# Patient Record
Sex: MALE | Employment: OTHER | URBAN - METROPOLITAN AREA
[De-identification: names, ages, dates, MRNs, and addresses within clinical notes are randomized per-mention and may not be internally consistent; named-entity substitution may affect disease eponyms.]

---

## 2019-02-22 ENCOUNTER — APPOINTMENT (OUTPATIENT)
Dept: GENERAL RADIOLOGY | Age: 74
End: 2019-02-22
Payer: MEDICARE

## 2019-02-22 ENCOUNTER — HOSPITAL ENCOUNTER (EMERGENCY)
Age: 74
Discharge: HOME | End: 2019-02-22
Attending: EMERGENCY MEDICINE
Payer: MEDICARE

## 2019-02-22 VITALS
WEIGHT: 180 LBS | HEART RATE: 86 BPM | SYSTOLIC BLOOD PRESSURE: 182 MMHG | RESPIRATION RATE: 18 BRPM | BODY MASS INDEX: 28.19 KG/M2 | TEMPERATURE: 98.1 F | DIASTOLIC BLOOD PRESSURE: 104 MMHG | OXYGEN SATURATION: 99 %
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
## 2019-02-22 DIAGNOSIS — I10 ESSENTIAL HYPERTENSION: ICD-10-CM

## 2019-02-22 DIAGNOSIS — L03.113 CELLULITIS OF RIGHT UPPER EXTREMITY: ICD-10-CM

## 2019-02-22 LAB
ALBUMIN SERPL-MCNC: 4.2 G/DL (ref 3.5–4.6)
ALP BLD-CCNC: 56 U/L (ref 35–104)
ALT SERPL-CCNC: 20 U/L (ref 0–41)
ANION GAP SERPL CALCULATED.3IONS-SCNC: 14 MEQ/L (ref 9–15)
AST SERPL-CCNC: 21 U/L (ref 0–40)
BASOPHILS ABSOLUTE: 0 K/UL (ref 0–0.2)
BASOPHILS RELATIVE PERCENT: 0.3 %
BILIRUB SERPL-MCNC: 1 MG/DL (ref 0.2–0.7)
BUN BLDV-MCNC: 18 MG/DL (ref 8–23)
CALCIUM SERPL-MCNC: 9 MG/DL (ref 8.5–9.9)
CHLORIDE BLD-SCNC: 97 MEQ/L (ref 95–107)
CO2: 22 MEQ/L (ref 20–31)
CREAT SERPL-MCNC: 0.95 MG/DL (ref 0.7–1.2)
EOSINOPHILS ABSOLUTE: 0.1 K/UL (ref 0–0.7)
EOSINOPHILS RELATIVE PERCENT: 0.8 %
GFR AFRICAN AMERICAN: >60
GFR NON-AFRICAN AMERICAN: >60
GLOBULIN: 3.8 G/DL (ref 2.3–3.5)
GLUCOSE BLD-MCNC: 134 MG/DL (ref 70–99)
HCT VFR BLD CALC: 47.2 % (ref 42–52)
HEMOGLOBIN: 16.7 G/DL (ref 14–18)
LYMPHOCYTES ABSOLUTE: 1 K/UL (ref 1–4.8)
LYMPHOCYTES RELATIVE PERCENT: 11.7 %
MCH RBC QN AUTO: 32 PG (ref 27–31.3)
MCHC RBC AUTO-ENTMCNC: 35.3 % (ref 33–37)
MCV RBC AUTO: 90.7 FL (ref 80–100)
MONOCYTES ABSOLUTE: 0.6 K/UL (ref 0.2–0.8)
MONOCYTES RELATIVE PERCENT: 7 %
NEUTROPHILS ABSOLUTE: 6.7 K/UL (ref 1.4–6.5)
NEUTROPHILS RELATIVE PERCENT: 80.2 %
PDW BLD-RTO: 12.9 % (ref 11.5–14.5)
PLATELET # BLD: 220 K/UL (ref 130–400)
POTASSIUM SERPL-SCNC: 3.7 MEQ/L (ref 3.4–4.9)
RBC # BLD: 5.2 M/UL (ref 4.7–6.1)
SODIUM BLD-SCNC: 133 MEQ/L (ref 135–144)
TOTAL PROTEIN: 8 G/DL (ref 6.3–8)
WBC # BLD: 8.4 K/UL (ref 4.8–10.8)

## 2019-02-22 PROCEDURE — 96365 THER/PROPH/DIAG IV INF INIT: CPT

## 2019-02-22 PROCEDURE — 36415 COLL VENOUS BLD VENIPUNCTURE: CPT

## 2019-02-22 PROCEDURE — 85025 COMPLETE CBC W/AUTO DIFF WBC: CPT

## 2019-02-22 PROCEDURE — 6370000000 HC RX 637 (ALT 250 FOR IP): Performed by: EMERGENCY MEDICINE

## 2019-02-22 PROCEDURE — 73130 X-RAY EXAM OF HAND: CPT

## 2019-02-22 PROCEDURE — 90471 IMMUNIZATION ADMIN: CPT | Performed by: EMERGENCY MEDICINE

## 2019-02-22 PROCEDURE — 90715 TDAP VACCINE 7 YRS/> IM: CPT | Performed by: EMERGENCY MEDICINE

## 2019-02-22 PROCEDURE — 80053 COMPREHEN METABOLIC PANEL: CPT

## 2019-02-22 PROCEDURE — 99283 EMERGENCY DEPT VISIT LOW MDM: CPT

## 2019-02-22 PROCEDURE — 87040 BLOOD CULTURE FOR BACTERIA: CPT

## 2019-02-22 PROCEDURE — 2580000003 HC RX 258: Performed by: EMERGENCY MEDICINE

## 2019-02-22 PROCEDURE — 96375 TX/PRO/DX INJ NEW DRUG ADDON: CPT

## 2019-02-22 PROCEDURE — 6360000002 HC RX W HCPCS: Performed by: EMERGENCY MEDICINE

## 2019-02-22 RX ORDER — 0.9 % SODIUM CHLORIDE 0.9 %
1000 INTRAVENOUS SOLUTION INTRAVENOUS ONCE
Status: COMPLETED | OUTPATIENT
Start: 2019-02-22 | End: 2019-02-22

## 2019-02-22 RX ORDER — ACETAMINOPHEN 500 MG
1000 TABLET ORAL ONCE
Status: COMPLETED | OUTPATIENT
Start: 2019-02-22 | End: 2019-02-22

## 2019-02-22 RX ORDER — AMLODIPINE BESYLATE 10 MG/1
10 TABLET ORAL DAILY
Qty: 30 TABLET | Refills: 0 | Status: SHIPPED | OUTPATIENT
Start: 2019-02-22 | End: 2019-03-26 | Stop reason: SDUPTHER

## 2019-02-22 RX ORDER — AMOXICILLIN AND CLAVULANATE POTASSIUM 875; 125 MG/1; MG/1
1 TABLET, FILM COATED ORAL 2 TIMES DAILY
Qty: 28 TABLET | Refills: 0 | Status: SHIPPED | OUTPATIENT
Start: 2019-02-22 | End: 2019-03-08

## 2019-02-22 RX ORDER — KETOROLAC TROMETHAMINE 15 MG/ML
15 INJECTION, SOLUTION INTRAMUSCULAR; INTRAVENOUS ONCE
Status: COMPLETED | OUTPATIENT
Start: 2019-02-22 | End: 2019-02-22

## 2019-02-22 RX ADMIN — TETANUS TOXOID, REDUCED DIPHTHERIA TOXOID AND ACELLULAR PERTUSSIS VACCINE, ADSORBED 0.5 ML: 5; 2.5; 8; 8; 2.5 SUSPENSION INTRAMUSCULAR at 12:22

## 2019-02-22 RX ADMIN — KETOROLAC TROMETHAMINE 15 MG: 15 INJECTION, SOLUTION INTRAMUSCULAR; INTRAVENOUS at 12:21

## 2019-02-22 RX ADMIN — ACETAMINOPHEN 1000 MG: 500 TABLET ORAL at 12:21

## 2019-02-22 RX ADMIN — SODIUM CHLORIDE 1000 ML: 9 INJECTION, SOLUTION INTRAVENOUS at 12:21

## 2019-02-22 RX ADMIN — PIPERACILLIN SODIUM,TAZOBACTAM SODIUM 3.38 G: 3; .375 INJECTION, POWDER, FOR SOLUTION INTRAVENOUS at 12:30

## 2019-02-22 ASSESSMENT — ENCOUNTER SYMPTOMS
BACK PAIN: 0
NAUSEA: 0
VOMITING: 0
SHORTNESS OF BREATH: 0
COUGH: 0
SORE THROAT: 0
ABDOMINAL PAIN: 0
DIARRHEA: 0

## 2019-02-22 ASSESSMENT — PAIN DESCRIPTION - PAIN TYPE: TYPE: ACUTE PAIN

## 2019-02-22 ASSESSMENT — PAIN DESCRIPTION - DESCRIPTORS: DESCRIPTORS: SORE

## 2019-02-22 ASSESSMENT — PAIN DESCRIPTION - PROGRESSION: CLINICAL_PROGRESSION: GRADUALLY IMPROVING

## 2019-02-22 ASSESSMENT — PAIN DESCRIPTION - LOCATION
LOCATION: HAND
LOCATION: HAND

## 2019-02-22 ASSESSMENT — PAIN - FUNCTIONAL ASSESSMENT: PAIN_FUNCTIONAL_ASSESSMENT: 0-10

## 2019-02-22 ASSESSMENT — PAIN DESCRIPTION - ORIENTATION
ORIENTATION: RIGHT
ORIENTATION: RIGHT

## 2019-02-22 ASSESSMENT — PAIN DESCRIPTION - FREQUENCY: FREQUENCY: CONTINUOUS

## 2019-02-22 ASSESSMENT — PAIN DESCRIPTION - ONSET: ONSET: ON-GOING

## 2019-02-22 ASSESSMENT — PAIN SCALES - GENERAL
PAINLEVEL_OUTOF10: 1
PAINLEVEL_OUTOF10: 1

## 2019-02-27 LAB
BLOOD CULTURE, ROUTINE: NORMAL
CULTURE, BLOOD 2: NORMAL

## 2019-03-23 ENCOUNTER — HOSPITAL ENCOUNTER (EMERGENCY)
Age: 74
Discharge: HOME | DRG: 603 | End: 2019-03-23
Payer: MEDICARE

## 2019-03-23 VITALS
OXYGEN SATURATION: 99 % | TEMPERATURE: 97.5 F | HEART RATE: 95 BPM | SYSTOLIC BLOOD PRESSURE: 159 MMHG | BODY MASS INDEX: 27.28 KG/M2 | HEIGHT: 68 IN | DIASTOLIC BLOOD PRESSURE: 88 MMHG | WEIGHT: 180 LBS | RESPIRATION RATE: 18 BRPM
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
## 2019-03-23 PROCEDURE — 6370000000 HC RX 637 (ALT 250 FOR IP): Performed by: NURSE PRACTITIONER

## 2019-03-23 PROCEDURE — 12002 RPR S/N/AX/GEN/TRNK2.6-7.5CM: CPT

## 2019-03-23 PROCEDURE — 2500000003 HC RX 250 WO HCPCS: Performed by: NURSE PRACTITIONER

## 2019-03-23 PROCEDURE — 99282 EMERGENCY DEPT VISIT SF MDM: CPT

## 2019-03-23 PROCEDURE — 2580000003 HC RX 258: Performed by: NURSE PRACTITIONER

## 2019-03-23 RX ORDER — LIDOCAINE HYDROCHLORIDE 20 MG/ML
5 INJECTION, SOLUTION INFILTRATION; PERINEURAL ONCE
Status: COMPLETED | OUTPATIENT
Start: 2019-03-23 | End: 2019-03-23

## 2019-03-23 RX ORDER — DIAPER,BRIEF,INFANT-TODD,DISP
EACH MISCELLANEOUS 2 TIMES DAILY
Status: DISCONTINUED | OUTPATIENT
Start: 2019-03-23 | End: 2019-03-23

## 2019-03-23 RX ORDER — GINSENG 100 MG
CAPSULE ORAL ONCE
Status: DISCONTINUED | OUTPATIENT
Start: 2019-03-23 | End: 2019-03-23

## 2019-03-23 RX ORDER — AMOXICILLIN AND CLAVULANATE POTASSIUM 875; 125 MG/1; MG/1
1 TABLET, FILM COATED ORAL 2 TIMES DAILY
Qty: 20 TABLET | Refills: 0 | Status: ON HOLD | OUTPATIENT
Start: 2019-03-23 | End: 2019-03-28 | Stop reason: HOSPADM

## 2019-03-23 RX ORDER — MAGNESIUM HYDROXIDE 1200 MG/15ML
1000 LIQUID ORAL ONCE
Status: COMPLETED | OUTPATIENT
Start: 2019-03-23 | End: 2019-03-23

## 2019-03-23 RX ORDER — AMOXICILLIN AND CLAVULANATE POTASSIUM 875; 125 MG/1; MG/1
1 TABLET, FILM COATED ORAL ONCE
Status: COMPLETED | OUTPATIENT
Start: 2019-03-23 | End: 2019-03-23

## 2019-03-23 RX ORDER — DIAPER,BRIEF,INFANT-TODD,DISP
EACH MISCELLANEOUS ONCE
Status: COMPLETED | OUTPATIENT
Start: 2019-03-23 | End: 2019-03-23

## 2019-03-23 RX ORDER — BACITRACIN, NEOMYCIN, POLYMYXIN B 400; 3.5; 5 [USP'U]/G; MG/G; [USP'U]/G
OINTMENT TOPICAL
Qty: 1 TUBE | Refills: 1 | Status: ON HOLD | OUTPATIENT
Start: 2019-03-23 | End: 2021-09-27 | Stop reason: HOSPADM

## 2019-03-23 RX ADMIN — BACITRACIN ZINC 1 G: 500 OINTMENT TOPICAL at 18:09

## 2019-03-23 RX ADMIN — AMOXICILLIN AND CLAVULANATE POTASSIUM 1 TABLET: 875; 125 TABLET, FILM COATED ORAL at 18:09

## 2019-03-23 RX ADMIN — LIDOCAINE HYDROCHLORIDE 5 ML: 20 INJECTION, SOLUTION INFILTRATION; PERINEURAL at 18:11

## 2019-03-23 RX ADMIN — SODIUM CHLORIDE 1000 ML: 900 IRRIGANT IRRIGATION at 18:09

## 2019-03-23 SDOH — HEALTH STABILITY: MENTAL HEALTH: HOW OFTEN DO YOU HAVE A DRINK CONTAINING ALCOHOL?: NEVER

## 2019-03-23 ASSESSMENT — ENCOUNTER SYMPTOMS
SHORTNESS OF BREATH: 0
PHOTOPHOBIA: 0
SORE THROAT: 0
BACK PAIN: 0
COUGH: 0
NAUSEA: 0
EYE PAIN: 0
ABDOMINAL PAIN: 0
DIARRHEA: 0
VOMITING: 0
RHINORRHEA: 0

## 2019-03-23 NOTE — ED NOTES
Patients wounds irrigated to bilateral arms. Stitches placed by Josefa Knox NP. Bacitracin ointment applied to all wounds, non adherent 4x4s, and kerlix applied. Patient educated on how to clean wounds. Patient medicated with PO antibiotics prior to DC. Educated on proper way to take antibiotics. Patient to FU in 5-7 days for stitch removal.  Up with steady gait to wheelchair and wheeled to sisters car.       Dannie Abernathy RN  03/23/19 0284

## 2019-03-23 NOTE — ED PROVIDER NOTES
[address] [facility] ED  eMERGENCY dEPARTMENT eNCOUnter      Pt Name: Audrey Moore  MRN: [id number]  [place] 1945  Date of evaluation: 3/23/2019  Provider: HOWARD Gomez 6626       Chief Complaint   Patient presents with    Animal Bite     pt has multiple bites and scracthes from his cat to bilateral hands and wrists. Left worse than right          HISTORY OF PRESENT ILLNESS   (Location/Symptom, Timing/Onset,Context/Setting, Quality, Duration, Modifying Factors, Severity)  Note limiting factors. Audrey Moore is a 68 y.o. male who presents to the emergency department with complaints of cat bite/scratch from own animal. Similar occurrence with same animal about a month ago. No change in motor/sensation. Tetanus is UTD. Location/Symptom - hand/wrist cat bites  Timing/Onset - today  Context/Setting - as above  Quality - lacerations  Duration - hours  Modifying Factors - none  Severity - mild to moderate    Nursing Notes were reviewed. REVIEW OF SYSTEMS    (2-9 systems for level 4, 10 or more for level 5)     Review of Systems   Constitutional: Negative for chills, diaphoresis, fatigue and fever. HENT: Negative for congestion, rhinorrhea and sore throat. Eyes: Negative for photophobia and pain. Respiratory: Negative for cough and shortness of breath. Cardiovascular: Negative for chest pain and palpitations. Gastrointestinal: Negative for abdominal pain, diarrhea, nausea and vomiting. Genitourinary: Negative for dysuria and flank pain. Musculoskeletal: Negative for back pain. Skin: Positive for wound. Negative for rash. Neurological: Negative for dizziness, light-headedness and headaches. Psychiatric/Behavioral: Negative. All other systems reviewed and are negative. Except as noted above the remainder of the review of systems was reviewed and negative.        PAST MEDICAL HISTORY     Past Medical History:   Diagnosis Date    Hypertension Past Surgical History:   Procedure Laterality Date    BACK SURGERY       Social History     Socioeconomic History    Marital status: Single     Spouse name: None    Number of children: None    Years of education: None    Highest education level: None   Occupational History    None   Social Needs    Financial resource strain: None    Food insecurity:     Worry: None     Inability: None    Transportation needs:     Medical: None     Non-medical: None   Tobacco Use    Smoking status: Never Smoker   Substance and Sexual Activity    Alcohol use: Never     Alcohol/week: 0.0 oz     Frequency: Never    Drug use: Never    Sexual activity: None   Lifestyle    Physical activity:     Days per week: None     Minutes per session: None    Stress: None   Relationships    Social connections:     Talks on phone: None     Gets together: None     Attends [employer] service: None     Active member of club or organization: None     Attends meetings of clubs or organizations: None     Relationship status: None    Intimate partner violence:     Fear of current or ex partner: None     Emotionally abused: None     Physically abused: None     Forced sexual activity: None   Other Topics Concern    None   Social History Narrative    None       SCREENINGS      @FLOW([id number])@      PHYSICAL EXAM    (up to 7 for level 4, 8 or more for level 5)     ED Triage Vitals [03/23/19 1712]   BP Temp Temp Source Pulse Resp SpO2 Height Weight   (!) 159/88 97.5 °F (36.4 °C) Oral 95 18 99 % 5' 8\" (1.727 m) 180 lb (81.6 kg)       Physical Exam   Constitutional: He is oriented to person, place, and time. He appears well-developed and well-nourished. He is active. No distress. HENT:   Head: Normocephalic and atraumatic. Mouth/Throat: Mucous membranes are normal.   Eyes: Conjunctivae and lids are normal.   Neck: Normal range of motion. Neck supple.    Cardiovascular: Normal rate, regular rhythm, normal heart sounds, intact distal pulses and normal pulses. Pulmonary/Chest: Effort normal and breath sounds normal.   Abdominal: Soft. Normal appearance and bowel sounds are normal. There is no tenderness. Musculoskeletal:   Multiple lacerations to b/l hand and wrist.  3 larger lacerations requiring loose closure to left wrist.    Lymphadenopathy:     He has no cervical adenopathy. Neurological: He is alert and oriented to person, place, and time. He has normal strength. Skin: Skin is warm, dry and intact. Capillary refill takes less than 2 seconds. No rash noted. He is not diaphoretic. Psychiatric: Judgment and thought content normal.   Nursing note and vitals reviewed. DIAGNOSTIC RESULTS     EKG: All EKG's are interpreted by the Emergency Department Physician who either signs or Co-signsthis chart in the absence of a cardiologist.        RADIOLOGY:   Shailesh Landau such as CT, Ultrasound and MRI are read by the radiologist. Plain radiographic images are visualized and preliminarily interpreted by the emergency physician with the below findings:        Interpretation per the Radiologist below, if available at the time ofthis note:    No orders to display         ED BEDSIDE ULTRASOUND:   Performed by ED Physician - none    LABS:  Labs Reviewed - No data to display    All other labs were within normal range or not returned as of this dictation. EMERGENCY DEPARTMENT COURSE and DIFFERENTIAL DIAGNOSIS/MDM:   Vitals:    Vitals:    03/23/19 1712   BP: (!) 159/88   Pulse: 95   Resp: 18   Temp: 97.5 °F (36.4 °C)   TempSrc: Oral   SpO2: 99%   Weight: 180 lb (81.6 kg)   Height: 5' 8\" (1.727 m)            MDM tetanus is UTD. No s/sx of infection. Lacerations sutured loose. ellie well. Started on augmentin. Standard anticipatory guidance given to patient upon discharge. Have given them a specific time frame in which to follow-up and who to follow-up with.  I have also advised them that they should return to the emergency department if they get worse, or not getting better or develop any new or concerning symptoms. Patient demonstrates understanding and all questions were answered. CRITICAL CARE TIME       CONSULTS:  None    PROCEDURES:  Unless otherwise noted below, none     Lac Repair  Date/Time: 3/23/2019 5:49 PM  Performed by: HOWARD Suero CNP  Authorized by: HOWARD Suero CNP     Consent:     Consent obtained:  Verbal    Consent given by:  Patient    Risks discussed:  Infection, need for additional repair, nerve damage, poor wound healing, pain, poor cosmetic result, vascular damage, tendon damage and retained foreign body    Alternatives discussed:  No treatment, observation, delayed treatment and referral  Anesthesia (see MAR for exact dosages): Anesthesia method:  Local infiltration    Local anesthetic:  Lidocaine 1% w/o epi  Laceration details:     Location:  Shoulder/arm    Shoulder/arm location:  L lower arm    Length (cm):  3    Depth (mm):  2  Repair type:     Repair type:  Simple  Pre-procedure details:     Preparation:  Patient was prepped and draped in usual sterile fashion  Exploration:     Hemostasis achieved with:  Direct pressure    Wound exploration: wound explored through full range of motion and entire depth of wound probed and visualized    Treatment:     Area cleansed with:  Saline    Amount of cleaning:  Extensive    Irrigation solution:  Sterile saline    Irrigation volume:  1000  Skin repair:     Repair method:  Sutures    Suture size:  5-0    Suture material:  Nylon    Suture technique:  Simple interrupted    Number of sutures:  3  Approximation:     Approximation:  Loose  Post-procedure details:     Dressing:  Antibiotic ointment    Patient tolerance of procedure:   Tolerated well, no immediate complications  Lac Repair  Date/Time: 3/23/2019 5:52 PM  Performed by: HOWARD Suero CNP  Authorized by: HOWARD Suero CNP     Consent:     Consent obtained:  Verbal    Consent given by:  Patient    Risks discussed:  Infection, need for additional repair, nerve damage, poor wound healing, poor cosmetic result, pain, retained foreign body, tendon damage and vascular damage    Alternatives discussed:  No treatment, observation, referral and delayed treatment  Anesthesia (see MAR for exact dosages): Anesthesia method:  Local infiltration    Local anesthetic:  Lidocaine 1% w/o epi  Laceration details:     Location:  Shoulder/arm    Shoulder/arm location:  L lower arm    Length (cm):  2.5    Depth (mm):  2  Repair type:     Repair type:  Simple  Pre-procedure details:     Preparation:  Patient was prepped and draped in usual sterile fashion  Exploration:     Hemostasis achieved with:  Direct pressure    Wound exploration: wound explored through full range of motion and entire depth of wound probed and visualized      Contaminated: no    Treatment:     Area cleansed with:  Saline    Amount of cleaning:  Extensive    Irrigation solution:  Sterile saline    Irrigation volume:  1000  Skin repair:     Repair method:  Sutures    Suture size:  5-0    Suture material:  Nylon    Suture technique:  Simple interrupted    Number of sutures:  1  Approximation:     Approximation:  Loose  Post-procedure details:     Dressing:  Antibiotic ointment    Patient tolerance of procedure: Tolerated well, no immediate complications  Lac Repair  Date/Time: 3/23/2019 5:53 PM  Performed by: HOWARD Gilbert CNP  Authorized by: HOWARD Gilbert CNP     Consent:     Consent obtained:  Verbal    Consent given by:  Patient    Risks discussed:  Infection, need for additional repair, nerve damage, poor wound healing, poor cosmetic result, pain, retained foreign body, tendon damage and vascular damage    Alternatives discussed:  No treatment, delayed treatment, observation and referral  Anesthesia (see MAR for exact dosages):      Anesthesia method:  Local infiltration    Local anesthetic:  Lidocaine 1% w/o epi  Laceration details:     Location:  Shoulder/arm    Shoulder/arm location:  L lower arm    Length (cm):  2.5    Depth (mm):  2  Repair type:     Repair type:  Simple  Pre-procedure details:     Preparation:  Patient was prepped and draped in usual sterile fashion  Exploration:     Hemostasis achieved with:  Direct pressure    Wound exploration: wound explored through full range of motion and entire depth of wound probed and visualized      Contaminated: no    Treatment:     Area cleansed with:  Saline    Amount of cleaning:  Extensive    Irrigation solution:  Sterile saline    Irrigation volume:  1000  Skin repair:     Repair method:  Sutures    Suture size:  5-0    Suture material:  Nylon    Suture technique:  Simple interrupted    Number of sutures:  1  Approximation:     Approximation:  Loose  Post-procedure details:     Dressing:  Antibiotic ointment    Patient tolerance of procedure: Tolerated well, no immediate complications        FINAL IMPRESSION      1. Cat scratch    2. Cat bite, initial encounter          DISPOSITION/PLAN   DISPOSITION        PATIENT REFERRED TO:  Stuart Mendoza MD  [address] [address]  [phone]    In 2 days  For continued evaluation and management      DISCHARGE MEDICATIONS:  New Prescriptions    AMOXICILLIN-CLAVULANATE (AUGMENTIN) 875-125 MG PER TABLET    Take 1 tablet by mouth 2 times daily for 10 days    NEOMYCIN-BACITRACIN-POLYMYXIN (NEOSPORIN) 400-5-5000 OINTMENT    Apply topically 2 times daily.           (Please notethat portions of this note were completed with a voice recognition program.  Efforts were made to edit the dictations but occasionally words are mis-transcribed.)    HOWARD Hall CNP (electronically signed)  Attending Emergency Physician          HOWARD Hall CNP  03/23/19 7254

## 2019-03-23 NOTE — ED TRIAGE NOTES
Pt to ER with multiple scratches and bites to bilateral hands and wrists. Left side worse than right. Bleeding is controlled at this time.

## 2019-03-26 ENCOUNTER — APPOINTMENT (OUTPATIENT)
Dept: GENERAL RADIOLOGY | Age: 74
DRG: 603 | End: 2019-03-26
Payer: MEDICARE

## 2019-03-26 ENCOUNTER — HOSPITAL ENCOUNTER (INPATIENT)
Age: 74
LOS: 3 days | Discharge: HOME | DRG: 603 | End: 2019-03-29
Attending: STUDENT IN AN ORGANIZED HEALTH CARE EDUCATION/TRAINING PROGRAM | Admitting: INTERNAL MEDICINE
Payer: MEDICARE

## 2019-03-26 ENCOUNTER — OFFICE VISIT (OUTPATIENT)
Dept: INTERNAL MEDICINE CLINIC | Age: 74
End: 2019-03-26
Payer: MEDICARE

## 2019-03-26 VITALS
SYSTOLIC BLOOD PRESSURE: 132 MMHG | OXYGEN SATURATION: 99 % | HEIGHT: 68 IN | WEIGHT: 180 LBS | BODY MASS INDEX: 27.28 KG/M2 | TEMPERATURE: 97.6 F | DIASTOLIC BLOOD PRESSURE: 88 MMHG | RESPIRATION RATE: 18 BRPM | HEART RATE: 86 BPM
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
## 2019-03-26 DIAGNOSIS — S61.452D CAT BITE OF LEFT HAND INCLUDING FINGERS WITH INFECTION, SUBSEQUENT ENCOUNTER: Primary | ICD-10-CM

## 2019-03-26 DIAGNOSIS — L08.9 CAT BITE OF LEFT HAND INCLUDING FINGERS WITH INFECTION, SUBSEQUENT ENCOUNTER: Primary | ICD-10-CM
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
## 2019-03-26 DIAGNOSIS — Z78.9 FAILURE OF OUTPATIENT TREATMENT: ICD-10-CM

## 2019-03-26 DIAGNOSIS — L03.114 CELLULITIS OF LEFT HAND: ICD-10-CM

## 2019-03-26 PROBLEM — L03.90 CELLULITIS: Status: ACTIVE | Noted: 2019-03-26

## 2019-03-26 LAB
ALBUMIN SERPL-MCNC: 4.1 G/DL (ref 3.5–4.6)
ALP BLD-CCNC: 59 U/L (ref 35–104)
ALT SERPL-CCNC: 28 U/L (ref 0–41)
ANION GAP SERPL CALCULATED.3IONS-SCNC: 14 MEQ/L (ref 9–15)
APTT: 27.9 SEC (ref 21.6–35.4)
AST SERPL-CCNC: 26 U/L (ref 0–40)
BASOPHILS ABSOLUTE: 0.1 K/UL (ref 0–0.2)
BASOPHILS RELATIVE PERCENT: 0.7 %
BILIRUB SERPL-MCNC: 0.6 MG/DL (ref 0.2–0.7)
BUN BLDV-MCNC: 23 MG/DL (ref 8–23)
CALCIUM SERPL-MCNC: 9 MG/DL (ref 8.5–9.9)
CHLORIDE BLD-SCNC: 101 MEQ/L (ref 95–107)
CO2: 22 MEQ/L (ref 20–31)
CREAT SERPL-MCNC: 0.94 MG/DL (ref 0.7–1.2)
EOSINOPHILS ABSOLUTE: 0.1 K/UL (ref 0–0.7)
EOSINOPHILS RELATIVE PERCENT: 1.6 %
GFR AFRICAN AMERICAN: >60
GFR NON-AFRICAN AMERICAN: >60
GLOBULIN: 3.7 G/DL (ref 2.3–3.5)
GLUCOSE BLD-MCNC: 148 MG/DL (ref 70–99)
HCT VFR BLD CALC: 42.5 % (ref 42–52)
HEMOGLOBIN: 15.4 G/DL (ref 14–18)
INR BLD: 1
LACTIC ACID: 0.9 MMOL/L (ref 0.5–2.2)
LYMPHOCYTES ABSOLUTE: 1.3 K/UL (ref 1–4.8)
LYMPHOCYTES RELATIVE PERCENT: 14.9 %
MCH RBC QN AUTO: 31.9 PG (ref 27–31.3)
MCHC RBC AUTO-ENTMCNC: 36.2 % (ref 33–37)
MCV RBC AUTO: 88.3 FL (ref 80–100)
MONOCYTES ABSOLUTE: 0.8 K/UL (ref 0.2–0.8)
MONOCYTES RELATIVE PERCENT: 9 %
NEUTROPHILS ABSOLUTE: 6.3 K/UL (ref 1.4–6.5)
NEUTROPHILS RELATIVE PERCENT: 73.8 %
PDW BLD-RTO: 12.6 % (ref 11.5–14.5)
PLATELET # BLD: 231 K/UL (ref 130–400)
POTASSIUM SERPL-SCNC: 3.8 MEQ/L (ref 3.4–4.9)
PROTHROMBIN TIME: 10.3 SEC (ref 9–11.5)
RBC # BLD: 4.81 M/UL (ref 4.7–6.1)
SODIUM BLD-SCNC: 137 MEQ/L (ref 135–144)
TOTAL CK: 65 U/L (ref 0–190)
TOTAL PROTEIN: 7.8 G/DL (ref 6.3–8)
WBC # BLD: 8.5 K/UL (ref 4.8–10.8)

## 2019-03-26 PROCEDURE — 87040 BLOOD CULTURE FOR BACTERIA: CPT

## 2019-03-26 PROCEDURE — 99222 1ST HOSP IP/OBS MODERATE 55: CPT | Performed by: INTERNAL MEDICINE

## 2019-03-26 PROCEDURE — 3017F COLORECTAL CA SCREEN DOC REV: CPT | Performed by: FAMILY MEDICINE

## 2019-03-26 PROCEDURE — 1111F DSCHRG MED/CURRENT MED MERGE: CPT | Performed by: FAMILY MEDICINE

## 2019-03-26 PROCEDURE — 2580000003 HC RX 258: Performed by: STUDENT IN AN ORGANIZED HEALTH CARE EDUCATION/TRAINING PROGRAM

## 2019-03-26 PROCEDURE — G8419 CALC BMI OUT NRM PARAM NOF/U: HCPCS | Performed by: FAMILY MEDICINE

## 2019-03-26 PROCEDURE — 96375 TX/PRO/DX INJ NEW DRUG ADDON: CPT

## 2019-03-26 PROCEDURE — 1123F ACP DISCUSS/DSCN MKR DOCD: CPT | Performed by: FAMILY MEDICINE

## 2019-03-26 PROCEDURE — 87205 SMEAR GRAM STAIN: CPT

## 2019-03-26 PROCEDURE — 96365 THER/PROPH/DIAG IV INF INIT: CPT

## 2019-03-26 PROCEDURE — G8427 DOCREV CUR MEDS BY ELIG CLIN: HCPCS | Performed by: FAMILY MEDICINE

## 2019-03-26 PROCEDURE — 87075 CULTR BACTERIA EXCEPT BLOOD: CPT

## 2019-03-26 PROCEDURE — 6360000002 HC RX W HCPCS: Performed by: STUDENT IN AN ORGANIZED HEALTH CARE EDUCATION/TRAINING PROGRAM

## 2019-03-26 PROCEDURE — 2060000000 HC ICU INTERMEDIATE R&B

## 2019-03-26 PROCEDURE — 73130 X-RAY EXAM OF HAND: CPT

## 2019-03-26 PROCEDURE — 2580000003 HC RX 258: Performed by: PHYSICIAN ASSISTANT

## 2019-03-26 PROCEDURE — 36415 COLL VENOUS BLD VENIPUNCTURE: CPT

## 2019-03-26 PROCEDURE — 85025 COMPLETE CBC W/AUTO DIFF WBC: CPT

## 2019-03-26 PROCEDURE — 99203 OFFICE O/P NEW LOW 30 MIN: CPT | Performed by: FAMILY MEDICINE

## 2019-03-26 PROCEDURE — G8484 FLU IMMUNIZE NO ADMIN: HCPCS | Performed by: FAMILY MEDICINE

## 2019-03-26 PROCEDURE — 96367 TX/PROPH/DG ADDL SEQ IV INF: CPT

## 2019-03-26 PROCEDURE — 1036F TOBACCO NON-USER: CPT | Performed by: FAMILY MEDICINE

## 2019-03-26 PROCEDURE — 6360000002 HC RX W HCPCS: Performed by: PHYSICIAN ASSISTANT

## 2019-03-26 PROCEDURE — 82550 ASSAY OF CK (CPK): CPT

## 2019-03-26 PROCEDURE — 99285 EMERGENCY DEPT VISIT HI MDM: CPT

## 2019-03-26 PROCEDURE — 80053 COMPREHEN METABOLIC PANEL: CPT

## 2019-03-26 PROCEDURE — 85730 THROMBOPLASTIN TIME PARTIAL: CPT

## 2019-03-26 PROCEDURE — 73110 X-RAY EXAM OF WRIST: CPT

## 2019-03-26 PROCEDURE — 85610 PROTHROMBIN TIME: CPT

## 2019-03-26 PROCEDURE — 83605 ASSAY OF LACTIC ACID: CPT

## 2019-03-26 PROCEDURE — 6370000000 HC RX 637 (ALT 250 FOR IP): Performed by: INTERNAL MEDICINE

## 2019-03-26 PROCEDURE — 4040F PNEUMOC VAC/ADMIN/RCVD: CPT | Performed by: FAMILY MEDICINE

## 2019-03-26 PROCEDURE — 87070 CULTURE OTHR SPECIMN AEROBIC: CPT

## 2019-03-26 RX ORDER — CIPROFLOXACIN 2 MG/ML
400 INJECTION, SOLUTION INTRAVENOUS EVERY 12 HOURS
Status: DISCONTINUED | OUTPATIENT
Start: 2019-03-26 | End: 2019-03-26

## 2019-03-26 RX ORDER — SODIUM CHLORIDE 0.9 % (FLUSH) 0.9 %
10 SYRINGE (ML) INJECTION PRN
Status: DISCONTINUED | OUTPATIENT
Start: 2019-03-26 | End: 2019-03-29 | Stop reason: HOSPADM

## 2019-03-26 RX ORDER — AMLODIPINE BESYLATE 10 MG/1
10 TABLET ORAL DAILY
Qty: 30 TABLET | Refills: 5 | Status: SHIPPED | OUTPATIENT
Start: 2019-03-26

## 2019-03-26 RX ORDER — SODIUM CHLORIDE 0.9 % (FLUSH) 0.9 %
10 SYRINGE (ML) INJECTION EVERY 12 HOURS SCHEDULED
Status: DISCONTINUED | OUTPATIENT
Start: 2019-03-26 | End: 2019-03-29 | Stop reason: HOSPADM

## 2019-03-26 RX ORDER — KETOROLAC TROMETHAMINE 15 MG/ML
15 INJECTION, SOLUTION INTRAMUSCULAR; INTRAVENOUS ONCE
Status: COMPLETED | OUTPATIENT
Start: 2019-03-26 | End: 2019-03-26

## 2019-03-26 RX ORDER — BACITRACIN, NEOMYCIN, POLYMYXIN B 400; 3.5; 5 [USP'U]/G; MG/G; [USP'U]/G
OINTMENT TOPICAL 2 TIMES DAILY
Status: DISCONTINUED | OUTPATIENT
Start: 2019-03-26 | End: 2019-03-29 | Stop reason: HOSPADM

## 2019-03-26 RX ORDER — ONDANSETRON 2 MG/ML
4 INJECTION INTRAMUSCULAR; INTRAVENOUS EVERY 6 HOURS PRN
Status: DISCONTINUED | OUTPATIENT
Start: 2019-03-26 | End: 2019-03-29 | Stop reason: HOSPADM

## 2019-03-26 RX ORDER — CLONIDINE HYDROCHLORIDE 0.1 MG/1
0.1 TABLET ORAL 2 TIMES DAILY
Status: DISCONTINUED | OUTPATIENT
Start: 2019-03-26 | End: 2019-03-29 | Stop reason: HOSPADM

## 2019-03-26 RX ORDER — CLONIDINE HYDROCHLORIDE 0.1 MG/1
0.1 TABLET ORAL 2 TIMES DAILY
Qty: 60 TABLET | Refills: 3 | Status: SHIPPED | OUTPATIENT
Start: 2019-03-26 | End: 2019-07-27 | Stop reason: SDUPTHER

## 2019-03-26 RX ORDER — SODIUM CHLORIDE 9 MG/ML
INJECTION, SOLUTION INTRAVENOUS CONTINUOUS
Status: DISCONTINUED | OUTPATIENT
Start: 2019-03-26 | End: 2019-03-26

## 2019-03-26 RX ORDER — AMLODIPINE BESYLATE 10 MG/1
10 TABLET ORAL DAILY
Status: DISCONTINUED | OUTPATIENT
Start: 2019-03-26 | End: 2019-03-29 | Stop reason: HOSPADM

## 2019-03-26 RX ADMIN — ENOXAPARIN SODIUM 40 MG: 40 INJECTION SUBCUTANEOUS at 18:11

## 2019-03-26 RX ADMIN — BACITRACIN ZINC, NEOMYCIN, POLYMYXIN B 1 G: 400; 3.5; 5 OINTMENT TOPICAL at 22:35

## 2019-03-26 RX ADMIN — KETOROLAC TROMETHAMINE 15 MG: 15 INJECTION, SOLUTION INTRAMUSCULAR; INTRAVENOUS at 14:26

## 2019-03-26 RX ADMIN — PIPERACILLIN SODIUM,TAZOBACTAM SODIUM 3.38 G: 3; .375 INJECTION, POWDER, FOR SOLUTION INTRAVENOUS at 14:26

## 2019-03-26 RX ADMIN — Medication 10 ML: at 19:50

## 2019-03-26 RX ADMIN — CLONIDINE HYDROCHLORIDE 0.1 MG: 0.1 TABLET ORAL at 19:49

## 2019-03-26 RX ADMIN — VANCOMYCIN HYDROCHLORIDE 1250 MG: 5 INJECTION, POWDER, LYOPHILIZED, FOR SOLUTION INTRAVENOUS at 14:30

## 2019-03-26 ASSESSMENT — ENCOUNTER SYMPTOMS
SHORTNESS OF BREATH: 0
DIARRHEA: 0
RESPIRATORY NEGATIVE: 1
VOMITING: 0
SINUS PRESSURE: 0
NAUSEA: 0
BACK PAIN: 0
TROUBLE SWALLOWING: 0
EYES NEGATIVE: 1
GASTROINTESTINAL NEGATIVE: 1
CHEST TIGHTNESS: 0
ALLERGIC/IMMUNOLOGIC NEGATIVE: 1
SHORTNESS OF BREATH: 0
ABDOMINAL PAIN: 0
RESPIRATORY NEGATIVE: 1
GASTROINTESTINAL NEGATIVE: 1
COUGH: 0

## 2019-03-26 ASSESSMENT — PATIENT HEALTH QUESTIONNAIRE - PHQ9
1. LITTLE INTEREST OR PLEASURE IN DOING THINGS: 0
SUM OF ALL RESPONSES TO PHQ QUESTIONS 1-9: 0
SUM OF ALL RESPONSES TO PHQ9 QUESTIONS 1 & 2: 0
2. FEELING DOWN, DEPRESSED OR HOPELESS: 0
SUM OF ALL RESPONSES TO PHQ QUESTIONS 1-9: 0

## 2019-03-26 ASSESSMENT — PAIN SCALES - GENERAL
PAINLEVEL_OUTOF10: 0
PAINLEVEL_OUTOF10: 0

## 2019-03-26 NOTE — ED PROVIDER NOTES
Hematological: Does not bruise/bleed easily. All other systems reviewed and are negative. Except as noted above the remainder of the review of systems was reviewed and negative. PAST MEDICAL HISTORY     Past Medical History:   Diagnosis Date    Hypertension          SURGICALHISTORY       Past Surgical History:   Procedure Laterality Date    BACK SURGERY           CURRENT MEDICATIONS       Previous Medications    AMOXICILLIN-CLAVULANATE (AUGMENTIN) 875-125 MG PER TABLET    Take 1 tablet by mouth 2 times daily for 10 days    NEOMYCIN-BACITRACIN-POLYMYXIN (NEOSPORIN) 400-5-5000 OINTMENT    Apply topically 2 times daily. ALLERGIES     Patient has no known allergies. FAMILY HISTORY     History reviewed. No pertinent family history.        SOCIAL HISTORY       Social History     Socioeconomic History    Marital status: Single     Spouse name: None    Number of children: None    Years of education: None    Highest education level: None   Occupational History    None   Social Needs    Financial resource strain: None    Food insecurity:     Worry: None     Inability: None    Transportation needs:     Medical: None     Non-medical: None   Tobacco Use    Smoking status: Never Smoker    Smokeless tobacco: Never Used   Substance and Sexual Activity    Alcohol use: Never     Alcohol/week: 0.0 oz     Frequency: Never    Drug use: Never    Sexual activity: None   Lifestyle    Physical activity:     Days per week: None     Minutes per session: None    Stress: None   Relationships    Social connections:     Talks on phone: None     Gets together: None     Attends [employer] service: None     Active member of club or organization: None     Attends meetings of clubs or organizations: None     Relationship status: None    Intimate partner violence:     Fear of current or ex partner: None     Emotionally abused: None     Physically abused: None     Forced sexual activity: None   Other Topics Concern    None   Social History Narrative    None       SCREENINGS      @FLOW([id number])@      PHYSICAL EXAM    (up to 7 for level 4, 8 or more for level 5)     ED Triage Vitals [03/26/19 1254]   BP Temp Temp Source Pulse Resp SpO2 Height Weight   (!) 164/77 97.9 °F (36.6 °C) Oral 86 18 97 % 5' 8\" (1.727 m) 180 lb (81.6 kg)       Physical Exam   Constitutional: He is oriented to person, place, and time. He appears well-developed and well-nourished. No distress. HENT:   Head: Normocephalic and atraumatic. Head is without Castellon's sign. Right Ear: External ear normal.   Left Ear: External ear normal.   Nose: Nose normal.   Mouth/Throat: Oropharynx is clear and moist. No oropharyngeal exudate. Eyes: Pupils are equal, round, and reactive to light. Conjunctivae and EOM are normal. No foreign body present in the right eye. Left eye exhibits no exudate. No scleral icterus. Neck: Normal range of motion. Neck supple. No JVD present. No neck rigidity. No tracheal deviation present. Cardiovascular: Normal rate, regular rhythm, normal heart sounds and intact distal pulses. Exam reveals no gallop, no distant heart sounds and no friction rub. No murmur heard. Pulmonary/Chest: Effort normal and breath sounds normal. No stridor. No respiratory distress. He has no wheezes. He has no rales. He exhibits no tenderness. Abdominal: Soft. Bowel sounds are normal. He exhibits no distension, no pulsatile liver and no ascites. There is no hepatosplenomegaly. There is no tenderness. There is no rebound and no guarding. Musculoskeletal: He exhibits no edema. Left wrist: He exhibits decreased range of motion, tenderness, swelling and laceration. He exhibits no crepitus and no deformity. Arms:  Lymphadenopathy:        Head (right side): No submental adenopathy present. Head (left side): No submental adenopathy present. Neurological: He is alert and oriented to person, place, and time.  He has normal reflexes. No cranial nerve deficit. Coordination normal.   Skin: Skin is warm and dry. Capillary refill takes less than 2 seconds. No rash noted. He is not diaphoretic. There is erythema. Psychiatric: He has a normal mood and affect. His behavior is normal. Judgment and thought content normal.   Nursing note and vitals reviewed. DIAGNOSTIC RESULTS     EKG: All EKG's are interpreted by the Emergency Department Physician who either signs or Co-signsthis chart in the absence of a cardiologist.        RADIOLOGY:   Raynell Session such as CT, Ultrasound and MRI are read by the radiologist. Plain radiographic images are visualized and preliminarily interpreted by the emergency physician with the below findings:        Interpretation per the Radiologist below, if available at the time ofthis note:    XR HAND LEFT (MIN 3 VIEWS)   Final Result   NO ACUTE FRACTURES. EXAMINATION: XR HAND LEFT (MIN 3 VIEWS), XR WRIST LEFT (MIN 3 VIEWS)       CLINICAL HISTORY:  multiple cat bites; swelling       COMPARISONS: None. FINDINGS:      Four views of the left wrist are submitted. No acute fractures. No dislocations. No focal bony abnormalities. .   No radiographic foreign body                                                                                    IMPRESSION:  NO ACUTE FRACTURES      XR WRIST LEFT (MIN 3 VIEWS)   Final Result   NO ACUTE FRACTURES. EXAMINATION: XR HAND LEFT (MIN 3 VIEWS), XR WRIST LEFT (MIN 3 VIEWS)       CLINICAL HISTORY:  multiple cat bites; swelling       COMPARISONS: None. FINDINGS:      Four views of the left wrist are submitted. No acute fractures. No dislocations. No focal bony abnormalities. .   No radiographic foreign body                                                                                    IMPRESSION:  NO ACUTE FRACTURES            ED BEDSIDE ULTRASOUND:   Performed by ED Physician - none    LABS:  Labs Reviewed   CBC WITH AUTO DIFFERENTIAL - Abnormal; Notable for the following components:       Result Value    MCH 31.9 (*)     All other components within normal limits   COMPREHENSIVE METABOLIC PANEL - Abnormal; Notable for the following components:    Glucose 148 (*)     Globulin 3.7 (*)     All other components within normal limits   CULTURE BLOOD #1   CULTURE BLOOD #2   ANAEROBIC AND AEROBIC CULTURE   LACTIC ACID, PLASMA   PROTIME-INR   APTT   CK       All other labs were within normal range or not returned as of this dictation. EMERGENCY DEPARTMENT COURSE and DIFFERENTIAL DIAGNOSIS/MDM:   Vitals:    Vitals:    03/26/19 1254 03/26/19 1354 03/26/19 1454   BP: (!) 164/77 (!) 156/72 (!) 142/83   Pulse: 86 78 71   Resp: 18 18 17   Temp: 97.9 °F (36.6 °C)     TempSrc: Oral     SpO2: 97% 97% 97%   Weight: 180 lb (81.6 kg)     Height: 5' 8\" (1.727 m)             MDM  Patient was started on IV Zosyn and IV vancomycin after clinical pharmacy consult with Alex vasquez clinical pharmacist.    Patient failed Augmentin outpatient therapy. Patient states that it is his Been thoroughly checked out by the vet one month ago to make sure that the cat did not have rabies. The patient will need to be admitted to the hospital for failure of outpatient therapy. CONSULTS:  IP CONSULT TO PHARMACY  IP CONSULT TO HOSPITALIST    PROCEDURES:  Unless otherwise noted below, none     Procedures    FINAL IMPRESSION      1. Cat bite of left hand including fingers with infection, subsequent encounter    2. Failure of outpatient treatment    3.  Cellulitis of left hand          DISPOSITION/PLAN   DISPOSITION Admitted 03/26/2019 04:06:29 PM      PATIENT REFERRED TO:  Lashonda Espinosa MD  [address] [place] [address]  [phone]            DISCHARGE MEDICATIONS:  New Prescriptions    No medications on file          (Please note that portions of this note were completed with a voice recognition program.  Efforts were made to edit the dictations but occasionally words are mis-transcribed.)    [address],  (electronically signed)  Attending Emergency Physician          52 Terese Wren DO  03/26/19 4013

## 2019-03-26 NOTE — CONSULTS
Consults   Infectious Disease     Patient Name: Yazmin Mera  Date: 3/26/2019  YOB: 1945  Medical Record Number: [id number]        Cellulitis left hand  Cat bite      History of Present Illness:    Patient multiple cat bites to left hand was given oral Augmentin which he took for 3 days. Continued worsening with swelling pain and swelling no fevers chills sweats    Had a cat bite one month ago His pet cat    Patient given Cipro Flagyl Zosyn and vancomycin          Review of Systems   Constitutional: Negative for chills, diaphoresis, fatigue and unexpected weight change. HENT: Negative. Respiratory: Negative. Cardiovascular: Negative. Gastrointestinal: Negative. Musculoskeletal: Negative. Skin: Positive for wound. Scratches and bites her both forearms  From 13year-old cat   Hematological: Negative. Psychiatric/Behavioral: Negative. Review of Systems: All 14 review of systems negative other than as stated above    Social History     Tobacco Use    Smoking status: Never Smoker    Smokeless tobacco: Never Used   Substance Use Topics    Alcohol use: Never     Alcohol/week: 0.0 oz     Frequency: Never    Drug use: Never         Past Medical History:   Diagnosis Date    Hypertension            Past Surgical History:   Procedure Laterality Date    BACK SURGERY           No current facility-administered medications on file prior to encounter. Current Outpatient Medications on File Prior to Encounter   Medication Sig Dispense Refill    cloNIDine (CATAPRES) 0.1 MG tablet Take 1 tablet by mouth 2 times daily 60 tablet 3    amLODIPine (NORVASC) 10 MG tablet Take 1 tablet by mouth daily 30 tablet 5    amoxicillin-clavulanate (AUGMENTIN) 875-125 MG per tablet Take 1 tablet by mouth 2 times daily for 10 days 20 tablet 0    neomycin-bacitracin-polymyxin (NEOSPORIN) 400-5-5000 ointment Apply topically 2 times daily.  1 Tube 1       No Known Allergies      History reviewed. No pertinent family history. Physical Exam:      Physical Exam   Constitutional: He is oriented to person, place, and time. HENT:   Head: Normocephalic. Mouth/Throat: No oropharyngeal exudate. Neck: Normal range of motion. No JVD present. No tracheal deviation present. No thyromegaly present. Cardiovascular: Exam reveals no gallop. No murmur heard. Pulmonary/Chest: Breath sounds normal. No respiratory distress. He has no wheezes. He has no rales. He exhibits no tenderness. Abdominal: Soft. Bowel sounds are normal. He exhibits no distension and no mass. There is no tenderness. There is no rebound and no guarding. Musculoskeletal:        Arms:  Lymphadenopathy:     He has no cervical adenopathy. Neurological: He is alert and oriented to person, place, and time. No cranial nerve deficit. Coordination normal.   Skin: Skin is warm. No rash noted. There is erythema. Blood pressure 139/79, pulse 89, temperature 97.4 °F (36.3 °C), temperature source Oral, resp. rate 20, height 5' 8\" (1.727 m), weight 168 lb 12.8 oz (76.6 kg), SpO2 100 %.       .   Lab Results   Component Value Date    WBC 8.5 03/26/2019    HGB 15.4 03/26/2019    HCT 42.5 03/26/2019    MCV 88.3 03/26/2019     03/26/2019     Lab Results   Component Value Date     03/26/2019    K 3.8 03/26/2019     03/26/2019    CO2 22 03/26/2019    BUN 23 03/26/2019    CREATININE 0.94 03/26/2019    GLUCOSE 148 03/26/2019    CALCIUM 9.0 03/26/2019                ASSESSMENT:  Patient Active Problem List   Diagnosis    Shoulder dislocation    Cellulitis    Cellulitis of left hand         PLAN:  Cellulitis left hand  Cat bite      Vancomycin and Zosyn at this point for treatment  Possible abscess in the left hand will obtain CT scan to see if there is any fluid that needs to be drained

## 2019-03-26 NOTE — H&P
Hospital Medicine History & Physical      PCP: Puma Montgomery MD    Date of Admission: 3/26/2019    Date of Service: 3/26/19      Chief Complaint:  Wound infection      History Of Present Illness:  68 y.o. male who presented to [facility] ED for complaints of a worsening of his wound from cat bites. The patient complains of a multiple cat bites and scratches to his left hand and wrist which he sustained three days ago. The patient was seen and prescribed oral augmentin, but states that the pain and redness has become much worse. The patient admits to having similar symptoms, which resulted from a cat bite one month ago. The patient states that his cat is up to date on all rabies vaccinations. His arm is erythematous and edematous with multiple bite marks and scratches. Denies cp sob ha rash anx n v d    Past Medical History:          Diagnosis Date    Hypertension        Past Surgical History:          Procedure Laterality Date    BACK SURGERY         Medications Prior to Admission:      Prior to Admission medications    Medication Sig Start Date End Date Taking? Authorizing Provider   amoxicillin-clavulanate (AUGMENTIN) 875-125 MG per tablet Take 1 tablet by mouth 2 times daily for 10 days 3/23/19 4/2/19 Yes HOWARD Domingo CNP   cloNIDine (CATAPRES) 0.1 MG tablet Take 1 tablet by mouth 2 times daily 3/26/19   Puma Montgomery MD   amLODIPine (NORVASC) 10 MG tablet Take 1 tablet by mouth daily 3/26/19   Puma Montgomery MD   neomycin-bacitracin-polymyxin (NEOSPORIN) 400-5-5000 ointment Apply topically 2 times daily. 3/23/19   HOWARD Domingo CNP       Allergies:  Patient has no known allergies. Social History:      The patient currently lives home    TOBACCO:   reports that he has never smoked. He has never used smokeless tobacco.  ETOH:   reports that he does not drink alcohol. Family History:       Positive as follows:    History reviewed. No pertinent family history.     REVIEW OF SYSTEMS:   Pertinent positives as noted in the HPI. All other systems reviewed and negative. PHYSICAL EXAM:    /79   Pulse 76   Temp 97.9 °F (36.6 °C) (Oral)   Resp 18   Ht 5' 8\" (1.727 m)   Wt 180 lb (81.6 kg)   SpO2 96%   BMI 27.37 kg/m²     General appearance:  No apparent distress, appears stated age and cooperative. HEENT:  Normal cephalic, atraumatic without obvious deformity. Pupils equal, round, and reactive to light. Extra ocular muscles intact. Conjunctivae/corneas clear. Neck: Supple, with full range of motion. No jugular venous distention. Trachea midline. Respiratory:  Normal respiratory effort. Clear to auscultation, bilaterally without Rales/Wheezes/Rhonchi. Cardiovascular:  Regular rate and rhythm with normal S1/S2 without murmurs, rubs or gallops. Abdomen: Soft, non-tender, non-distended with normal bowel sounds. Musculoskeletal:  Erythema and edema to left upper extremity with multiple scratches and bite marks. Full range of motion without deformity. Skin: Skin color, texture, turgor normal.  No rashes or lesions. Neurologic:  Neurovascularly intact without any focal sensory/motor deficits.  Cranial nerves: II-XII intact, grossly non-focal.  Psychiatric:  Alert and oriented, thought content appropriate, normal insight  Capillary Refill: Brisk,< 3 seconds   Peripheral Pulses: +2 palpable, equal bilaterally       Labs:     Recent Labs     03/26/19  1300   WBC 8.5   HGB 15.4   HCT 42.5        Recent Labs     03/26/19  1300      K 3.8      CO2 22   BUN 23   CREATININE 0.94   CALCIUM 9.0     Recent Labs     03/26/19  1300   AST 26   ALT 28   BILITOT 0.6   ALKPHOS 59     Recent Labs     03/26/19  1300   INR 1.0     Recent Labs     03/26/19  1300   CKTOTAL 65       Urinalysis:    No results found for: Octavio Kotyk, BACTERIA, RBCUA, BLOODU, [address], [address] [place] [address]    Radiology:     CXR: I have reviewed the CXR with the following interpretation: pending  EKG:  I have

## 2019-03-26 NOTE — ED NOTES
Report called to [id number] [facility]     Jhoana Greene, [facility]  03/26/19 8313

## 2019-03-26 NOTE — FLOWSHEET NOTE
Pt as multiple scratches to right and left arms and hands, right and left great toes. Left leg, right knee abrasion. Left wrist/forearm area swollen. 3 cat bite lacerations to area. One has 3 stitches and is a little weepy, serosanguinous fluid, wound culture sent from ER. One laceration has 2 stitches and one laceration on the base ot thumb wrist area has one stitch. Pt states this is the second time the cat has scratched and bit him in the last 2 months. Pt arrived in dirty pants, socks and shoes. Pt appears disheveled. States he lives alone and uses a cane. No tele. 100% on RA. Lungs clear bilat. Denies pain, N/V, SOB. A&O x4. #18 in L  AC F&P.

## 2019-03-26 NOTE — ED NOTES
Called lab second time to draw labs original call was placed at [phone] Arron Vazquez, RN  03/26/19 7409

## 2019-03-26 NOTE — PROGRESS NOTES
Pharmacy Note  Vancomycin Consult    Susan Maloney is a 68 y.o. male started on Vancomycin for cellulitis of the hand, s/p failure of outpatient treatment; consult received from Patience Girard PA-C  to manage therapy. Also receiving the following antibiotics: piperacillin-tazobactam 3.375gm IV Q8 hours per extended infusion protocol. Patient Active Problem List   Diagnosis    Shoulder dislocation    Cellulitis    Cellulitis of left hand       Allergies:  Patient has no known allergies. Temp max: 97.9*F    Recent Labs     03/26/19  1300   BUN 23       Recent Labs     03/26/19  1300   CREATININE 0.94       Recent Labs     03/26/19  1300   WBC 8.5         Intake/Output Summary (Last 24 hours) at 3/26/2019 1705  Last data filed at 3/26/2019 1504  Gross per 24 hour   Intake 50 ml   Output --   Net 50 ml       Culture Date      Source                       Results  CULTURE BLOOD #1 [[id number]]    Order Status: Sent    CULTURE BLOOD #2 [[id number]]    Order Status: Sent    Wound Culture [[id number]]    Order Status: No result Specimen: Hand    Anaerobic and Aerobic Culture [[id number]] Collected: 03/26/19 1424   Order Status: Sent Specimen: Arm Updated: 03/26/19 1424   Culture Blood #1 [[id number]] Collected: 03/26/19 1300   Order Status: Sent Specimen: Blood Updated: 03/26/19 1423   Culture Blood #2 [[id number]] Collected: 03/26/19 1417   Order Status: Sent Specimen: Blood Updated: 03/26/19 1417       Ht Readings from Last 1 Encounters:   03/26/19 5' 8\" (1.727 m)        Wt Readings from Last 1 Encounters:   03/26/19 168 lb 12.8 oz (76.6 kg)         Body mass index is 25.67 kg/m². Estimated Creatinine Clearance: 69 mL/min (based on SCr of 0.94 mg/dL). Goal Trough Level: 15-20 mcg/mL    Assessment/Plan:  Will initiate vancomycin 1000 mg IV every 12 hours, based on patient current age, height, weight, and renal function.  At this dosing strength and interval, pharmacokinetic analysis predicts a trough level of 17.8 mcg/mL, which is within goal range of 14-22NDQ/MQ for complicated skin and soft tissue infection. Patient received first (loading) dose of vancomycin 1250mg IV X 1 dose in ED. Timing of trough level will be determined based on culture results, renal function, and clinical response. Vancomycin trough level to be drawn prior to the 4th total dose of vancomycin therapy. Trough level ordered for 3/28/19 @ 0400. Thank you for the consult. Will continue to follow.     Loan Gautam, PharmD   3/26/2019 5:17 PM

## 2019-03-27 ENCOUNTER — APPOINTMENT (OUTPATIENT)
Dept: CT IMAGING | Age: 74
DRG: 603 | End: 2019-03-27
Payer: MEDICARE

## 2019-03-27 LAB
BASOPHILS ABSOLUTE: 0 K/UL (ref 0–0.2)
BASOPHILS RELATIVE PERCENT: 0.8 %
EOSINOPHILS ABSOLUTE: 0.4 K/UL (ref 0–0.7)
EOSINOPHILS RELATIVE PERCENT: 6 %
HCT VFR BLD CALC: 39.8 % (ref 42–52)
HEMOGLOBIN: 14.2 G/DL (ref 14–18)
LYMPHOCYTES ABSOLUTE: 1.9 K/UL (ref 1–4.8)
LYMPHOCYTES RELATIVE PERCENT: 31 %
MCH RBC QN AUTO: 32.1 PG (ref 27–31.3)
MCHC RBC AUTO-ENTMCNC: 35.8 % (ref 33–37)
MCV RBC AUTO: 89.9 FL (ref 80–100)
MONOCYTES ABSOLUTE: 0.6 K/UL (ref 0.2–0.8)
MONOCYTES RELATIVE PERCENT: 9.2 %
NEUTROPHILS ABSOLUTE: 3.2 K/UL (ref 1.4–6.5)
NEUTROPHILS RELATIVE PERCENT: 53 %
PDW BLD-RTO: 12.9 % (ref 11.5–14.5)
PLATELET # BLD: 220 K/UL (ref 130–400)
RBC # BLD: 4.42 M/UL (ref 4.7–6.1)
WBC # BLD: 6 K/UL (ref 4.8–10.8)

## 2019-03-27 PROCEDURE — 2580000003 HC RX 258: Performed by: INTERNAL MEDICINE

## 2019-03-27 PROCEDURE — 6360000004 HC RX CONTRAST MEDICATION: Performed by: INTERNAL MEDICINE

## 2019-03-27 PROCEDURE — 6360000002 HC RX W HCPCS: Performed by: INTERNAL MEDICINE

## 2019-03-27 PROCEDURE — 85025 COMPLETE CBC W/AUTO DIFF WBC: CPT

## 2019-03-27 PROCEDURE — 6360000002 HC RX W HCPCS: Performed by: PHYSICIAN ASSISTANT

## 2019-03-27 PROCEDURE — 36415 COLL VENOUS BLD VENIPUNCTURE: CPT

## 2019-03-27 PROCEDURE — 2060000000 HC ICU INTERMEDIATE R&B

## 2019-03-27 PROCEDURE — 2580000003 HC RX 258: Performed by: PHYSICIAN ASSISTANT

## 2019-03-27 PROCEDURE — 73201 CT UPPER EXTREMITY W/DYE: CPT

## 2019-03-27 PROCEDURE — 99232 SBSQ HOSP IP/OBS MODERATE 35: CPT | Performed by: INTERNAL MEDICINE

## 2019-03-27 PROCEDURE — 6370000000 HC RX 637 (ALT 250 FOR IP): Performed by: INTERNAL MEDICINE

## 2019-03-27 RX ADMIN — IOPAMIDOL 100 ML: 612 INJECTION, SOLUTION INTRAVENOUS at 08:26

## 2019-03-27 RX ADMIN — CLONIDINE HYDROCHLORIDE 0.1 MG: 0.1 TABLET ORAL at 08:53

## 2019-03-27 RX ADMIN — CLONIDINE HYDROCHLORIDE 0.1 MG: 0.1 TABLET ORAL at 19:58

## 2019-03-27 RX ADMIN — AMLODIPINE BESYLATE 10 MG: 10 TABLET ORAL at 08:53

## 2019-03-27 RX ADMIN — PIPERACILLIN SODIUM,TAZOBACTAM SODIUM 3.38 G: 3; .375 INJECTION, POWDER, FOR SOLUTION INTRAVENOUS at 08:52

## 2019-03-27 RX ADMIN — ENOXAPARIN SODIUM 40 MG: 40 INJECTION SUBCUTANEOUS at 18:24

## 2019-03-27 RX ADMIN — PIPERACILLIN SODIUM,TAZOBACTAM SODIUM 3.38 G: 3; .375 INJECTION, POWDER, FOR SOLUTION INTRAVENOUS at 16:54

## 2019-03-27 RX ADMIN — BACITRACIN ZINC, NEOMYCIN, POLYMYXIN B 1 G: 400; 3.5; 5 OINTMENT TOPICAL at 08:53

## 2019-03-27 RX ADMIN — Medication 10 ML: at 08:54

## 2019-03-27 RX ADMIN — Medication 10 ML: at 19:58

## 2019-03-27 RX ADMIN — BACITRACIN ZINC, NEOMYCIN, POLYMYXIN B: 400; 3.5; 5 OINTMENT TOPICAL at 21:00

## 2019-03-27 RX ADMIN — PIPERACILLIN SODIUM,TAZOBACTAM SODIUM 3.38 G: 3; .375 INJECTION, POWDER, FOR SOLUTION INTRAVENOUS at 00:06

## 2019-03-27 ASSESSMENT — PAIN SCALES - GENERAL
PAINLEVEL_OUTOF10: 0
PAINLEVEL_OUTOF10: 0

## 2019-03-27 ASSESSMENT — ENCOUNTER SYMPTOMS
GASTROINTESTINAL NEGATIVE: 1
RESPIRATORY NEGATIVE: 1

## 2019-03-27 NOTE — PROGRESS NOTES
Department of Internal Medicine  Progress Note      SUBJECTIVE: no complains  No acute events overnight. ROS:  All 12 systems reviewed and negative except mentioned in HPI and Assessment and plan    MEDICATIONS:  Current Facility-Administered Medications   Medication Dose Route Frequency Provider Last Rate Last Dose    sodium chloride flush 0.9 % injection 10 mL  10 mL Intravenous 2 times per day NANDA Larios   10 mL at 03/27/19 0854    sodium chloride flush 0.9 % injection 10 mL  10 mL Intravenous PRN NANDA Garcia        magnesium hydroxide (MILK OF MAGNESIA) 400 MG/5ML suspension 30 mL  30 mL Oral Daily PRN NANDA Garcia        ondansetron [facility]) injection 4 mg  4 mg Intravenous Q6H PRN NANDA Garcia        enoxaparin (LOVENOX) injection 40 mg  40 mg Subcutaneous Daily NANDA Garcia   40 mg at 03/26/19 1811    amLODIPine (NORVASC) tablet 10 mg  10 mg Oral Daily Satya Lala MD   10 mg at 03/27/19 0853    cloNIDine (CATAPRES) tablet 0.1 mg  0.1 mg Oral BID Satya Lala MD   0.1 mg at 03/27/19 0853    neomycin-bacitracin-polymyxin (NEOSPORIN) ointment   Topical BID Satya Lala MD   1 g at 03/27/19 0853    piperacillin-tazobactam (ZOSYN) 3.375 g in dextrose 5 % 50 mL IVPB (mini-bag)  3.375 g Intravenous Q8H Sudhir Quiros MD   Stopped at 03/27/19 1250         OBJECTIVE:  Vital Signs:  Vitals:    03/27/19 0721   BP: (!) 144/80   Pulse: 73   Resp: 20   Temp: 97.7 °F (36.5 °C)   SpO2: 100%       Focal exam:      General Exam (except as mentioned above):  CONSTITUTIONAL: Awake, alert, no apparent distress  EYES:  PERRL, conjunctiva normal  ENT:  Normocephalic, atraumatic  NECK:  Supple  BACK:  Symmetric  LUNGS:  CTAB except bilateral basilar crackles. CARDIOVASCULAR:  S1S2 present  ABDOMEN:  soft, non-distended, non-tender  MUSCULOSKELETAL:  There is no redness, warmth, or swelling of the joints. NEUROLOGIC:  Alert, awake, oriented x 3. No FND  EXTREMITIES: Warm and well perfused. LABS  Recent Labs     03/26/19  1300 03/27/19  0551   WBC 8.5 6.0   RBC 4.81 4.42*   HGB 15.4 14.2   HCT 42.5 39.8*   MCV 88.3 89.9   MCH 31.9* 32.1*   MCHC 36.2 35.8   RDW 12.6 12.9    220       Recent Labs     03/26/19  1300      K 3.8      CO2 22   BUN 23   CREATININE 0.94   GLUCOSE 148*   CALCIUM 9.0       No results for input(s): MG in the last 72 hours.         ASSESSMENT AND PLAN    Active Hospital Problems    Diagnosis Date Noted    Cellulitis [L03.90] 03/26/2019    Cellulitis of left hand [L03.114] 03/26/2019     - Zosyn  - continue home meds  - follow up blood and wound cultures  - ID for abx recommendation  CT scan of hand does not show any abscess or OM    DVT prophylaxis: Lovenox  Alejandro Siu MD  Pager : [phone]

## 2019-03-27 NOTE — FLOWSHEET NOTE
0300: Assumed care of patient. Resting in bed at present. 0600: Resting with eyes closed.      Electronically signed by Tristian Acosta RN on 3/27/2019 at 6:37 AM

## 2019-03-27 NOTE — DISCHARGE INSTR - COC
Continuity of Care Form    Patient Name: Marlo Adjutant   :    MRN:  [id number]    [address] [facility] [address] date:  3/26/2019  Discharge date:  3/29/19    Code Status Order: Full Code   Advance Directives:   [facility] Documentation     Date/Time Healthcare Directive Type of Healthcare Directive Copy in [address] Agent's Name Healthcare Agent's Phone Number    19 1646  No, patient does not have an advance directive for healthcare treatment -- -- -- -- --          Admitting Physician:  Kay Hankins MD  PCP: Ayaka Blanca MD    Discharging Nurse: [facility] Unit/Room#: C070/C008-85  Discharging Unit Phone Number: ***    Emergency Contact:   Extended Emergency Contact Information  Primary Emergency Contact: Trav [address] [place] [address] Phone: [phone]  Relation: Brother/Sister    Past Surgical History:  Past Surgical History:   Procedure Laterality Date    BACK SURGERY         Immunization History:   Immunization History   Administered Date(s) Administered    Tdap (Boostrix, Adacel) 2019       Active Problems:  Patient Active Problem List   Diagnosis Code    Shoulder dislocation S43.006A    Cellulitis L03.90    Cellulitis of left hand L03.114       Isolation/Infection:   Isolation          No Isolation            Nurse Assessment:  Last Vital Signs: BP (!) 144/80   Pulse 73   Temp 97.7 °F (36.5 °C) (Oral)   Resp 20   Ht 5' 8\" (1.727 m)   Wt 168 lb 12.8 oz (76.6 kg)   SpO2 100%   BMI 25.67 kg/m²     Last documented pain score (0-10 scale): Pain Level: 0  Last Weight:   Wt Readings from Last 1 Encounters:   19 168 lb 12.8 oz (76.6 kg)     Mental Status:  oriented and alert    IV Access:  - None    Nursing Mobility/ADLs:  Walking   Independent  Transfer  Independent  Bathing  Independent  Dressing  Independent  Toileting  Independent  Feeding  [address]  Assisted  Med Delivery   whole    [address] [facility]. Documentation and Therapy:        Elimination:  Continence:   · Bowel: Yes  · Bladder: Yes  Urinary Catheter: None   Colostomy/Ileostomy/Ileal Conduit: No       Date of Last BM: 3/28/19    Intake/Output Summary (Last 24 hours) at 3/27/2019 1209  Last data filed at 3/27/2019 0852  Gross per 24 hour   Intake 580 ml   Output 350 ml   Net 230 ml     I/O last 3 completed shifts: In: 340 [P.O.:240; IV Piggyback:100]  Out: -     Safety Concerns:     None    Impairments/Disabilities:      None    Nutrition Therapy:  Current Nutrition Therapy:   - Oral Diet:  General    Routes of Feeding: Oral  Liquids: Thin Liquids  Daily Fluid Restriction: no  Last Modified Barium Swallow with Video (Video Swallowing Test): not done    Treatments at the Time of Hospital Discharge:   Respiratory Treatments: NA  Oxygen Therapy:  is not on home oxygen therapy.   Ventilator:    - No ventilator support    Rehab Therapies: Physical Therapy and Occupational Therapy  Weight Bearing Status/Restrictions: No weight bearing restirctions  Other Medical Equipment (for information only, NOT a DME order):  NA  Other Treatments: ***    Patient's personal belongings (please select all that are sent with patient):  None    RN SIGNATURE:  Electronically signed by Nataly Santos RN on 3/29/19 at 1:12 PM    CASE MANAGEMENT/SOCIAL WORK SECTION    Inpatient Status Date: ***    Readmission Risk Assessment Score:  Readmission Risk              Risk of Unplanned Readmission:        8           Discharging to Facility/ Agency   · Name:   · Address:  · Phone:  · Fax:    Dialysis Facility (if applicable)   · Name:  · Address:  · Dialysis Schedule:  · Phone:  · Fax:    / signature: Electronically signed by DIONISIO Patel on 3/27/19 at 12:09 PM      PHYSICIAN SECTION    Prognosis: {Prognosis:5753757956}    Condition at Discharge: [address] Patient Condition:286262169}    Rehab Potential (if transferring to Rehab): {Prognosis:5184547524}    Recommended Labs or Other Treatments After Discharge: ***    Physician Certification: I certify the above information and transfer of Yazmin   is necessary for the continuing treatment of the diagnosis listed and that he requires {Admit to Appropriate Level of Care:35584} for {GREATER/LESS:798136592} 30 days.      Update Admission H&P: {CHP DME Changes in CUPKJ:598726553}    PHYSICIAN SIGNATURE:  {Esignature:886544410}

## 2019-03-27 NOTE — PROGRESS NOTES
Sabra Watkins is a 68 y.o.male patient. Cellulitis left hand  Cat bite        XAM: CT of the left hand with contrast         HISTORY: Hand abscess. Multiple lacerations from cat scratches. Evaluate for osteomyelitis       TECHNIQUE: Multiple contiguous axial images were obtained with IV contrast. Multiplanar reformats were obtained.       COMPARISON: Hand radiographs from March 26, 2019       FINDINGS:       No acute fracture or dislocation. No osseous erosions. Mild degenerative changes of the first carpometacarpal joint. Circumferential subcutaneous soft tissue edema of the wrist and hand, more so dorsally, with areas of skin thickening but no discrete    fluid collection within the soft tissues to suggest abscess. Visualized myotendinous structures appear intact by CT.           Impression       Findings compatible with cellulitis without soft tissue abscess.       No evidence of osteomyelitis however CT is not sensitive as sensitive as MRI for detection of osteomyelitis.          Current Facility-Administered Medications   Medication Dose Route Frequency Provider Last Rate Last Dose    sodium chloride flush 0.9 % injection 10 mL  10 mL Intravenous 2 times per day NANDA Heredia   10 mL at 03/27/19 0854    sodium chloride flush 0.9 % injection 10 mL  10 mL Intravenous PRN NANDA Heredia        magnesium hydroxide (MILK OF MAGNESIA) 400 MG/5ML suspension 30 mL  30 mL Oral Daily PRN NANDA Heredia        ondansetron [facility]) injection 4 mg  4 mg Intravenous Q6H PRN NANDA Heredia        enoxaparin (LOVENOX) injection 40 mg  40 mg Subcutaneous Daily NANDA Heredia   40 mg at 03/26/19 1811    amLODIPine (NORVASC) tablet 10 mg  10 mg Oral Daily Satya Lala MD   10 mg at 03/27/19 0853    cloNIDine (CATAPRES) tablet 0.1 mg  0.1 mg Oral BID Satya Lala MD   0.1 mg at 03/27/19 0853    neomycin-bacitracin-polymyxin (NEOSPORIN) ointment   Topical BID Meme Briscoe MD   1 g at 03/27/19 0853    piperacillin-tazobactam (ZOSYN) 3.375 g in dextrose 5 % 50 mL IVPB (mini-bag)  3.375 g Intravenous Linda Luevano MD   Stopped at 03/27/19 1250       No Known Allergies  Patient Active Problem List    Diagnosis Date Noted    Cellulitis 03/26/2019    Cellulitis of left hand 03/26/2019    Shoulder dislocation 03/08/2016     Culture Blood #2 [[id number]] Collected: 03/26/19 1354   Order Status: Completed Specimen: Blood Updated: 03/27/19 1415    Culture, Blood 2 No Growth to date.  Any change in status will be called. Narrative:     ORDER#: [id number]                          ORDERED BY: [employer]  SOURCE: Blood                              COLLECTED:  03/26/19 13:54  ANTIBIOTICS AT ODALIS. :                      RECEIVED :  03/26/19 13:57   Anaerobic and Aerobic Culture [[id number]] Collected: 03/26/19 1424   Order Status: Completed Specimen: Arm Updated: 03/27/19 1012    Gram Stain Result No WBC's, No organisms seen    WOUND/ABSCESS No growth 24 hours    Anaerobic Culture Culture in progress   Narrative:     ORDER#: [id number]                          ORDERED BY: [employer]  SOURCE: Arm                                COLLECTED:  03/26/19 14:24  ANTIBIOTICS AT ODALIS. :                      RECEIVED :  03/26/19 14:24     z    BP (!) 144/80   Pulse 73   Temp 97.7 °F (36.5 °C) (Oral)   Resp 20   Ht 5' 8\" (1.727 m)   Wt 168 lb 12.8 oz (76.6 kg)   SpO2 100%   BMI 25.67 kg/m²     Review of Systems   Constitutional: Negative for diaphoresis, fatigue, fever and unexpected weight change. Respiratory: Negative. Cardiovascular: Negative. Gastrointestinal: Negative. Genitourinary: Negative. Musculoskeletal: Negative. Skin: Positive for wound. Physical Exam   HENT:   Head: Normocephalic. Pulmonary/Chest: Effort normal and breath sounds normal. No respiratory distress. He has no wheezes. He has no rales. He exhibits no tenderness.    Abdominal: Bowel sounds are normal. He exhibits no distension and no mass. There is no tenderness. There is no rebound and no guarding. Musculoskeletal: He exhibits edema and tenderness. Skin: There is erythema.    Redness swelling of left hand markedly better still some edema some tenderness       Assessment/Plan:    Cellulitis left hand  Cat bite   CT scan shows no abscess  Vancomycin and Zosyn   Should be able to switch to oral antibiotic tomorrow

## 2019-03-27 NOTE — FLOWSHEET NOTE
Pt sleeping. Woke him up to place neosporin ointment to wounds. No further requests at this time will continue to monitor.

## 2019-03-27 NOTE — CARE COORDINATION
LSW met with the pt. Pt states he lives at home alone and has several cats. Pt states the cat that bit him is 13 yrs old and has not been fixed. Pt states he took the cat to the vet. Pt states he has a cane, walker and rollator, but he doesn't use any equipment. Pt states he does not have O2 at home. Pt states he has no other services at home. Pt states he prepares his own meals. Pt states his sister also helps him. Sticky note left per C3 for PT/OT order when he is medically stable.   Electronically signed by DIONISIO Ward on 3/27/19 at 12:12 PM

## 2019-03-28 LAB
ANAEROBIC CULTURE: NORMAL
BASOPHILS ABSOLUTE: 0.1 K/UL (ref 0–0.2)
BASOPHILS RELATIVE PERCENT: 0.9 %
EOSINOPHILS ABSOLUTE: 0.4 K/UL (ref 0–0.7)
EOSINOPHILS RELATIVE PERCENT: 7.6 %
GRAM STAIN RESULT: NORMAL
HCT VFR BLD CALC: 42 % (ref 42–52)
HEMOGLOBIN: 15 G/DL (ref 14–18)
LYMPHOCYTES ABSOLUTE: 2 K/UL (ref 1–4.8)
LYMPHOCYTES RELATIVE PERCENT: 34.7 %
MCH RBC QN AUTO: 32.3 PG (ref 27–31.3)
MCHC RBC AUTO-ENTMCNC: 35.7 % (ref 33–37)
MCV RBC AUTO: 90.6 FL (ref 80–100)
MONOCYTES ABSOLUTE: 0.5 K/UL (ref 0.2–0.8)
MONOCYTES RELATIVE PERCENT: 8.9 %
NEUTROPHILS ABSOLUTE: 2.7 K/UL (ref 1.4–6.5)
NEUTROPHILS RELATIVE PERCENT: 47.9 %
PDW BLD-RTO: 12.7 % (ref 11.5–14.5)
PLATELET # BLD: 230 K/UL (ref 130–400)
RBC # BLD: 4.64 M/UL (ref 4.7–6.1)
WBC # BLD: 5.7 K/UL (ref 4.8–10.8)
WOUND/ABSCESS: NORMAL

## 2019-03-28 PROCEDURE — 97161 PT EVAL LOW COMPLEX 20 MIN: CPT

## 2019-03-28 PROCEDURE — 2060000000 HC ICU INTERMEDIATE R&B

## 2019-03-28 PROCEDURE — 99232 SBSQ HOSP IP/OBS MODERATE 35: CPT | Performed by: INTERNAL MEDICINE

## 2019-03-28 PROCEDURE — 85025 COMPLETE CBC W/AUTO DIFF WBC: CPT

## 2019-03-28 PROCEDURE — 2580000003 HC RX 258: Performed by: PHYSICIAN ASSISTANT

## 2019-03-28 PROCEDURE — 6360000002 HC RX W HCPCS: Performed by: PHYSICIAN ASSISTANT

## 2019-03-28 PROCEDURE — 36415 COLL VENOUS BLD VENIPUNCTURE: CPT

## 2019-03-28 PROCEDURE — 6360000002 HC RX W HCPCS: Performed by: INTERNAL MEDICINE

## 2019-03-28 PROCEDURE — 2580000003 HC RX 258: Performed by: INTERNAL MEDICINE

## 2019-03-28 PROCEDURE — 6370000000 HC RX 637 (ALT 250 FOR IP): Performed by: INTERNAL MEDICINE

## 2019-03-28 RX ORDER — AMOXICILLIN AND CLAVULANATE POTASSIUM 875; 125 MG/1; MG/1
1 TABLET, FILM COATED ORAL 2 TIMES DAILY
Qty: 14 TABLET | Refills: 0 | Status: SHIPPED | OUTPATIENT
Start: 2019-03-28 | End: 2019-04-11

## 2019-03-28 RX ADMIN — BACITRACIN ZINC, NEOMYCIN, POLYMYXIN B: 400; 3.5; 5 OINTMENT TOPICAL at 22:42

## 2019-03-28 RX ADMIN — Medication 10 ML: at 22:41

## 2019-03-28 RX ADMIN — AMLODIPINE BESYLATE 10 MG: 10 TABLET ORAL at 08:08

## 2019-03-28 RX ADMIN — ENOXAPARIN SODIUM 40 MG: 40 INJECTION SUBCUTANEOUS at 08:08

## 2019-03-28 RX ADMIN — PIPERACILLIN SODIUM,TAZOBACTAM SODIUM 3.38 G: 3; .375 INJECTION, POWDER, FOR SOLUTION INTRAVENOUS at 00:23

## 2019-03-28 RX ADMIN — CLONIDINE HYDROCHLORIDE 0.1 MG: 0.1 TABLET ORAL at 22:41

## 2019-03-28 RX ADMIN — Medication 10 ML: at 08:08

## 2019-03-28 RX ADMIN — CLONIDINE HYDROCHLORIDE 0.1 MG: 0.1 TABLET ORAL at 08:08

## 2019-03-28 RX ADMIN — PIPERACILLIN SODIUM,TAZOBACTAM SODIUM 3.38 G: 3; .375 INJECTION, POWDER, FOR SOLUTION INTRAVENOUS at 08:06

## 2019-03-28 RX ADMIN — PIPERACILLIN SODIUM,TAZOBACTAM SODIUM 3.38 G: 3; .375 INJECTION, POWDER, FOR SOLUTION INTRAVENOUS at 16:19

## 2019-03-28 RX ADMIN — BACITRACIN ZINC, NEOMYCIN, POLYMYXIN B 1 G: 400; 3.5; 5 OINTMENT TOPICAL at 08:08

## 2019-03-28 ASSESSMENT — PAIN SCALES - GENERAL
PAINLEVEL_OUTOF10: 0

## 2019-03-28 ASSESSMENT — ENCOUNTER SYMPTOMS
RESPIRATORY NEGATIVE: 1
GASTROINTESTINAL NEGATIVE: 1

## 2019-03-28 NOTE — PROGRESS NOTES
Nutrition Assessment (Low Risk)    Type and Reason for Visit: Initial, Positive Nutrition Screen(wound)     Nutrition Recommendations: Continue with General diet as tolerated    Nutrition Assessment:  Patient assessed for nutritional risk. Deemed to be at low risk at this time. Will continue to monitor for changes in status. Nutritional status appears adequate at this time. General diet appropriate and tolerated with intake > 75% today.  pt states appetite is good    Malnutrition Assessment:  · Malnutrition Status: No malnutrition    Nutrition Risk Level   Risk Level: Low    Nutrition Diagnosis:   · Problem: No nutrition diagnosis at this time      Nutrition Intervention:  Food and/or Delivery: Continue current diet  Nutrition Education/Counseling/Coordination of Care:  Continued Inpatient Monitoring, Education Not Indicated      Electronically signed by Lara Duane, RD, LD on 3/28/19 at 12:05 PM

## 2019-03-28 NOTE — PROGRESS NOTES
Ligia Horn is a 68 y.o.male patient. Cellulitis left hand  Cat bite        XAM: CT of the left hand with contrast         HISTORY: Hand abscess. Multiple lacerations from cat scratches. Evaluate for osteomyelitis       TECHNIQUE: Multiple contiguous axial images were obtained with IV contrast. Multiplanar reformats were obtained.       COMPARISON: Hand radiographs from March 26, 2019       FINDINGS:       No acute fracture or dislocation. No osseous erosions. Mild degenerative changes of the first carpometacarpal joint. Circumferential subcutaneous soft tissue edema of the wrist and hand, more so dorsally, with areas of skin thickening but no discrete    fluid collection within the soft tissues to suggest abscess. Visualized myotendinous structures appear intact by CT.           Impression       Findings compatible with cellulitis without soft tissue abscess.       No evidence of osteomyelitis however CT is not sensitive as sensitive as MRI for detection of osteomyelitis.          Current Facility-Administered Medications   Medication Dose Route Frequency Provider Last Rate Last Dose    sodium chloride flush 0.9 % injection 10 mL  10 mL Intravenous 2 times per day NANDA De La Cruz   10 mL at 03/28/19 8889    sodium chloride flush 0.9 % injection 10 mL  10 mL Intravenous PRN NANDA De La Cruz        magnesium hydroxide (MILK OF MAGNESIA) 400 MG/5ML suspension 30 mL  30 mL Oral Daily PRN NANDA De La Cruz        ondansetron [facility]) injection 4 mg  4 mg Intravenous Q6H PRN NANDA De La Cruz        enoxaparin (LOVENOX) injection 40 mg  40 mg Subcutaneous Daily Taisha Byrne, [address]   40 mg at 03/28/19 9092    amLODIPine (NORVASC) tablet 10 mg  10 mg Oral Daily Satya Lala MD   10 mg at 03/28/19 0319    cloNIDine (CATAPRES) tablet 0.1 mg  0.1 mg Oral BID Satya Lala MD   0.1 mg at 03/28/19 0808    neomycin-bacitracin-polymyxin (NEOSPORIN) ointment   Topical BID

## 2019-03-28 NOTE — PROGRESS NOTES
Department of Internal Medicine  Progress Note      SUBJECTIVE:  no complains      No acute events overnight. ROS:  All 12 systems reviewed and negative except mentioned in HPI and Assessment and plan    MEDICATIONS:  Current Facility-Administered Medications   Medication Dose Route Frequency Provider Last Rate Last Dose    sodium chloride flush 0.9 % injection 10 mL  10 mL Intravenous 2 times per day NANDA Mathew   10 mL at 03/28/19 0808    sodium chloride flush 0.9 % injection 10 mL  10 mL Intravenous PRN NANDA Braswell        magnesium hydroxide (MILK OF MAGNESIA) 400 MG/5ML suspension 30 mL  30 mL Oral Daily PRN NANDA Braswell        [facility]ron [facility] injection 4 mg  4 mg Intravenous Q6H PRN NANDA Braswell        enoxaparin (LOVENOX) injection 40 mg  40 mg Subcutaneous Daily NANDA Braswell   40 mg at 03/28/19 0749    amLODIPine (NORVASC) tablet 10 mg  10 mg Oral Daily Satya Lala MD   10 mg at 03/28/19 5247    cloNIDine (CATAPRES) tablet 0.1 mg  0.1 mg Oral BID Satya Lala MD   0.1 mg at 03/28/19 9117    neomycin-bacitracin-polymyxin (NEOSPORIN) ointment   Topical BID Satya Lala MD   1 g at 03/28/19 0808    piperacillin-tazobactam (ZOSYN) 3.375 g in dextrose 5 % 50 mL IVPB (mini-bag)  3.375 g Intravenous Q8H Laila Crowe MD 12.5 mL/hr at 03/28/19 0806 3.375 g at 03/28/19 0806         OBJECTIVE:  Vital Signs:  Vitals:    03/28/19 0758   BP: 130/70   Pulse: 66   Resp: 19   Temp: 98 °F (36.7 °C)   SpO2: 99%       Focal exam:  Left forearm has multiple animal bites. It is red and swollen    General Exam (except as mentioned above):  CONSTITUTIONAL: Awake, alert, no apparent distress  EYES:  PERRL, conjunctiva normal  ENT:  Normocephalic, atraumatic  NECK:  Supple  BACK:  Symmetric  LUNGS:  CTAB except bilateral basilar crackles.   CARDIOVASCULAR:  S1S2 present  ABDOMEN:  soft, non-distended, non-tender  MUSCULOSKELETAL:  There is no redness, warmth, or swelling of the joints. NEUROLOGIC:  Alert, awake, oriented x 3. No FND  EXTREMITIES: Warm and well perfused. LABS  Recent Labs     03/26/19  1300 03/27/19  0551 03/28/19  0554   WBC 8.5 6.0 5.7   RBC 4.81 4.42* 4.64*   HGB 15.4 14.2 15.0   HCT 42.5 39.8* 42.0   MCV 88.3 89.9 90.6   MCH 31.9* 32.1* 32.3*   MCHC 36.2 35.8 35.7   RDW 12.6 12.9 12.7    220 230       Recent Labs     03/26/19  1300      K 3.8      CO2 22   BUN 23   CREATININE 0.94   GLUCOSE 148*   CALCIUM 9.0       No results for input(s): MG in the last 72 hours.         ASSESSMENT AND PLAN    Active Hospital Problems    Diagnosis Date Noted    Cellulitis [L03.90] 03/26/2019    Cellulitis of left hand [L03.114] 03/26/2019     Zosyn  - continue home meds  - blood and wound cultures negative  - ID for abx recommendation  CT scan of hand does not show any abscess or OM  D/c today if cleared by ID  DVT prophylaxis: Lovenox  Arsen Devi MD  Pager : [phone]

## 2019-03-28 NOTE — PROGRESS NOTES
Physical Therapy Med Surg Initial Assessment  [facility]  [facility]       NAME: Marlo Adjutant  :  (68 y.o.)  MRN: [id number]  CODE STATUS: Full Code    Date of Service: 3/28/2019    Patient Diagnosis(es): Cellulitis [L03.90]  Cellulitis of left hand [N91.906]   Chief Complaint   Patient presents with    Wound Infection     cat bite seen on saturday took atb, area getting worse     Patient Active Problem List    Diagnosis Date Noted    Cellulitis 2019    Cellulitis of left hand 2019    Shoulder dislocation 2016        Past Medical History:   Diagnosis Date    Hypertension      Past Surgical History:   Procedure Laterality Date    BACK SURGERY         Chart Reviewed: Yes  Family / Caregiver Present: No  General Comment  Comments: Pt resting in bed - agreeable to PT evaluation    Restrictions:  Restrictions/Precautions: (NA)     SUBJECTIVE: Subjective: \"That cat was always docile, then he turned on me. \"  Pre Treatment Pain Screening  Comments / Details: no complaints of pain    Post Treatment Pain Screening:   Pain Assessment  Pain Assessment: (unchanged)    Prior Level of Function:  Social/Functional History  Lives With: Alone  Type of Home: House  Home Layout: One level  Home Access: Stairs to enter without rails  Entrance Stairs - Number of Steps: 1  Home Equipment: Rolling walker, Cane, 4 wheeled walker  ADL Assistance: Independent  Homemaking Assistance: Independent  Ambulation Assistance: Independent  Transfer Assistance: Independent    OBJECTIVE:   Vision/Hearing:  Vision: Within Functional Limits  Hearing: Within functional limits    Cognition:  Overall Orientation Status: Within Functional Limits  Follows Commands: Within Functional Limits    Observation/Palpation  Observation: Multiple scabbed over lacerations B hands and wrists. Swelling L wrist and hand. No acute distress noted.      ROM:  RLE PROM: WFL  LLE PROM: WFL    Strength:  Strength RLE  Strength RLE: WFL  Strength LLE  Strength LLE: WFL    Neuro:  Balance  Comments: No LOB with sternal nudge or retrieving item from floor with UE support on furniture. Motor Control  Gross Motor?: WFL  Sensation  Overall Sensation Status: WFL    Bed mobility  Supine to Sit: Modified independent  Sit to Supine: Modified independent    Transfers  Sit to Stand: Modified independent  Stand to sit: Modified independent  Bed to Chair: Modified independent    Ambulation 1  Surface: level tile  Device: No Device  Assistance: Modified Independent  Quality of Gait: Pt manages small spaces intermittently furniture surfing; open areas with slow shuffing gait pattern  Distance: 100ft with turns  Stairs/Curb  Stairs?: No    Activity Tolerance  Activity Tolerance: Patient Tolerated treatment well          ASSESSMENT:   Decision Making: Low Complexity  History: Med  Exam: Med  Clinical Presentation: Low  No Skilled PT: At baseline function    Prognosis: Good  Patient Education: PT POC; DC recs; role of PT in the hosp  Barriers to Learning: none at this time    DISCHARGE RECOMMENDATIONS:  Discharge Recommendations: Home independently, Home with Home health PT  No Skilled PT: At baseline function    Assessment: Pt completes basic mobility at indep level of function. No further PT indicated at this time. Rec SPC for ambulation and f/u PT upon return home. REQUIRES PT FOLLOW UP: No      PLAN OF CARE:  Plan  Times per week: eval only  Safety Devices  Type of devices:  All fall risk precautions in place    Goals:  Short term goals  Short term goal 1: NA    AMPA (6 CLICK) BASIC MOBILITY  AM-PAC Inpatient Mobility Raw Score : 24     Therapy Time:   Individual   Time In 1300   Time Out 1320   Minutes [address] [place] [address], [address] [place] [address], 03/28/19 at 1:24 PM

## 2019-03-29 VITALS
SYSTOLIC BLOOD PRESSURE: 137 MMHG | BODY MASS INDEX: 25.6 KG/M2 | HEART RATE: 59 BPM | WEIGHT: 168.9 LBS | TEMPERATURE: 97.3 F | HEIGHT: 68 IN | DIASTOLIC BLOOD PRESSURE: 68 MMHG | OXYGEN SATURATION: 99 % | RESPIRATION RATE: 18 BRPM

## 2019-03-29 LAB
BASOPHILS ABSOLUTE: 0 K/UL (ref 0–0.2)
BASOPHILS RELATIVE PERCENT: 0.8 %
EOSINOPHILS ABSOLUTE: 0.4 K/UL (ref 0–0.7)
EOSINOPHILS RELATIVE PERCENT: 7.5 %
HCT VFR BLD CALC: 41.3 % (ref 42–52)
HEMOGLOBIN: 14.7 G/DL (ref 14–18)
LYMPHOCYTES ABSOLUTE: 1.9 K/UL (ref 1–4.8)
LYMPHOCYTES RELATIVE PERCENT: 32.5 %
MCH RBC QN AUTO: 32.2 PG (ref 27–31.3)
MCHC RBC AUTO-ENTMCNC: 35.7 % (ref 33–37)
MCV RBC AUTO: 90.3 FL (ref 80–100)
MONOCYTES ABSOLUTE: 0.5 K/UL (ref 0.2–0.8)
MONOCYTES RELATIVE PERCENT: 8.9 %
NEUTROPHILS ABSOLUTE: 2.9 K/UL (ref 1.4–6.5)
NEUTROPHILS RELATIVE PERCENT: 50.3 %
PDW BLD-RTO: 12.8 % (ref 11.5–14.5)
PLATELET # BLD: 243 K/UL (ref 130–400)
RBC # BLD: 4.58 M/UL (ref 4.7–6.1)
WBC # BLD: 5.8 K/UL (ref 4.8–10.8)

## 2019-03-29 PROCEDURE — 36415 COLL VENOUS BLD VENIPUNCTURE: CPT

## 2019-03-29 PROCEDURE — 85025 COMPLETE CBC W/AUTO DIFF WBC: CPT

## 2019-03-29 PROCEDURE — 6360000002 HC RX W HCPCS: Performed by: PHYSICIAN ASSISTANT

## 2019-03-29 PROCEDURE — 6370000000 HC RX 637 (ALT 250 FOR IP): Performed by: INTERNAL MEDICINE

## 2019-03-29 PROCEDURE — 2580000003 HC RX 258: Performed by: INTERNAL MEDICINE

## 2019-03-29 PROCEDURE — 2580000003 HC RX 258: Performed by: PHYSICIAN ASSISTANT

## 2019-03-29 PROCEDURE — 6360000002 HC RX W HCPCS: Performed by: INTERNAL MEDICINE

## 2019-03-29 RX ADMIN — AMLODIPINE BESYLATE 10 MG: 10 TABLET ORAL at 08:22

## 2019-03-29 RX ADMIN — Medication 10 ML: at 08:23

## 2019-03-29 RX ADMIN — PIPERACILLIN SODIUM,TAZOBACTAM SODIUM 3.38 G: 3; .375 INJECTION, POWDER, FOR SOLUTION INTRAVENOUS at 00:56

## 2019-03-29 RX ADMIN — BACITRACIN ZINC, NEOMYCIN, POLYMYXIN B 1 G: 400; 3.5; 5 OINTMENT TOPICAL at 08:23

## 2019-03-29 RX ADMIN — CLONIDINE HYDROCHLORIDE 0.1 MG: 0.1 TABLET ORAL at 08:23

## 2019-03-29 RX ADMIN — PIPERACILLIN SODIUM,TAZOBACTAM SODIUM 3.38 G: 3; .375 INJECTION, POWDER, FOR SOLUTION INTRAVENOUS at 08:23

## 2019-03-29 RX ADMIN — ENOXAPARIN SODIUM 40 MG: 40 INJECTION SUBCUTANEOUS at 08:22

## 2019-03-29 ASSESSMENT — PAIN SCALES - GENERAL
PAINLEVEL_OUTOF10: 0
PAINLEVEL_OUTOF10: 0

## 2019-03-29 NOTE — PROGRESS NOTES
Pt went over dc paper work, medication changes and follow up pt is aware and verbalized an understanding. Pt given copy of paperwork for home. IV removed no complications noted.

## 2019-03-29 NOTE — CARE COORDINATION
PATIENT UP AND BOUT IN ROOM. DC IS TODAY ON ORAL ANTIBIOTICS.   DECLINES [facility]

## 2019-03-29 NOTE — PROGRESS NOTES
This nurse went over DC paperwork with pt sister. She verbalized an understanding. Pt and sister assisted out of facility, no issues noted.

## 2019-03-29 NOTE — DISCHARGE SUMMARY
Physician Discharge Summary     Patient ID:  Susan Maloney  [id number]  53 y.o.  1945    Admit date: 3/26/2019    Discharge date and time: 3/29/2019    Admitting Physician: Isabel De Luna MD     Discharge Physician: Isabel De Luna MD    Admission Diagnoses: Cellulitis [L03.90]  Cellulitis of left hand [L03.114]    Discharge Diagnoses: Active Hospital Problems    Diagnosis Date Noted    Cellulitis [L03.90] 03/26/2019    Cellulitis of left hand [L03.114] 03/26/2019       Admission Condition: fair    Discharged Condition: good    Indication for Admission: cat bites    Hospital Course: Patient was admitted for multiple cat bites and swelling due to the same. He had CT of his left hand which sis not show any abscess. Bllod cultures and wound cultures were negative The patient was discharged in stable condition    Inpatient meds:  sodium chloride flush, 10 mL, Intravenous, 2 times per day    enoxaparin, 40 mg, Subcutaneous, Daily    amLODIPine, 10 mg, Oral, Daily    cloNIDine, 0.1 mg, Oral, BID    neomycin-bacitracin-polymyxin, , Topical, BID    piperacillin-tazobactam, 3.375 g, Intravenous, Q8H    Labs:  LABS  Recent Labs     03/27/19  0551 03/28/19  0554 03/29/19  0610   WBC 6.0 5.7 5.8   RBC 4.42* 4.64* 4.58*   HGB 14.2 15.0 14.7   HCT 39.8* 42.0 41.3*   MCV 89.9 90.6 90.3   MCH 32.1* 32.3* 32.2*   MCHC 35.8 35.7 35.7   RDW 12.9 12.7 12.8    230 243       No results for input(s): NA, K, CL, CO2, BUN, CREATININE, GLUCOSE, CALCIUM in the last 72 hours. No results for input(s): MG in the last 72 hours. Imaging: Xr Wrist Left (min 3 Views)    Result Date: 3/26/2019  EXAMINATION: XR HAND LEFT (MIN 3 VIEWS), XR WRIST LEFT (MIN 3 VIEWS)  CLINICAL HISTORY:  multiple cat bites; swelling COMPARISONS: Comparisons  FINDINGS: Three views of the right hand are submitted. No acute fractures. No dislocations. Minimal degenerative changes at the IP joint.  No radiopaque foreign body Diffuse soft tissue swelling overlying the dorsum of the hand                                                                                   NO ACUTE FRACTURES. EXAMINATION: XR HAND LEFT (MIN 3 VIEWS), XR WRIST LEFT (MIN 3 VIEWS) CLINICAL HISTORY:  multiple cat bites; swelling COMPARISONS: None. FINDINGS: Four views of the left wrist are submitted. No acute fractures. No dislocations. No focal bony abnormalities. . No radiographic foreign body                                                                               IMPRESSION:  NO ACUTE FRACTURES    Xr Hand Left (min 3 Views)    Result Date: 3/26/2019  EXAMINATION: XR HAND LEFT (MIN 3 VIEWS), XR WRIST LEFT (MIN 3 VIEWS)  CLINICAL HISTORY:  multiple cat bites; swelling COMPARISONS: Comparisons  FINDINGS: Three views of the right hand are submitted. No acute fractures. No dislocations. Minimal degenerative changes at the IP joint. No radiopaque foreign body Diffuse soft tissue swelling overlying the dorsum of the hand                                                                                   NO ACUTE FRACTURES. EXAMINATION: XR HAND LEFT (MIN 3 VIEWS), XR WRIST LEFT (MIN 3 VIEWS) CLINICAL HISTORY:  multiple cat bites; swelling COMPARISONS: None. FINDINGS: Four views of the left wrist are submitted. No acute fractures. No dislocations. No focal bony abnormalities. . No radiographic foreign body                                                                               IMPRESSION:  NO ACUTE FRACTURES    Ct Hand Left W Contrast    Result Date: 3/27/2019  EXAM: CT of the left hand with contrast  HISTORY: Hand abscess. Multiple lacerations from cat scratches. Evaluate for osteomyelitis TECHNIQUE: Multiple contiguous axial images were obtained with IV contrast. Multiplanar reformats were obtained. COMPARISON: Hand radiographs from March 26, 2019 FINDINGS: No acute fracture or dislocation. No osseous erosions. Mild degenerative changes of the first carpometacarpal joint. Circumferential subcutaneous soft tissue edema of the wrist and hand, more so dorsally, with areas of skin thickening but no discrete fluid collection within the soft tissues to suggest abscess. Visualized myotendinous structures appear intact by CT. Findings compatible with cellulitis without soft tissue abscess. No evidence of osteomyelitis however CT is not sensitive as sensitive as MRI for detection of osteomyelitis. All CT scans at this facility use dose modulation, iterative reconstruction, and/or weight based dosing when appropriate to reduce radiation dose to as low as reasonably achievable.          Discharge Exam:  General Appearance:    Alert, cooperative, no distress, appears stated age   Lungs:     Clear to auscultation bilaterally, respirations unlabored    Heart:    Regular rate and rhythm, S1 and S2 normal, no murmur, rub   or gallop   Abdomen:     Soft, non-tender, bowel sounds active all four quadrants,     no masses, no organomegaly   Extremities:   Extremities normal, atraumatic, no cyanosis or edema   Pulses:   2+ and symmetric all extremities   Neurologic:   CNII-XII intact, normal strength, sensation and reflexes     throughout     In process/preliminary results:  Outstanding Order Results     Date and Time Order Name Status Description    3/26/2019 1354 Culture Blood #2 Preliminary     3/26/2019 1300 Culture Blood #1 Preliminary     3/23/2019 1707 [place] Repair Preliminary     3/23/2019 1707 Lac Repair Preliminary     3/23/2019 1707 Lac Repair Preliminary           Discharge medications     Medication List      CHANGE how you take these medications    cloNIDine 0.1 MG tablet  Commonly known as:  CATAPRES  Take 1 tablet by mouth 2 times daily  What changed:    · how much to take  · how to take this  · when to take this        CONTINUE taking these medications    amLODIPine 10 MG tablet  Commonly known as:  NORVASC  Take 1 tablet by mouth daily     amoxicillin-clavulanate 875-125 MG per tablet  Commonly known as:  AUGMENTIN  Take 1 tablet by mouth 2 times daily for 14 days     neomycin-bacitracin-polymyxin 400-5-5000 ointment  Commonly known as:  NEOSPORIN  Apply topically 2 times daily. Where to Get Your Medications      These medications were sent to [facility]pharmacy #[id number], [address] [place] [address] [address] [id number]    Phone:  [phone]   · amLODIPine 10 MG tablet  · cloNIDine 0.1 MG tablet     You can get these medications from any pharmacy    Bring a paper prescription for each of these medications  · amoxicillin-clavulanate 875-125 MG per tablet           Patient Instructions:   Current Discharge Medication List      CONTINUE these medications which have CHANGED    Details   amoxicillin-clavulanate (AUGMENTIN) 875-125 MG per tablet Take 1 tablet by mouth 2 times daily for 14 days  Qty: 14 tablet, Refills: 0         CONTINUE these medications which have NOT CHANGED    Details   cloNIDine (CATAPRES) 0.1 MG tablet Take 1 tablet by mouth 2 times daily  Qty: 60 tablet, Refills: 3      amLODIPine (NORVASC) 10 MG tablet Take 1 tablet by mouth daily  Qty: 30 tablet, Refills: 5      neomycin-bacitracin-polymyxin (NEOSPORIN) 400-5-5000 ointment Apply topically 2 times daily.   Qty: 1 Tube, Refills: 1           Activity: activity as tolerated  Diet: regular diet and diabetic diet ( if indicated)    I spent more than 30 minutes talking to the patient, family and the nurse and preparing the discharge      Signed:  Meme Briscoe MD  Pager : [phone]  3/29/2019  2:21 PM

## 2019-03-31 LAB
BLOOD CULTURE, ROUTINE: NORMAL
CULTURE, BLOOD 2: NORMAL

## 2019-04-01 ENCOUNTER — TELEPHONE (OUTPATIENT)
Dept: FAMILY MEDICINE CLINIC | Age: 74
End: 2019-04-01

## 2019-04-03 ENCOUNTER — OFFICE VISIT (OUTPATIENT)
Dept: INTERNAL MEDICINE CLINIC | Age: 74
End: 2019-04-03
Payer: MEDICARE

## 2019-04-03 VITALS
OXYGEN SATURATION: 98 % | HEART RATE: 67 BPM | WEIGHT: 170 LBS | TEMPERATURE: 97.7 F | HEIGHT: 68 IN | SYSTOLIC BLOOD PRESSURE: 120 MMHG | RESPIRATION RATE: 16 BRPM | BODY MASS INDEX: 25.76 KG/M2 | DIASTOLIC BLOOD PRESSURE: 70 MMHG
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
## 2019-04-03 PROCEDURE — 1111F DSCHRG MED/CURRENT MED MERGE: CPT | Performed by: NURSE PRACTITIONER

## 2019-04-03 PROCEDURE — 99495 TRANSJ CARE MGMT MOD F2F 14D: CPT | Performed by: NURSE PRACTITIONER

## 2019-04-03 ASSESSMENT — ENCOUNTER SYMPTOMS
DIARRHEA: 0
SHORTNESS OF BREATH: 0
EYES NEGATIVE: 1
WHEEZING: 0
NAUSEA: 0
VOMITING: 0
COLOR CHANGE: 1
RHINORRHEA: 0
ABDOMINAL PAIN: 0
COUGH: 0

## 2019-04-03 NOTE — PROGRESS NOTES
now reconciled against medications ordered at time of hospital discharge: Yes    Chief Complaint   Patient presents with    Follow-Up from Hospital     Pt. is here for an ER f/u from [facility] 03/23/2019 and 03/26/2019 for multiple cat bites. Pt. had 5 stitches placed on his left hand. Pt. c/o swelling but denies any pain or itchiness.  Health Maintenance     Pt. declines all HM. HPI    Inpatient course: Discharge summary reviewed- see chart. Interval history/Current status: stable    Review of Systems   Constitutional: Negative for chills, diaphoresis and fever. HENT: Negative for congestion and rhinorrhea. Eyes: Negative. Respiratory: Negative for cough, shortness of breath and wheezing. Cardiovascular: Negative for chest pain, palpitations and leg swelling. Gastrointestinal: Negative for abdominal pain, diarrhea, nausea and vomiting. Genitourinary: Negative. Skin: Positive for color change and wound. Neurological: Negative for dizziness, syncope, light-headedness and headaches. Psychiatric/Behavioral: Negative. Vitals:    04/03/19 1006   BP: 120/70   Site: Left Upper Arm   Position: Sitting   Cuff Size: Large Adult   Pulse: 67   Resp: 16   Temp: 97.7 °F (36.5 °C)   TempSrc: Oral   SpO2: 98%   Weight: 170 lb (77.1 kg)   Height: 5' 8\" (1.727 m)     Body mass index is 25.85 kg/m². Wt Readings from Last 3 Encounters:   04/03/19 170 lb (77.1 kg)   03/29/19 168 lb 14.4 oz (76.6 kg)   03/26/19 180 lb (81.6 kg)     BP Readings from Last 3 Encounters:   04/03/19 120/70   03/29/19 137/68   03/26/19 132/88       Physical Exam   Constitutional: He is oriented to person, place, and time. He appears well-developed and well-nourished. HENT:   Head: Normocephalic and atraumatic. Mouth/Throat: Oropharynx is clear and moist.   Eyes: Pupils are equal, round, and reactive to light. Conjunctivae are normal.   Neck: Neck supple.    Cardiovascular: Normal rate, regular rhythm and normal heart sounds. Pulmonary/Chest: Effort normal and breath sounds normal. No respiratory distress. He has no wheezes. Abdominal: Soft. Bowel sounds are normal. There is no tenderness. Musculoskeletal: Normal range of motion. Left hand: He exhibits tenderness and swelling. Sutures remain in place to left hand. Surrounding erythema and edema present. Improved from prior visit. Neurological: He is alert and oriented to person, place, and time. Skin: Skin is warm. He is not diaphoretic. Assessment/Plan:  1.  Cat bite, subsequent encounter  - Ambulatory referral to Infectious Disease    -Sutures not ready to be removed  -Continue ATB as ordered  -Discussed importance of keep wound clean      Medical Decision Making: moderate complexity

## 2019-04-05 ENCOUNTER — TELEPHONE (OUTPATIENT)
Dept: INFECTIOUS DISEASES | Age: 74
End: 2019-04-05

## 2019-04-10 ENCOUNTER — OFFICE VISIT (OUTPATIENT)
Dept: INTERNAL MEDICINE CLINIC | Age: 74
End: 2019-04-10
Payer: MEDICARE

## 2019-04-10 VITALS
HEIGHT: 68 IN | BODY MASS INDEX: 25.85 KG/M2 | SYSTOLIC BLOOD PRESSURE: 122 MMHG | OXYGEN SATURATION: 98 % | RESPIRATION RATE: 16 BRPM | DIASTOLIC BLOOD PRESSURE: 74 MMHG | HEART RATE: 81 BPM | TEMPERATURE: 98 F
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
## 2019-04-10 PROCEDURE — 4040F PNEUMOC VAC/ADMIN/RCVD: CPT | Performed by: FAMILY MEDICINE

## 2019-04-10 PROCEDURE — 1036F TOBACCO NON-USER: CPT | Performed by: FAMILY MEDICINE

## 2019-04-10 PROCEDURE — G8427 DOCREV CUR MEDS BY ELIG CLIN: HCPCS | Performed by: FAMILY MEDICINE

## 2019-04-10 PROCEDURE — 1111F DSCHRG MED/CURRENT MED MERGE: CPT | Performed by: FAMILY MEDICINE

## 2019-04-10 PROCEDURE — 1123F ACP DISCUSS/DSCN MKR DOCD: CPT | Performed by: FAMILY MEDICINE

## 2019-04-10 PROCEDURE — G8419 CALC BMI OUT NRM PARAM NOF/U: HCPCS | Performed by: FAMILY MEDICINE

## 2019-04-10 PROCEDURE — 99213 OFFICE O/P EST LOW 20 MIN: CPT | Performed by: FAMILY MEDICINE

## 2019-04-10 PROCEDURE — 3017F COLORECTAL CA SCREEN DOC REV: CPT | Performed by: FAMILY MEDICINE

## 2019-04-10 ASSESSMENT — ENCOUNTER SYMPTOMS
EYES NEGATIVE: 1
SHORTNESS OF BREATH: 0
ALLERGIC/IMMUNOLOGIC NEGATIVE: 1
GASTROINTESTINAL NEGATIVE: 1
RESPIRATORY NEGATIVE: 1

## 2019-04-10 NOTE — PROGRESS NOTES
Patient is seen in follow up for   Chief Complaint   Patient presents with    Animal Bite     1 week follow up on cat bite - possible suture removal Left hand and forearm     HPIhere for follow up after cat bite doing much better. Past Medical History:   Diagnosis Date    Hypertension      Patient Active Problem List    Diagnosis Date Noted    Cellulitis 03/26/2019    Cellulitis of left hand 03/26/2019    Shoulder dislocation 03/08/2016     Past Surgical History:   Procedure Laterality Date    BACK SURGERY       No family history on file.   Social History     Socioeconomic History    Marital status: Single     Spouse name: None    Number of children: None    Years of education: None    Highest education level: None   Occupational History    None   Social Needs    Financial resource strain: None    Food insecurity:     Worry: None     Inability: None    Transportation needs:     Medical: None     Non-medical: None   Tobacco Use    Smoking status: Never Smoker    Smokeless tobacco: Never Used   Substance and Sexual Activity    Alcohol use: Never     Alcohol/week: 0.0 oz     Frequency: Never    Drug use: Never    Sexual activity: None   Lifestyle    Physical activity:     Days per week: None     Minutes per session: None    Stress: None   Relationships    Social connections:     Talks on phone: None     Gets together: None     Attends Sabianism service: None     Active member of club or organization: None     Attends meetings of clubs or organizations: None     Relationship status: None    Intimate partner violence:     Fear of current or ex partner: None     Emotionally abused: None     Physically abused: None     Forced sexual activity: None   Other Topics Concern    None   Social History Narrative    None     Current Outpatient Medications   Medication Sig Dispense Refill    amoxicillin-clavulanate (AUGMENTIN) 875-125 MG per tablet Take 1 tablet by mouth 2 times daily for 14 days 14 tablet 0    cloNIDine (CATAPRES) 0.1 MG tablet Take 1 tablet by mouth 2 times daily 60 tablet 3    amLODIPine (NORVASC) 10 MG tablet Take 1 tablet by mouth daily 30 tablet 5    neomycin-bacitracin-polymyxin (NEOSPORIN) 400-5-5000 ointment Apply topically 2 times daily. 1 Tube 1     No current facility-administered medications for this visit. Current Outpatient Medications on File Prior to Visit   Medication Sig Dispense Refill    amoxicillin-clavulanate (AUGMENTIN) 875-125 MG per tablet Take 1 tablet by mouth 2 times daily for 14 days 14 tablet 0    cloNIDine (CATAPRES) 0.1 MG tablet Take 1 tablet by mouth 2 times daily 60 tablet 3    amLODIPine (NORVASC) 10 MG tablet Take 1 tablet by mouth daily 30 tablet 5    neomycin-bacitracin-polymyxin (NEOSPORIN) 400-5-5000 ointment Apply topically 2 times daily. 1 Tube 1     No current facility-administered medications on file prior to visit. No Known Allergies  Health Maintenance   Topic Date Due    Hepatitis C screen  1945    Lipid screen  11/17/1986    Shingles Vaccine (1 of 2) 11/17/1996    Colon cancer screen colonoscopy  11/17/1996    Pneumococcal 65+ years Vaccine (1 of 2 - PCV13) 11/17/2011    Flu vaccine (Season Ended) 09/01/2019    DTaP/Tdap/Td vaccine (2 - Td) 02/22/2029       Review of Systems     Review of Systems   Constitutional: Negative for activity change, appetite change, chills, fever and unexpected weight change. HENT: Negative. Eyes: Negative. Respiratory: Negative. Negative for shortness of breath. Cardiovascular: Negative. Negative for chest pain and palpitations. Gastrointestinal: Negative. Endocrine: Negative. Genitourinary: Negative. Musculoskeletal: Negative. Skin: Negative. Allergic/Immunologic: Negative. Neurological: Negative. Hematological: Negative. Psychiatric/Behavioral: Negative.         Physical Exam  Vitals:    04/10/19 1500   BP: 122/74   Site: Right Upper Arm Position: Sitting   Cuff Size: Large Adult   Pulse: 81   Resp: 16   Temp: 98 °F (36.7 °C)   TempSrc: Oral   SpO2: 98%   Height: 5' 8\" (1.727 m)       Physical Exam   Constitutional: He is oriented to person, place, and time. He appears well-developed and well-nourished. HENT:   Right Ear: External ear normal.   Left Ear: External ear normal.   Eyes: Pupils are equal, round, and reactive to light. Conjunctivae and EOM are normal.   Neck: Normal range of motion. Neck supple. No thyromegaly present. Cardiovascular: Normal rate, regular rhythm, normal heart sounds and intact distal pulses. Exam reveals no gallop and no friction rub. No murmur heard. Pulmonary/Chest: Effort normal and breath sounds normal. No respiratory distress. He has no wheezes. Abdominal: Soft. Bowel sounds are normal. He exhibits no distension and no mass. There is no tenderness. There is no rebound and no guarding. No hernia. Genitourinary: Penis normal.   Musculoskeletal: Normal range of motion. He exhibits no edema or tenderness. Arms:  Lymphadenopathy:     He has no cervical adenopathy. Neurological: He is alert and oriented to person, place, and time. No cranial nerve deficit. Coordination normal.   Skin: Skin is warm and dry. Psychiatric: He has a normal mood and affect. Assessment   Diagnosis Orders   1. Cat bite, subsequent encounter       Problem List     None          Plan  Doing much better finish antibiotics. No orders of the defined types were placed in this encounter. No follow-ups on file.   Stuart Mendoza MD

## 2019-04-16 ENCOUNTER — OFFICE VISIT (OUTPATIENT)
Dept: INFECTIOUS DISEASES | Age: 74
End: 2019-04-16
Payer: MEDICARE

## 2019-04-16 VITALS
BODY MASS INDEX: 25.85 KG/M2 | HEART RATE: 80 BPM | DIASTOLIC BLOOD PRESSURE: 82 MMHG | TEMPERATURE: 98.1 F | HEIGHT: 68 IN | RESPIRATION RATE: 20 BRPM | SYSTOLIC BLOOD PRESSURE: 134 MMHG
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
## 2019-04-16 PROCEDURE — 4040F PNEUMOC VAC/ADMIN/RCVD: CPT | Performed by: INTERNAL MEDICINE

## 2019-04-16 PROCEDURE — 1036F TOBACCO NON-USER: CPT | Performed by: INTERNAL MEDICINE

## 2019-04-16 PROCEDURE — 3017F COLORECTAL CA SCREEN DOC REV: CPT | Performed by: INTERNAL MEDICINE

## 2019-04-16 PROCEDURE — 1111F DSCHRG MED/CURRENT MED MERGE: CPT | Performed by: INTERNAL MEDICINE

## 2019-04-16 PROCEDURE — 1123F ACP DISCUSS/DSCN MKR DOCD: CPT | Performed by: INTERNAL MEDICINE

## 2019-04-16 PROCEDURE — G8419 CALC BMI OUT NRM PARAM NOF/U: HCPCS | Performed by: INTERNAL MEDICINE

## 2019-04-16 PROCEDURE — 99212 OFFICE O/P EST SF 10 MIN: CPT | Performed by: INTERNAL MEDICINE

## 2019-04-16 PROCEDURE — G8427 DOCREV CUR MEDS BY ELIG CLIN: HCPCS | Performed by: INTERNAL MEDICINE

## 2019-04-16 RX ORDER — NAPROXEN SODIUM 220 MG
220 TABLET ORAL 2 TIMES DAILY WITH MEALS
Status: ON HOLD | COMMUNITY
End: 2021-09-27 | Stop reason: HOSPADM

## 2019-04-16 ASSESSMENT — ENCOUNTER SYMPTOMS
GASTROINTESTINAL NEGATIVE: 1
RESPIRATORY NEGATIVE: 1

## 2019-04-16 NOTE — PROGRESS NOTES
Subjective:      Patient ID: Matheus Arevalo is a 68 y.o. male. HPI       Cellulitis left hand  Cat bite   CT scan shows no abscess      oral antibiotic   augmentin          Past Medical History        Past Medical History:   Diagnosis Date    Hypertension            No Known Allergies        Family History               Past Surgical History         Past Surgical History:   Procedure Laterality Date    BACK SURGERY                           Review of Systems   Respiratory: Negative. Cardiovascular: Negative. Gastrointestinal: Negative. Genitourinary: Negative. Musculoskeletal: Negative. Skin: Negative. Objective:   Physical Exam   Cardiovascular: Normal rate and normal heart sounds. No murmur heard. Pulmonary/Chest: Effort normal and breath sounds normal. No stridor. No respiratory distress. He has no wheezes. He has no rales. Abdominal: Soft. Bowel sounds are normal. He exhibits no distension and no mass. There is no guarding. Musculoskeletal:        Arms:  Skin: Skin is warm. There is erythema. Assessment:        Diagnosis Orders   1.  Cat bite, subsequent encounter             Plan:          Hands healing    Finish augmentin            Nano Ngo MD

## 2019-07-27 RX ORDER — CLONIDINE HYDROCHLORIDE 0.1 MG/1
TABLET ORAL
Qty: 180 TABLET | Refills: 1 | Status: ON HOLD | OUTPATIENT
Start: 2019-07-27 | End: 2021-09-27 | Stop reason: HOSPADM

## 2019-10-16 ENCOUNTER — TELEPHONE (OUTPATIENT)
Dept: FAMILY MEDICINE CLINIC | Age: 74
End: 2019-10-16

## 2020-02-20 ENCOUNTER — TELEPHONE (OUTPATIENT)
Dept: FAMILY MEDICINE CLINIC | Age: 75
End: 2020-02-20

## 2020-02-20 NOTE — TELEPHONE ENCOUNTER
Lexie Galaviz is calling on behalf of the patient. She sts that he was contacted to be on jury duty but he is unable to attend the request as his health is poor. She sts that this is the second time that he has been contacted. She is requesting another letter stating that he is unable to fill their request. Please advise.

## 2020-02-21 NOTE — TELEPHONE ENCOUNTER
Pt would need this request filled by 2/25 if possible.     Please let me know either way, so I can relay to pt    TY

## 2020-02-21 NOTE — TELEPHONE ENCOUNTER
Please call and tell patient that we do not see any diagnosis on his chart that would show need to be off of jury duty. Please ask specific diagnosis from patient in order for us to create this.

## 2021-09-10 ENCOUNTER — APPOINTMENT (OUTPATIENT)
Dept: GENERAL RADIOLOGY | Age: 76
DRG: 065 | End: 2021-09-10
Payer: MEDICARE

## 2021-09-10 ENCOUNTER — APPOINTMENT (OUTPATIENT)
Dept: CT IMAGING | Age: 76
DRG: 065 | End: 2021-09-10
Payer: MEDICARE

## 2021-09-10 ENCOUNTER — HOSPITAL ENCOUNTER (INPATIENT)
Age: 76
LOS: 3 days | Discharge: NURSING, REHAB OR LONG-TERM CARE | DRG: 065 | End: 2021-09-13
Attending: STUDENT IN AN ORGANIZED HEALTH CARE EDUCATION/TRAINING PROGRAM | Admitting: INTERNAL MEDICINE
Payer: MEDICARE

## 2021-09-10 DIAGNOSIS — R53.1 GENERAL WEAKNESS: ICD-10-CM

## 2021-09-10 DIAGNOSIS — I63.9 CEREBROVASCULAR ACCIDENT (CVA), UNSPECIFIED MECHANISM (HCC): Primary | ICD-10-CM

## 2021-09-10 DIAGNOSIS — R26.9 GAIT ABNORMALITY: ICD-10-CM

## 2021-09-10 LAB
ALBUMIN SERPL-MCNC: 4.5 G/DL (ref 3.5–4.6)
ALP BLD-CCNC: 79 U/L (ref 35–104)
ALT SERPL-CCNC: 52 U/L (ref 0–41)
ANION GAP SERPL CALCULATED.3IONS-SCNC: 14 MEQ/L (ref 9–15)
AST SERPL-CCNC: 36 U/L (ref 0–40)
BACTERIA: NEGATIVE /HPF
BASOPHILS ABSOLUTE: 0 K/UL (ref 0–0.2)
BASOPHILS RELATIVE PERCENT: 0.8 %
BILIRUB SERPL-MCNC: 1 MG/DL (ref 0.2–0.7)
BILIRUBIN URINE: NEGATIVE
BLOOD, URINE: ABNORMAL
BUN BLDV-MCNC: 21 MG/DL (ref 8–23)
CALCIUM SERPL-MCNC: 9.2 MG/DL (ref 8.5–9.9)
CHLORIDE BLD-SCNC: 103 MEQ/L (ref 95–107)
CHOLESTEROL, TOTAL: 145 MG/DL (ref 0–199)
CLARITY: CLEAR
CO2: 22 MEQ/L (ref 20–31)
COLOR: YELLOW
CREAT SERPL-MCNC: 0.97 MG/DL (ref 0.7–1.2)
EOSINOPHILS ABSOLUTE: 0 K/UL (ref 0–0.7)
EOSINOPHILS RELATIVE PERCENT: 0.8 %
EPITHELIAL CELLS, UA: ABNORMAL /HPF (ref 0–5)
GFR AFRICAN AMERICAN: >60
GFR NON-AFRICAN AMERICAN: >60
GLOBULIN: 2.7 G/DL (ref 2.3–3.5)
GLUCOSE BLD-MCNC: 98 MG/DL (ref 70–99)
GLUCOSE URINE: NEGATIVE MG/DL
HCT VFR BLD CALC: 48.6 % (ref 42–52)
HDLC SERPL-MCNC: 38 MG/DL (ref 40–59)
HEMOGLOBIN: 17.1 G/DL (ref 14–18)
HYALINE CASTS: ABNORMAL /HPF (ref 0–5)
KETONES, URINE: 15 MG/DL
LDL CHOLESTEROL CALCULATED: 94 MG/DL (ref 0–129)
LEUKOCYTE ESTERASE, URINE: NEGATIVE
LYMPHOCYTES ABSOLUTE: 1.5 K/UL (ref 1–4.8)
LYMPHOCYTES RELATIVE PERCENT: 24.6 %
MAGNESIUM: 1.9 MG/DL (ref 1.7–2.4)
MCH RBC QN AUTO: 31.9 PG (ref 27–31.3)
MCHC RBC AUTO-ENTMCNC: 35.2 % (ref 33–37)
MCV RBC AUTO: 90.4 FL (ref 80–100)
MONOCYTES ABSOLUTE: 0.4 K/UL (ref 0.2–0.8)
MONOCYTES RELATIVE PERCENT: 7.4 %
NEUTROPHILS ABSOLUTE: 3.9 K/UL (ref 1.4–6.5)
NEUTROPHILS RELATIVE PERCENT: 66.4 %
NITRITE, URINE: NEGATIVE
PDW BLD-RTO: 13 % (ref 11.5–14.5)
PH UA: 5.5 (ref 5–9)
PLATELET # BLD: 235 K/UL (ref 130–400)
POTASSIUM SERPL-SCNC: 4 MEQ/L (ref 3.4–4.9)
PRO-BNP: 596 PG/ML
PROTEIN UA: ABNORMAL MG/DL
RBC # BLD: 5.38 M/UL (ref 4.7–6.1)
RBC UA: ABNORMAL /HPF (ref 0–5)
SARS-COV-2, NAAT: NOT DETECTED
SODIUM BLD-SCNC: 139 MEQ/L (ref 135–144)
SPECIFIC GRAVITY UA: 1.02 (ref 1–1.03)
TOTAL PROTEIN: 7.2 G/DL (ref 6.3–8)
TRIGL SERPL-MCNC: 67 MG/DL (ref 0–150)
TROPONIN: <0.01 NG/ML (ref 0–0.01)
TSH REFLEX: 1.45 UIU/ML (ref 0.44–3.86)
URINE REFLEX TO CULTURE: ABNORMAL
UROBILINOGEN, URINE: 1 E.U./DL
WBC # BLD: 5.9 K/UL (ref 4.8–10.8)
WBC UA: ABNORMAL /HPF (ref 0–5)

## 2021-09-10 PROCEDURE — 6360000002 HC RX W HCPCS: Performed by: INTERNAL MEDICINE

## 2021-09-10 PROCEDURE — 2500000003 HC RX 250 WO HCPCS: Performed by: STUDENT IN AN ORGANIZED HEALTH CARE EDUCATION/TRAINING PROGRAM

## 2021-09-10 PROCEDURE — 83880 ASSAY OF NATRIURETIC PEPTIDE: CPT

## 2021-09-10 PROCEDURE — 83735 ASSAY OF MAGNESIUM: CPT

## 2021-09-10 PROCEDURE — 80061 LIPID PANEL: CPT

## 2021-09-10 PROCEDURE — 36415 COLL VENOUS BLD VENIPUNCTURE: CPT

## 2021-09-10 PROCEDURE — 84484 ASSAY OF TROPONIN QUANT: CPT

## 2021-09-10 PROCEDURE — 80053 COMPREHEN METABOLIC PANEL: CPT

## 2021-09-10 PROCEDURE — 6370000000 HC RX 637 (ALT 250 FOR IP): Performed by: INTERNAL MEDICINE

## 2021-09-10 PROCEDURE — 93005 ELECTROCARDIOGRAM TRACING: CPT | Performed by: STUDENT IN AN ORGANIZED HEALTH CARE EDUCATION/TRAINING PROGRAM

## 2021-09-10 PROCEDURE — 1210000000 HC MED SURG R&B

## 2021-09-10 PROCEDURE — 70450 CT HEAD/BRAIN W/O DYE: CPT

## 2021-09-10 PROCEDURE — 85025 COMPLETE CBC W/AUTO DIFF WBC: CPT

## 2021-09-10 PROCEDURE — 81001 URINALYSIS AUTO W/SCOPE: CPT

## 2021-09-10 PROCEDURE — 87635 SARS-COV-2 COVID-19 AMP PRB: CPT

## 2021-09-10 PROCEDURE — 6370000000 HC RX 637 (ALT 250 FOR IP): Performed by: STUDENT IN AN ORGANIZED HEALTH CARE EDUCATION/TRAINING PROGRAM

## 2021-09-10 PROCEDURE — 83036 HEMOGLOBIN GLYCOSYLATED A1C: CPT

## 2021-09-10 PROCEDURE — 96374 THER/PROPH/DIAG INJ IV PUSH: CPT

## 2021-09-10 PROCEDURE — 99283 EMERGENCY DEPT VISIT LOW MDM: CPT

## 2021-09-10 PROCEDURE — 84443 ASSAY THYROID STIM HORMONE: CPT

## 2021-09-10 PROCEDURE — 71045 X-RAY EXAM CHEST 1 VIEW: CPT

## 2021-09-10 PROCEDURE — 2580000003 HC RX 258: Performed by: INTERNAL MEDICINE

## 2021-09-10 RX ORDER — POTASSIUM CHLORIDE 7.45 MG/ML
10 INJECTION INTRAVENOUS PRN
Status: DISCONTINUED | OUTPATIENT
Start: 2021-09-10 | End: 2021-09-13 | Stop reason: HOSPADM

## 2021-09-10 RX ORDER — POTASSIUM CHLORIDE 1.5 G/1.77G
40 POWDER, FOR SOLUTION ORAL PRN
Status: DISCONTINUED | OUTPATIENT
Start: 2021-09-10 | End: 2021-09-13 | Stop reason: HOSPADM

## 2021-09-10 RX ORDER — LABETALOL HYDROCHLORIDE 5 MG/ML
10 INJECTION, SOLUTION INTRAVENOUS ONCE
Status: COMPLETED | OUTPATIENT
Start: 2021-09-10 | End: 2021-09-10

## 2021-09-10 RX ORDER — HYDRALAZINE HYDROCHLORIDE 20 MG/ML
10 INJECTION INTRAMUSCULAR; INTRAVENOUS EVERY 6 HOURS PRN
Status: DISCONTINUED | OUTPATIENT
Start: 2021-09-10 | End: 2021-09-13 | Stop reason: HOSPADM

## 2021-09-10 RX ORDER — ONDANSETRON 2 MG/ML
4 INJECTION INTRAMUSCULAR; INTRAVENOUS EVERY 6 HOURS PRN
Status: DISCONTINUED | OUTPATIENT
Start: 2021-09-10 | End: 2021-09-13 | Stop reason: HOSPADM

## 2021-09-10 RX ORDER — ACETAMINOPHEN 325 MG/1
650 TABLET ORAL EVERY 6 HOURS PRN
Status: DISCONTINUED | OUTPATIENT
Start: 2021-09-10 | End: 2021-09-13 | Stop reason: HOSPADM

## 2021-09-10 RX ORDER — ASPIRIN 81 MG/1
81 TABLET, CHEWABLE ORAL DAILY
Status: DISCONTINUED | OUTPATIENT
Start: 2021-09-10 | End: 2021-09-13 | Stop reason: HOSPADM

## 2021-09-10 RX ORDER — ATORVASTATIN CALCIUM 80 MG/1
80 TABLET, FILM COATED ORAL NIGHTLY
Status: DISCONTINUED | OUTPATIENT
Start: 2021-09-10 | End: 2021-09-13 | Stop reason: HOSPADM

## 2021-09-10 RX ORDER — SODIUM CHLORIDE 0.9 % (FLUSH) 0.9 %
5-40 SYRINGE (ML) INJECTION PRN
Status: DISCONTINUED | OUTPATIENT
Start: 2021-09-10 | End: 2021-09-13 | Stop reason: HOSPADM

## 2021-09-10 RX ORDER — ONDANSETRON 4 MG/1
4 TABLET, ORALLY DISINTEGRATING ORAL EVERY 8 HOURS PRN
Status: DISCONTINUED | OUTPATIENT
Start: 2021-09-10 | End: 2021-09-13 | Stop reason: HOSPADM

## 2021-09-10 RX ORDER — AMLODIPINE BESYLATE 10 MG/1
10 TABLET ORAL DAILY
Status: DISCONTINUED | OUTPATIENT
Start: 2021-09-10 | End: 2021-09-13 | Stop reason: HOSPADM

## 2021-09-10 RX ORDER — POTASSIUM CHLORIDE 20 MEQ/1
40 TABLET, EXTENDED RELEASE ORAL PRN
Status: DISCONTINUED | OUTPATIENT
Start: 2021-09-10 | End: 2021-09-13 | Stop reason: HOSPADM

## 2021-09-10 RX ORDER — SODIUM CHLORIDE 0.9 % (FLUSH) 0.9 %
5-40 SYRINGE (ML) INJECTION EVERY 12 HOURS SCHEDULED
Status: DISCONTINUED | OUTPATIENT
Start: 2021-09-10 | End: 2021-09-13 | Stop reason: HOSPADM

## 2021-09-10 RX ORDER — POLYETHYLENE GLYCOL 3350 17 G/17G
17 POWDER, FOR SOLUTION ORAL DAILY PRN
Status: DISCONTINUED | OUTPATIENT
Start: 2021-09-10 | End: 2021-09-13 | Stop reason: HOSPADM

## 2021-09-10 RX ORDER — SODIUM CHLORIDE 9 MG/ML
25 INJECTION, SOLUTION INTRAVENOUS PRN
Status: DISCONTINUED | OUTPATIENT
Start: 2021-09-10 | End: 2021-09-13 | Stop reason: HOSPADM

## 2021-09-10 RX ORDER — ACETAMINOPHEN 650 MG/1
650 SUPPOSITORY RECTAL EVERY 6 HOURS PRN
Status: DISCONTINUED | OUTPATIENT
Start: 2021-09-10 | End: 2021-09-13 | Stop reason: HOSPADM

## 2021-09-10 RX ORDER — MECLIZINE HCL 12.5 MG/1
25 TABLET ORAL ONCE
Status: COMPLETED | OUTPATIENT
Start: 2021-09-10 | End: 2021-09-10

## 2021-09-10 RX ADMIN — ENOXAPARIN SODIUM 40 MG: 40 INJECTION SUBCUTANEOUS at 17:44

## 2021-09-10 RX ADMIN — ATORVASTATIN CALCIUM 80 MG: 80 TABLET, FILM COATED ORAL at 22:06

## 2021-09-10 RX ADMIN — ASPIRIN 81 MG: 81 TABLET, CHEWABLE ORAL at 17:44

## 2021-09-10 RX ADMIN — AMLODIPINE BESYLATE 10 MG: 10 TABLET ORAL at 17:44

## 2021-09-10 RX ADMIN — MECLIZINE 25 MG: 12.5 TABLET ORAL at 14:08

## 2021-09-10 RX ADMIN — LABETALOL HYDROCHLORIDE 10 MG: 5 INJECTION, SOLUTION INTRAVENOUS at 14:08

## 2021-09-10 RX ADMIN — SODIUM CHLORIDE, PRESERVATIVE FREE 10 ML: 5 INJECTION INTRAVENOUS at 22:06

## 2021-09-10 NOTE — H&P
Department of Internal Medicine - Staff Internal Medicine Service          ADMISSION NOTE/HISTORY AND PHYSICAL EXAMINATION   ______________________________________________________________________    HISTORY OBTAIN FROM:  patient    CHIEF COMPLAINT:  Dizziness       HISTORY OF PRESENT ILLNESS:    Patient is 58-year-old male who presented to the ER with recurrent falls and dizziness. Patient states that since 1 month he has been feeling very dizzy and not himself, patient states that he is also feeling generalized weak and fatigued, patient states that his symptoms got worse today so he decided to come to ER. Patient also complained of multiple falls in the last couple of days, denied any blurry vision or double vision, denied any headache. In the ER patient was very unsteady on his feet, patient had head CT which showed only mild inferior cerebellar stroke, blood pressure was in 200s. Labs were unremarkable      PAST MEDICAL HISTORY:        Diagnosis Date    Hypertension        PAST SURGICAL HISTORY:        Procedure Laterality Date    BACK SURGERY         MEDICATION PRIOR TO ADMISSION:  Medications Prior to Admission: cloNIDine (CATAPRES) 0.1 MG tablet, TAKE 1 TABLET BY MOUTH TWICE A DAY  naproxen sodium (ALEVE) 220 MG tablet, Take 220 mg by mouth 2 times daily (with meals)  amLODIPine (NORVASC) 10 MG tablet, Take 1 tablet by mouth daily  neomycin-bacitracin-polymyxin (NEOSPORIN) 400-5-5000 ointment, Apply topically 2 times daily. Allergies:  Patient has no known allergies. SOCIAL HISTORY:   Denies tobacco or alcohol use     FAMILY HISTORY:   No family history on file. REVIEW OF SYSTEMS:  As stated in hpi , rest of ros negative     PHYSICAL EXAM:  BP (!) 159/93   Pulse 71   Temp 97.2 °F (36.2 °C)   Resp 13   Ht 5' 8\" (1.727 m)   Wt 179 lb (81.2 kg)   SpO2 100%   BMI 27.22 kg/m²   Physical Exam  Constitutional:       Appearance: Normal appearance. HENT:      Head: Normocephalic.
77

## 2021-09-10 NOTE — ED PROVIDER NOTES
[address] [facility]  eMERGENCY dEPARTMENT eNCOUnter      Pt Name: Raffy Edwards  MRN: [id number]  [place] 1945  Date of evaluation: 9/10/2021  Provider: Sterling Theodore MD        HISTORY OF PRESENT ILLNESS      The history is provided by the Patient. Raffy Edwards is a 77 y.o. male with a PMH clinically significant for uncontrolled HTN presenting to the ED via PV c/o generalized fatigue, RUE/BLE weakness, lightheadedness, dizziness, difficulty ambulating with initial onset of symptoms approximately 1 month ago, worsening more acutely over the past week. Characterizes weakness as heaviness in both the right upper extremity and bilateral lower extremities. States he had an injury a long time ago on the right upper extremity and that has never been strong since that time, but thinks that the weakness might be worse now. States weakness is constant, moderate. States ambulating is very difficult due to the weakness in both his legs in addition to feeling dizzy and unable to coordinate himself. States no falls prior to onset of symptoms 1 month ago, but did fall this week due to the difficulty ambulating. Denies any associated headache, vision changes, photophobia, chest pain, shortness of breath, abdominal pain, N/V/D/C. Improved with nothing. States no history of similar previous episodes. States they have otherwise been feeling well. Does not take any regular medications, but states he was supposed to be on blood pressure medications. Denies any EtOH abuse, tobacco use or licit substance abuse. PAST MEDICAL HISTORY     Past Medical History:   Diagnosis Date    Hypertension        SURGICAL HISTORY       Past Surgical History:   Procedure Laterality Date    BACK SURGERY         FAMILY HISTORY     History reviewed. No pertinent family history.     SOCIAL HISTORY       Social History     Socioeconomic History    Marital status: Single     Spouse name: None    Number of children: None    Years of education: None    Highest education level: None   Occupational History    None   Tobacco Use    Smoking status: Never Smoker    Smokeless tobacco: Never Used   Vaping Use    Vaping Use: Never used   Substance and Sexual Activity    Alcohol use: Never     Alcohol/week: 0.0 standard drinks    Drug use: Never    Sexual activity: None   Other Topics Concern    None   Social History Narrative    None     Social Determinants of Health     Financial Resource Strain:     Difficulty of Paying Living Expenses:    Food Insecurity:     Worried About Running Out of Food in the Last Year:     Ran Out of Food in the Last Year:    Transportation Needs:     Lack of Transportation (Medical):  Lack of Transportation (Non-Medical):    Physical Activity:     Days of Exercise per Week:     Minutes of Exercise per Session:    Stress:     Feeling of Stress :    Social Connections:     Frequency of Communication with Friends and Family:     Frequency of Social Gatherings with Friends and Family:     Attends Mandaeism Services:     Active Member of Clubs or Organizations:     Attends Club or Organization Meetings:     Marital Status:    Intimate Partner Violence:     Fear of Current or Ex-Partner:     Emotionally Abused:     Physically Abused:     Sexually Abused:        CURRENT MEDICATIONS       Current Discharge Medication List      CONTINUE these medications which have NOT CHANGED    Details   cloNIDine (CATAPRES) 0.1 MG tablet TAKE 1 TABLET BY MOUTH TWICE A DAY  Qty: 180 tablet, Refills: 1      naproxen sodium (ALEVE) 220 MG tablet Take 220 mg by mouth 2 times daily (with meals)      amLODIPine (NORVASC) 10 MG tablet Take 1 tablet by mouth daily  Qty: 30 tablet, Refills: 5      neomycin-bacitracin-polymyxin (NEOSPORIN) 400-5-5000 ointment Apply topically 2 times daily. Qty: 1 Tube, Refills: 1             ALLERGIES     Patient has no known allergies.     REVIEW OF SYSTEMS       Review of Systems Failed attempt at venipuncture  Collection has been rescheduled by [facility] at 09/10/2021 21:48 Reason:   Still needs a green tube pulled. Failed at second attempt   MICROSCOPIC URINALYSIS - Abnormal; Notable for the following components:    RBC, UA  (*)     All other components within normal limits   COVID-19, RAPID   MAGNESIUM   TROPONIN   BRAIN NATRIURETIC PEPTIDE   TSH WITH REFLEX   HEMOGLOBIN A1C       ED Course as of Sep 11 0026   Fri Sep 10, 2021   1439 EKG showing normal sinus rhythm, rate of 74 bpm.  Normal axis, normal intervals. No acute ST-T wave abnormalities. EKG 12 Lead [NA]   1439 Largely unremarkable. CBC Auto Differential(!):    WBC 5.9   RBC 5.38   Hemoglobin Quant 17.1   Hematocrit 48.6   MCV 90.4   MCH 31.9(!)   MCHC 35.2   RDW 13.0   Platelet Count 668   Neutrophils % 66.4   Lymphocyte % 24.6   Monocytes % 7.4   Eosinophils % 0.8   Basophils % 0.8   Neutrophils Absolute 3.9   Lymphocytes Absolute 1.5   Monocytes Absolute 0.4   Eosinophils Absolute 0.0   Basophils Absolute 0.0 [NA]   1440 SARS-CoV-2, NAAT: Not Detected [NA]   1513 Pro-BNP: 596 [NA]   1513 IMPRESSION:  NO BLEED, MASS EFFECT, OR EXTRA-AXIAL FLUID COLLECTION. EVOLVING NONHEMORRHAGIC INFARCT IN THE INFERIOR RIGHT CEREBELLAR HEMISPHERE. 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 CT HEAD WO CONTRAST [NA]      ED Course User Index  [NA] Zoran Arthur MD       77 y.o. male with a PMH clinically significant for uncontrolled HTN presenting to the ED via PV c/o generalized fatigue, RUE/BLE weakness, lightheadedness, dizziness, difficulty ambulating with initial onset of symptoms approximately 1 month ago, worsening more acutely over the past week. Upon initial evaluation, Pt significantly hypertensive and with neurological deficits as noted above. PE as noted above. Labs, , EKG, and Imaging as noted above. Given findings, clinical presentation most likely consistent w/ subacute right cerebellar infarct. Likely secondary to uncontrolled hypertension. Patient noted to be significantly hypertensive in the ED. Although patient with subacute infarct, unlikely to benefit from permissive hypertension at this time. Therefore administered antihypertensives in the ED with appropriate improvement. Discussed with ANDERSON Mckay who was amenable to accepting care of the patient. No further acute events under my care.    Pt was administered   Medications   sodium chloride flush 0.9 % injection 5-40 mL (10 mLs IntraVENous Given 9/10/21 2206)   sodium chloride flush 0.9 % injection 5-40 mL (has no administration in time range)   0.9 % sodium chloride infusion (has no administration in time range)   enoxaparin (LOVENOX) injection 40 mg (40 mg SubCUTAneous Given 9/10/21 1744)   ondansetron (ZOFRAN-ODT) disintegrating tablet 4 mg (has no administration in time range)     Or   ondansetron (ZOFRAN) injection 4 mg (has no administration in time range)   polyethylene glycol (GLYCOLAX) packet 17 g (has no administration in time range)   acetaminophen (TYLENOL) tablet 650 mg (has no administration in time range)     Or   acetaminophen (TYLENOL) suppository 650 mg (has no administration in time range)   potassium chloride (KLOR-CON M) extended release tablet 40 mEq (has no administration in time range)     Or   potassium chloride (KLOR-CON) packet 40 mEq (has no administration in time range)     Or   potassium chloride 10 mEq/100 mL IVPB (Peripheral Line) (has no administration in time range)   aspirin chewable tablet 81 mg (81 mg Oral Given 9/10/21 1744)   hydrALAZINE (APRESOLINE) injection 10 mg (has no administration in time range)   amLODIPine (NORVASC) tablet 10 mg (10 mg Oral Given 9/10/21 1744)   atorvastatin (LIPITOR) tablet 80 mg (80 mg Oral Given 9/10/21 2206)   labetalol (NORMODYNE;TRANDATE) injection 10 mg (10 mg IntraVENous Given 9/10/21 1408)   meclizine (ANTIVERT) tablet 25 mg (25 mg Oral Given 9/10/21 1408)       Plan: Admit to IM for further evaluation and management. Report given to Dr. Aiden Villanueva. and Patient understanding and amenable to the POC. CRITICAL CARE TIME   Total CriticalCare time was 0 minutes, excluding separately reportable procedures. There was a high probability of clinically significant/life threatening deterioration in the patient's condition which required my urgent intervention. FINAL IMPRESSION      1. Cerebrovascular accident (CVA), unspecified mechanism ([address]    2. General weakness    3.  Gait abnormality          DISPOSITION/PLAN   DISPOSITION Admitted 09/10/2021 03:25:14 PM      Current Discharge Medication List           MD Dom Chopra MD  09/11/21 9267

## 2021-09-11 ENCOUNTER — APPOINTMENT (OUTPATIENT)
Dept: ULTRASOUND IMAGING | Age: 76
DRG: 065 | End: 2021-09-11
Payer: MEDICARE

## 2021-09-11 ENCOUNTER — APPOINTMENT (OUTPATIENT)
Dept: MRI IMAGING | Age: 76
DRG: 065 | End: 2021-09-11
Payer: MEDICARE

## 2021-09-11 LAB
HBA1C MFR BLD: 5.3 % (ref 4.8–5.9)
LV EF: 60 %
LVEF MODALITY: NORMAL
TOTAL CK: 61 U/L (ref 0–190)

## 2021-09-11 PROCEDURE — 99221 1ST HOSP IP/OBS SF/LOW 40: CPT | Performed by: PSYCHIATRY & NEUROLOGY

## 2021-09-11 PROCEDURE — 36415 COLL VENOUS BLD VENIPUNCTURE: CPT

## 2021-09-11 PROCEDURE — 6360000002 HC RX W HCPCS: Performed by: INTERNAL MEDICINE

## 2021-09-11 PROCEDURE — 2580000003 HC RX 258: Performed by: INTERNAL MEDICINE

## 2021-09-11 PROCEDURE — 93880 EXTRACRANIAL BILAT STUDY: CPT

## 2021-09-11 PROCEDURE — 82550 ASSAY OF CK (CPK): CPT

## 2021-09-11 PROCEDURE — 97166 OT EVAL MOD COMPLEX 45 MIN: CPT

## 2021-09-11 PROCEDURE — 92610 EVALUATE SWALLOWING FUNCTION: CPT

## 2021-09-11 PROCEDURE — 92523 SPEECH SOUND LANG COMPREHEN: CPT

## 2021-09-11 PROCEDURE — 97162 PT EVAL MOD COMPLEX 30 MIN: CPT

## 2021-09-11 PROCEDURE — 70551 MRI BRAIN STEM W/O DYE: CPT

## 2021-09-11 PROCEDURE — 6370000000 HC RX 637 (ALT 250 FOR IP): Performed by: INTERNAL MEDICINE

## 2021-09-11 PROCEDURE — 82607 VITAMIN B-12: CPT

## 2021-09-11 PROCEDURE — 93306 TTE W/DOPPLER COMPLETE: CPT

## 2021-09-11 PROCEDURE — 2500000003 HC RX 250 WO HCPCS

## 2021-09-11 PROCEDURE — 82746 ASSAY OF FOLIC ACID SERUM: CPT

## 2021-09-11 PROCEDURE — 1210000000 HC MED SURG R&B

## 2021-09-11 RX ORDER — WATER FOR INJ.,BACTERIOSTATIC
VIAL (ML) INJECTION
Status: COMPLETED
Start: 2021-09-11 | End: 2021-09-11

## 2021-09-11 RX ORDER — LORAZEPAM 2 MG/ML
1 INJECTION INTRAMUSCULAR
Status: ACTIVE | OUTPATIENT
Start: 2021-09-11 | End: 2021-09-11

## 2021-09-11 RX ORDER — LISINOPRIL 10 MG/1
10 TABLET ORAL DAILY
Status: DISCONTINUED | OUTPATIENT
Start: 2021-09-11 | End: 2021-09-13 | Stop reason: HOSPADM

## 2021-09-11 RX ADMIN — HYDRALAZINE HYDROCHLORIDE 10 MG: 20 INJECTION INTRAMUSCULAR; INTRAVENOUS at 09:52

## 2021-09-11 RX ADMIN — AMLODIPINE BESYLATE 10 MG: 10 TABLET ORAL at 10:43

## 2021-09-11 RX ADMIN — ENOXAPARIN SODIUM 40 MG: 40 INJECTION SUBCUTANEOUS at 09:47

## 2021-09-11 RX ADMIN — ASPIRIN 81 MG: 81 TABLET, CHEWABLE ORAL at 10:43

## 2021-09-11 RX ADMIN — BACTERIOSTATIC WATER 8 ML: 1 INJECTION, SOLUTION INTRAMUSCULAR; INTRAVENOUS; SUBCUTANEOUS at 08:22

## 2021-09-11 RX ADMIN — LISINOPRIL 10 MG: 10 TABLET ORAL at 17:03

## 2021-09-11 RX ADMIN — SODIUM CHLORIDE, PRESERVATIVE FREE 10 ML: 5 INJECTION INTRAVENOUS at 08:23

## 2021-09-11 RX ADMIN — SODIUM CHLORIDE, PRESERVATIVE FREE 10 ML: 5 INJECTION INTRAVENOUS at 20:37

## 2021-09-11 RX ADMIN — ATORVASTATIN CALCIUM 80 MG: 80 TABLET, FILM COATED ORAL at 20:37

## 2021-09-11 ASSESSMENT — ENCOUNTER SYMPTOMS
ABDOMINAL PAIN: 0
COLOR CHANGE: 0
RHINORRHEA: 0
TROUBLE SWALLOWING: 0
NAUSEA: 0
VOMITING: 0
CHOKING: 0
SORE THROAT: 0
BACK PAIN: 0
COUGH: 0
DIARRHEA: 0
EYE PAIN: 0
SHORTNESS OF BREATH: 0
PHOTOPHOBIA: 0

## 2021-09-11 ASSESSMENT — PAIN DESCRIPTION - ORIENTATION
ORIENTATION: RIGHT;LEFT
ORIENTATION: RIGHT;LEFT

## 2021-09-11 ASSESSMENT — PAIN DESCRIPTION - DESCRIPTORS
DESCRIPTORS: ACHING
DESCRIPTORS: ACHING

## 2021-09-11 ASSESSMENT — PAIN SCALES - GENERAL
PAINLEVEL_OUTOF10: 1
PAINLEVEL_OUTOF10: 1

## 2021-09-11 ASSESSMENT — PAIN DESCRIPTION - LOCATION
LOCATION: LEG
LOCATION: LEG

## 2021-09-11 NOTE — PROGRESS NOTES
[facility]   Facility/Department: [address] NEURO  Speech Language Pathology  Initial Speech/Language/Cognitive Assessment    NAME:Shaggy Stacy  :  (77 y.o.)   MRN: [id number]  [id number]  ADMISSION DATE: 9/10/2021  PATIENT DIAGNOSIS(ES): Gait abnormality [R26.9]  General weakness [R53.1]  Stroke determined by clinical assessment ([facility] [I63.9]  Cerebrovascular accident (CVA), unspecified mechanism ([facility] [I63.9]  Chief Complaint   Patient presents with    Fatigue     pt states that he thinks he had a stroke a few days ago   pt says  he has been getting w\"weaker\" and  \"weaker\"    pt states he hada disloaction in his right arm a few years ago and thinks  they never fixed it right      Patient Active Problem List    Diagnosis Date Noted    Stroke determined by clinical assessment ([facility] 09/10/2021    Cellulitis 2019    Cellulitis of left hand 2019    Shoulder dislocation 2016     Past Medical History:   Diagnosis Date    Hypertension      Past Surgical History:   Procedure Laterality Date    BACK SURGERY           DATE ONSET: 9/10/2021    Date of Evaluation: 2021   Evaluating Therapist: SIVA Stoddard      Assessment:      Diagnosis: Pt presents with mild receptive aphasia and moderate expressive aphasia and suspected cognitive deficits. MRI exam pending. Pt demonstrated deficits in following 1-2 step directions, answering yes/no questions, confrontational naming, word fluency, and word retrieval when explaining mid level information. Pt able to express basic wants and needs without difficulty. Pt exhibited flat affect and would be impulsive at times. Pt was fully oriented, however had difficulty with immediate and short term recall.     Recommendations:  Requires SLP Intervention: Yes  Duration/Frequency of Treatment: 2-3x/week during LOS or until goals met             Goals:  Short-term Goals  Timeframe for Short-term Goals: 1 week  Goal 1: Pt will follow 1-2 step directions given orally with 90% accuracy to increase the pt's ability to follow directions provided by caregivers for safe follow through with ADLs. Goal 2: Pt will answer mid-high level yes/no questions with 90% accuracy to assist the caregiver in obtaining important information regarding the patient's personal, medical, and safety needs. Goal 3: Pt will answer mid-high level Wh- questions with 85% accuracy with min cues to assist the caregiver in obtaining important information regarding the patient's personal, medical, and safety needs. Goal 4: Pt will complete confrontational naming tasks when shown pictures with 90% accuracy to help the patient express his basic wants and needs. Goal 5: Pt will name 10 items in a concrete category with min verbal cues to promote semantic organization, neuroplasticity, and the efficiency of word finding for expression of the patient's wants, needs, feelings, and ideas. Goal 6: Pt will generate sentence regarding picture scene with min cues with 90% accuracy. Long-term Goals  Timeframe for Long-term Goals: 1-2 weeks  Goal 1: Pt will improve his Receptive Language abilities to a supervision level for comprehension of conversation and safety directions with familiar and unfamiliar communication partners. Goal 2: Pt will improve his Expressive Language abilities to a supervision level for expression of complex wants, needs, feelings/ideas, and medical/safety information. Patient's goals: did not address at this time    Subjective:   Previous level of function and limitations: No speech or swallow difficulties PTA  General  Chart Reviewed: Yes  Social/Functional History  Lives With: Alone  Active : No  Vision  Vision: Impaired  Vision Exceptions: Wears glasses for reading  Hearing  Hearing: Within functional limits           Objective:     Oral/Motor  Oral Motor:  Within functional limits    Auditory Comprehension  Comprehension: Exceptions  Yes/No Questions: Mild (70% accuracy MTDD)  Basic Questions:  (WFL)  Complex Questions: Mild  One Step Basic Commands: Mild (60% without cues; 100% with min cues. Pt asking clarification questions)  Two Step Basic Commands: Mild (80% accuracy)  Multistep Basic Commands: Moderate (impulsive)  Complex/Abstract Commands: Moderate  Pictures:  (WFL)  Conversation: Moderate (i.e. \"what do you mean if I have any questions? \")         Expression  Primary Mode of Expression: Verbal    Verbal Expression  Verbal Expression: Exceptions to functional limits  Initiation: Mild  (pt demonstrated delay with answering questions and other times would be impulsive with answers)  Repetition:  [facility])  Automatic Speech:  [facility] however fast rate; repeated 2 JOY)  Confrontation: Mild  Convergent: Moderate (60%; perseverated x 1; anomia x 1)  Divergent: Moderate (1/3x (red food items); word fluency: named 4 animals given 1 minute; perseverated x 1)  Responsive:  (WFL)  Conversation: Moderate (cookie theft picture: frequent pausing, difficulty explaining scenario, errors occurred)  Interfering Components: Impaired thought organization;Perseverations  Effective Techniques: Word retrived strategies    Written Expression  Dominant Hand: Right    Motor Speech  Motor Speech: Within Functional Limits    Pragmatics/Social Functioning  Pragmatics: Within functional limits (flat affect noted)    Cognition:      Orientation  Overall Orientation Status: Within Functional Limits  Orientation Level: Oriented to place;Oriented to time;Oriented to situation;Oriented to person  Attention  Attention: Within Functional Limits  Memory  Memory: Exceptions to [facility]  Short-term Memory: Moderate (unable to recall 3 words in less than 1 minute with distraction; category cues did not assist)  Working Memory:  Moderate  Problem Solving  Problem Solving: Unable to assess  Numeric Reasoning  Numeric Reasoning: Unable to assess  Safety/Judgement  Safety/Judgement: Unable to assess      Prognosis:  Speech Therapy Prognosis  Prognosis: Good  Individuals consulted  Consulted and agree with results and recommendations: Patient;RN Mandeep Avalos RN)    Education:  Patient Education: Educated pt on results  Patient Education Response: Needs reinforcement  Safety Devices in place: Yes  Type of devices: Bed alarm in place;Call light within reach    Pain Assessment:  Pre-Treatment  Pain assessment: 0-10  Pain level: 4  Intervention:  Patient reported acceptable level for treatment. Post-Treatment  Pain assessment: 0-10  Pain level: 4  Intervention:  Patient reported acceptable level for treatment. Therapy Time  SLP Individual Minutes  Time In: 1020  Time Out: 7112  Minutes: 15                 Signature: Electronically signed by Jesus Rice.  SIVA David on 9/11/2021 at 11:52 AM

## 2021-09-11 NOTE — PROGRESS NOTES
[facility] Occupational Therapy      Date: 2021  Patient Name: Samira Parnell        MRN: [id number]  Account: [de-identified]   : 1945  (77 y.o.)  Room: [place] [address]    Chart reviewed, attempted OT at 8:10 a.m.  for eval. Patient not seen 2° to:    Pt. having bedside procedure. Spoke to Maribel ''R'' , RN aware. Will attempt again when able.     Electronically signed by BEATA Thompson on 2021 at 8:12 AM

## 2021-09-11 NOTE — FLOWSHEET NOTE
0825: Vital signs obtained. AM assessment completed and documented. Patient resting in bed. Noted bilateral lower leg weakness, right worse than left. Patient did exhibit some word searching and speech difficulty at times, but appears alert and oriented. Patients story about \"why\" he is in the hospital varied slightly on details. Patient states at times this weakness and difficulty started 7 days ago, then the next time he is asked he says a month ago. Noted several abrasions on patient; right upper lip, elbows, knees, mid/upper back. Patient was ambulated by this nurse to the bathroom. Gait was very unsteady, and patient appears to \"drag\" his right leg. Patient remains NPO at this time. 4725: BP checked again due to elevated previous BP. PRN hydralazine given per MAR. 1045: Patient going down to MRI via cart     1056: Secure message sent to Dr Ventura Baker making her aware of speech therapy's evaluation and recommendation of regular diet and nectar liquids; and a MBS.     1200: MRI tech called. Patient was able to finish the MRI of his head/brain, but was unable to remain still for the MRI ordered of his spine. Offered to bring down ativan, however patient was already out of MRI room and down in ultrasound. 1222: Patient arrived back into room. Transferred into bed with 2 assist due to unsteadiness of gait. Vital signs obtained. BP improved but still elevated. 1255: Critical MRI results called to this RN from the radiologist. Secure message sent to Dr Ventura Baker and Dr Asiya Horn making them aware. No new orders at this time.

## 2021-09-11 NOTE — PROGRESS NOTES
[facility] OCCUPATIONAL THERAPY EVALUATION - ACUTE     NAME: Martínez Tomlin  : 1945 (77 y.o.)  MRN: [id number]  CODE STATUS: Full Code  Room: [place] [id number]    Date of Service: 2021    Patient Diagnosis(es): Gait abnormality [R26.9]  General weakness [R53.1]  Stroke determined by clinical assessment [facility]) [I63.9]  Cerebrovascular accident (CVA), unspecified mechanism ([facility] [address] [I63.9]   Chief Complaint   Patient presents with    Fatigue     pt states that he thinks he had a stroke a few days ago   pt says  he has been getting w\"weaker\" and  \"weaker\"    pt states he hada disloaction in his right arm a few years ago and thinks  they never fixed it right      Patient Active Problem List    Diagnosis Date Noted    Stroke determined by clinical assessment ([facility] 09/10/2021    Cellulitis 2019    Cellulitis of left hand 2019    Shoulder dislocation 2016        Past Medical History:   Diagnosis Date    Hypertension      Past Surgical History:   Procedure Laterality Date    BACK SURGERY          Restrictions  Restrictions/Precautions: Fall Risk     Safety Devices: Safety Devices  Safety Devices in place: Yes  Type of devices:  All fall risk precautions in place        Subjective  Pre Treatment Pain Screening  Pain at present: 1  Scale Used: Numeric Score  Intervention List: Patient able to continue with treatment    Pain Reassessment:   Pain Assessment  Patient Currently in Pain: Yes  Pain Assessment: 0-10  Pain Level: 1  Pain Location: Leg  Pain Orientation: Right, Left  Pain Descriptors: Aching       Prior Level of Function:  Social/Functional History  Lives With: Alone  Type of Home: House  Home Layout: One level  Home Access: Stairs to enter with rails  Entrance Stairs - Number of Steps: 3  Entrance Stairs - Rails: Both  Bathroom Shower/Tub: Tub/Shower unit  Home Equipment: Cane, Rolling walker (rollator)  ADL Assistance: Independent  Homemaking Assistance: Maryellen Bullock No  Coordination and Movement description: Decreased speed, Right UE    Hand Dominance:  Hand Dominance  Hand Dominance: Right    ADL Status:  ADL  Feeding: NPO (positive hand to mouth)  Grooming: Stand by assistance  UE Bathing: Stand by assistance  LE Bathing: Contact guard assistance  UE Dressing: Stand by assistance  LE Dressing: Contact guard assistance  Toileting: Contact guard assistance  Additional Comments: pt able to don/doff socks seated EOB, other ADL's simulated  Toilet Transfers  Toilet Transfer: Unable to assess  Toilet Transfers Comments: anticipated CGA       Therapy key for assistance levels -   Independent = Pt. is able to perform task with no assistance but may require a device   Stand by assistance = Pt. does not perform task at an independent level but does not need physical assistance, requires verbal cues  Minimal, Moderate, Maximal Assistance = Pt. requires physical assistance (25%, 50%, 75% assist from helper) for task but is able to actively participate in task   Dependent = Pt. requires total assistance with task and is not able to actively participate with task completion     Functional Mobility:  Functional Mobility  Functional - Mobility Device: Rolling Walker  Activity: To/from bathroom  Assist Level: Minimal assistance  Transfers  Sit to stand: Contact guard assistance  Stand to sit: Contact guard assistance    Bed Mobility  Bed mobility  Supine to Sit: Stand by assistance  Sit to Supine: Stand by assistance    Seated and Standing Balance:  Balance  Sitting Balance: Stand by assistance  Standing Balance: Contact guard assistance    Functional Endurance:  Activity Tolerance  Activity Tolerance: fair+    D/C Recommendations:  OT D/C RECOMMENDATIONS  REQUIRES OT FOLLOW UP: Yes    Equipment Recommendations:  OT Equipment Recommendations  Other: Continue to assess    OT Education:   OT Education  OT Education: OT Role, Plan of Care  Barriers to Learning: none    OT Follow Up:  OT D/C

## 2021-09-11 NOTE — FLOWSHEET NOTE
Patient noted to have difficulties swallowing with HS medication and then coughed on water. Made NPO and ST eval ordered.

## 2021-09-11 NOTE — CONSULTS
Subjective:      Patient ID: Urbano Avila is a 77 y.o. male who presents today for   Weakness. Dr Amaya Ervin  HPI 77 right-handed gentleman admitted with history of numbness and weakness. Patient reports symptoms going on for week. He initially started numbness in his legs and weakness and now complains of right arm numbness and weakness as well. He reports no symptoms in his left arm. The exact timing of his symptoms are difficult to ascertain though it is few days. He denies any difficulty swallowing. He is having some difficulty walking as well he denies any neck pain or back pain. He lives alone does not consume alcohol or any other recreational drugs. Review of Systems   Constitutional: Negative for fever. HENT: Negative for ear pain, tinnitus and trouble swallowing. Eyes: Negative for photophobia and visual disturbance. Respiratory: Negative for choking and shortness of breath. Cardiovascular: Negative for chest pain and palpitations. Gastrointestinal: Negative for nausea and vomiting. Musculoskeletal: Negative for back pain, gait problem, joint swelling, myalgias, neck pain and neck stiffness. Skin: Negative for color change. Allergic/Immunologic: Negative for food allergies. Neurological: Positive for weakness and numbness. Negative for dizziness, tremors, seizures, syncope, facial asymmetry, speech difficulty, light-headedness and headaches. Psychiatric/Behavioral: Negative for behavioral problems, confusion, hallucinations and sleep disturbance.        Past Medical History:   Diagnosis Date    Hypertension      Past Surgical History:   Procedure Laterality Date    BACK SURGERY       Social History     Socioeconomic History    Marital status: Single     Spouse name: Not on file    Number of children: Not on file    Years of education: Not on file    Highest education level: Not on file   Occupational History    Not on file   Tobacco Use    Smoking status: Never Smoker  Smokeless tobacco: Never Used   Vaping Use    Vaping Use: Never used   Substance and Sexual Activity    Alcohol use: Never     Alcohol/week: 0.0 standard drinks    Drug use: Never    Sexual activity: Not on file   Other Topics Concern    Not on file   Social History Narrative    Not on file     Social Determinants of Health     Financial Resource Strain:     Difficulty of Paying Living Expenses:    Food Insecurity:     Worried About Running Out of Food in the Last Year:     [address] in the Last Year:    Transportation Needs:     Lack of Transportation (Medical):  Lack of Transportation (Non-Medical):    Physical Activity:     Days of Exercise per Week:     Minutes of Exercise per Session:    Stress:     Feeling of Stress :    Social Connections:     Frequency of Communication with Friends and Family:     Frequency of Social Gatherings with Friends and Family:     Attends [employer] Services:     Active Member of Clubs or Organizations:     Attends Club or Organization Meetings:     Marital Status:    Intimate Partner Violence:     Fear of Current or Ex-Partner:     Emotionally Abused:     Physically Abused:     Sexually Abused:      History reviewed. No pertinent family history.   No Known Allergies  Current Facility-Administered Medications   Medication Dose Route Frequency Provider Last Rate Last Admin    LORazepam (ATIVAN) injection 1 mg  1 mg IntraVENous Once PRN Lyubov Malagon MD        sodium chloride flush 0.9 % injection 5-40 mL  5-40 mL IntraVENous 2 times per day Beverley Altman MD   10 mL at 09/11/21 0823    sodium chloride flush 0.9 % injection 5-40 mL  5-40 mL IntraVENous PRN Beverley Altman MD        0.9 % sodium chloride infusion  25 mL IntraVENous PRN Beverley Altman MD        enoxaparin (LOVENOX) injection 40 mg  40 mg SubCUTAneous Daily Beverley Altman MD   40 mg at 09/11/21 0947    ondansetron (ZOFRAN-ODT) disintegrating tablet 4 mg  4 mg Oral Q8H PRN Yury Segal abnormal muscle tone. Coordination: Coordination normal.      Deep Tendon Reflexes: Reflexes are normal and symmetric. Babinski sign absent on the right side. Babinski sign absent on the left side. Comments: Examination notable for mild incoordination of the right upper extremity with mild weakness. He does report weakness in the lower extremities though the strength appears to be 4/5. He has hyperreflexia in the upper extremity compared to the lower extremity but more notable in the right upper extremity than the left upper extremity. He retains his ankle reflexes no sensory levels are noted. Gait is deferred   Psychiatric:         Mood and Affect: Mood normal.         CT HEAD WO CONTRAST    Result Date: 9/10/2021  EXAMINATION: CT HEAD WO CONTRAST CLINICAL HISTORY: generalized weakness COMPARISON:  NONE AVAILABLE An unenhanced scan is performed. FINDINGS:   There is no bleed, mass effect, or space occupying lesion. No extra-axial mass or fluid collections. Hypoattenuated White matter changes in the cerebral hemispheres noted. There is global atrophy. Findings likely reflect chronic small vessel vasculopathy. There is a vague area of low attenuation in the inferior posterior right cerebellar hemisphere with slight obliteration of adjacent sulci. An evolving nonhemorrhagic infarct in the cerebellum would have to be a consideration. Clinical correlation recommended. NO BLEED, MASS EFFECT, OR EXTRA-AXIAL FLUID COLLECTION. EVOLVING NONHEMORRHAGIC INFARCT IN THE INFERIOR RIGHT CEREBELLAR HEMISPHERE. 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 All CT scans at this facility use dose modulation, iterative reconstruction, and/or weight based dosing when appropriate to reduce radiation dose to as low as reasonably achievable. XR CHEST PORTABLE    Result Date: 9/10/2021  Exam: XR CHEST PORTABLE History: Generalized weakness Technique: AP portable view of the chest obtained.  Comparison: None available Findings: Atherosclerotic calcification of the thoracic aorta. The cardiomediastinal silhouette is within normal limits. No pneumothorax, pleural effusion, or consolidation. Question calcified pleural plaques on the left, which can be seen with asbestos related disease. No acute osseous abnormality. No radiographic evidence of acute intrathoracic process. Question left-sided pleural plaques which can be seen with asbestos related disease. Lab Results   Component Value Date    WBC 5.9 09/10/2021    RBC 5.38 09/10/2021    HGB 17.1 09/10/2021    HCT 48.6 09/10/2021    MCV 90.4 09/10/2021    MCH 31.9 09/10/2021    MCHC 35.2 09/10/2021    RDW 13.0 09/10/2021     09/10/2021     Lab Results   Component Value Date     09/10/2021    K 4.0 09/10/2021     09/10/2021    CO2 22 09/10/2021    BUN 21 09/10/2021    CREATININE 0.97 09/10/2021    GFRAA >60.0 09/10/2021    LABGLOM >60.0 09/10/2021    GLUCOSE 98 09/10/2021    PROT 7.2 09/10/2021    LABALBU 4.5 09/10/2021    CALCIUM 9.2 09/10/2021    BILITOT 1.0 09/10/2021    ALKPHOS 79 09/10/2021    AST 36 09/10/2021    ALT 52 09/10/2021     Lab Results   Component Value Date    PROTIME 10.3 03/26/2019    INR 1.0 03/26/2019     No results found for: TSH, PUGPPXUY13, FOLATE, FERRITIN, IRON, TIBC, PTRFSAT, TSH, FREET4  Lab Results   Component Value Date    TRIG 67 09/10/2021    HDL 38 09/10/2021    LDLCALC 94 09/10/2021     No results found for: LABAMPH, BARBSCNU, LABBENZ, CANNAB, COCAINESCRN, LABMETH, OPIATESCREENURINE, PHENCYCLIDINESCREENURINE, PPXUR, ETOH  No results found for: LITHIUM, DILFRTOT, VALPROATE    Assessment:   Numbness with weakness in his lower extremity as well as right upper extremity the onset of the time is very difficult to certain. Patient retains all his reflexes including ankle reflexes and therefore this is not an acute inflammatory demyelinating polyneuropathy.  Underlying spinal etiology is a consideration and therefore recommend a complete spinal screen and an MRI of the brain to rule out strokes as well. Patient does not exhibit any other significant risk factors for cerebrovascular disease and therefore a complete evaluation. Depending on the results of the same will further advise. Jordan Hall MD, Dory Rowland, American Board of Psychiatry & Neurology  Board Certified in Vascular Neurology  Board Certified in Neuromuscular Medicine  Certified in [facility]:

## 2021-09-11 NOTE — PROGRESS NOTES
Department of Internal Medicine - Staff Internal Medicine Service                                                                   PROGRESS NOTE   ______________________________________________________________________        HISTORY OF PRESENT ILLNESS:    Patient is 55-year-old male who presented to the ER with recurrent falls and dizziness. Patient states that since 1 month he has been feeling very dizzy and not himself, patient states that he is also feeling generalized weak and fatigued, patient states that his symptoms got worse today so he decided to come to ER. Patient also complained of multiple falls in the last couple of days, denied any blurry vision or double vision, denied any headache. In the ER patient was very unsteady on his feet, patient had head CT which showed only mild inferior cerebellar stroke, blood pressure was in 200s. Labs were unremarkable      INTERVAL HISTORY :   Patient seen and examined, seen by speech, MBS recommended, will order MBS for Monday. MRI showed acute ischemia in left cerebellum, patient continues to be hypertensive    PAST MEDICAL HISTORY:        Diagnosis Date    Hypertension        PAST SURGICAL HISTORY:        Procedure Laterality Date    BACK SURGERY         MEDICATION PRIOR TO ADMISSION:  Medications Prior to Admission: cloNIDine (CATAPRES) 0.1 MG tablet, TAKE 1 TABLET BY MOUTH TWICE A DAY  naproxen sodium (ALEVE) 220 MG tablet, Take 220 mg by mouth 2 times daily (with meals)  amLODIPine (NORVASC) 10 MG tablet, Take 1 tablet by mouth daily  neomycin-bacitracin-polymyxin (NEOSPORIN) 400-5-5000 ointment, Apply topically 2 times daily. Allergies:  Patient has no known allergies. SOCIAL HISTORY:   Denies tobacco or alcohol use     FAMILY HISTORY:   History reviewed. No pertinent family history.     REVIEW OF SYSTEMS:  As stated in hpi , rest of ros negative     PHYSICAL EXAM:  BP (!) 177/89   Pulse 80   Temp 97.5 °F (36.4 °C) (Oral)   Resp 16   Ht 5' 8\" (1.727 m)   Wt 179 lb (81.2 kg)   SpO2 100%   BMI 27.22 kg/m²   Physical Exam  Constitutional:       Appearance: Normal appearance. HENT:      Head: Normocephalic. Mouth/Throat:      Mouth: Mucous membranes are moist.   Eyes:      Extraocular Movements: Extraocular movements intact. Pupils: Pupils are equal, round, and reactive to light. Cardiovascular:      Rate and Rhythm: Normal rate and regular rhythm. Pulses: Normal pulses. Pulmonary:      Effort: Pulmonary effort is normal.   Abdominal:      General: Abdomen is flat. Palpations: Abdomen is soft. Musculoskeletal:         General: No swelling or tenderness. Normal range of motion. Cervical back: Normal range of motion. Skin:     General: Skin is warm. Capillary Refill: Capillary refill takes less than 2 seconds. Neurological:      General: No focal deficit present. Mental Status: He is alert and oriented to person, place, and time. ASSESSMENT/PLAN:  1. Cerebellar stroke  2.  Hypertensive emergency    Plan  Start on aspirin Lipitor,  HbA1c 5.3, lipid panel reviewed,  Patient on Lipitor  Neurology has been consulted appreciate their input  MRI showed acute cerebellar stroke  Carotid Doppler showed 50 to 69% stenosis in right ICA and <50 in left ica   Cervical spine pending  Echo showed ejection fraction of 88% with diastolic dysfunction  DVT prophylaxis  PT OT  Patient continues to be hypertensive, will add lisinopril 10      Nisa Hoover MD,

## 2021-09-11 NOTE — PROGRESS NOTES
[facility]   Facility/Department: [address] NEURO  Speech Language Pathology  Clinical Bedside Swallow Evaluation    Uzair Cid  :  (77 y.o.)   MRN: [id number]  ROOM: [facility]/[id number]  ADMISSION DATE: 9/10/2021  PATIENT DIAGNOSIS(ES): Gait abnormality [R26.9]  General weakness [R53.1]  Stroke determined by clinical assessment ([facility] [address] [I63.9]  Cerebrovascular accident (CVA), unspecified mechanism ([facility] [address] [I63.9]  Chief Complaint   Patient presents with    Fatigue     pt states that he thinks he had a stroke a few days ago   pt says  he has been getting w\"weaker\" and  \"weaker\"    pt states he hada disloaction in his right arm a few years ago and thinks  they never fixed it right      Patient Active Problem List    Diagnosis Date Noted    Stroke determined by clinical assessment ([facility] [address] 09/10/2021    Cellulitis 2019    Cellulitis of left hand 2019    Shoulder dislocation 2016     Past Medical History:   Diagnosis Date    Hypertension      Past Surgical History:   Procedure Laterality Date    BACK SURGERY       No Known Allergies    DATE ONSET: 9/10/2021    Date of Evaluation: 2021   Evaluating Therapist: Ravi Horn. SIVA Roy    Recommended Diet and Intervention  Diet Solids Recommendation: Regular  Liquid Consistency Recommendation: Mildly Thick (Nectar)  Recommended Form of Meds: Meds in puree  Recommendations: Modified barium swallow study; Dysphagia treatment;Self feed  Therapeutic Interventions: Diet tolerance monitoring, Therapeutic PO trials with SLP, Pharyngeal exercises, Laryngeal exercises, Patient/Family education    Compensatory Swallowing Strategies  Compensatory Swallowing Strategies: Upright as possible for all oral intake;Small bites/sips;Eat/Feed slowly    Reason for Referral  Paty Sandoval was referred for a bedside swallow evaluation to assess the efficiency of his swallow function, identify signs and symptoms of aspiration and make recommendations regarding safe dietary consistencies, effective compensatory strategies, and safe eating environment. General  Chart Reviewed: Yes  Behavior/Cognition: Alert; Cooperative  Respiratory Status: Room air  Communication Observation: Aphasia (functional for basic wants and needs)  Follows Directions: Simple (with min cues/repetition)  Dentition: Some missing teeth; Adequate  Patient Positioning: Upright in bed  Baseline Vocal Quality: Normal  Prior Dysphagia History: Pt denies. Per nursing report, pt ate last night without difficulty. Pt was noted to have difficulty swallowing pills then demonstrated coughing following water. Was then made NPO until seen by speech. Consistencies Administered: Reg solid; Dysphagia Pureed (Dysphagia I); Thin - cup;Nectar - cup; Ice Chips    Current Diet level:  Current Diet : NPO  Current Liquid Diet : NPO    Oral Motor Deficits  Oral/Motor  Oral Motor: Within functional limits    Oral Phase Dysfunction  Oral Phase  Oral Phase: WFL  Oral Phase  Oral Phase - Comment: Oral stage WFL     Indicators of Pharyngeal Phase Dysfunction   Pharyngeal Phase  Pharyngeal Phase: Exceptions  Indicators of Pharyngeal Phase Dysfunction  Cough - Immediate: Thin - cup  Pharyngeal Phase   Pharyngeal: Suspect pharyngeal dysphagia    Impression  Dysphagia Diagnosis: Mild pharyngeal stage dysphagia  Dysphagia Impression : Pt presents with suspected mild pharyngeal dysphagia characterized by decreased laryngeal elevation and suspected premature entry which led to immediate cough following thin from cup in 1/5 trials. Pt noted to be impulsive with drink size despite verbal cues. Audible swallows. Pt demonstrated functional oral stage of swallow with adequate bolus formation and propulsion. No signfiicant oral residue. Pt reported \"scratchines\" in his throat. Dysphagia Outcome Severity Scale: Level 5: Mild dysphagia- Distant supervision.  May need one diet consistency restricted     Treatment Plan  Requires SLP Intervention: Yes  Duration/Frequency of Treatment: 2-3x/week during LOS or until goals met          Treatment/Goals  Short-term Goals  Timeframe for Short-term Goals: 1 week  Goal 1: Pt will tolerate regular diet with mildly thick liquids with no overt s/s of aspiration. Goal 2: Pt will perform dysphagia exercises (Effortful, Mendelson) to strengthen pharyngeal swallow with min cues 5x/each. Goal 3: Pt will complete MBS exam; add/change goals as appropriate. Long-term Goals  Timeframe for Long-term Goals: 1-2 weeks  Goal 1: Pt will tolerate least restrictive diet with no overt s/s of aspiration. Prognosis  Prognosis  Prognosis for safe diet advancement: good  Individuals consulted  Consulted and agree with results and recommendations: Patient;RN Aiv Camp RN)    Education  Patient Education: Educated pt on thickened liquids and MBS exam.  Patient Education Response: Needs reinforcement  Safety Devices in place: Yes  Type of devices: Bed alarm in place;Call light within reach    Pain Assessment:  Pre-Treatment  Pain assessment: 0-10  Pain level: 4  Intervention:  Patient reported acceptable level for treatment. Post-Treatment  Pain assessment: 0-10  Pain level: 4  Intervention:  Patient reported acceptable level for treatment. Therapy Time  SLP Individual Minutes  Time In: 1005  Time Out: 1020  Minutes: 15              Signature: Electronically signed by Ravi Horn.  SIVA Roy on 9/11/2021 at 11:27 AM

## 2021-09-11 NOTE — PROGRESS NOTES
Number of Steps: 3  Entrance Stairs - Rails: Both  Bathroom Shower/Tub: Tub/Shower unit  Home Equipment: Cane, Rolling walker (rollator)  ADL Assistance: [address]: Independent  Homemaking Responsibilities: Yes  Ambulation Assistance: Independent (2ww)  Transfer Assistance: Independent  Active : No    OBJECTIVE:   Vision: Impaired  Vision Exceptions: Wears glasses for reading  Hearing: Exceptions to [facility]  Hearing Exceptions: Hard of hearing/hearing concerns    Cognition:  Follows Commands: Within Functional Limits (follows 1-2 steps commands, mild delay noted)    Observation/Palpation  Posture: Fair  Observation: pleasant, cooperative, no acute distress noted, on RA, increased time for processing    ROM:  RLE AROM: WFL  LLE AROM : WFL    Strength:  Strength RLE  Comment: hip 3+/5, knee 4-/5, ankle 4/5  Strength LLE  Comment: hip 4/5, knee 4/5, ankle 4+/5    Neuro:  Balance  Posture: Fair  Sitting - Static: Good;- (supervision)  Sitting - Dynamic: Good;- (supervision)  Standing - Static: Fair (1 UE support to 2 UE support, varies)  Standing - Dynamic: Fair;- (requires Min A to amb with 2ww)     Motor Control  Gross Motor?: Exceptions  Comments: impaired R UE and LE coordination  Coordination  Rapid Alternating Movements: Dysdiadokinesia  Heel to Shin: Ataxic  Sensation  Overall Sensation Status: Impaired  Additional Comments: numbness/tingling in B legs per pt reports ~ month    Bed mobility  Supine to Sit: Stand by assistance  Sit to Supine: Stand by assistance  Comment: VC to sequence scooting to EOB    Transfers  Sit to Stand: Contact guard assistance  Stand to sit: Contact guard assistance  Comment: x2  trials; with and without 2ww    Ambulation  Ambulation?: Yes  Ambulation 1  Surface: level tile  Device: Rolling Walker  Assistance: Minimal assistance  Quality of Gait: R LE toed out, poor heel strike, foot clearance, step length, and inconsistant step length especially with turns  Gait Deviations: Shuffles  Distance: 25ft x 2  Comments: Pt with decreased safety awareness and poor awareness deficits, inability to compensate despite VC. Activity Tolerance  Activity Tolerance: Patient Tolerated treatment well      PT Education  PT Education: Goals;PT Role;Plan of Care;Precautions  Patient Education: call light for assistance    ASSESSMENT:   Body structures, Functions, Activity limitations: Decreased functional mobility ; Decreased ROM; Decreased safe awareness;Decreased balance;Decreased strength;Decreased cognition;Decreased coordination  Decision Making: Medium Complexity  History: high  Exam: high  Clinical Presentation: med    Prognosis: Good  Patient Education: call light for assistance  Barriers to Learning: none    DISCHARGE RECOMMENDATIONS:  Discharge Recommendations: Continue to assess pending progress, Patient would benefit from continued therapy after discharge    Assessment: Pt demonstrates the above deficits and decline in functional mobility status placing them at increased risk for falls. Pt demonstrates significant gait deficits compared to his baseline. Pt would benefit from physical therapy to address above deficits and allow for safe return home at highest level of function, decrease risk for falls, and improve QOL.     REQUIRES PT FOLLOW UP: Yes      PLAN OF CARE:  Plan  Times per week: 3-6  Current Treatment Recommendations: Strengthening, Balance Training, Transfer Training, Endurance Training, Gait Training, Neuromuscular Re-education, Home Exercise Program, Cognitive Reorientation, Positioning, Equipment Evaluation, Education, & procurement, Safety Education & Training, Stair training, Functional Mobility Training, Cognitive/Perceptual Training  Safety Devices  Type of devices: Left in bed, Bed alarm in place, Call light within reach    Goals:  Patient goals : agreeable to [facility]. [place] [place] better\"  Long term goals  Long term goal 1: Bed mobility with indep  Long term goal 2: Functional transfers with indep  Long term goal 3: Amb 150ft with 2ww and indep  Long term goal 4: TUG <15.0 seconds with AD to demonstrate low fall risk  Long term goal 5: indep with HEP to improve LE strength, balance, coordination    [facility] (6 CLICK) BASIC MOBILITY  AM-PAC Inpatient Mobility Raw Score : 15     Therapy Time:   Individual   Time In 0858   Time Out 0913   Minutes [address], [address] [facility], 09/11/21 at 9:33 AM         Definitions for assistance levels  Independent = pt does not require any physical supervision or assistance from another person for activity completion. Device may be needed.   Stand by assistance = pt requires verbal cues or instructions from another person, close to but not touching, to perform the activity  Minimal assistance= pt performs 75% or more of the activity; assistance is required to complete the activity  Moderate assistance= pt performs 50% of the activity; assistance is required to complete the activity  Maximal assistance = pt performs 25% of the activity; assistance is required to complete the activity  Dependent = pt requires total physical assistance to accomplish the task

## 2021-09-11 NOTE — PROGRESS NOTES
See OT evaluation for all goals and OT POC.  Electronically signed by KAMALA Pardo/KATI on 9/11/2021 at 9:34 AM

## 2021-09-12 ENCOUNTER — APPOINTMENT (OUTPATIENT)
Dept: MRI IMAGING | Age: 76
DRG: 065 | End: 2021-09-12
Payer: MEDICARE

## 2021-09-12 LAB
ANION GAP SERPL CALCULATED.3IONS-SCNC: 12 MEQ/L (ref 9–15)
BACTERIA: NEGATIVE /HPF
BILIRUBIN URINE: NEGATIVE
BLOOD, URINE: ABNORMAL
BUN BLDV-MCNC: 26 MG/DL (ref 8–23)
CALCIUM SERPL-MCNC: 8.9 MG/DL (ref 8.5–9.9)
CHLORIDE BLD-SCNC: 106 MEQ/L (ref 95–107)
CLARITY: ABNORMAL
CO2: 22 MEQ/L (ref 20–31)
COLOR: ABNORMAL
CREAT SERPL-MCNC: 1.03 MG/DL (ref 0.7–1.2)
EPITHELIAL CELLS, UA: ABNORMAL /HPF (ref 0–5)
FOLATE: 13.3 NG/ML (ref 7.3–26.1)
GFR AFRICAN AMERICAN: >60
GFR NON-AFRICAN AMERICAN: >60
GLUCOSE BLD-MCNC: 97 MG/DL (ref 70–99)
GLUCOSE URINE: NEGATIVE MG/DL
HYALINE CASTS: ABNORMAL /HPF (ref 0–5)
KETONES, URINE: ABNORMAL MG/DL
LEUKOCYTE ESTERASE, URINE: ABNORMAL
NITRITE, URINE: NEGATIVE
PH UA: 5 (ref 5–9)
POTASSIUM SERPL-SCNC: 4 MEQ/L (ref 3.4–4.9)
PROTEIN UA: 30 MG/DL
RBC UA: ABNORMAL /HPF (ref 0–2)
SODIUM BLD-SCNC: 140 MEQ/L (ref 135–144)
SPECIFIC GRAVITY UA: 1.02 (ref 1–1.03)
UROBILINOGEN, URINE: 0.2 E.U./DL
VITAMIN B-12: 476 PG/ML (ref 232–1245)
WBC UA: ABNORMAL /HPF (ref 0–5)

## 2021-09-12 PROCEDURE — 6360000002 HC RX W HCPCS: Performed by: PSYCHIATRY & NEUROLOGY

## 2021-09-12 PROCEDURE — 6370000000 HC RX 637 (ALT 250 FOR IP): Performed by: INTERNAL MEDICINE

## 2021-09-12 PROCEDURE — 6360000002 HC RX W HCPCS: Performed by: INTERNAL MEDICINE

## 2021-09-12 PROCEDURE — 80048 BASIC METABOLIC PNL TOTAL CA: CPT

## 2021-09-12 PROCEDURE — 36415 COLL VENOUS BLD VENIPUNCTURE: CPT

## 2021-09-12 PROCEDURE — 72146 MRI CHEST SPINE W/O DYE: CPT

## 2021-09-12 PROCEDURE — 2580000003 HC RX 258: Performed by: INTERNAL MEDICINE

## 2021-09-12 PROCEDURE — 99233 SBSQ HOSP IP/OBS HIGH 50: CPT | Performed by: PSYCHIATRY & NEUROLOGY

## 2021-09-12 PROCEDURE — 1210000000 HC MED SURG R&B

## 2021-09-12 PROCEDURE — 81001 URINALYSIS AUTO W/SCOPE: CPT

## 2021-09-12 PROCEDURE — 97112 NEUROMUSCULAR REEDUCATION: CPT

## 2021-09-12 PROCEDURE — 87086 URINE CULTURE/COLONY COUNT: CPT

## 2021-09-12 RX ORDER — LORAZEPAM 2 MG/ML
1 INJECTION INTRAMUSCULAR ONCE
Status: COMPLETED | OUTPATIENT
Start: 2021-09-12 | End: 2021-09-12

## 2021-09-12 RX ADMIN — LORAZEPAM 1 MG: 2 INJECTION INTRAMUSCULAR; INTRAVENOUS at 11:33

## 2021-09-12 RX ADMIN — ENOXAPARIN SODIUM 40 MG: 40 INJECTION SUBCUTANEOUS at 09:15

## 2021-09-12 RX ADMIN — AMLODIPINE BESYLATE 10 MG: 10 TABLET ORAL at 09:15

## 2021-09-12 RX ADMIN — ATORVASTATIN CALCIUM 80 MG: 80 TABLET, FILM COATED ORAL at 22:24

## 2021-09-12 RX ADMIN — SODIUM CHLORIDE, PRESERVATIVE FREE 10 ML: 5 INJECTION INTRAVENOUS at 09:16

## 2021-09-12 RX ADMIN — HYDRALAZINE HYDROCHLORIDE 10 MG: 20 INJECTION INTRAMUSCULAR; INTRAVENOUS at 22:42

## 2021-09-12 RX ADMIN — ASPIRIN 81 MG: 81 TABLET, CHEWABLE ORAL at 09:15

## 2021-09-12 RX ADMIN — LISINOPRIL 10 MG: 10 TABLET ORAL at 09:15

## 2021-09-12 RX ADMIN — SODIUM CHLORIDE, PRESERVATIVE FREE 10 ML: 5 INJECTION INTRAVENOUS at 22:24

## 2021-09-12 ASSESSMENT — ENCOUNTER SYMPTOMS
COLOR CHANGE: 0
TROUBLE SWALLOWING: 0
BACK PAIN: 1
CHOKING: 0
VOMITING: 0
NAUSEA: 0
SHORTNESS OF BREATH: 0
PHOTOPHOBIA: 0

## 2021-09-12 ASSESSMENT — PAIN SCALES - GENERAL
PAINLEVEL_OUTOF10: 0
PAINLEVEL_OUTOF10: 0

## 2021-09-12 NOTE — PROGRESS NOTES
Physical Therapy Med Surg Daily Treatment Note  [facility]  [facility]       NAME: Enrike Valentin  : 1945 (62 y.o.)  MRN: [id number]  CODE STATUS: Full Code    Date of Service: 2021    Patient Diagnosis(es): Gait abnormality [R26.9]  General weakness [R53.1]  Stroke determined by clinical assessment [facility]) [I63.9]  Cerebrovascular accident (CVA), unspecified [facility] ([facility] [I63.9]   Chief Complaint   Patient presents with    Fatigue     pt states that he thinks he had a stroke a few days ago   pt says  he has been getting w\"weaker\" and  \"weaker\"    pt states he hada disloaction in his right arm a few years ago and thinks  they never fixed it right      Patient Active Problem List    Diagnosis Date Noted    Stroke determined by clinical assessment ([facility] 09/10/2021    Cellulitis 2019    Cellulitis of left hand 2019    Shoulder dislocation 2016        Past Medical History:   Diagnosis Date    Hypertension      Past Surgical History:   Procedure Laterality Date    BACK SURGERY         Restrictions  Restrictions/Precautions: Fall Risk    SUBJECTIVE   General  Chart Reviewed: Yes  Subjective  Subjective: Pt reports unable to get comfortable in the bed. Occ uses rollator or walker at home.     Pre-Session Pain Report     Pain Screening  Patient Currently in Pain: No       Post-Session Pain Report   None         OBJECTIVE        Bed mobility  Rolling to Left: Stand by assistance  Supine to Sit: Stand by assistance  Scooting: Stand by assistance    Transfers  Sit to Stand: Contact guard assistance  Stand to sit: Contact guard assistance  Comment: VCs for safe approach to chair; demonstrates decreased eccentric control from stand to sit    Ambulation  Ambulation?: Yes  Ambulation 1  Surface: level tile  Device: Rolling Walker  Assistance: Minimal assistance  Quality of Gait: Rt toe out with decreased overall foot clearance, inconsistent step length with downward gaze  Gait Deviations: Shuffles  Distance: 30ft              Neuromuscular Education  Neuromuscular Comments: Static standing: feet together 20sec, feet apart 30sec close SBA; single stepping forward to improve foot clearance and placement     Exercises  Hip Flexion: x10  Knee Long Arc Quad: x10                               ASSESSMENT   Body structures, Functions, Activity limitations: Decreased functional mobility ; Decreased ROM; Decreased safe awareness;Decreased balance;Decreased strength;Decreased cognition;Decreased coordination  Assessment: Initiated balance and exercises to improve strength and stability. Pt demonstrates decreased safety awareness throughout transfers, VCs for safe hand placements and approach to chair. VCs to increase Rt foot clearance with able to correct but difficulty maintaining. Prognosis: Good  REQUIRES PT FOLLOW UP: Yes     Discharge Recommendations:  Continue to assess pending progress, Patient would benefit from continued therapy after discharge    Goals  Long term goals  Long term goal 1: Bed mobility with indep  Long term goal 2: Functional transfers with indep  Long term goal 3: Amb 150ft with 2ww and indep  Long term goal 4: TUG <15.0 seconds with AD to demonstrate low fall risk  Long term goal 5: indep with HEP to improve LE strength, balance, coordination  Patient Goals   Patient goals : agreeable to \"walk better\"    PLAN    Times per week: 3-6  Safety Devices  Type of devices:  All fall risk precautions in place, Call light within reach, Chair alarm in place     [facility] (6 CLICK) Paula Koch 28 Inpatient Mobility Raw Score : 16     Therapy Time   Individual   Time In 0803   Time Out 0819   Minutes 16     Timed Code Treatment Minutes: 16 Minutes   Ambulation: 8  Neuro: Jayjay Yepez PTA, 09/12/21 at 8:26 AM    Electronically signed by Thomas Monsivais PTA on 9/12/2021 at 8:27 AM        Definitions for assistance levels  Independent = pt does not require any physical supervision or assistance from another person for activity completion. Device may be needed.   Stand by assistance = pt requires verbal cues or instructions from another person, close to but not touching, to perform the activity  Minimal assistance= pt performs 75% or more of the activity; assistance is required to complete the activity  Moderate assistance= pt performs 50% of the activity; assistance is required to complete the activity  Maximal assistance = pt performs 25% of the activity; assistance is required to complete the activity  Dependent = pt requires total physical assistance to accomplish the task

## 2021-09-12 NOTE — PROGRESS NOTES
Neurology Follow up    SUBJECTIVE: Complains of lower extremity and right upper extremity weakness.     Current Facility-Administered Medications   Medication Dose Route Frequency Provider Last Rate Last Admin    lisinopril (PRINIVIL;ZESTRIL) tablet 10 mg  10 mg Oral Daily Nisa Hoover MD   10 mg at 09/12/21 0915    sodium chloride flush 0.9 % injection 5-40 mL  5-40 mL IntraVENous 2 times per day Nisa Hoover MD   10 mL at 09/12/21 0916    sodium chloride flush 0.9 % injection 5-40 mL  5-40 mL IntraVENous PRN Nisa Hoover MD        0.9 % sodium chloride infusion  25 mL IntraVENous PRN Nisa Hoover MD        enoxaparin (LOVENOX) injection 40 mg  40 mg SubCUTAneous Daily Nisa Hoover MD   40 mg at 09/12/21 0915    ondansetron (ZOFRAN-ODT) disintegrating tablet 4 mg  4 mg Oral Q8H PRN Nisa Hoover MD        Or    ondansetron [facility]) injection 4 mg  4 mg IntraVENous Q6H PRN Nisa Hoover MD        polyethylene glycol (GLYCOLAX) packet 17 g  17 g Oral Daily PRN Nisa Hoover MD        acetaminophen (TYLENOL) tablet 650 mg  650 mg Oral Q6H PRN Nisa Hoover MD        Or    acetaminophen (TYLENOL) suppository 650 mg  650 mg Rectal Q6H PRN Nisa Hoover MD        potassium chloride (KLOR-CON M) extended release tablet 40 mEq  40 mEq Oral PRN Nisa Hoover MD        Or    potassium chloride (KLOR-CON) packet 40 mEq  40 mEq Oral PRN Nisa Hoover MD        Or    potassium chloride 10 mEq/100 mL IVPB (Peripheral Line)  10 mEq IntraVENous PRN Nisa Hoover MD        aspirin chewable tablet 81 mg  81 mg Oral Daily Nisa Hoover MD   81 mg at 09/12/21 0915    hydrALAZINE (APRESOLINE) injection 10 mg  10 mg IntraVENous Q6H PRN Nisa Hoover MD   10 mg at 09/11/21 0952    amLODIPine (NORVASC) tablet 10 mg  10 mg Oral Daily Nisa Hoover MD   10 mg at 09/12/21 0915    atorvastatin (LIPITOR) tablet 80 mg  80 mg Oral Nightly Nisa Hoover MD   80 mg at 09/11/21 2037       PHYSICAL EXAM:    /67   Pulse 90 Temp 97.3 °F (36.3 °C) (Oral)   Resp 16   Ht 5' 8\" (1.727 m)   Wt 179 lb (81.2 kg)   SpO2 100%   BMI 27.22 kg/m²    General Appearance:      Skin:  normal  CVS - Normal sounds, No murmurs , No carotid Bruits  RS -CTA  Abdomen Soft, BS present  Review of Systems   Constitutional: Negative for fever. HENT: Negative for ear pain, tinnitus and trouble swallowing. Eyes: Negative for photophobia and visual disturbance. Respiratory: Negative for choking and shortness of breath. Cardiovascular: Negative for chest pain and palpitations. Gastrointestinal: Negative for nausea and vomiting. Musculoskeletal: Positive for back pain. Negative for gait problem, joint swelling, myalgias, neck pain and neck stiffness. Skin: Negative for color change. Allergic/Immunologic: Negative for food allergies. Neurological: Positive for weakness and numbness. Negative for dizziness, tremors, seizures, syncope, facial asymmetry, speech difficulty, light-headedness and headaches. Psychiatric/Behavioral: Negative for behavioral problems, confusion, hallucinations and sleep disturbance.       Mental Status Exam:             Level of Alertness:   awake            Orientation:   person, place, time            Memory:   normal                    Language:  normal      Funduscopic Exam:     Cranial Nerves            Cranial nerve III           Pupils:  equal, round, reactive to light      Cranial nerves III, IV, VI           Extraocular Movements: intact      Cranial nerve V           Facial sensation:  intact      Cranial nerve VII           Facial strength: intact      Cranial nerve VIII           Hearing:  intact      Cranial nerve IX           Palate:  intact      Cranial nerve XI         Shoulder shrug:  intact      Cranial nerve XII          Tongue movement:  normal    Motor: Right upper extremity weakness of 4 /5 with a drift  Drift:  Motor exam is symmetrical 5 out of 5 all extremities bilaterally  Tone: normal  Abnormal Movements:  absent            Sensory: No definite findings        Pinprick             Right Upper Extremity:  normal             Left Upper Extremity:  normal             Right Lower Extremity:  normal             Left Lower Extremity:  normal           Vibration                         Touch            Proprioception                 Coordination:           Finger/Nose   Right:  abnormal              Left:  normal          Heel-Knee-Shin                Right:  normal              Left:  normal          Rapid Alternating Movements              Right:  normal              Left:  normal          Gait:                       Casual:  normal                         Romberg:  normal            Reflexes:             Deep Tendon Reflexes:             Reflexes are 2 + including ankle reflexes. Plantar response:                Right:  downgoing               Left:  downgoing    Vascular:  Cardiac Exam:  normal         Echocardiogram complete 2D with doppler with color    Result Date: 9/11/2021  Transthoracic Echocardiography Report (TTE)  Demographics   Patient Name    Diana Yañez Gender               Male   Patient Number  [id number]       Race                 Unknown                                  Ethnicity   Visit Number    [id number]      Room Number          U808   Corporate ID                   Date of Study        09/11/2021   Accession       [id number]     Referring Physician  Number   Date of Birth   1945     Sonographer          Karl Olsenar   Age             77 year(s)     Interpreting         [facility])                                 Physician            Cardiology                                                      [address] [place] [address]  Procedure Type of Study   TTE procedure:ECHOCARDIOGRAM COMPLETE WITH BUBBLE STUDY. Procedure Date Date: 09/11/2021 Start: 08:15 AM Study Location: Portable Technical Quality: Adequate visualization Indications:Stroke.  Patient Status: Routine Height: 68 inches Weight: 179 pounds BSA: 1.95 m^2 BMI: 27.22 kg/m^2 BP: 147/86 mmHg  Conclusions   Summary  Moderate annular calcification. Normal aortic valve structure and function. Trivial aortic regurgitation is  noted. Moderately dilated left atrium. Normal intact intra-atrial septum was noted with no evidence of  significant intra-atrial communications neither by colour flow Doppler  imaging nor by aggitated saline study. Left ventricular ejection fraction is visually estimated at 60%. Impaired relaxation compatible with diastolic dysfunction. ( reversed E/A  ratio)  Moderate concentric left ventricular hypertrophy. Signature   ----------------------------------------------------------------  Electronically signed by Nicole Medina(Interpreting physician)  on 09/11/2021 11:17 AM  ----------------------------------------------------------------   Findings  Left Ventricle Left ventricular ejection fraction is visually estimated at 60%. Impaired relaxation compatible with diastolic dysfunction. ( reversed E/A ratio) Moderate concentric left ventricular hypertrophy. Right Ventricle Normal right ventricle structure and function. Normal right ventricle systolic pressure. Left Atrium Moderately dilated left atrium. Normal intact intra-atrial septum was noted with no evidence of significant intra-atrial communications neither by colour flow Doppler imaging nor by aggitated saline study. Right Atrium Normal right atrium. Mitral Valve Moderate annular calcification. Tricuspid Valve Normal tricuspid valve structure and function. Aortic Valve Normal aortic valve structure and function. Trivial aortic regurgitation is noted. Pulmonic Valve Normal pulmonic valve structure and function. Pericardial Effusion No evidence of pericardial effusion. Pleural Effusion No evidence of pleural effusion. Aorta \ Miscellaneous Miscellaneous normal findings were found.  M-Mode Measurements (cm)   LVIDd: 4.6 cm Septum Systolic: 9.73 cm  PW Diastolic: 5.69 cm                                       FS: 34.6 %  LV EDV/LV EDV Index: 97.45 ml/50 m^2 LV ESV/LV ESV Index: 35.36 ml/18 m^2  EF Calculated: 63.7 %                LV Length: 7.34 cm   LVOT Diameter: 1.72 cm   Right Atrium   RA Systolic Pressure: 10 mmHg   Right Ventricle   Diastolic Dimension: 4.73 cm                                     RV Systolic Pressure: 10.9 mmHg  Aorta/ Miscellaneous Aorta   Aortic Root: 3.04 cm  LVOT Diameter: 1.72 cm      CT HEAD WO CONTRAST    Result Date: 9/10/2021  EXAMINATION: CT HEAD WO CONTRAST CLINICAL HISTORY: generalized weakness COMPARISON:  NONE AVAILABLE An unenhanced scan is performed. FINDINGS:   There is no bleed, mass effect, or space occupying lesion. No extra-axial mass or fluid collections. Hypoattenuated White matter changes in the cerebral hemispheres noted. There is global atrophy. Findings likely reflect chronic small vessel vasculopathy. There is a vague area of low attenuation in the inferior posterior right cerebellar hemisphere with slight obliteration of adjacent sulci. An evolving nonhemorrhagic infarct in the cerebellum would have to be a consideration. Clinical correlation recommended. NO BLEED, MASS EFFECT, OR EXTRA-AXIAL FLUID COLLECTION. EVOLVING NONHEMORRHAGIC INFARCT IN THE INFERIOR RIGHT CEREBELLAR HEMISPHERE. 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 All CT scans at this facility use dose modulation, iterative reconstruction, and/or weight based dosing when appropriate to reduce radiation dose to as low as reasonably achievable. XR CHEST PORTABLE    Result Date: 9/10/2021  Exam: XR CHEST PORTABLE History: Generalized weakness Technique: AP portable view of the chest obtained. Comparison: None available Findings: Atherosclerotic calcification of the thoracic aorta. The cardiomediastinal silhouette is within normal limits. No pneumothorax, pleural effusion, or consolidation.   Question calcified pleural plaques on the left, which can be seen with asbestos related disease. No acute osseous abnormality. No radiographic evidence of acute intrathoracic process. Question left-sided pleural plaques which can be seen with asbestos related disease. MRI BRAIN WO CONTRAST    Result Date: 9/11/2021  EXAMINATION: MRI BRAIN WO CONTRAST CLINICAL HISTORY:  STROKE COMPARISONS: MR brain 4/5/2016 TECHNIQUE: Multiplanar multisequence images of the brain were obtained without contrast. Diffusion perfusion imaging was obtained. FINDINGS: There is a tiny focus of restricted diffusion in the left centrum semiovale (series 4 image 19). There are no extra-axial collections. There is no evidence of hemorrhage. The susceptibility images do not demonstrate evidence of hemosiderin deposition within the brain parenchyma or the leptomeninges. There are numerous patchy foci of T2/FLAIR hyperintensities in the subcortical and periventricular white matter in a symmetric distribution throughout both hemispheres. Chronic bilateral cerebellar infarcts. There is prominence of the sulci and ventricles consistent with moderate global cerebral atrophy and chronic involutional changes. No midline shift. The visualized portions of the orbits are within normal limits, the globes are intact. The visualized portions of the paranasal sinuses are within normal limits. Right mastoid effusion. Small foci of restricted diffusion in the left centrum semiovale, consistent with acute ischemia. Chronic microvascular changes and bilateral chronic cerebellar infarcts. CRITICAL RESULTS: Communicated with Escobar Berg RN on 9/11/2021 at 12:51 PM.    US CAROTID ARTERY BILATERAL    Result Date: 9/11/2021  EXAMINATION: US CAROTID ARTERY BILATERAL HISTORY:   stroke . R26.9 Gait abnormality ICD10 COMPARISON:None TECHNIQUE: Carotid duplex sonograms which include gray scale and color flow evaluation are complimented with spectral waveform analysis. Please refer to chart below for specific carotid velocity measurements. The degree of stenosis recorded on this exam uses the same method of stratification used in the NASCET trials. This complies with ACR practice guidelines and the Society of radiology in ultrasound consensus statement and provides adequate information for clinical decision making. Society of Radiologists in Ultrasound (SRU) consensus statement was used to estimate internal carotid artery stenosis. RESULT: Mild to moderate plaque in the carotid arteries bilaterally. No significant increase in systolic flow or turbulence to indicate any hemodynamically significant narrowing in the right or left internal carotid arteries. There is antegrade flow identified in both vertebral arteries. ARTERIAL BLOOD FLOW VELOCITY RIGHT PEAK SYSTOLIC VELOCITIES (PSV)                                               Prox CCA    129 cm/s             Mid CCA     114 cm/s              Dist CCA    104 cm/s             Prox ICA    169 cm/s               Mid ICA     106 cm/s            Dist ICA    63 cm/s             Prox ECA     170   cm/s             Prox VERT   51 cm/s              ICA/CCA     1.48                        LEFT PEAK SYSTOLIC VELOCITIES (PSV)  Prox CCA    135 cm/s Mid CCA     166 cm/s Dist CCA    121 cm/s Prox ICA    119 cm/s Mid ICA     72 cm/s Dist ICA    61 cm/s Prox ECA    131 cm/s Prox VERT   56 cm/s ICA/CCA     0.72      50-69 % STENOSIS IN THE RIGHT ICA  <50 % STENOSIS IN THE LEFT ICA ANTEGRADE FLOW IN THE BILATERAL VERTEBRAL ARTERIES.        Recent Labs     09/10/21  1345   WBC 5.9   HGB 17.1        Recent Labs     09/10/21  1345 09/12/21  0655    140   K 4.0 4.0    106   CO2 22 22   BUN 21 26*   CREATININE 0.97 1.03   GLUCOSE 98 97     Recent Labs     09/10/21  1345   BILITOT 1.0*   ALKPHOS 79   AST 36   ALT 52*     Lab Results   Component Value Date    PROTIME 10.3 03/26/2019    INR 1.0 03/26/2019     No results found for: LITHIUM, DILFRTOT, VALPROATE    ASSESSMENT AND PLAN  Left small centrum semioval stroke , with right-sided findings. Patient also complains of lower extremity weakness and therefore recommended to complete spine evaluation given that he is mildly hyperreflexic in the upper extremity compared to the lower extremity. No definite sensory levels are notable patient does have some gait issues from the stroke as well. Patient require rehabilitation. We will complete his work-up and continue on aspirin since he was not on aspirin or cholesterol-lowering agents. Findings were discussed with the patient    Jordan Fay MD, Neelima Zavala, American Board of Psychiatry & Neurology  Board Certified in Vascular Neurology  Board Certified in Neuromuscular Medicine  Certified in Abhijeet Antunez

## 2021-09-12 NOTE — CARE COORDINATION
MET WITH PATIENT WHILE HE SITTING UP IN CHAIR. FREEDOM OF CHOICE GIVEN. PT AGREEABLE TO [facility]. PT LIVES ALONE, BUT HIS SISTER CAN HELP HIM SOMETIMES. [facility] [place] Case Management Initial Discharge Assessment    Met with Patient to discuss discharge plan. PCP: Brigid Patricio MD                                Date of Last Visit: \"IT'S BEEN A LONG TIME\"    If no PCP, list provided? N/A    Discharge Planning    Living Arrangements: independently at home    Who do you live with? ALONE    Who helps you with your care:  self    If lives at home:     Do you have any barriers navigating in your home? yes - A FEW STEPS    Patient can perform ADL? Yes    Current Services (outpatient and in home) :  None    Dialysis: No    Is transportation available to get to your appointments? Yes, BUT WILL NEED TO REASSESS DRIVING D/T CVA. HE STATES SISTER CAN HELP HIM GET TO APPTS. DME Equipment:  yes - PT STATES HE HAS A [employer] AND [employer]. Respiratory equipment: None    Respiratory provider:  no     Pharmacy:  yes - [facility]    Consult with Medication Assistance Program?  No      Patient agreeable to [facility]? Yes, [facility]    Patient agreeable to SNF/Rehab? Yes, [address]    Other discharge needs identified? N/A    Does Patient Have a High-Risk for Readmission Diagnosis (CHF, PN, MI, COPD)? No, ADMIT FOR CVA    Initial Discharge Plan? (Note: please see concurrent daily documentation for any updates after initial note). REHAB REFERRAL.  PT LIVES ALONE, MAY NEED TO REASSESS AFTER REHAB    Readmission Risk              Risk of Unplanned Readmission:  7         Electronically signed by Teodoro Treviño RN on 9/12/2021 at 9:31 AM

## 2021-09-12 NOTE — CONSULTS
Physical Medicine & Rehabilitation  Consult Note      Admitting Physician: Michel Aguilar MD    Primary Care Provider: Jesus Rothman MD     Reason for Consult:  Asses rehab needs, promote physical and mental function, and decrease likelihood of re-admit to the hospital after discharge. History of Present Illness:    Kosta Cui is a 77 y.o. male admitted to [facility] on 9/10/2021. Left small centrum semioval stroke       HPI      I reviewed recent nursing notes discussed care with acute care providers, \"  See notes  \". Their inpatient work up has included:    Imaging:    Imaging and other studies reviewed and discussed with patient and staff    Echocardiogram complete 2D with doppler with color    Result Date: 9/11/2021  Transthoracic Echocardiography Report (TTE)  Demographics   Patient Name    Traci Mclain Gender               Male   Patient Number  [id number]       Race                 Unknown                                  Ethnicity   Visit Number    [id number]      Room Number          F468   Corporate ID                   Date of Study        09/11/2021   Accession       [id number]     Referring Physician  Number   Date of Birth   1945     Sonographer          Nico Orozco   Age             77 year(s)     Interpreting         [facility])                                 Physician            Cardiology                                                      Lorene Kennedy  Procedure Type of Study   TTE procedure:ECHOCARDIOGRAM COMPLETE WITH BUBBLE STUDY. Procedure Date Date: 09/11/2021 Start: 08:15 AM Study Location: Portable Technical Quality: Adequate visualization Indications:Stroke. Patient Status: Routine Height: 68 inches Weight: 179 pounds BSA: 1.95 m^2 BMI: 27.22 kg/m^2 BP: 147/86 mmHg  Conclusions   Summary  Moderate annular calcification. Normal aortic valve structure and function. Trivial aortic regurgitation is  noted. Moderately dilated left atrium. Velocity:0.02 m/s                   MV Peak E-Wave: 1.25 m/s  AV Peak Gradient: 15.73 mmHg           MV Peak A-Wave: 1.54 m/s  AV Mean Gradient: 8.65 mmHg  AV Area (Continuity):1.66 cm^2         MV P1/2t: 103 msec  TR Velocity:2.47 m/s                   MVA by PHT2.14 cm^2  TR Gradient:24.4 mmHg                  Estimated RAP:10 mmHg                                         RVSP:34.4 mmHg  Valves  Mitral Valve   Peak E-Wave: 1.25 m/s                 Peak A-Wave: 1.54 m/s  P1/2t: 103 msec                       E/A Ratio: 0.81  Mean Velocity: 1.01 m/s               Peak Gradient: 6.28 mmHg  Mean Gradient: 4.78 mmHg              Deceleration Time: 407.7 msec  Area (PHT): 2.14 cm^2                 Area (continuity): 1.44 cm^2   Tissue Doppler   E' Septal Velocity: 0.07 m/s  E' Lateral Velocity: 0.06 m/s   Aortic Valve   Peak Velocity: 1.98 m/s                Mean Velocity: 1.39 m/s  Peak Gradient: 15.73 mmHg              Mean Gradient: 8.65 mmHg  Area (continuity): 1.66 cm^2  AV VTI: 46.12 cm   Tricuspid Valve   Estimated RVSP: 34.4 mmHg               Estimated RAP: 10 mmHg  TR Velocity: 2.47 m/s                   TR Gradient: 24.4 mmHg   Pulmonic Valve   Peak Velocity: 0.95 m/s            Peak Gradient: 3.64 mmHg                                     Estimated PASP: 34.4 mmHg   LVOT   Peak Velocity: 1.51 m/s              Mean Velocity: 1.09 m/s  Peak Gradient: 9.17 mmHg             Mean Gradient: 5.32 mmHg  LVOT Diameter: 1.72 cm               LVOT VTI: 32.89 cm  Structures  Left Atrium   LA Volume/Index: 47.11 ml /24 m^2             LA Area: 16.59 cm^2   Left Ventricle   Diastolic Dimension: 4.6 cm          Systolic Dimension: 6.88 cm  Septum Diastolic: 5.02 cm            Septum Systolic: 7.13 cm  PW Diastolic: 8.67 cm                                       FS: 34.6 %  LV EDV/LV EDV Index: 97.45 ml/50 m^2 LV ESV/LV ESV Index: 35.36 ml/18 m^2  EF Calculated: 63.7 %                LV Length: 7.34 cm   LVOT Diameter: 1.72 cm Right Atrium   RA Systolic Pressure: 10 mmHg   Right Ventricle   Diastolic Dimension: 0.16 cm                                     RV Systolic Pressure: 64.7 mmHg  Aorta/ Miscellaneous Aorta   Aortic Root: 3.04 cm  LVOT Diameter: 1.72 cm      CT HEAD WO CONTRAST    Result Date: 9/10/2021  EXAMINATION: CT HEAD WO CONTRAST CLINICAL HISTORY: generalized weakness COMPARISON:  NONE AVAILABLE An unenhanced scan is performed. FINDINGS:   There is no bleed, mass effect, or space occupying lesion. No extra-axial mass or fluid collections. Hypoattenuated White matter changes in the cerebral hemispheres noted. There is global atrophy. Findings likely reflect chronic small vessel vasculopathy. There is a vague area of low attenuation in the inferior posterior right cerebellar hemisphere with slight obliteration of adjacent sulci. An evolving nonhemorrhagic infarct in the cerebellum would have to be a consideration. Clinical correlation recommended. NO BLEED, MASS EFFECT, OR EXTRA-AXIAL FLUID COLLECTION. EVOLVING NONHEMORRHAGIC INFARCT IN THE INFERIOR RIGHT CEREBELLAR HEMISPHERE. 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 All CT scans at this facility use dose modulation, iterative reconstruction, and/or weight based dosing when appropriate to reduce radiation dose to as low as reasonably achievable. XR CHEST PORTABLE    Result Date: 9/10/2021  Exam: XR CHEST PORTABLE History: Generalized weakness Technique: AP portable view of the chest obtained. Comparison: None available Findings: Atherosclerotic calcification of the thoracic aorta. The cardiomediastinal silhouette is within normal limits. No pneumothorax, pleural effusion, or consolidation. Question calcified pleural plaques on the left, which can be seen with asbestos related disease. No acute osseous abnormality. No radiographic evidence of acute intrathoracic process.  Question left-sided pleural plaques which can be seen with asbestos related disease. MRI BRAIN WO CONTRAST    Result Date: 9/11/2021  EXAMINATION: MRI BRAIN WO CONTRAST CLINICAL HISTORY:  STROKE COMPARISONS: MR brain 4/5/2016 TECHNIQUE: Multiplanar multisequence images of the brain were obtained without contrast. Diffusion perfusion imaging was obtained. FINDINGS: There is a tiny focus of restricted diffusion in the left centrum semiovale (series 4 image 19). There are no extra-axial collections. There is no evidence of hemorrhage. The susceptibility images do not demonstrate evidence of hemosiderin deposition within the brain parenchyma or the leptomeninges. There are numerous patchy foci of T2/FLAIR hyperintensities in the subcortical and periventricular white matter in a symmetric distribution throughout both hemispheres. Chronic bilateral cerebellar infarcts. There is prominence of the sulci and ventricles consistent with moderate global cerebral atrophy and chronic involutional changes. No midline shift. The visualized portions of the orbits are within normal limits, the globes are intact. The visualized portions of the paranasal sinuses are within normal limits. Right mastoid effusion. Small foci of restricted diffusion in the left centrum semiovale, consistent with acute ischemia. Chronic microvascular changes and bilateral chronic cerebellar infarcts. CRITICAL RESULTS: Communicated with Wynelle Dakins, RN on 9/11/2021 at 12:51 PM.    US CAROTID ARTERY BILATERAL    Result Date: 9/11/2021  EXAMINATION: US CAROTID ARTERY BILATERAL HISTORY:   stroke . R26.9 Gait abnormality ICD10 COMPARISON:None TECHNIQUE: Carotid duplex sonograms which include gray scale and color flow evaluation are complimented with spectral waveform analysis. Please refer to chart below for specific carotid velocity measurements. The degree of stenosis recorded on this exam uses the same method of stratification used in the NASCET trials.  This complies with ACR practice guidelines and the Society of radiology in ultrasound consensus statement and provides adequate information for clinical decision making. Society of Radiologists in Ultrasound (SRU) consensus statement was used to estimate internal carotid artery stenosis. RESULT: Mild to moderate plaque in the carotid arteries bilaterally. No significant increase in systolic flow or turbulence to indicate any hemodynamically significant narrowing in the right or left internal carotid arteries. There is antegrade flow identified in both vertebral arteries. ARTERIAL BLOOD FLOW VELOCITY RIGHT PEAK SYSTOLIC VELOCITIES (PSV)                                               Prox CCA    129 cm/s             Mid CCA     114 cm/s              Dist CCA    104 cm/s             Prox ICA    169 cm/s               Mid ICA     106 cm/s            Dist ICA    63 cm/s             Prox ECA     170   cm/s             Prox VERT   51 cm/s              ICA/CCA     1.48                        LEFT PEAK SYSTOLIC VELOCITIES (PSV)  Prox CCA    135 cm/s Mid CCA     166 cm/s Dist CCA    121 cm/s Prox ICA    119 cm/s Mid ICA     72 cm/s Dist ICA    61 cm/s Prox ECA    131 cm/s Prox VERT   56 cm/s ICA/CCA     0.72      50-69 % STENOSIS IN THE RIGHT ICA  <50 % STENOSIS IN THE LEFT ICA ANTEGRADE FLOW IN THE BILATERAL VERTEBRAL ARTERIES. MRI CERVICAL THORACIC LUMBAR SPINE WO CONTRAST LIMITED    Result Date: 9/12/2021  EXAMINATION: MRI CERVICAL THORACIC LUMBAR SPINE WO CONTRAST LIMITED COMPARISON:None CLINICAL HISTORY:  spinal stenosis  FINDINGS:Tailored MRI scans to the cervical thoracic and lumbar spine. Scan quality limited due to patient motion  CERVICAL REGION: Multilevel degenerative changes with severe canal stenosis at C2-3, C3-4, and C6-7. Moderate canal stenosis at C4-5. THORACIC REGION: No spinal canal stenosis LUMBAR REGION: Multilevel degenerative changes. Endplate irregularity with joint space narrowing and slight anterolisthesis at L3-4 Severe canal stenosis at L2-3 and L4-5.  Moderate to severe canal stenosis at L3-4. No acute compression fracture. No epidural or paraspinal soft tissue mass. No abnormal cord signal intensity on the scans. Multiple areas of severe spinal canal stenosis in the cervical region. Severe canal stenosis at L2-3, L3-4 and L4-5. Labs:     labs reviewed and discussed with patient and staff    No results found for: POCGLU  Lab Results   Component Value Date     09/12/2021    K 4.0 09/12/2021     09/12/2021    CO2 22 09/12/2021    BUN 26 09/12/2021    CREATININE 1.03 09/12/2021    CALCIUM 8.9 09/12/2021    LABALBU 4.5 09/10/2021    BILITOT 1.0 09/10/2021    ALKPHOS 79 09/10/2021    AST 36 09/10/2021    ALT 52 09/10/2021     Lab Results   Component Value Date    WBC 5.9 09/10/2021    RBC 5.38 09/10/2021    HGB 17.1 09/10/2021    HCT 48.6 09/10/2021    MCV 90.4 09/10/2021    MCH 31.9 09/10/2021    MCHC 35.2 09/10/2021    RDW 13.0 09/10/2021     09/10/2021     No results found for: VITD25  Lab Results   Component Value Date    COLORU Yellow 09/10/2021    NITRU Negative 09/10/2021    GLUCOSEU Negative 09/10/2021    KETUA 15 09/10/2021    UROBILINOGEN 1.0 09/10/2021    BILIRUBINUR Negative 09/10/2021     Lab Results   Component Value Date    PROTIME 10.3 03/26/2019     Lab Results   Component Value Date    INR 1.0 03/26/2019         I discussed results with patient.     Current Rehabilitation Assessments:    Rehabilitation:  Physical therapy: FIMS:  BedMobility: Scooting: Stand by assistance    Transfers: Sit to Stand: Contact guard assistance  Stand to sit: Contact guard assistance, Ambulation 1  Surface: level tile  Device: Rolling Walker  Assistance: Minimal assistance  Quality of Gait: Rt toe out with decreased overall foot clearance, inconsistent step length with downward gaze  Gait Deviations: Shuffles  Distance: 30ft  Comments: Pt with decreased safety awareness and poor awareness deficits, inability to compensate despite VC.,      FIMS: ,  , Assessment: Initiated balance and exercises to improve strength and stability. Pt demonstrates decreased safety awareness throughout transfers, VCs for safe hand placements and approach to chair. VCs to increase Rt foot clearance with able to correct but difficulty maintaining. Occupational therapy: FIMS:   ,  , Assessment: Pt is  a 77 y.o. admitted for CVA. Pt with above deficits impairing ability to safety complete ADL's at reported baseline. Pt may benefit from OT services to address deficits and maximize level of safety and function during ADL's. ADL  Feeding: NPO (positive hand to mouth) (09/11/21 0921)  Grooming: Stand by assistance (09/11/21 0921)  UE Bathing: Stand by assistance (09/11/21 0921)  LE Bathing: Contact guard assistance (09/11/21 0921)  UE Dressing: Stand by assistance (09/11/21 0921)  LE Dressing: Contact guard assistance (09/11/21 0921)  Toileting: Contact guard assistance (09/11/21 0780)  Additional Comments: pt able to don/doff socks seated EOB, other ADL's simulated (09/11/21 0921)  OCCUPATIONAL THERAPY  Hand Dominance: Right  ADL  Feeding: NPO (positive hand to mouth) (09/11/21 0921)  Grooming: Stand by assistance (09/11/21 0921)  UE Bathing: Stand by assistance (09/11/21 0921)  LE Bathing: Contact guard assistance (09/11/21 0921)  UE Dressing: Stand by assistance (09/11/21 0921)  LE Dressing: Contact guard assistance (09/11/21 0921)  Toileting: Contact guard assistance (09/11/21 1616)  Additional Comments: pt able to don/doff socks seated EOB, other ADL's simulated (09/11/21 7116)  Toilet Transfers  Toilet Transfer: Unable to assess (09/11/21 1162)  Toilet Transfers Comments: anticipated CGA (09/11/21 8623)            LTG:                 Speech therapy: FIMS:       SPEECH THERAPY  Motor Speech:  Within Functional Limits  Comprehension: Exceptions  Verbal Expression: Exceptions to functional limits      Diet/Swallow:  Diet Solids Recommendation: Regular  Liquid Consistency Recommendation: Mildly Thick (Nectar)  Dysphagia Outcome Severity Scale: Level 5: Mild dysphagia- Distant supervision.  May need one diet consistency restricted    Compensatory Swallowing Strategies: Upright as possible for all oral intake, Small bites/sips, Eat/Feed slowly  Therapeutic Interventions: Diet tolerance monitoring, Therapeutic PO trials with SLP, Pharyngeal exercises, Laryngeal exercises, Patient/Family education          COGNITION  OT:    SP:Memory: Exceptions to [facility]  Problem Solving: Unable to assess      Past Medical History:          Diagnosis Date    Hypertension          PastSurgical History:          Procedure Laterality Date    BACK SURGERY           Allergies:   No Known Allergies     CurrentMedications:     Current Facility-Administered Medications: lisinopril (PRINIVIL;ZESTRIL) tablet 10 mg, 10 mg, Oral, Daily  sodium chloride flush 0.9 % injection 5-40 mL, 5-40 mL, IntraVENous, 2 times per day  sodium chloride flush 0.9 % injection 5-40 mL, 5-40 mL, IntraVENous, PRN  0.9 % sodium chloride infusion, 25 mL, IntraVENous, PRN  enoxaparin (LOVENOX) injection 40 mg, 40 mg, SubCUTAneous, Daily  ondansetron (ZOFRAN-ODT) disintegrating tablet 4 mg, 4 mg, Oral, Q8H PRN **OR** ondansetron (ZOFRAN) injection 4 mg, 4 mg, IntraVENous, Q6H PRN  polyethylene glycol (GLYCOLAX) packet 17 g, 17 g, Oral, Daily PRN  acetaminophen (TYLENOL) tablet 650 mg, 650 mg, Oral, Q6H PRN **OR** acetaminophen (TYLENOL) suppository 650 mg, 650 mg, Rectal, Q6H PRN  potassium chloride (KLOR-CON M) extended release tablet 40 mEq, 40 mEq, Oral, PRN **OR** potassium chloride (KLOR-CON) packet 40 mEq, 40 mEq, Oral, PRN **OR** potassium chloride 10 mEq/100 mL IVPB (Peripheral Line), 10 mEq, IntraVENous, PRN  aspirin chewable tablet 81 mg, 81 mg, Oral, Daily  hydrALAZINE (APRESOLINE) injection 10 mg, 10 mg, IntraVENous, Q6H PRN  amLODIPine (NORVASC) tablet 10 mg, 10 mg, Oral, Daily  atorvastatin (LIPITOR) tablet 80 mg, 80 mg, Oral, Nightly      Social History:    Social History     Socioeconomic History    Marital status: Single     Spouse name: Not on file    Number of children: Not on file    Years of education: Not on file    Highest education level: Not on file   Occupational History    Not on file   Tobacco Use    Smoking status: Never Smoker    Smokeless tobacco: Never Used   Vaping Use    Vaping Use: Never used   Substance and Sexual Activity    Alcohol use: Never     Alcohol/week: 0.0 standard drinks    Drug use: Never    Sexual activity: Not on file   Other Topics Concern    Not on file   Social History Narrative    Not on file     Social Determinants of Health     Financial Resource Strain:     Difficulty of Paying Living Expenses:    Food Insecurity:     Worried About Running Out of Food in the Last Year:     [address] in the Last Year:    Transportation Needs:     Lack of Transportation (Medical):  Lack of Transportation (Non-Medical):    Physical Activity:     Days of Exercise per Week:     Minutes of Exercise per Session:    Stress:     Feeling of Stress :    Social Connections:     Frequency of Communication with Friends and Family:     Frequency of Social Gatherings with Friends and Family:     Attends [employer] Services:     Active Member of Clubs or Organizations:     Attends Club or Organization Meetings:     Marital Status:    Intimate Partner Violence:     Fear of Current or Ex-Partner:     Emotionally Abused:     Physically Abused:     Sexually Abused:           Family History:     History reviewed. No pertinent family history. Review of Systems:    Review of Systems          Physical Exam:    /67   Pulse 90   Temp 97.3 °F (36.3 °C) (Oral)   Resp 16   Ht 5' 8\" (1.727 m)   Wt 179 lb (81.2 kg)   SpO2 100%   BMI 27.22 kg/m²      Physical Exam  Ortho Exam  Neurologic Exam       ROS x10:   The patient also complains of severely impaired mobility and activities of daily living. Otherwise no new problems with vision, hearing, nose, mouth, throat, dermal, cardiovascular, GI, , pulmonary, musculoskeletal, psychiatric or neurological. See Rehab H&P on Rehab chart dated . Vital signs:  /67   Pulse 90   Temp 97.3 °F (36.3 °C) (Oral)   Resp 16   Ht 5' 8\" (1.727 m)   Wt 179 lb (81.2 kg)   SpO2 100%   BMI 27.22 kg/m²   I/O:   PO/Intake:  fair PO intake, no problems observed or reported. Bowel/Bladder:  continent, no problems noted. General:  Patient is well developed, adequately nourished, non-obese and     well kempt. HEENT:    PERRLA, hearing intact to loud voice, external inspection of ear     and nose benign. Inspection of lips, tongue and gums benign  Musculoskeletal: No significant change in strength or tone. All joints stable. Inspection and palpation of digits and nails show no clubbing,       cyanosis or inflammatory conditions. Neuro/Psychiatric: Affect: flat but pleasant. Alert and oriented to person, place and     Situation with    cues. No significant change in deep tendon reflexes or     sensation  Lungs:  Diminished, CTA-B. Respiration effort is normal at rest.     Heart:   S1 = S2, RRR. No loud murmurs. Abdomen:  Soft, non-tender, no enlargement of liver or spleen. Extremities:  No significant lower extremity edema or tenderness. Skin:   Intact to general survey, no visualized or palpated problems.     Rehabilitation:  Physical therapy:   Bed Mobility: Scooting: Stand by assistance    Transfers: Sit to Stand: Contact guard assistance  Stand to sit: Contact guard assistance, Ambulation 1  Surface: level tile  Device: Rolling Walker  Assistance: Minimal assistance  Quality of Gait: Rt toe out with decreased overall foot clearance, inconsistent step length with downward gaze  Gait Deviations: Shuffles  Distance: 30ft  Comments: Pt with decreased safety awareness and poor awareness deficits, inability to compensate despite VC., FIMS:  ,  , Assessment: Initiated balance and exercises to improve strength and stability. Pt demonstrates decreased safety awareness throughout transfers, VCs for safe hand placements and approach to chair. VCs to increase Rt foot clearance with able to correct but difficulty maintaining. Occupational therapy:    ,  , Assessment: Pt is  a 77 y.o. admitted for CVA. Pt with above deficits impairing ability to safety complete ADL's at reported baseline. Pt may benefit from OT services to address deficits and maximize level of safety and function during ADL's. Speech therapy:                   Diagnostics:    Recent Results (from the past 24 hour(s))   Basic Metabolic Panel    Collection Time: 09/12/21  6:55 AM   Result Value Ref Range    Sodium 140 135 - 144 mEq/L    Potassium 4.0 3.4 - 4.9 mEq/L    Chloride 106 95 - 107 mEq/L    CO2 22 20 - 31 mEq/L    Anion Gap 12 9 - 15 mEq/L    Glucose 97 70 - 99 mg/dL    BUN 26 (H) 8 - 23 mg/dL    CREATININE 1.03 0.70 - 1.20 mg/dL    GFR Non-African American >60.0 >60    GFR  >60.0 >60    Calcium 8.9 8.5 - 9.9 mg/dL              Impression:    1. Impaired mobility and ADLs due to CVA Left small centrum semioval stroke   2. Fatigue and Malaise      Complex Active General Medical Issues thatcomplicate care Assess & Plan:     1. Active Problems:    Stroke determined by clinical assessment [facility])  Resolved Problems:    * No resolved hospital problems. *    Patient Active Problem List   Diagnosis    Shoulder dislocation    Cellulitis    Cellulitis of left hand    Stroke determined by clinical assessment ([facility] [address]               Recommendations:    1. Considering all of the factors aboveincluding the patient's current medical status, social status/home envirnment, their functional needs and their ability to participate in a therapy program, I feel that [facility] pending functional performance.   It is my opinion that they will be able to tolerate and benefit from 3 hours of therapy a day. I reviewed the varous local options re these levels of care with the patient and family. 2. Nursing care to focus on bowel and bladder issues. 3. Vitamin B12 shot times one  4. Improve hydration and Nutrition by adding Proteinex and push PO fluids       It was my pleasure to evaluate Nisha Madrid today. Please call [phone] with questions.     Brigida Jones MD

## 2021-09-12 NOTE — PROGRESS NOTES
Department of Internal Medicine - Staff Internal Medicine Service                                                                   PROGRESS NOTE   ______________________________________________________________________        HISTORY OF PRESENT ILLNESS:    Patient is 73-year-old male who presented to the ER with recurrent falls and dizziness. Patient states that since 1 month he has been feeling very dizzy and not himself, patient states that he is also feeling generalized weak and fatigued, patient states that his symptoms got worse today so he decided to come to ER. Patient also complained of multiple falls in the last couple of days, denied any blurry vision or double vision, denied any headache. In the ER patient was very unsteady on his feet, patient had head CT which showed only mild inferior cerebellar stroke, blood pressure was in 200s. Labs were unremarkable      INTERVAL HISTORY :   Patient seen and examined, no overnight issues reported, patient was sitting on the chair, was eating lunch on my encounter, vitals have been stable    PAST MEDICAL HISTORY:        Diagnosis Date    Hypertension        PAST SURGICAL HISTORY:        Procedure Laterality Date    BACK SURGERY         MEDICATION PRIOR TO ADMISSION:  Medications Prior to Admission: cloNIDine (CATAPRES) 0.1 MG tablet, TAKE 1 TABLET BY MOUTH TWICE A DAY  naproxen sodium (ALEVE) 220 MG tablet, Take 220 mg by mouth 2 times daily (with meals)  amLODIPine (NORVASC) 10 MG tablet, Take 1 tablet by mouth daily  neomycin-bacitracin-polymyxin (NEOSPORIN) 400-5-5000 ointment, Apply topically 2 times daily. Allergies:  Patient has no known allergies. SOCIAL HISTORY:   Denies tobacco or alcohol use     FAMILY HISTORY:   History reviewed. No pertinent family history.     REVIEW OF SYSTEMS:  As stated in hpi , rest of ros negative     PHYSICAL EXAM:  /67   Pulse 90   Temp 97.3 °F (36.3 °C) (Oral)   Resp 16   Ht 5' 8\" (1.727 m)   Wt 179 lb (81.2 kg)   SpO2 100%   BMI 27.22 kg/m²   Physical Exam  Constitutional:       Appearance: Normal appearance. HENT:      Head: Normocephalic. Mouth/Throat:      Mouth: Mucous membranes are moist.   Eyes:      Extraocular Movements: Extraocular movements intact. Pupils: Pupils are equal, round, and reactive to light. Cardiovascular:      Rate and Rhythm: Normal rate and regular rhythm. Pulses: Normal pulses. Pulmonary:      Effort: Pulmonary effort is normal.   Abdominal:      General: Abdomen is flat. Palpations: Abdomen is soft. Musculoskeletal:         General: No swelling or tenderness. Normal range of motion. Cervical back: Normal range of motion. Skin:     General: Skin is warm. Capillary Refill: Capillary refill takes less than 2 seconds. Neurological:      General: No focal deficit present. Mental Status: He is alert and oriented to person, place, and time. ASSESSMENT/PLAN:  1. Cerebellar stroke  2. Hypertensive emergency  3. MR cervical showed severe spinal stenosis    Plan  Consult neurosurgery for severe spinal canal stenosis  Started on aspirin Lipitor,  HbA1c 5.3, lipid panel reviewed,  Neurology has been consulted appreciate their input  MRI showed acute cerebellar stroke  Carotid Doppler showed 50 to 69% stenosis in right ICA and <50 in left ica   Echo showed ejection fraction of 98% with diastolic dysfunction  DVT prophylaxis  PT OT  Blood pressure has been stable, continue body pain and lisinopril.       Ian West MD,

## 2021-09-13 ENCOUNTER — APPOINTMENT (OUTPATIENT)
Dept: GENERAL RADIOLOGY | Age: 76
DRG: 065 | End: 2021-09-13
Payer: MEDICARE

## 2021-09-13 ENCOUNTER — HOSPITAL ENCOUNTER (INPATIENT)
Age: 76
LOS: 15 days | Discharge: NURSING, REHAB OR LONG-TERM CARE | DRG: 057 | End: 2021-09-28
Attending: PHYSICAL MEDICINE & REHABILITATION | Admitting: PHYSICAL MEDICINE & REHABILITATION
Payer: MEDICARE

## 2021-09-13 VITALS
OXYGEN SATURATION: 100 % | BODY MASS INDEX: 27.13 KG/M2 | DIASTOLIC BLOOD PRESSURE: 83 MMHG | TEMPERATURE: 97.3 F | SYSTOLIC BLOOD PRESSURE: 132 MMHG | WEIGHT: 179 LBS | RESPIRATION RATE: 18 BRPM | HEIGHT: 68 IN | HEART RATE: 87 BPM

## 2021-09-13 PROBLEM — R26.9 ABNORMALITY OF GAIT FOLLOWING CEREBROVASCULAR ACCIDENT (CVA): Status: ACTIVE | Noted: 2021-09-13

## 2021-09-13 PROBLEM — N28.9 RENAL DISEASE: Status: ACTIVE | Noted: 2021-09-13

## 2021-09-13 PROBLEM — I69.398 ABNORMALITY OF GAIT FOLLOWING CEREBROVASCULAR ACCIDENT (CVA): Status: ACTIVE | Noted: 2021-09-13

## 2021-09-13 PROBLEM — M48.02 MYELOPATHY CONCURRENT WITH AND DUE TO SPINAL STENOSIS OF CERVICAL REGION (HCC): Status: ACTIVE | Noted: 2021-09-13

## 2021-09-13 PROBLEM — M48.062 SPINAL STENOSIS OF LUMBAR REGION WITH NEUROGENIC CLAUDICATION: Status: ACTIVE | Noted: 2021-09-13

## 2021-09-13 PROBLEM — G99.2 MYELOPATHY CONCURRENT WITH AND DUE TO SPINAL STENOSIS OF CERVICAL REGION (HCC): Status: ACTIVE | Noted: 2021-09-13

## 2021-09-13 PROBLEM — R26.0 ATAXIC GAIT: Status: ACTIVE | Noted: 2021-09-13

## 2021-09-13 PROBLEM — R26.9 GAIT ABNORMALITY: Status: ACTIVE | Noted: 2021-09-13

## 2021-09-13 PROBLEM — R31.9 HEMATURIA: Status: ACTIVE | Noted: 2021-09-13

## 2021-09-13 PROBLEM — I63.441 CEREBRAL INFARCTION DUE TO EMBOLISM OF RIGHT CEREBELLAR ARTERY (HCC): Status: ACTIVE | Noted: 2021-09-13

## 2021-09-13 LAB
ANION GAP SERPL CALCULATED.3IONS-SCNC: 12 MEQ/L (ref 9–15)
BASOPHILS ABSOLUTE: 0 K/UL (ref 0–0.2)
BASOPHILS RELATIVE PERCENT: 0.7 %
BUN BLDV-MCNC: 35 MG/DL (ref 8–23)
CALCIUM SERPL-MCNC: 9 MG/DL (ref 8.5–9.9)
CHLORIDE BLD-SCNC: 107 MEQ/L (ref 95–107)
CO2: 23 MEQ/L (ref 20–31)
CREAT SERPL-MCNC: 1.25 MG/DL (ref 0.7–1.2)
EKG ATRIAL RATE: 74 BPM
EKG P AXIS: 70 DEGREES
EKG P-R INTERVAL: 172 MS
EKG Q-T INTERVAL: 402 MS
EKG QRS DURATION: 80 MS
EKG QTC CALCULATION (BAZETT): 446 MS
EKG R AXIS: 43 DEGREES
EKG T AXIS: 52 DEGREES
EKG VENTRICULAR RATE: 74 BPM
EOSINOPHILS ABSOLUTE: 0.2 K/UL (ref 0–0.7)
EOSINOPHILS RELATIVE PERCENT: 3.1 %
GFR AFRICAN AMERICAN: >60
GFR NON-AFRICAN AMERICAN: 56.3
GLUCOSE BLD-MCNC: 97 MG/DL (ref 70–99)
HCT VFR BLD CALC: 47.3 % (ref 42–52)
HEMOGLOBIN: 16.6 G/DL (ref 14–18)
LYMPHOCYTES ABSOLUTE: 1.6 K/UL (ref 1–4.8)
LYMPHOCYTES RELATIVE PERCENT: 29.9 %
MCH RBC QN AUTO: 31.9 PG (ref 27–31.3)
MCHC RBC AUTO-ENTMCNC: 35 % (ref 33–37)
MCV RBC AUTO: 91.1 FL (ref 80–100)
MONOCYTES ABSOLUTE: 0.6 K/UL (ref 0.2–0.8)
MONOCYTES RELATIVE PERCENT: 10.8 %
NEUTROPHILS ABSOLUTE: 3 K/UL (ref 1.4–6.5)
NEUTROPHILS RELATIVE PERCENT: 55.5 %
PDW BLD-RTO: 13 % (ref 11.5–14.5)
PLATELET # BLD: 224 K/UL (ref 130–400)
POTASSIUM SERPL-SCNC: 4.1 MEQ/L (ref 3.4–4.9)
RBC # BLD: 5.19 M/UL (ref 4.7–6.1)
SARS-COV-2, NAAT: NOT DETECTED
SODIUM BLD-SCNC: 142 MEQ/L (ref 135–144)
WBC # BLD: 5.4 K/UL (ref 4.8–10.8)

## 2021-09-13 PROCEDURE — 92611 MOTION FLUOROSCOPY/SWALLOW: CPT

## 2021-09-13 PROCEDURE — 6360000002 HC RX W HCPCS: Performed by: INTERNAL MEDICINE

## 2021-09-13 PROCEDURE — 99233 SBSQ HOSP IP/OBS HIGH 50: CPT | Performed by: PSYCHIATRY & NEUROLOGY

## 2021-09-13 PROCEDURE — APPSS30 APP SPLIT SHARED TIME 16-30 MINUTES: Performed by: NURSE PRACTITIONER

## 2021-09-13 PROCEDURE — 2580000003 HC RX 258: Performed by: INTERNAL MEDICINE

## 2021-09-13 PROCEDURE — 74230 X-RAY XM SWLNG FUNCJ C+: CPT

## 2021-09-13 PROCEDURE — 93010 ELECTROCARDIOGRAM REPORT: CPT | Performed by: INTERNAL MEDICINE

## 2021-09-13 PROCEDURE — 6370000000 HC RX 637 (ALT 250 FOR IP): Performed by: INTERNAL MEDICINE

## 2021-09-13 PROCEDURE — 99223 1ST HOSP IP/OBS HIGH 75: CPT | Performed by: NEUROLOGICAL SURGERY

## 2021-09-13 PROCEDURE — 85025 COMPLETE CBC W/AUTO DIFF WBC: CPT

## 2021-09-13 PROCEDURE — 80048 BASIC METABOLIC PNL TOTAL CA: CPT

## 2021-09-13 PROCEDURE — 36415 COLL VENOUS BLD VENIPUNCTURE: CPT

## 2021-09-13 PROCEDURE — 99232 SBSQ HOSP IP/OBS MODERATE 35: CPT | Performed by: PHYSICAL MEDICINE & REHABILITATION

## 2021-09-13 PROCEDURE — 1180000000 HC REHAB R&B

## 2021-09-13 PROCEDURE — 87635 SARS-COV-2 COVID-19 AMP PRB: CPT

## 2021-09-13 PROCEDURE — 2500000003 HC RX 250 WO HCPCS

## 2021-09-13 RX ORDER — POLYETHYLENE GLYCOL 3350 17 G/17G
17 POWDER, FOR SOLUTION ORAL DAILY PRN
Status: DISCONTINUED | OUTPATIENT
Start: 2021-09-13 | End: 2021-09-28 | Stop reason: HOSPADM

## 2021-09-13 RX ORDER — ACETAMINOPHEN 650 MG/1
650 SUPPOSITORY RECTAL EVERY 6 HOURS PRN
Status: CANCELLED | OUTPATIENT
Start: 2021-09-13

## 2021-09-13 RX ORDER — ASPIRIN 81 MG/1
81 TABLET, CHEWABLE ORAL DAILY
Status: CANCELLED | OUTPATIENT
Start: 2021-09-14

## 2021-09-13 RX ORDER — AMLODIPINE BESYLATE 10 MG/1
10 TABLET ORAL DAILY
Status: CANCELLED | OUTPATIENT
Start: 2021-09-14

## 2021-09-13 RX ORDER — ACETAMINOPHEN 325 MG/1
650 TABLET ORAL EVERY 6 HOURS PRN
Status: DISCONTINUED | OUTPATIENT
Start: 2021-09-13 | End: 2021-09-28 | Stop reason: HOSPADM

## 2021-09-13 RX ORDER — LISINOPRIL 10 MG/1
10 TABLET ORAL DAILY
Status: CANCELLED | OUTPATIENT
Start: 2021-09-14

## 2021-09-13 RX ORDER — ATORVASTATIN CALCIUM 80 MG/1
80 TABLET, FILM COATED ORAL NIGHTLY
Status: DISCONTINUED | OUTPATIENT
Start: 2021-09-13 | End: 2021-09-28 | Stop reason: HOSPADM

## 2021-09-13 RX ORDER — ACETAMINOPHEN 325 MG/1
650 TABLET ORAL EVERY 6 HOURS PRN
Status: CANCELLED | OUTPATIENT
Start: 2021-09-13

## 2021-09-13 RX ORDER — POLYETHYLENE GLYCOL 3350 17 G/17G
17 POWDER, FOR SOLUTION ORAL DAILY PRN
Status: CANCELLED | OUTPATIENT
Start: 2021-09-13

## 2021-09-13 RX ORDER — ATORVASTATIN CALCIUM 80 MG/1
80 TABLET, FILM COATED ORAL NIGHTLY
Status: CANCELLED | OUTPATIENT
Start: 2021-09-13

## 2021-09-13 RX ORDER — LISINOPRIL 10 MG/1
10 TABLET ORAL DAILY
Status: DISCONTINUED | OUTPATIENT
Start: 2021-09-14 | End: 2021-09-17

## 2021-09-13 RX ORDER — ASPIRIN 81 MG/1
81 TABLET, CHEWABLE ORAL DAILY
Status: DISCONTINUED | OUTPATIENT
Start: 2021-09-14 | End: 2021-09-28 | Stop reason: HOSPADM

## 2021-09-13 RX ORDER — ACETAMINOPHEN 650 MG/1
650 SUPPOSITORY RECTAL EVERY 6 HOURS PRN
Status: DISCONTINUED | OUTPATIENT
Start: 2021-09-13 | End: 2021-09-28 | Stop reason: HOSPADM

## 2021-09-13 RX ORDER — AMLODIPINE BESYLATE 10 MG/1
10 TABLET ORAL DAILY
Status: DISCONTINUED | OUTPATIENT
Start: 2021-09-14 | End: 2021-09-28 | Stop reason: HOSPADM

## 2021-09-13 RX ADMIN — SODIUM CHLORIDE, PRESERVATIVE FREE 10 ML: 5 INJECTION INTRAVENOUS at 10:37

## 2021-09-13 RX ADMIN — ENOXAPARIN SODIUM 40 MG: 40 INJECTION SUBCUTANEOUS at 10:37

## 2021-09-13 RX ADMIN — ASPIRIN 81 MG: 81 TABLET, CHEWABLE ORAL at 10:37

## 2021-09-13 RX ADMIN — BARIUM SULFATE 80 ML: 400 SUSPENSION ORAL at 13:24

## 2021-09-13 RX ADMIN — ATORVASTATIN CALCIUM 80 MG: 80 TABLET, FILM COATED ORAL at 20:12

## 2021-09-13 RX ADMIN — AMLODIPINE BESYLATE 10 MG: 10 TABLET ORAL at 10:37

## 2021-09-13 RX ADMIN — BARIUM SULFATE 50 G: 0.81 POWDER, FOR SUSPENSION ORAL at 13:24

## 2021-09-13 RX ADMIN — LISINOPRIL 10 MG: 10 TABLET ORAL at 10:37

## 2021-09-13 ASSESSMENT — PAIN SCALES - GENERAL
PAINLEVEL_OUTOF10: 0
PAINLEVEL_OUTOF10: 0

## 2021-09-13 ASSESSMENT — ENCOUNTER SYMPTOMS
NAUSEA: 0
VOMITING: 0
CHOKING: 0
SHORTNESS OF BREATH: 0
COLOR CHANGE: 0
BACK PAIN: 1
TROUBLE SWALLOWING: 0

## 2021-09-13 NOTE — CARE COORDINATION
Inpatient Rehab referral received. Met with patient and explained [facility] Acute Inpatient Rehab program, all questions answered. Freedom of choice provided and patient requests admit to [facility]. PM&R consult completed and recommending Acute Rehab. Neurosurgery consult completed, no intervention at this time. MBS completed, Mild oral dysphagia and mild-moderate pharyngeal dyshagia with DIET RECOMMENDATIONS: Soft and bite size with mildly thick (nectar) liquids. Patient can transfer to room 233 pending medical clearance and negative covid. Jermaine Avalos RN aware.  Electronically signed by Osmani Ibanez RN on 9/13/21 at 3:37 PM EDT

## 2021-09-13 NOTE — FLOWSHEET NOTE
Patient assessment complete, awake and alert; oriented to person, place, and time, patient denies pain, denies n/v, denies cp & sob, call light in reach, bed in lowest position, bed alarm engaged, will continue to monitor

## 2021-09-13 NOTE — DISCHARGE INSTR - COC
Continuity of Care Form    Patient Name: Triny Becerril   :    MRN:  [id number]    [address] [place] [address] date:  9/10/2021  Discharge date:  ***    Code Status Order: Full Code   Advance Directives:     Admitting Physician:  Dawood Parker MD  PCP: Fredy Isaacs MD    Discharging Nurse: [facility] Unit/Room#: K819/Y559-02  Discharging Unit Phone Number: ***    Emergency Contact:   Extended Emergency Contact Information  Primary Emergency Contact: Trav [address] [place] [address] Phone: [phone]  Relation: Brother/Sister    Past Surgical History:  Past Surgical History:   Procedure Laterality Date    BACK SURGERY         Immunization History:   Immunization History   Administered Date(s) Administered    Tdap (Boostrix, Adacel) 2019       Active Problems:  Patient Active Problem List   Diagnosis Code    Shoulder dislocation S43.006A    Cellulitis L03.90    Cellulitis of left hand L03.114    Stroke determined by clinical assessment ([facility] [address] I63.9    Gait abnormality R26.9    Renal disease N28.9    Hematuria R31.9       Isolation/Infection:   Isolation          No Isolation        Patient Infection Status     Infection Onset Added Last Indicated Last Indicated By Review Planned Expiration Resolved Resolved By    None active    Resolved    COVID-19 Rule Out 09/10/21 09/10/21 09/10/21 COVID-19, Rapid (Ordered)   09/10/21 Rule-Out Test Resulted          Nurse Assessment:  Last Vital Signs: /76   Pulse 88   Temp 98.1 °F (36.7 °C)   Resp 18   Ht 5' 8\" (1.727 m)   Wt 179 lb (81.2 kg)   SpO2 100%   BMI 27.22 kg/m²     Last documented pain score (0-10 scale): Pain Level: 0  Last Weight:   Wt Readings from Last 1 Encounters:   09/10/21 179 lb (81.2 kg)     Mental Status:  {IP PT MENTAL STATUS:42323}    IV Access:  [facility] IV ACCESS:111376804}    Nursing Mobility/ADLs:  Walking   {CHP DME KOWQ:338034912}  Transfer  {CHP DME FUFO:042241502}  Bathing  {P DME AEBK:270724316}  Dressing  {CHP DME GINL:698126001}  Toileting  {CHP DME BSJY:970395136}  Feeding  {P DME PUZH:922879863}  Med Admin  {CHP DME OASZ:050951466}  Med Delivery   { GABE MED Delivery:837359073}    Wound Care Documentation and Therapy:        Elimination:  Continence:   · Bowel: {YES / RS:28968}  · Bladder: {YES / SY:63728}  Urinary Catheter: {Urinary Catheter:046914372}   Colostomy/Ileostomy/Ileal Conduit: {YES / UK:67299}       Date of Last BM: ***    Intake/Output Summary (Last 24 hours) at 2021 1127  Last data filed at [address]  Gross per 24 hour   Intake 240 ml   Output --   Net 240 ml     I/O last 3 completed shifts: In: 250 [P.O.:240;  I.V.:10]  Out: -     Safety Concerns:     [address] [employer] Safety Concerns:616202408}    Impairments/Disabilities:      {[facility] Impairments/Disabilities:996479695}    Nutrition Therapy:  Current Nutrition Therapy:   [address] [employer] Diet List:133051631}    Routes of Feeding: {[facility] DME Other Feedings:997286652}  Liquids: {Slp liquid thickness:71568}  Daily Fluid Restriction: {P DME Yes amt example:640630638}  Last Modified Barium Swallow with Video (Video Swallowing Test): {Done Not Done NNPB:183619017}    Treatments at the Time of Hospital Discharge:   Respiratory Treatments: ***  Oxygen Therapy:  {Therapy; copd oxygen:34487}  Ventilator:    {[facility] Vent IXPD:861044598}    Rehab Therapies: {THERAPEUTIC INTERVENTION:0358385908}  Weight Bearing Status/Restrictions: [address] [employer] Weight Bearin}  Other Medical Equipment (for information only, NOT a DME order):  {EQUIPMENT:234804851}  Other Treatments: ***    Patient's personal belongings (please select all that are sent with patient):  {[facility] DME Belongings:415781559}    RN SIGNATURE:  {Esignature:649309081}    CASE MANAGEMENT/SOCIAL WORK SECTION    Inpatient Status Date: 09/10/21    Readmission Risk Assessment Score:  Readmission Risk              Risk of Unplanned Readmission:  8           Discharging to Facility/ Agency · Name: [facility] acute rehab  · Address:  · Phone:  · Fax:    Dialysis Facility (if applicable)   · Name:  · Address:  · Dialysis Schedule:  · Phone:  · Fax:    / signature: Electronically signed by SANJAY Marcano on 21 at 11:28 AM EDT    PHYSICIAN SECTION    Prognosis: {Prognosis:3810983991}    Condition at Discharge: [facility] Patient Condition:411838861}    Rehab Potential (if transferring to Rehab): {Prognosis:7127698780}    Recommended Labs or Other Treatments After Discharge: ***    Physician Certification: I certify the above information and transfer of Mosotho Fetch  is necessary for the continuing treatment of the diagnosis listed and that he requires {Admit to Appropriate Level of Care:32398} for {GREATER/LESS:004279958} 30 days.      Update Admission H&P: {CHP DME Changes in LGLL}    PHYSICIAN SIGNATURE:  {Esignature:341192147}

## 2021-09-13 NOTE — PROGRESS NOTES
Subjective: The patient complains of severe acute on chronic progressive fatigue and right sided weakness partially relieved by rest, PT, OT and meds and exacerbated by exertion and recent illness. I am concerned about patients medical complexities-Left small centrum semioval stroke , with right-sided findings. Reviewed recent nursing note and discussed current status and planned care with acute care providers, \"   Vital signs obtained. AM assessment completed. Patient laying in bed. Patient denies any c/o pain or nausea at this time. Neuro assessment completed. Patient c/o numbness to both left and right leg and right arm. Noted that patient still has difficulty with right leg when ambulating and tends to drag it when walking. Patients speech is slurred and delayed at times. Patient also has word finding difficulty at times. 1133: Patient medicated with ativan 1mg prior to MRI. Patient going down to MRI now via cart. 1432: Patient lethargic after returning from MRI. Patient placed in bed with bed alarm engaged. Vital signs obtained. 1745: Patient becoming increasingly impulsive and attempting to get out of bed. Patient still lethargic and gait is very unsteady. Patient made aware at this time he is too unsteady to ambulate to bathroom and needs to use urinal or bedside commode for his safely. Attempted to get an echoecho telesitter for safety, but was informed none were available at this time. 1930: Patient attempting to get out of bed. Patient appears very agitated at this time and yelling at the nurses \". ROS x10: The patient also complains of severely impaired mobility and activities of daily living. Otherwise no new problems with vision, hearing, nose, mouth, throat, dermal, cardiovascular, GI, , pulmonary, musculoskeletal, psychiatric or neurological. See Rehab consult on Rehab chart .        Vital signs:  /76   Pulse 88   Temp 98.1 °F (36.7 °C)   Resp 18   Ht 5' 8\" (1.727 m)   Wt 179 lb (81.2 kg)   SpO2 100%   BMI 27.22 kg/m²   I/O:   PO/Intake:    fair PO intake,       Bowel/Bladder:  incontinent,    General:  Patient is well developed, adequately nourished, non-obese and     well kempt. HEENT:    PERRLA, hearing intact to loud voice, external inspection of ear     and nose benign. Inspection of lips, tongue and gums benign  Musculoskeletal: No significant change in strength or tone. All joints stable. Inspection and palpation of digits and nails show no clubbing,       cyanosis or inflammatory conditions. Neuro/Psychiatric: Affect: flat-  Alert and oriented to self and     Situation with min cues. No significant change in deep tendon reflexes or     Sensation-right sided weakness  Lungs:  Diminished, CTA-B. Respiration effort is normal at rest.  LEE  Heart:   S1 = S2,   RRR. No loud murmurs. Abdomen:  Soft, non-tender, no enlargement of liver or spleen. Extremities:  No significant lower extremity edema or tenderness. Skin:    BUE bruises dt blood draws, no visualized or palpated problems. Rehabilitation:  Physical therapy: FIMS:  Bed Mobility: Scooting: Stand by assistance    Transfers: Sit to Stand: Contact guard assistance  Stand to sit: Contact guard assistance, Ambulation 1  Surface: level tile  Device: Rolling Walker  Assistance: Minimal assistance  Quality of Gait: Rt toe out with decreased overall foot clearance, inconsistent step length with downward gaze  Gait Deviations: Shuffles  Distance: 30ft  Comments: Pt with decreased safety awareness and poor awareness deficits, inability to compensate despite VC.,      FIMS:  ,  , Assessment: Initiated balance and exercises to improve strength and stability. Pt demonstrates decreased safety awareness throughout transfers, VCs for safe hand placements and approach to chair. VCs to increase Rt foot clearance with able to correct but difficulty maintaining.     Occupational therapy: FIMS:   ,  , Assessment: Pt is  a 77 y.o. admitted for CVA. Pt with above deficits impairing ability to safety complete ADL's at reported baseline. Pt may benefit from OT services to address deficits and maximize level of safety and function during ADL's. Speech therapy: FIMS:      SPEECH THERAPY  Motor Speech: Within Functional Limits  Comprehension: Exceptions  Verbal Expression: Exceptions to functional limits      Diet/Swallow:  Diet Solids Recommendation: Regular  Liquid Consistency Recommendation: Mildly Thick (Nectar)  Dysphagia Outcome Severity Scale: Level 5: Mild dysphagia- Distant supervision.  May need one diet consistency restricted    Compensatory Swallowing Strategies: Upright as possible for all oral intake, Small bites/sips, Eat/Feed slowly  Therapeutic Interventions: Diet tolerance monitoring, Therapeutic PO trials with SLP, Pharyngeal exercises, Laryngeal exercises, Patient/Family education          COGNITION  OT:    SP:Memory: Exceptions to [facility]  Problem Solving: Unable to assess       Lab/X-ray studies reviewed, analyzed and discussed with patient and staff:   Recent Results (from the past 24 hour(s))   Urinalysis    Collection Time: 09/12/21  8:32 PM   Result Value Ref Range    Color, UA DARK YELLOW (A) Straw/Yellow    Clarity, UA CLOUDY (A) Clear    Glucose, Ur Negative Negative mg/dL    Bilirubin Urine Negative Negative    Ketones, Urine TRACE (A) Negative mg/dL    Specific Gravity, UA 1.021 1.005 - 1.030    Blood, Urine LARGE (A) Negative    pH, UA 5.0 5.0 - 9.0    Protein, UA 30 (A) Negative mg/dL    Urobilinogen, Urine 0.2 <2.0 E.U./dL    Nitrite, Urine Negative Negative    Leukocyte Esterase, Urine TRACE (A) Negative   Microscopic Urinalysis    Collection Time: 09/12/21  8:32 PM   Result Value Ref Range    RBC, UA 20-50 (A) 0 - 2 /HPF    Bacteria, UA Negative Negative /HPF    Hyaline Casts, UA 1-3 0 - 5 /HPF    WBC, UA 0-2 0 - 5 /HPF    Epithelial Cells, UA 0-2 0 - 5 /HPF   CBC Auto Differential    Collection Time: 09/13/21  6:29 AM   Result Value Ref Range    WBC 5.4 4.8 - 10.8 K/uL    RBC 5.19 4.70 - 6.10 M/uL    Hemoglobin 16.6 14.0 - 18.0 g/dL    Hematocrit 47.3 42.0 - 52.0 %    MCV 91.1 80.0 - 100.0 fL    MCH 31.9 (H) 27.0 - 31.3 pg    MCHC 35.0 33.0 - 37.0 %    RDW 13.0 11.5 - 14.5 %    Platelets 034 033 - 995 K/uL    Neutrophils % 55.5 %    Lymphocytes % 29.9 %    Monocytes % 10.8 %    Eosinophils % 3.1 %    Basophils % 0.7 %    Neutrophils Absolute 3.0 1.4 - 6.5 K/uL    Lymphocytes Absolute 1.6 1.0 - 4.8 K/uL    Monocytes Absolute 0.6 0.2 - 0.8 K/uL    Eosinophils Absolute 0.2 0.0 - 0.7 K/uL    Basophils Absolute 0.0 0.0 - 0.2 K/uL   Basic Metabolic Panel    Collection Time: 09/13/21  6:29 AM   Result Value Ref Range    Sodium 142 135 - 144 mEq/L    Potassium 4.1 3.4 - 4.9 mEq/L    Chloride 107 95 - 107 mEq/L    CO2 23 20 - 31 mEq/L    Anion Gap 12 9 - 15 mEq/L    Glucose 97 70 - 99 mg/dL    BUN 35 (H) 8 - 23 mg/dL    CREATININE 1.25 (H) 0.70 - 1.20 mg/dL    GFR Non-African American 56.3 (L) >60    GFR  >60.0 >60    Calcium 9.0 8.5 - 9.9 mg/dL       Previous extensive, complex labs, notes and diagnostics reviewed and analyzed. ALLERGIES:    Allergies as of 09/10/2021    (No Known Allergies)      (please also verify by checking [facility])     Complex Physical Medicine & Rehab Issues Assess & Plan:   1. Severe abnormality of gait and mobility and impaired self-care and ADL's secondary to acute Left small centrum semioval stroke , with right-sided findings . Functional and medical status reassessed regarding patients ability to participate in therapies and patient found to be able to participate in  acute intensive comprehensive inpatient rehabilitation program including PT/OT to improve balance, ambulation, ADLs, and to improve the P/AROM. It is my opinion that they will be able to tolerate 3 hours of therapy a day and benefit from it at an acute level.   2. Bowel constipation and Bladder

## 2021-09-13 NOTE — DISCHARGE SUMMARY
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 *      Hospital Course:   Emile King is a 77 y.o. male that was admitted and treated at [facility] for the following medical issues:     Dizziness and weakness  - acute CVA of the left centrum semiovale and bilateral chronic cerebellar infarcts  - MRI of the spine showed  severe canal stenosis at L2-3, L3-4 and L4-5 per report  - neurosurgery recommended conservative therapy  - continue ASA and statin therapy  - continue PT/OT  - followed by neurology    HTN  - on amlodipine and lisinopril  - monitor BP    Elevated BUN/Cr  - possibly related to lisinopril   - monitor renal function closely  - avoid nephrotoxic agents      Diet: ADULT DIET;  Regular; Mildly Thick (Nectar)    Code Status: Full Code      Disposition - acute rehab    Patient was seen by the following consultants while admitted to [facility]:   Consults:  [address] TO REHAB/TCU ADMISSION COORDINATOR  IP CONSULT TO NEUROSURGERY  IP CONSULT TO NEUROLOGY    Significant Diagnostic Studies:    Echocardiogram complete 2D with doppler with color    Result Date: 2021  Transthoracic Echocardiography Report (TTE)  Demographics   Patient Name    Sunni Cassidy Gender               Male   Patient Number  [id number]       Race                 Unknown Ethnicity   Visit Number    [id number]      Room Number          K792   Corporate ID                   Date of Study        09/11/2021   Accession       [id number]     Referring Physician  Number   Date of Birth   1945     Sonographer          Olivia Kaylen   Age             77 year(s)     Interpreting         [facility])                                 Physician            Cardiology                                                      [address] [place] [address]  Procedure Type of Study   TTE procedure:ECHOCARDIOGRAM COMPLETE WITH BUBBLE STUDY. Procedure Date Date: 09/11/2021 Start: 08:15 AM Study Location: Portable Technical Quality: Adequate visualization Indications:Stroke. Patient Status: Routine Height: 68 inches Weight: 179 pounds BSA: 1.95 m^2 BMI: 27.22 kg/m^2 BP: 147/86 mmHg  Conclusions   Summary  Moderate annular calcification. Normal aortic valve structure and function. Trivial aortic regurgitation is  noted. Moderately dilated left atrium. Normal intact intra-atrial septum was noted with no evidence of  significant intra-atrial communications neither by colour flow Doppler  imaging nor by aggitated saline study. Left ventricular ejection fraction is visually estimated at 60%. Impaired relaxation compatible with diastolic dysfunction. ( reversed E/A  ratio)  Moderate concentric left ventricular hypertrophy. Signature   ----------------------------------------------------------------  Electronically signed by Karilyn Pallas Ismail(Interpreting physician)  on 09/11/2021 11:17 AM  ----------------------------------------------------------------   Findings  Left Ventricle Left ventricular ejection fraction is visually estimated at 60%. Impaired relaxation compatible with diastolic dysfunction. ( reversed E/A ratio) Moderate concentric left ventricular hypertrophy. Right Ventricle Normal right ventricle structure and function. Normal right ventricle systolic pressure.  Left Atrium Moderately dilated left atrium. Normal intact intra-atrial septum was noted with no evidence of significant intra-atrial communications neither by colour flow Doppler imaging nor by aggitated saline study. Right Atrium Normal right atrium. Mitral Valve Moderate annular calcification. Tricuspid Valve Normal tricuspid valve structure and function. Aortic Valve Normal aortic valve structure and function. Trivial aortic regurgitation is noted. Pulmonic Valve Normal pulmonic valve structure and function. Pericardial Effusion No evidence of pericardial effusion. Pleural Effusion No evidence of pleural effusion. Aorta \ Miscellaneous Miscellaneous normal findings were found. M-Mode Measurements (cm)   LVIDd: 4.6 cm                          LVIDs: 3.01 cm  IVSd: 1.98 cm                          IVSs: 2.14 cm  LVPWd: 1.08 cm                         AO Root Dimension: 3.04 cm  Rt. Vent.  Dimension: 3.05 cm                                         LVOT: 1.72 cm  Doppler Measurements:   AV Velocity:0.02 m/s                   MV Peak E-Wave: 1.25 m/s  AV Peak Gradient: 15.73 mmHg           MV Peak A-Wave: 1.54 m/s  AV Mean Gradient: 8.65 mmHg  AV Area (Continuity):1.66 cm^2         MV P1/2t: 103 msec  TR Velocity:2.47 m/s                   MVA by PHT2.14 cm^2  TR Gradient:24.4 mmHg                  Estimated RAP:10 mmHg                                         RVSP:34.4 mmHg  Valves  Mitral Valve   Peak E-Wave: 1.25 m/s                 Peak A-Wave: 1.54 m/s  P1/2t: 103 msec                       E/A Ratio: 0.81  Mean Velocity: 1.01 m/s               Peak Gradient: 6.28 mmHg  Mean Gradient: 4.78 mmHg              Deceleration Time: 407.7 msec  Area (PHT): 2.14 cm^2                 Area (continuity): 1.44 cm^2   Tissue Doppler   E' Septal Velocity: 0.07 m/s  E' Lateral Velocity: 0.06 m/s   Aortic Valve   Peak Velocity: 1.98 m/s                Mean Velocity: 1.39 m/s  Peak Gradient: 15.73 mmHg              Mean Gradient: 8.65 mmHg  Area (continuity): 1.66 cm^2  AV VTI: 46.12 cm   Tricuspid Valve   Estimated RVSP: 34.4 mmHg               Estimated RAP: 10 mmHg  TR Velocity: 2.47 m/s                   TR Gradient: 24.4 mmHg   Pulmonic Valve   Peak Velocity: 0.95 m/s            Peak Gradient: 3.64 mmHg                                     Estimated PASP: 34.4 mmHg   LVOT   Peak Velocity: 1.51 m/s              Mean Velocity: 1.09 m/s  Peak Gradient: 9.17 mmHg             Mean Gradient: 5.32 mmHg  LVOT Diameter: 1.72 cm               LVOT VTI: 32.89 cm  Structures  Left Atrium   LA Volume/Index: 47.11 ml /24 m^2             LA Area: 16.59 cm^2   Left Ventricle   Diastolic Dimension: 4.6 cm          Systolic Dimension: 6.22 cm  Septum Diastolic: 6.69 cm            Septum Systolic: 3.02 cm  PW Diastolic: 4.93 cm                                       FS: 34.6 %  LV EDV/LV EDV Index: 97.45 ml/50 m^2 LV ESV/LV ESV Index: 35.36 ml/18 m^2  EF Calculated: 63.7 %                LV Length: 7.34 cm   LVOT Diameter: 1.72 cm   Right Atrium   RA Systolic Pressure: 10 mmHg   Right Ventricle   Diastolic Dimension: 4.36 cm                                     RV Systolic Pressure: 55.0 mmHg  Aorta/ Miscellaneous Aorta   Aortic Root: 3.04 cm  LVOT Diameter: 1.72 cm      CT HEAD WO CONTRAST    Result Date: 9/10/2021  EXAMINATION: CT HEAD WO CONTRAST CLINICAL HISTORY: generalized weakness COMPARISON:  NONE AVAILABLE An unenhanced scan is performed. FINDINGS:   There is no bleed, mass effect, or space occupying lesion. No extra-axial mass or fluid collections. Hypoattenuated White matter changes in the cerebral hemispheres noted. There is global atrophy. Findings likely reflect chronic small vessel vasculopathy. There is a vague area of low attenuation in the inferior posterior right cerebellar hemisphere with slight obliteration of adjacent sulci. An evolving nonhemorrhagic infarct in the cerebellum would have to be a consideration. Clinical correlation recommended.      NO BLEED, MASS EFFECT, OR EXTRA-AXIAL FLUID COLLECTION. EVOLVING NONHEMORRHAGIC INFARCT IN THE INFERIOR RIGHT CEREBELLAR HEMISPHERE. 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 All CT scans at this facility use dose modulation, iterative reconstruction, and/or weight based dosing when appropriate to reduce radiation dose to as low as reasonably achievable. XR CHEST PORTABLE    Result Date: 9/10/2021  Exam: XR CHEST PORTABLE History: Generalized weakness Technique: AP portable view of the chest obtained. Comparison: None available Findings: Atherosclerotic calcification of the thoracic aorta. The cardiomediastinal silhouette is within normal limits. No pneumothorax, pleural effusion, or consolidation. Question calcified pleural plaques on the left, which can be seen with asbestos related disease. No acute osseous abnormality. No radiographic evidence of acute intrathoracic process. Question left-sided pleural plaques which can be seen with asbestos related disease. MRI BRAIN WO CONTRAST    Result Date: 9/11/2021  EXAMINATION: MRI BRAIN WO CONTRAST CLINICAL HISTORY:  STROKE COMPARISONS: MR brain 4/5/2016 TECHNIQUE: Multiplanar multisequence images of the brain were obtained without contrast. Diffusion perfusion imaging was obtained. FINDINGS: There is a tiny focus of restricted diffusion in the left centrum semiovale (series 4 image 19). There are no extra-axial collections. There is no evidence of hemorrhage. The susceptibility images do not demonstrate evidence of hemosiderin deposition within the brain parenchyma or the leptomeninges. There are numerous patchy foci of T2/FLAIR hyperintensities in the subcortical and periventricular white matter in a symmetric distribution throughout both hemispheres. Chronic bilateral cerebellar infarcts. There is prominence of the sulci and ventricles consistent with moderate global cerebral atrophy and chronic involutional changes. No midline shift.  The visualized portions of the orbits are within normal limits, the globes are intact. The visualized portions of the paranasal sinuses are within normal limits. Right mastoid effusion. Small foci of restricted diffusion in the left centrum semiovale, consistent with acute ischemia. Chronic microvascular changes and bilateral chronic cerebellar infarcts. CRITICAL RESULTS: Communicated with Sammy Abdul RN on 9/11/2021 at 12:51 PM.    US CAROTID ARTERY BILATERAL    Result Date: 9/11/2021  EXAMINATION: US CAROTID ARTERY BILATERAL HISTORY:   stroke . R26.9 Gait abnormality ICD10 COMPARISON:None TECHNIQUE: Carotid duplex sonograms which include gray scale and color flow evaluation are complimented with spectral waveform analysis. Please refer to chart below for specific carotid velocity measurements. The degree of stenosis recorded on this exam uses the same method of stratification used in the NASCET trials. This complies with ACR practice guidelines and the Society of radiology in ultrasound consensus statement and provides adequate information for clinical decision making. Society of Radiologists in Ultrasound (SRU) consensus statement was used to estimate internal carotid artery stenosis. RESULT: Mild to moderate plaque in the carotid arteries bilaterally. No significant increase in systolic flow or turbulence to indicate any hemodynamically significant narrowing in the right or left internal carotid arteries. There is antegrade flow identified in both vertebral arteries.  ARTERIAL BLOOD FLOW VELOCITY RIGHT PEAK SYSTOLIC VELOCITIES (PSV)                                               Prox CCA    129 cm/s             Mid CCA     114 cm/s              Dist CCA    104 cm/s             Prox ICA    169 cm/s               Mid ICA     106 cm/s            Dist ICA    63 cm/s             Prox ECA     170   cm/s             Prox VERT   51 cm/s              ICA/CCA     1.48                        LEFT PEAK SYSTOLIC VELOCITIES (PSV)  Prox CCA 135 cm/s Mid CCA     166 cm/s Dist CCA    121 cm/s Prox ICA    119 cm/s Mid ICA     72 cm/s Dist ICA    61 cm/s Prox ECA    131 cm/s Prox VERT   56 cm/s ICA/CCA     0.72      50-69 % STENOSIS IN THE RIGHT ICA  <50 % STENOSIS IN THE LEFT ICA ANTEGRADE FLOW IN THE BILATERAL VERTEBRAL ARTERIES. Disposition:   Discharged to acute rehab. Any [facility] needs that were indicated and/or required as been addressed and set up by Social Work. Condition at discharge: Pt was medically stable at the time of discharge. Activity: activity as tolerated, fall precautions. Total time taken for discharging this patient: 40 minutes. Greater than 70% of time was spent focused exclusively on this patient. Time was taken to review chart, discuss plans with consultants, reconciling medications, discussing plan answering questions with patient. Signed:  Nain Armendariz MD  9/13/2021, 2:06 PM  ----------------------------------------------------------------------------------------------------------------------    Alecia Pereira,     Please return to ER or call 911 if you develop any significant signs or symptoms. I may not have addressed all of your medical illnesses or the abnormal blood work or imaging therefore please ask your PCP Kelley Brumfield MD and other out patient specialists and providers  to obtain [phone] [facility] entirely to follow up on all of the abnormal labs, imaging and findings that I have and have not addressed during your hospitalization. Discharging you from the hospital does not mean that your medical care ends here and now. You may still need additional work up, investigation, monitoring, and treatment to be handled from this point on by outside providers including your PCP, Kelley Brumfield MD , Specialists and other healthcare providers.       Please review your list of discharge medications prior to resuming medications you might still have at home, as the medications you need to be taking, dosages or how often you must take them may have changed. For medication questions, contact your retail pharmacy and your PCP, Minnie Peace MD .     ** I STRONGLY RECOMMEND that you follow up with Minnie Peace MD within 3 to 5 days for a post hospitalization evaluation. This specific office visit is covered by your insurance, and is not the same as your annual doctor visit/ check up. This office visit is important, as it may prevent need for repeat and/or future hospitalizations. **    Your medical team at [facility] ([facility]) appreciates the opportunity to work with you to get well!     Sincerely,  Erika Price MD

## 2021-09-13 NOTE — PROCEDURES
[facility]   Facility/Department: [address] NEURO  Speech Language Pathology  Modified Barium Swallow (MBS)/Videofluoroscopic Study of Swallowing    NAME:Shaggy Escalera  : 1945 (72 y.o.)   MRN: [id number]  ROOM: [place] [address]  ADMISSION DATE: 9/10/2021  PATIENT DIAGNOSIS(ES): Gait abnormality [R26.9]  General weakness [R53.1]  Stroke determined by clinical assessment ([facility] [I63.9]  Cerebrovascular accident (CVA), unspecified mechanism ([address] [I63.9]  Chief Complaint   Patient presents with    Fatigue     pt states that he thinks he had a stroke a few days ago   pt says  he has been getting w\"weaker\" and  \"weaker\"    pt states he hada disloaction in his right arm a few years ago and thinks  they never fixed it right      Patient Active Problem List    Diagnosis Date Noted    Gait abnormality 2021    Renal disease 2021    Hematuria 2021    Spinal stenosis of lumbar region with neurogenic claudication 2021    Myelopathy concurrent with and due to spinal stenosis of cervical region [facility]) 2021    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 ([facility] 2021    Stroke determined by clinical assessment ([facility] 09/10/2021    Cellulitis 2019    Cellulitis of left hand 2019    Shoulder dislocation 2016     Past Medical History:   Diagnosis Date    Hypertension      Past Surgical History:   Procedure Laterality Date    BACK SURGERY       No Known Allergies    Date of Onset: 9/10/2021      Date of Evaluation: 2021   Evaluating Therapist: SIVA Gold      SUMMARY OF EVALUATION  DYSPHAGIA DIAGNOSIS:  Mild oral dysphagia and mild-moderate pharyngeal dyshagia    DIET RECOMMENDATIONS: Soft and bite size with mildly thick (nectar) liquids      FEEDING RECOMMENDATIONS:   Frequent checks by nursing  Double swallow  Feed in upright position  Small bites/sips  No straw  Eat/Feed at a slow rate      THERAPY RECOMMENDATIONS:   Therapy is recommended to:  Assess tolerance of recommended diet  Improve tongue base retraction  Improve laryngeal strength and range of motion      Nursing notified: Sabra Kelly RN      OBJECTIVE ASSESSMENT    Oral mechanism examination:  Left labiobucccal weakness  Decreased lingual ROM/strength    Dentition:  Missing some teeth    Current respiratory status:      Room air    Baseline Vocal Quality:  Normal    Cognitive status:   Cognitive Deficits      Diet Level Prior to this Evaluation:    ADULT DIET; Regular; Mildly Thick (Nectar)        PROCEDURE   Patient Positioning: Seated 70-90°  Viewing Plane(s): LATERAL ONLY  Contrast: commercially prepared, non-standardized barium viscosities; ranging from thin liquid to pudding consistency  Consistencies Administered During the Evaluation:   Puree (pudding)  Solid (cookie)  Thin liquid  Nectar liquid      Method of Intake:   Self Feed  Cup  Spoon  Straw    RESULTS     ORAL PHASE:  Poor mastication: Solid  Weak lingual manipulation: All  Reduced posterior propulsion: All  Decreased bolus cohesion: All  Lingual/palatal residue: All  Premature bolus loss to pharynx: All  Reduced tongue base retraction:All       Oral Stage Impression: Mild oral dysphagia characterized by decreased bolus formation and cohesion and decreased bolus propulsion. Pt exhibited decreased mastication of solid requiring extra time. Pt had consistent premature entry to valleculae, and premature entry to valleculae with cascade to pyriforms with solid. Mild scattered residue post swallow, in which pt cleared with cued re-swallow. PHARYNGEAL STAGE:    Delayed swallow initiation:  Solid  Premature spillage to valleculae: All  Premature spillage to pyriform: Solid  Reduced epiglottic distention: All  Reduced laryngeal elevation: All  Reduced tongue base: All  Deep penetration (during): Thin from straw  Pharyngeal residue- valleculae:  All  Pharyngeal residue- pyriform: Solid, Thin and Nectar  Pharyngeal residue- posterior pharynx: Nectar        Aspiration Scale  Puree  Solid  Nectar 1 Material does not enter the airway    2 Material enters the airway, remains above the vocal folds, and is ejected from the airway   Thin- straw 3 Material enters the airway, remains above the vocal folds, and is not ejected from the airway    4 Material enters the airway, contacts the vocal folds, an is ejected from the airway    5 Material enters the airway, contacts the vocal folds, and is not ejected from the airway    6 Material enters the airway, passes below the vocal folds and is ejected into the larynx or out of the airway    7 Material enters the airway, passes below the vocal folds, and is not ejected from the trachea despite effort    8 Material enters the airway, passes below the vocal folds, and no effort is made to eject. Pharyngeal Stage Impression:  Mild-moderate pharyngeal dysphagia characterized by delayed swallow initiation with premature entry, decreased base of tongue retraction, decreased hyolaryngeal excursion which resulted in mild-moderate residue in valleculae (all), mild residue in pyriforms (solid, nectar, thin) , and mild residue on posterior pharyngeal wall (nectar). Pt demonstrated decreased sensation and needed verbal cues to re-swallow to clear residue. Pt had deep penetration of thin from straw when using consecutive sips. No aspiration occurred, however pt is at risk due to decreased pharyngeal sensation and impulsivity. CERVICAL ESOPHAGEAL STAGE :   WFL             PLAN OF CARE:   Long Term Goals:    1. Patient will tolerate least restrictive diet with no overt s/s of difficulty or aspiration. Short Term Goals:   Pt to be seen 2-3x/week during LOS or until goals met  1.  Pt will complete oral motor ROM and strengthening exercises with 90% accuracy, given cues as needed, in order to strengthen lingual/labial/buccal musculature to promote safety and efficiency of oral phase of swallow and decrease risk for pocketing. 2.  Pt will complete lingual exercises that promote anterior to posterior propulsion of bolus and improve tongue base retraction with 90% accuracy in order to strengthen the muscles of the swallow to decrease risk of aspiration and to increase ability to safely handle the least restrictive diet level. 3.  Pt will complete pharyngeal strengthening exercises (such as the Blanca, Effortful swallow, etc) with 90% accuracy in order to strengthen and establish and more effective swallow. 4.  Pt will tolerate therapeutic trials of thin from cup using small sip with demonstrating no overt s/s of difficulty or aspiration on 100% trials. 5.  Pt will demonstrate swallow strategies for safe and efficient swallow of soft/nectar consistencies in all given opportunities. Prognosis for improvements is: Good,  motivation      Pain Assessment:  Pre-Treatment  Pain assessment: 0-10  Pain level: 0  Intervention:  Patient denies pain. Post-Treatment  Pain assessment: 0-10  Pain level: 0  Intervention:  Patient denies pain. Radiologist:  Available on Consult as needed, Radiology Tech present    Treatment goals were discussed with the:   Patient. , who gives fair understanding of recommendations. .  Reinforcement of recommendations is needed. Thank you for your referral.  Please direct any questions or concerns to Speech Pathology. Images are available through [facility]/PACS. Please see radiologist report for additional details. Therapy Time  SLP Individual Minutes  Time In: 6327  Time Out: 1320  Minutes: 15          Signature:  Electronically signed by Alessandra Alberts.  Jordan Lomax, SIVA on 9/13/2021 at 2:24 PM

## 2021-09-13 NOTE — FLOWSHEET NOTE
2522:  Vital signs obtained. AM assessment completed. Patient laying in bed. Patient denies any c/o pain or nausea at this time. Neuro assessment completed. Patient c/o numbness to both left and right leg and right arm. Noted that patient still has difficulty with right leg when ambulating and tends to drag it when walking. Patients speech is slurred and delayed at times. Patient also has word finding difficulty at times. 1133: Patient medicated with ativan 1mg prior to MRI. Patient going down to MRI now via cart. 1432: Patient lethargic after returning from MRI. Patient placed in bed with bed alarm engaged. Vital signs obtained. 1745: Patient becoming increasingly impulsive and attempting to get out of bed. Patient still lethargic and gait is very unsteady. Patient made aware at this time he is too unsteady to ambulate to bathroom and needs to use urinal or bedside commode for his safely. Attempted to get an [employer] telesitter for safety, but was informed none were available at this time. 1930: Patient attempting to get out of bed. Patient appears very agitated at this time and yelling at the nurses.

## 2021-09-13 NOTE — PROGRESS NOTES
Neurology Follow up    SUBJECTIVE: Patient seen and examined for neurology follow-up for acute left ischemic centrum semiovale infarct. Patient with some mild right-sided weakness. Currently on thickened liquids for dysphagia with modified barium swallow pending. Blood pressure improved. No new deficits. No seizure activity. Denies headache or vision changes. No paresthesias.     No new changes,     Current Facility-Administered Medications   Medication Dose Route Frequency Provider Last Rate Last Admin    lisinopril (PRINIVIL;ZESTRIL) tablet 10 mg  10 mg Oral Daily Ander Lay MD   10 mg at 09/13/21 1037    sodium chloride flush 0.9 % injection 5-40 mL  5-40 mL IntraVENous 2 times per day Ander Lay MD   10 mL at 09/13/21 1037    sodium chloride flush 0.9 % injection 5-40 mL  5-40 mL IntraVENous PRN Ander Lay MD        0.9 % sodium chloride infusion  25 mL IntraVENous PRN Ander Lay MD        enoxaparin (LOVENOX) injection 40 mg  40 mg SubCUTAneous Daily Ander Lay MD   40 mg at 09/13/21 1037    ondansetron (ZOFRAN-ODT) disintegrating tablet 4 mg  4 mg Oral Q8H PRN Ander Lay MD        Or    ondansetron [facility]) injection 4 mg  4 mg IntraVENous Q6H PRN Ander Lay MD        polyethylene glycol (GLYCOLAX) packet 17 g  17 g Oral Daily PRN Ander Lay MD        acetaminophen (TYLENOL) tablet 650 mg  650 mg Oral Q6H PRN Ander Lay MD        Or    acetaminophen (TYLENOL) suppository 650 mg  650 mg Rectal Q6H PRN Ander Lay MD        potassium chloride (KLOR-CON M) extended release tablet 40 mEq  40 mEq Oral PRN Ander Lay MD        Or    potassium chloride (KLOR-CON) packet 40 mEq  40 mEq Oral PRN Ander Lay MD        Or    potassium chloride 10 mEq/100 mL IVPB (Peripheral Line)  10 mEq IntraVENous PRN Ander Lay MD        aspirin chewable tablet 81 mg  81 mg Oral Daily Ander Lay MD   81 mg at 09/13/21 1037    hydrALAZINE (APRESOLINE) injection 10 mg  10 mg IntraVENous Q6H Shoulder shrug:  intact      Cranial nerve XII          Tongue movement:  normal    Motor: Right upper extremity weakness of 4 /5 with a drift  Drift:  Motor exam   Tone:  normal  Abnormal Movements:  absent            Sensory: No definite findings        Pinprick             Right Upper Extremity:  normal             Left Upper Extremity:  normal             Right Lower Extremity:  normal             Left Lower Extremity:  normal           Vibration                         Touch            Proprioception                 Coordination:           Finger/Nose   Right:  abnormal              Left:  normal          Heel-Knee-Shin                Right:  normal              Left:  normal          Rapid Alternating Movements              Right:  normal              Left:  normal          Gait:                       Casual:  normal                         Romberg:  normal            Reflexes:             Deep Tendon Reflexes:             Reflexes are 2 + including ankle reflexes.              Plantar response:                Right:  downgoing               Left:  downgoing    Vascular:  Cardiac Exam:  normal         Echocardiogram complete 2D with doppler with color    Result Date: 9/11/2021  Transthoracic Echocardiography Report (TTE)  Demographics   Patient Name    Marianna Weller Gender               Male   Patient Number  [id number]       Race                 Unknown                                  Ethnicity   Visit Number    [id number]      Room Number          E799   Corporate ID                   Date of Study        09/11/2021   Accession       [id number]     Referring Physician  Number   Date of Birth   1945     Sonographer          Daniela Fishman   Age             77 year(s)     Interpreting         [facility])                                 Physician            Cardiology                                                      [address] [place] [address]  Procedure Type of Study   TTE procedure:ECHOCARDIOGRAM COMPLETE WITH BUBBLE STUDY. Procedure Date Date: 09/11/2021 Start: 08:15 AM Study Location: Portable Technical Quality: Adequate visualization Indications:Stroke. Patient Status: Routine Height: 68 inches Weight: 179 pounds BSA: 1.95 m^2 BMI: 27.22 kg/m^2 BP: 147/86 mmHg  Conclusions   Summary  Moderate annular calcification. Normal aortic valve structure and function. Trivial aortic regurgitation is  noted. Moderately dilated left atrium. Normal intact intra-atrial septum was noted with no evidence of  significant intra-atrial communications neither by colour flow Doppler  imaging nor by aggitated saline study. Left ventricular ejection fraction is visually estimated at 60%. Impaired relaxation compatible with diastolic dysfunction. ( reversed E/A  ratio)  Moderate concentric left ventricular hypertrophy. Signature   ----------------------------------------------------------------  Electronically signed by Pino Medina(Interpreting physician)  on 09/11/2021 11:17 AM  ----------------------------------------------------------------   Findings  Left Ventricle Left ventricular ejection fraction is visually estimated at 60%. Impaired relaxation compatible with diastolic dysfunction. ( reversed E/A ratio) Moderate concentric left ventricular hypertrophy. Right Ventricle Normal right ventricle structure and function. Normal right ventricle systolic pressure. Left Atrium Moderately dilated left atrium. Normal intact intra-atrial septum was noted with no evidence of significant intra-atrial communications neither by colour flow Doppler imaging nor by aggitated saline study. Right Atrium Normal right atrium. Mitral Valve Moderate annular calcification. Tricuspid Valve Normal tricuspid valve structure and function. Aortic Valve Normal aortic valve structure and function. Trivial aortic regurgitation is noted. Pulmonic Valve Normal pulmonic valve structure and function. Pericardial Effusion No evidence of pericardial effusion.  Pleural Effusion No evidence of pleural effusion. Aorta \ Miscellaneous Miscellaneous normal findings were found. M-Mode Measurements (cm)   LVIDd: 4.6 cm                          LVIDs: 3.01 cm  IVSd: 1.98 cm                          IVSs: 2.14 cm  LVPWd: 1.08 cm                         AO Root Dimension: 3.04 cm  Rt. Vent.  Dimension: 3.05 cm                                         LVOT: 1.72 cm  Doppler Measurements:   AV Velocity:0.02 m/s                   MV Peak E-Wave: 1.25 m/s  AV Peak Gradient: 15.73 mmHg           MV Peak A-Wave: 1.54 m/s  AV Mean Gradient: 8.65 mmHg  AV Area (Continuity):1.66 cm^2         MV P1/2t: 103 msec  TR Velocity:2.47 m/s                   MVA by PHT2.14 cm^2  TR Gradient:24.4 mmHg                  Estimated RAP:10 mmHg                                         RVSP:34.4 mmHg  Valves  Mitral Valve   Peak E-Wave: 1.25 m/s                 Peak A-Wave: 1.54 m/s  P1/2t: 103 msec                       E/A Ratio: 0.81  Mean Velocity: 1.01 m/s               Peak Gradient: 6.28 mmHg  Mean Gradient: 4.78 mmHg              Deceleration Time: 407.7 msec  Area (PHT): 2.14 cm^2                 Area (continuity): 1.44 cm^2   Tissue Doppler   E' Septal Velocity: 0.07 m/s  E' Lateral Velocity: 0.06 m/s   Aortic Valve   Peak Velocity: 1.98 m/s                Mean Velocity: 1.39 m/s  Peak Gradient: 15.73 mmHg              Mean Gradient: 8.65 mmHg  Area (continuity): 1.66 cm^2  AV VTI: 46.12 cm   Tricuspid Valve   Estimated RVSP: 34.4 mmHg               Estimated RAP: 10 mmHg  TR Velocity: 2.47 m/s                   TR Gradient: 24.4 mmHg   Pulmonic Valve   Peak Velocity: 0.95 m/s            Peak Gradient: 3.64 mmHg                                     Estimated PASP: 34.4 mmHg   LVOT   Peak Velocity: 1.51 m/s              Mean Velocity: 1.09 m/s  Peak Gradient: 9.17 mmHg             Mean Gradient: 5.32 mmHg  LVOT Diameter: 1.72 cm               LVOT VTI: 32.89 cm  Structures  Left Atrium   LA Volume/Index: Atherosclerotic calcification of the thoracic aorta. The cardiomediastinal silhouette is within normal limits. No pneumothorax, pleural effusion, or consolidation. Question calcified pleural plaques on the left, which can be seen with asbestos related disease. No acute osseous abnormality. No radiographic evidence of acute intrathoracic process. Question left-sided pleural plaques which can be seen with asbestos related disease. MRI BRAIN WO CONTRAST    Result Date: 9/11/2021  EXAMINATION: MRI BRAIN WO CONTRAST CLINICAL HISTORY:  STROKE COMPARISONS: MR brain 4/5/2016 TECHNIQUE: Multiplanar multisequence images of the brain were obtained without contrast. Diffusion perfusion imaging was obtained. FINDINGS: There is a tiny focus of restricted diffusion in the left centrum semiovale (series 4 image 19). There are no extra-axial collections. There is no evidence of hemorrhage. The susceptibility images do not demonstrate evidence of hemosiderin deposition within the brain parenchyma or the leptomeninges. There are numerous patchy foci of T2/FLAIR hyperintensities in the subcortical and periventricular white matter in a symmetric distribution throughout both hemispheres. Chronic bilateral cerebellar infarcts. There is prominence of the sulci and ventricles consistent with moderate global cerebral atrophy and chronic involutional changes. No midline shift. The visualized portions of the orbits are within normal limits, the globes are intact. The visualized portions of the paranasal sinuses are within normal limits. Right mastoid effusion. Small foci of restricted diffusion in the left centrum semiovale, consistent with acute ischemia. Chronic microvascular changes and bilateral chronic cerebellar infarcts. CRITICAL RESULTS: Communicated with Escobar Berg RN on 9/11/2021 at 12:51 PM.    US CAROTID ARTERY BILATERAL    Result Date: 9/11/2021  EXAMINATION: US CAROTID ARTERY BILATERAL HISTORY:   stroke .  R26.9 Gait abnormality ICD10 COMPARISON:None TECHNIQUE: Carotid duplex sonograms which include gray scale and color flow evaluation are complimented with spectral waveform analysis. Please refer to chart below for specific carotid velocity measurements. The degree of stenosis recorded on this exam uses the same method of stratification used in the NASCET trials. This complies with ACR practice guidelines and the Society of radiology in ultrasound consensus statement and provides adequate information for clinical decision making. Society of Radiologists in Ultrasound (SRU) consensus statement was used to estimate internal carotid artery stenosis. RESULT: Mild to moderate plaque in the carotid arteries bilaterally. No significant increase in systolic flow or turbulence to indicate any hemodynamically significant narrowing in the right or left internal carotid arteries. There is antegrade flow identified in both vertebral arteries. ARTERIAL BLOOD FLOW VELOCITY RIGHT PEAK SYSTOLIC VELOCITIES (PSV)                                               Prox CCA    129 cm/s             Mid CCA     114 cm/s              Dist CCA    104 cm/s             Prox ICA    169 cm/s               Mid ICA     106 cm/s            Dist ICA    63 cm/s             Prox ECA     170   cm/s             Prox VERT   51 cm/s              ICA/CCA     1.48                        LEFT PEAK SYSTOLIC VELOCITIES (PSV)  Prox CCA    135 cm/s Mid CCA     166 cm/s Dist CCA    121 cm/s Prox ICA    119 cm/s Mid ICA     72 cm/s Dist ICA    61 cm/s Prox ECA    131 cm/s Prox VERT   56 cm/s ICA/CCA     0.72      50-69 % STENOSIS IN THE RIGHT ICA  <50 % STENOSIS IN THE LEFT ICA ANTEGRADE FLOW IN THE BILATERAL VERTEBRAL ARTERIES.        Recent Labs     09/10/21  1345 09/13/21  0629   WBC 5.9 5.4   HGB 17.1 16.6    224     Recent Labs     09/10/21  1345 09/12/21  0655 09/13/21  0629    140 142   K 4.0 4.0 4.1    106 107   CO2 22 22 23   BUN 21 26* 35* CREATININE 0.97 1.03 1. 25*   GLUCOSE 98 97 97     Recent Labs     09/10/21  1345   BILITOT 1.0*   ALKPHOS 79   AST 36   ALT 52*     Lab Results   Component Value Date    PROTIME 10.3 03/26/2019    INR 1.0 03/26/2019     No results found for: LITHIUM, DILFRTOT, VALPROATE    ASSESSMENT AND PLAN  Left small centrum semioval stroke , with right-sided findings. Patient also complains of lower extremity weakness and therefore recommended to complete spine evaluation given that he is mildly hyperreflexic in the upper extremity compared to the lower extremity. No definite sensory levels are notable patient does have some gait issues from the stroke as well. Patient require rehabilitation. We will complete his work-up and continue on aspirin since he was not on aspirin or cholesterol-lowering agents. Findings were discussed with the patient    Left centrum semiovale acute ischemic infarct  Carotid duplex with 50 to 69% stenosis in the right internal carotid artery and less than 50% in the left internal carotid artery  hemoGlobin A1c 5.3  Total cholesterol 145, triglycerides 67, HDL 38,LDL 94  Echocardiogram with ejection fraction of 60% with negative bubble study  Continue aspirin and high intensity statin  Blood pressure control improved  Dysphasia, MBS pending, nectar thick liquids  MRI of the cervical, thoracic and lumbar spine completed with multiple areas of severe spinal canal stenosis in the cervical spine. Neurosurgery has been consulted with plans for follow-up once medically stable after stroke for possible surgical intervention. [facility] rehab    I have personally performed a face to face diagnostic evaluation on this patient, reviewed all data and investigations, and am the sole provider of all clinical decisions on the neurological status of this patient.right weakness, small cva left ica no stenosis  Rehab soon      Jordan Brewer MD, Samira Aguero, American Board of Psychiatry & Neurology  Board Certified in Vascular Neurology  Board Certified in Neuromuscular Medicine  Certified in Neurorehabilitation

## 2021-09-13 NOTE — PROGRESS NOTES
Hospitalist Progress Note      PCP: Rosi Bar MD    Date of Admission: 9/10/2021    Chief Complaint:  No acute events, afebrile, stable HD    Medications:  Reviewed    Infusion Medications    sodium chloride       Scheduled Medications    lisinopril  10 mg Oral Daily    sodium chloride flush  5-40 mL IntraVENous 2 times per day    enoxaparin  40 mg SubCUTAneous Daily    aspirin  81 mg Oral Daily    amLODIPine  10 mg Oral Daily    atorvastatin  80 mg Oral Nightly     PRN Meds: sodium chloride flush, sodium chloride, ondansetron **OR** ondansetron, polyethylene glycol, acetaminophen **OR** acetaminophen, potassium chloride **OR** potassium alternative oral replacement **OR** potassium chloride, hydrALAZINE      Intake/Output Summary (Last 24 hours) at 9/13/2021 1221  Last data filed at 9/12/2021 2236  Gross per 24 hour   Intake 240 ml   Output --   Net 240 ml       Exam:    /76   Pulse 88   Temp 98.1 °F (36.7 °C)   Resp 18   Ht 5' 8\" (1.727 m)   Wt 179 lb (81.2 kg)   SpO2 100%   BMI 27.22 kg/m²     General appearance: awake, cooperative. Respiratory:  clear to auscultation, bilaterally   Cardiovascular: Regular rate and rhythm, S1/S2  Abdomen: Soft, active bowel sounds. Musculoskeletal: No edema bilaterally. Labs:   Recent Labs     09/10/21  1345 09/13/21  0629   WBC 5.9 5.4   HGB 17.1 16.6   HCT 48.6 47.3    224     Recent Labs     09/10/21  1345 09/12/21  0655 09/13/21  0629    140 142   K 4.0 4.0 4.1    106 107   CO2 22 22 23   BUN 21 26* 35*   CREATININE 0.97 1.03 1.25*   CALCIUM 9.2 8.9 9.0     Recent Labs     09/10/21  1345   AST 36   ALT 52*   BILITOT 1.0*   ALKPHOS 79     No results for input(s): INR in the last 72 hours.   Recent Labs     09/10/21  1345 09/11/21  1406   CKTOTAL  --  61   TROPONINI <0.010  --        Urinalysis:      Lab Results   Component Value Date    NITRU Negative 09/12/2021    WBCUA 0-2 09/12/2021    BACTERIA Negative 09/12/2021    RBCUA 20-50 09/12/2021    BLOODU LARGE 09/12/2021    SPECGRAV 1.021 09/12/2021    GLUCOSEU Negative 09/12/2021       Radiology:  MRI CERVICAL THORACIC LUMBAR SPINE WO CONTRAST LIMITED   Final Result   Multiple areas of severe spinal canal stenosis in the cervical region. Severe canal stenosis at L2-3, L3-4 and L4-5. US CAROTID ARTERY BILATERAL   Final Result       50-69 % STENOSIS IN THE RIGHT ICA        <50 % STENOSIS IN THE LEFT ICA       ANTEGRADE FLOW IN THE BILATERAL VERTEBRAL ARTERIES. MRI BRAIN WO CONTRAST   Final Result   Small foci of restricted diffusion in the left centrum semiovale, consistent with acute ischemia. Chronic microvascular changes and bilateral chronic cerebellar infarcts. CRITICAL RESULTS: Communicated with Nakia Rios RN on 9/11/2021 at 12:51 PM.      CT HEAD WO CONTRAST   Final Result      NO BLEED, MASS EFFECT, OR EXTRA-AXIAL FLUID COLLECTION. EVOLVING NONHEMORRHAGIC INFARCT IN THE INFERIOR RIGHT CEREBELLAR HEMISPHERE. 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 All CT scans at this facility use dose modulation, iterative reconstruction, and/or weight based dosing when appropriate to reduce radiation dose to as low as reasonably achievable. XR CHEST PORTABLE   Final Result      No radiographic evidence of acute intrathoracic process. Question left-sided pleural plaques which can be seen with asbestos related disease.       FL MODIFIED BARIUM SWALLOW W VIDEO    (Results Pending)           Assessment/Plan:    78 y/o man with history of HTN who presented with:    Dizziness and weakness  - acute CVA of the left centrum semiovale and bilateral chronic cerebellar infarcts  - MRI of the spine showed  severe canal stenosis at L2-3, L3-4 and L4-5 per report  - neurosurgery recommended conservative therapy  - continue ASA and statin therapy  - continue PT/OT  - followed by neurology    HTN  - on amlodipine and lisinopril  - monitor BP    Elevated BUN/Cr  - possibly related to lisinopril   - monitor renal function closely  - avoid nephrotoxic agents      Diet: ADULT DIET;  Regular; Mildly Thick (Nectar)    Code Status: Full Code      Disposition - acute rehab today          Electronically signed by Lisa Parry MD on 9/13/2021 at 12:21 PM

## 2021-09-13 NOTE — CONSULTS
Patient Name: Roxana Candelario Date: 9/10/2021 12:36 PM  MR #: [id number]  : 1945    Attending Physician: Ana Clayton MD  Reason for consult:   Lumbar stenosis with neurogenic claudication. Cervical spine stenosis with myelopathy. History of Presenting Illness and Review of Ginger Fairbanks is a 77 y.o. male on hospital day 3 . History Obtained From:  patient, electronic medical record  Old patient of mine having undergone anterior cervical fusion C5-6 in the remote past. Admitted to the hospital for his right cerebellar infarct gait ataxia. He tells me his arms are doing well he has no pain tingling numbness in the upper extremities. He does have gait ataxia. Has had multiple falls feeling weak and tender dizzy. Hypertensive    History:      Past Medical History:   Diagnosis Date    Hypertension      Past Surgical History:   Procedure Laterality Date    BACK SURGERY         Family History  History reviewed. No pertinent family history.   [] Unable to obtain due to ventilated and/ or neurologic status    Social History     Socioeconomic History    Marital status: Single     Spouse name: Not on file    Number of children: Not on file    Years of education: Not on file    Highest education level: Not on file   Occupational History    Not on file   Tobacco Use    Smoking status: Never Smoker    Smokeless tobacco: Never Used   Vaping Use    Vaping Use: Never used   Substance and Sexual Activity    Alcohol use: Never     Alcohol/week: 0.0 standard drinks    Drug use: Never    Sexual activity: Not on file   Other Topics Concern    Not on file   Social History Narrative    Lives With: Alone, sister lives in the area    Type of Home: [address] [place], [place] [address]vd: One level    Home Access: Stairs to enter with rails- Number of Steps: 3- Rails: Both    Bathroom Shower/Tub: Tub/Shower unit    Home Equipment: Cane, Rolling walker (rollator)    ADL Assistance: Independent    Homemaking Assistance: Independent    Homemaking Responsibilities: Yes    Ambulation Assistance: Independent (2ww)    Transfer Assistance: Independent    Active : No    He is a retired  for [employer] [place] [employer]. He learned how to be a  in [employer] was stationed in [place] during the [address]. Never went overseas. Never  no kids graduated [employer]. Social Determinants of Health     Financial Resource Strain:     Difficulty of Paying Living Expenses:    Food Insecurity:     Worried About Running Out of Food in the Last Year:     [address] in the Last Year:    Transportation Needs:     Lack of Transportation (Medical):  Lack of Transportation (Non-Medical):    Physical Activity:     Days of Exercise per Week:     Minutes of Exercise per Session:    Stress:     Feeling of Stress :    Social Connections:     Frequency of Communication with Friends and Family:     Frequency of Social Gatherings with Friends and Family:     Attends Denominational Services:     Active Member of Clubs or Organizations:     Attends Club or Organization Meetings:     Marital Status:    Intimate Partner Violence:     Fear of Current or Ex-Partner:     Emotionally Abused:     Physically Abused:     Sexually Abused:       [] Unable to obtain due to ventilated and/ or neurologic status      Home Medications:      Medications Prior to Admission: cloNIDine (CATAPRES) 0.1 MG tablet, TAKE 1 TABLET BY MOUTH TWICE A DAY  naproxen sodium (ALEVE) 220 MG tablet, Take 220 mg by mouth 2 times daily (with meals)  amLODIPine (NORVASC) 10 MG tablet, Take 1 tablet by mouth daily  neomycin-bacitracin-polymyxin (NEOSPORIN) 400-5-5000 ointment, Apply topically 2 times daily.     Current Hospital Medications:     Scheduled Meds:   lisinopril  10 mg Oral Daily    sodium chloride flush  5-40 mL IntraVENous 2 times per day    enoxaparin  40 mg SubCUTAneous Daily    aspirin  81 mg Oral Daily    amLODIPine  10 mg Oral Daily    atorvastatin  80 mg Oral Nightly     Continuous Infusions:   sodium chloride       PRN Meds:.sodium chloride flush, sodium chloride, ondansetron **OR** ondansetron, polyethylene glycol, acetaminophen **OR** acetaminophen, potassium chloride **OR** potassium alternative oral replacement **OR** potassium chloride, hydrALAZINE  .  sodium chloride          Allergies:     No Known Allergies     Physical Exam     Physical Exam  Constitutional:       Appearance: Normal appearance. HENT:      Head: Normocephalic. Mouth/Throat:      Mouth: Mucous membranes are moist.   Eyes:      Extraocular Movements: Extraocular movements intact. Pupils: Pupils are equal, round, and reactive to light. Cardiovascular:      Rate and Rhythm: Normal rate and regular rhythm. Pulses: Normal pulses. Pulmonary:      Effort: Pulmonary effort is normal.   Abdominal:      General: Abdomen is flat. Palpations: Abdomen is soft. Musculoskeletal:         General: No swelling or tenderness. Normal range of motion. Cervical back: Normal range of motion. Skin:     General: Skin is warm. Capillary Refill: Capillary refill takes less than 2 seconds. Neurological:      General: No focal deficit present.       Mental Status: He is alert and oriented to person, place, and time.         Objective Findings:     Vitals:   Vitals:    09/12/21 0855 09/12/21 1432 09/12/21 2240 09/12/21 2353   BP: 130/67 135/64 (!) 173/90 130/76   Pulse: 90 86 83 88   Resp: 16 18     Temp: 97.3 °F (36.3 °C)  98.1 °F (36.7 °C)    TempSrc: Oral      SpO2: 100% 99% 100%    Weight:       Height:              Laboratory, Microbiology, Pathology, Radiology, Cardiology, Medications and Transcriptions reviewed  Scheduled Meds:   lisinopril  10 mg Oral Daily    sodium chloride flush  5-40 mL IntraVENous 2 times per day    enoxaparin  40 mg SubCUTAneous Daily    aspirin  81 mg Oral Daily    amLODIPine  10 mg Oral Daily    atorvastatin  80 mg Oral Nightly     Continuous Infusions:   sodium chloride         Recent Labs     09/10/21  1345 09/13/21  0629   WBC 5.9 5.4   HGB 17.1 16.6   HCT 48.6 47.3   MCV 90.4 91.1    224     Recent Labs     09/10/21  1345 09/12/21  0655 09/13/21  0629    140 142   K 4.0 4.0 4.1    106 107   CO2 22 22 23   BUN 21 26* 35*   CREATININE 0.97 1.03 1.25*     Recent Labs     09/10/21  1345   AST 36   ALT 52*   BILITOT 1.0*   ALKPHOS 79     No results for input(s): LIPASE, AMYLASE in the last 72 hours. Recent Labs     09/10/21  1345   PROT 7.2     Echocardiogram complete 2D with doppler with color    Result Date: 9/11/2021  Transthoracic Echocardiography Report (TTE)  Demographics   Patient Name    Gualberto High Gender               Male   Patient Number  [id number]       Race                 Unknown                                  Ethnicity   Visit Number    [id number]      Room Number          W697   Corporate ID                   Date of Study        09/11/2021   Accession       [id number]     Referring Physician  Number   Date of Birth   1945     Sonographer          Inge Monahan   Age             77 year(s)     Interpreting         [facility])                                 Physician            Cardiology                                                      [address] [place] [address]  Procedure Type of Study   TTE procedure:ECHOCARDIOGRAM COMPLETE WITH BUBBLE STUDY. Procedure Date Date: 09/11/2021 Start: 08:15 AM Study Location: Portable Technical Quality: Adequate visualization Indications:Stroke. Patient Status: Routine Height: 68 inches Weight: 179 pounds BSA: 1.95 m^2 BMI: 27.22 kg/m^2 BP: 147/86 mmHg  Conclusions   Summary  Moderate annular calcification. Normal aortic valve structure and function. Trivial aortic regurgitation is  noted. Moderately dilated left atrium.   Normal intact intra-atrial septum was noted with no evidence of significant intra-atrial communications neither by colour flow Doppler  imaging nor by aggitated saline study. Left ventricular ejection fraction is visually estimated at 60%. Impaired relaxation compatible with diastolic dysfunction. ( reversed E/A  ratio)  Moderate concentric left ventricular hypertrophy. Signature   ----------------------------------------------------------------  Electronically signed by Pearl Medina(Interpreting physician)  on 09/11/2021 11:17 AM  ----------------------------------------------------------------   Findings  Left Ventricle Left ventricular ejection fraction is visually estimated at 60%. Impaired relaxation compatible with diastolic dysfunction. ( reversed E/A ratio) Moderate concentric left ventricular hypertrophy. Right Ventricle Normal right ventricle structure and function. Normal right ventricle systolic pressure. Left Atrium Moderately dilated left atrium. Normal intact intra-atrial septum was noted with no evidence of significant intra-atrial communications neither by colour flow Doppler imaging nor by aggitated saline study. Right Atrium Normal right atrium. Mitral Valve Moderate annular calcification. Tricuspid Valve Normal tricuspid valve structure and function. Aortic Valve Normal aortic valve structure and function. Trivial aortic regurgitation is noted. Pulmonic Valve Normal pulmonic valve structure and function. Pericardial Effusion No evidence of pericardial effusion. Pleural Effusion No evidence of pleural effusion. Aorta \ Miscellaneous Miscellaneous normal findings were found. M-Mode Measurements (cm)   LVIDd: 4.6 cm                          LVIDs: 3.01 cm  IVSd: 1.98 cm                          IVSs: 2.14 cm  LVPWd: 1.08 cm                         AO Root Dimension: 3.04 cm  Rt. Vent.  Dimension: 3.05 cm                                         LVOT: 1.72 cm  Doppler Measurements:   AV Velocity:0.02 m/s                   MV Peak E-Wave: 1.25 m/s  AV Peak Gradient: 15.73 mmHg           MV Peak A-Wave: 1.54 m/s  AV Mean Gradient: 8.65 mmHg  AV Area (Continuity):1.66 cm^2         MV P1/2t: 103 msec  TR Velocity:2.47 m/s                   MVA by PHT2.14 cm^2  TR Gradient:24.4 mmHg                  Estimated RAP:10 mmHg                                         RVSP:34.4 mmHg  Valves  Mitral Valve   Peak E-Wave: 1.25 m/s                 Peak A-Wave: 1.54 m/s  P1/2t: 103 msec                       E/A Ratio: 0.81  Mean Velocity: 1.01 m/s               Peak Gradient: 6.28 mmHg  Mean Gradient: 4.78 mmHg              Deceleration Time: 407.7 msec  Area (PHT): 2.14 cm^2                 Area (continuity): 1.44 cm^2   Tissue Doppler   E' Septal Velocity: 0.07 m/s  E' Lateral Velocity: 0.06 m/s   Aortic Valve   Peak Velocity: 1.98 m/s                Mean Velocity: 1.39 m/s  Peak Gradient: 15.73 mmHg              Mean Gradient: 8.65 mmHg  Area (continuity): 1.66 cm^2  AV VTI: 46.12 cm   Tricuspid Valve   Estimated RVSP: 34.4 mmHg               Estimated RAP: 10 mmHg  TR Velocity: 2.47 m/s                   TR Gradient: 24.4 mmHg   Pulmonic Valve   Peak Velocity: 0.95 m/s            Peak Gradient: 3.64 mmHg                                     Estimated PASP: 34.4 mmHg   LVOT   Peak Velocity: 1.51 m/s              Mean Velocity: 1.09 m/s  Peak Gradient: 9.17 mmHg             Mean Gradient: 5.32 mmHg  LVOT Diameter: 1.72 cm               LVOT VTI: 32.89 cm  Structures  Left Atrium   LA Volume/Index: 47.11 ml /24 m^2             LA Area: 16.59 cm^2   Left Ventricle   Diastolic Dimension: 4.6 cm          Systolic Dimension: 7.49 cm  Septum Diastolic: 5.46 cm            Septum Systolic: 9.20 cm  PW Diastolic: 1.81 cm                                       FS: 34.6 %  LV EDV/LV EDV Index: 97.45 ml/50 m^2 LV ESV/LV ESV Index: 35.36 ml/18 m^2  EF Calculated: 63.7 %                LV Length: 7.34 cm   LVOT Diameter: 1.72 cm   Right Atrium   RA Systolic Pressure: 10 mmHg   Right Ventricle Diastolic Dimension: 4.65 cm                                     RV Systolic Pressure: 32.5 mmHg  Aorta/ Miscellaneous Aorta   Aortic Root: 3.04 cm  LVOT Diameter: 1.72 cm      CT HEAD WO CONTRAST    Result Date: 9/10/2021  EXAMINATION: CT HEAD WO CONTRAST CLINICAL HISTORY: generalized weakness COMPARISON:  NONE AVAILABLE An unenhanced scan is performed. FINDINGS:   There is no bleed, mass effect, or space occupying lesion. No extra-axial mass or fluid collections. Hypoattenuated White matter changes in the cerebral hemispheres noted. There is global atrophy. Findings likely reflect chronic small vessel vasculopathy. There is a vague area of low attenuation in the inferior posterior right cerebellar hemisphere with slight obliteration of adjacent sulci. An evolving nonhemorrhagic infarct in the cerebellum would have to be a consideration. Clinical correlation recommended. NO BLEED, MASS EFFECT, OR EXTRA-AXIAL FLUID COLLECTION. EVOLVING NONHEMORRHAGIC INFARCT IN THE INFERIOR RIGHT CEREBELLAR HEMISPHERE. 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 All CT scans at this facility use dose modulation, iterative reconstruction, and/or weight based dosing when appropriate to reduce radiation dose to as low as reasonably achievable. XR CHEST PORTABLE    Result Date: 9/10/2021  Exam: XR CHEST PORTABLE History: Generalized weakness Technique: AP portable view of the chest obtained. Comparison: None available Findings: Atherosclerotic calcification of the thoracic aorta. The cardiomediastinal silhouette is within normal limits. No pneumothorax, pleural effusion, or consolidation. Question calcified pleural plaques on the left, which can be seen with asbestos related disease. No acute osseous abnormality. No radiographic evidence of acute intrathoracic process. Question left-sided pleural plaques which can be seen with asbestos related disease.     MRI BRAIN WO CONTRAST    Result Date: 9/11/2021  EXAMINATION: MRI BRAIN WO CONTRAST CLINICAL HISTORY:  STROKE COMPARISONS: MR brain 4/5/2016 TECHNIQUE: Multiplanar multisequence images of the brain were obtained without contrast. Diffusion perfusion imaging was obtained. FINDINGS: There is a tiny focus of restricted diffusion in the left centrum semiovale (series 4 image 19). There are no extra-axial collections. There is no evidence of hemorrhage. The susceptibility images do not demonstrate evidence of hemosiderin deposition within the brain parenchyma or the leptomeninges. There are numerous patchy foci of T2/FLAIR hyperintensities in the subcortical and periventricular white matter in a symmetric distribution throughout both hemispheres. Chronic bilateral cerebellar infarcts. There is prominence of the sulci and ventricles consistent with moderate global cerebral atrophy and chronic involutional changes. No midline shift. The visualized portions of the orbits are within normal limits, the globes are intact. The visualized portions of the paranasal sinuses are within normal limits. Right mastoid effusion. Small foci of restricted diffusion in the left centrum semiovale, consistent with acute ischemia. Chronic microvascular changes and bilateral chronic cerebellar infarcts. CRITICAL RESULTS: Communicated with Alvino Levin RN on 9/11/2021 at 12:51 PM.    US CAROTID ARTERY BILATERAL    Result Date: 9/11/2021  EXAMINATION: US CAROTID ARTERY BILATERAL HISTORY:   stroke . R26.9 Gait abnormality ICD10 COMPARISON:None TECHNIQUE: Carotid duplex sonograms which include gray scale and color flow evaluation are complimented with spectral waveform analysis. Please refer to chart below for specific carotid velocity measurements. The degree of stenosis recorded on this exam uses the same method of stratification used in the NASCET trials.  This complies with ACR practice guidelines and the Society of radiology in ultrasound consensus statement and provides adequate information for clinical decision making. Society of Radiologists in Ultrasound (SRU) consensus statement was used to estimate internal carotid artery stenosis. RESULT: Mild to moderate plaque in the carotid arteries bilaterally. No significant increase in systolic flow or turbulence to indicate any hemodynamically significant narrowing in the right or left internal carotid arteries. There is antegrade flow identified in both vertebral arteries. ARTERIAL BLOOD FLOW VELOCITY RIGHT PEAK SYSTOLIC VELOCITIES (PSV)                                               Prox CCA    129 cm/s             Mid CCA     114 cm/s              Dist CCA    104 cm/s             Prox ICA    169 cm/s               Mid ICA     106 cm/s            Dist ICA    63 cm/s             Prox ECA     170   cm/s             Prox VERT   51 cm/s              ICA/CCA     1.48                        LEFT PEAK SYSTOLIC VELOCITIES (PSV)  Prox CCA    135 cm/s Mid CCA     166 cm/s Dist CCA    121 cm/s Prox ICA    119 cm/s Mid ICA     72 cm/s Dist ICA    61 cm/s Prox ECA    131 cm/s Prox VERT   56 cm/s ICA/CCA     0.72      50-69 % STENOSIS IN THE RIGHT ICA  <50 % STENOSIS IN THE LEFT ICA ANTEGRADE FLOW IN THE BILATERAL VERTEBRAL ARTERIES. MRI CERVICAL THORACIC LUMBAR SPINE WO CONTRAST LIMITED    Result Date: 9/12/2021  EXAMINATION: MRI CERVICAL THORACIC LUMBAR SPINE WO CONTRAST LIMITED COMPARISON:None CLINICAL HISTORY:  spinal stenosis  FINDINGS:Tailored MRI scans to the cervical thoracic and lumbar spine. Scan quality limited due to patient motion  CERVICAL REGION: Multilevel degenerative changes with severe canal stenosis at C2-3, C3-4, and C6-7. Moderate canal stenosis at C4-5. THORACIC REGION: No spinal canal stenosis LUMBAR REGION: Multilevel degenerative changes. Endplate irregularity with joint space narrowing and slight anterolisthesis at L3-4 Severe canal stenosis at L2-3 and L4-5. Moderate to severe canal stenosis at L3-4.  No acute compression fracture. No epidural or paraspinal soft tissue mass. No abnormal cord signal intensity on the scans. Multiple areas of severe spinal canal stenosis in the cervical region. Severe canal stenosis at L2-3, L3-4 and L4-5. Impression:   Cervical canal stenosis with mild myelopathy. Lumbar canal stenosis with neurogenic claudication. Right cerebellar infarct, recovering. Plan:   Transfer to rehabilitation when possible. Assess his recovery from the stroke. If still impaired he may be a candidate for surgery. Plan follow-up on an outpatient basis in 6 weeks  Comments: Thank you for allowing us to participate in the care of this patient. Will sign off. Please call if questions or concerns arise.     Electronically signed by Leelee Cohen MD on 9/13/2021 at 1:02 PM

## 2021-09-14 PROBLEM — R26.9 ABNORMALITY OF GAIT AND MOBILITY: Status: ACTIVE | Noted: 2021-09-14

## 2021-09-14 LAB
ALBUMIN SERPL-MCNC: 4.2 G/DL (ref 3.5–4.6)
ANION GAP SERPL CALCULATED.3IONS-SCNC: 13 MEQ/L (ref 9–15)
BUN BLDV-MCNC: 35 MG/DL (ref 8–23)
CALCIUM SERPL-MCNC: 9 MG/DL (ref 8.5–9.9)
CHLORIDE BLD-SCNC: 108 MEQ/L (ref 95–107)
CO2: 21 MEQ/L (ref 20–31)
CREAT SERPL-MCNC: 0.98 MG/DL (ref 0.7–1.2)
GFR AFRICAN AMERICAN: >60
GFR NON-AFRICAN AMERICAN: >60
GLUCOSE BLD-MCNC: 106 MG/DL (ref 70–99)
PHOSPHORUS: 3.1 MG/DL (ref 2.3–4.8)
POTASSIUM SERPL-SCNC: 4.4 MEQ/L (ref 3.4–4.9)
SODIUM BLD-SCNC: 142 MEQ/L (ref 135–144)
URINE CULTURE, ROUTINE: NORMAL

## 2021-09-14 PROCEDURE — 97129 THER IVNTJ 1ST 15 MIN: CPT

## 2021-09-14 PROCEDURE — 6370000000 HC RX 637 (ALT 250 FOR IP): Performed by: PHYSICAL MEDICINE & REHABILITATION

## 2021-09-14 PROCEDURE — 6360000002 HC RX W HCPCS: Performed by: INTERNAL MEDICINE

## 2021-09-14 PROCEDURE — 97116 GAIT TRAINING THERAPY: CPT

## 2021-09-14 PROCEDURE — 99222 1ST HOSP IP/OBS MODERATE 55: CPT | Performed by: INTERNAL MEDICINE

## 2021-09-14 PROCEDURE — 97112 NEUROMUSCULAR REEDUCATION: CPT

## 2021-09-14 PROCEDURE — 97530 THERAPEUTIC ACTIVITIES: CPT

## 2021-09-14 PROCEDURE — 6370000000 HC RX 637 (ALT 250 FOR IP): Performed by: INTERNAL MEDICINE

## 2021-09-14 PROCEDURE — 99223 1ST HOSP IP/OBS HIGH 75: CPT | Performed by: PHYSICAL MEDICINE & REHABILITATION

## 2021-09-14 PROCEDURE — 0HBRXZZ EXCISION OF TOE NAIL, EXTERNAL APPROACH: ICD-10-PCS | Performed by: STUDENT IN AN ORGANIZED HEALTH CARE EDUCATION/TRAINING PROGRAM

## 2021-09-14 PROCEDURE — 92610 EVALUATE SWALLOWING FUNCTION: CPT

## 2021-09-14 PROCEDURE — 36415 COLL VENOUS BLD VENIPUNCTURE: CPT

## 2021-09-14 PROCEDURE — 93005 ELECTROCARDIOGRAM TRACING: CPT | Performed by: INTERNAL MEDICINE

## 2021-09-14 PROCEDURE — 6360000002 HC RX W HCPCS: Performed by: PHYSICAL MEDICINE & REHABILITATION

## 2021-09-14 PROCEDURE — 80069 RENAL FUNCTION PANEL: CPT

## 2021-09-14 PROCEDURE — 92523 SPEECH SOUND LANG COMPREHEN: CPT

## 2021-09-14 PROCEDURE — 97165 OT EVAL LOW COMPLEX 30 MIN: CPT

## 2021-09-14 PROCEDURE — 1180000000 HC REHAB R&B

## 2021-09-14 PROCEDURE — 97162 PT EVAL MOD COMPLEX 30 MIN: CPT

## 2021-09-14 RX ORDER — ACETAMINOPHEN 325 MG/1
650 TABLET ORAL EVERY 4 HOURS PRN
Status: DISCONTINUED | OUTPATIENT
Start: 2021-09-14 | End: 2021-09-28 | Stop reason: HOSPADM

## 2021-09-14 RX ORDER — VITAMIN B COMPLEX
2000 TABLET ORAL
Status: DISCONTINUED | OUTPATIENT
Start: 2021-09-14 | End: 2021-09-28 | Stop reason: HOSPADM

## 2021-09-14 RX ORDER — UBIDECARENONE 100 MG
100 CAPSULE ORAL DAILY
Status: DISCONTINUED | OUTPATIENT
Start: 2021-09-14 | End: 2021-09-28 | Stop reason: HOSPADM

## 2021-09-14 RX ORDER — METOPROLOL TARTRATE 50 MG/1
50 TABLET, FILM COATED ORAL 2 TIMES DAILY
Status: DISCONTINUED | OUTPATIENT
Start: 2021-09-14 | End: 2021-09-17

## 2021-09-14 RX ORDER — CYANOCOBALAMIN 1000 UG/ML
1000 INJECTION INTRAMUSCULAR; SUBCUTANEOUS WEEKLY
Status: DISCONTINUED | OUTPATIENT
Start: 2021-09-14 | End: 2021-09-28 | Stop reason: HOSPADM

## 2021-09-14 RX ORDER — BISACODYL 10 MG
10 SUPPOSITORY, RECTAL RECTAL DAILY PRN
Status: DISCONTINUED | OUTPATIENT
Start: 2021-09-14 | End: 2021-09-28 | Stop reason: HOSPADM

## 2021-09-14 RX ORDER — LIDOCAINE 4 G/G
3 PATCH TOPICAL DAILY
Status: DISCONTINUED | OUTPATIENT
Start: 2021-09-14 | End: 2021-09-27

## 2021-09-14 RX ORDER — SODIUM PHOSPHATE, DIBASIC AND SODIUM PHOSPHATE, MONOBASIC 7; 19 G/133ML; G/133ML
1 ENEMA RECTAL DAILY PRN
Status: DISCONTINUED | OUTPATIENT
Start: 2021-09-14 | End: 2021-09-28 | Stop reason: HOSPADM

## 2021-09-14 RX ADMIN — ENOXAPARIN SODIUM 40 MG: 100 INJECTION SUBCUTANEOUS at 08:07

## 2021-09-14 RX ADMIN — AMLODIPINE BESYLATE 10 MG: 10 TABLET ORAL at 08:07

## 2021-09-14 RX ADMIN — Medication 100 MG: at 08:07

## 2021-09-14 RX ADMIN — ASPIRIN 81 MG: 81 TABLET, CHEWABLE ORAL at 08:07

## 2021-09-14 RX ADMIN — CYANOCOBALAMIN 1000 MCG: 1000 INJECTION, SOLUTION INTRAMUSCULAR; SUBCUTANEOUS at 08:07

## 2021-09-14 RX ADMIN — ATORVASTATIN CALCIUM 80 MG: 80 TABLET, FILM COATED ORAL at 21:54

## 2021-09-14 RX ADMIN — Medication 2000 UNITS: at 17:04

## 2021-09-14 RX ADMIN — METOPROLOL TARTRATE 50 MG: 50 TABLET, FILM COATED ORAL at 13:05

## 2021-09-14 RX ADMIN — METOPROLOL TARTRATE 50 MG: 50 TABLET, FILM COATED ORAL at 21:54

## 2021-09-14 RX ADMIN — LISINOPRIL 10 MG: 10 TABLET ORAL at 08:07

## 2021-09-14 ASSESSMENT — ENCOUNTER SYMPTOMS
COUGH: 0
PHOTOPHOBIA: 0
DIARRHEA: 0
GASTROINTESTINAL NEGATIVE: 1
BLOOD IN STOOL: 0
VISUAL CHANGE: 0
EYES NEGATIVE: 1
ABDOMINAL PAIN: 0
EYE PAIN: 0
CHEST TIGHTNESS: 0
WHEEZING: 0
CONSTIPATION: 1
VOMITING: 0
SHORTNESS OF BREATH: 0
SORE THROAT: 0
STRIDOR: 0
BOWEL INCONTINENCE: 0
EYE REDNESS: 0
RESPIRATORY NEGATIVE: 1
NAUSEA: 0
BACK PAIN: 1
SHORTNESS OF BREATH: 1

## 2021-09-14 ASSESSMENT — PAIN SCALES - GENERAL
PAINLEVEL_OUTOF10: 0

## 2021-09-14 NOTE — CONSULTS
Hospitalist Progress Note  9/14/2021 10:59 AM    Assessment and Plan:     1. Acute left centrum semiovale ischemic infarct: Neurology following. Continue ASA and high intensity statin  2. Dysphagia: s/p MBS on 9/13. Diet per ST recommendations. 3. Spinal canal stenosis with neurogenic claudication: MRI of the spine showed  severe canal stenosis at L2-3, L3-4 and L4-5. Evaluated by NSx during medical admit, conservative management for now. Outpatient f/u with NSx   4. HTN: On Amlodipine and lisinopril. Cardiology following. Metoprolol added to regimen. Trend Bps  5. NSVT: Episode of NSVT 9/13, asymptomatic. Cardiology following. BB added to regimen  6. Renal disease: Stable Cr, 0.98 on admit to rehab. Trend renal function. Avoid new nephrotoxic agents  7. R sided weakness, Gait instability and Decreased Functional Status 2/2 CVA: Fall precautions. PT OT to evaluate. Maximize nutrition status. Assessing if needs DME at home. SW on board. 8. Bowel Regimen and GI PPx: stool softners PRN ordered with hold parameters for loose stools or diarrhea. On antiacid  9. Diet: ADULT DIET; Dysphagia - Soft and Bite Sized; Mildly Thick (Nectar)  10. Advance Directive: Full Code   11. Nutrition status: Supplemental Vitamins ordered. Dietitian assessment  12. Vaccinations: Immunization records reviewed. If has not received appropriate vaccinations, will order to be given prior to discharge. 13. DVT prophylaxis: Chemical prophylaxis SQ enoxaparin  14. Discharge planning: SW on board. Will discharge once medically stable and cleared by all consultants. 15. High Risk Readmission Screening Tool Score Noted.      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)  Active Problems:    Shoulder dislocation    Stroke determined by clinical assessment [facility])    Renal disease    Spinal stenosis of lumbar region with neurogenic claudication    Myelopathy concurrent with and due to spinal stenosis of cervical region [facility])    Ataxic gait    Abnormality of gait and mobility  Resolved Problems:    * No resolved hospital problems. *      ** Total time spent reviewing medical records, evaluating patient, speaking with RN's and consultants where I was focused exclusively on this patient: 35 minutes. This time is excluding time spent performing procedures or significant events occurring earlier or later in the day requiring my attention and focus. Subjective:   Admit Date: 9/13/2021  PCP: Alessia Ramos MD    Short episode of NSVT, spontaneously converted to NSR. Patient was asymptomatic. On exam, denies any headache or dizziness. No chest pain, palpitations, shortness of breath. No abdominal pain or nausea. He does continue to have some residual right-sided weakness. Objective:     Vitals:    09/14/21 0121 09/14/21 0245 09/14/21 0746 09/14/21 0800   BP:  (!) 169/86 (!) 160/92    Pulse: 62 72 81    Resp:  17 20    Temp:    98.5 °F (36.9 °C)   TempSrc:   Oral    SpO2:  100%  100%   Weight:       Height:         General appearance: Patient is awake, alert, answering questions appropriately. Lungs: Clear to auscultation bilaterally, no rhonchi, rales, wheezes  Heart:  S1, S2 normal, RRR, murmur noted, no gallop or rub  Abdomen: Soft, nontender, nondistended, no guarding or rigidity, bowel sounds present in all 4 quadrants  Extremities:  no cyanosis, no edema bilat lower exts, no calf tenderness bilaterally.  Dry skin noted       Medications:      Vitamin D  2,000 Units Oral Dinner    cyanocobalamin  1,000 mcg IntraMUSCular Weekly    coenzyme Q10  100 mg Oral Daily    lidocaine  3 patch TransDERmal Daily    metoprolol tartrate  50 mg Oral BID    enoxaparin  40 mg SubCUTAneous Daily    amLODIPine  10 mg Oral Daily    aspirin  81 mg Oral Daily    atorvastatin  80 mg Oral Nightly    lisinopril  10 mg Oral Daily       LABS Reviewed    IMAGING Reviewed    Maryam Alvarez, APRN - CNP  Rounding Hospitalist

## 2021-09-14 NOTE — PROGRESS NOTES
Occupational Therapy  Facility/Department: [facility]  Daily Treatment Note  NAME: Earlene Mohamud  : 1945  MRN: [id number]    Date of Service: 2021    Discharge Recommendations:  Continue to assess pending progress  OT Equipment Recommendations  Other: Continue to assess    Assessment   Performance deficits / Impairments: Decreased functional mobility ; Decreased ADL status; Decreased strength;Decreased safe awareness;Decreased high-level IADLs;Decreased cognition;Decreased balance;Decreased coordination;Decreased fine motor control  Assessment: Pt is  a 77 y.o. admitted for CVA. Pt with above deficits impairing ability to safety complete ADL's and IADLs. Pt would benefit from OT services to address deficits and maximize level of safety and function during ADL's. Prognosis: Good  Decision Making: Medium Complexity  History: Multi comorb  Exam: 9 perf deficits  Assistance / Modification: Min A  REQUIRES OT FOLLOW UP: Yes  Activity Tolerance  Activity Tolerance: Patient Tolerated treatment well  Safety Devices  Safety Devices in place: Yes  Type of devices: All fall risk precautions in place  Restraints  Initially in place: No         Patient Diagnosis(es): There were no encounter diagnoses. has a past medical history of Cellulitis of left hand and Hypertension. has a past surgical history that includes back surgery. Restrictions  Restrictions/Precautions  Restrictions/Precautions: Fall Risk, Swallowing - Thickened Liquids  Subjective   General  Chart Reviewed: Yes  Patient assessed for rehabilitation services?: Yes  Family / Caregiver Present: No  Referring Practitioner: Dr. Shruthi King  Diagnosis: Impaired mobility, gait, and ADL's 2º inferior cerebellar stroke  Subjective  Subjective:  \"This is the best wash up I've had yet\"  Pre Treatment Pain Screening  Pain at present: 0  Scale Used: Numeric Score  Intervention List: Patient able to continue with treatment  Vital Signs  Patient Currently in Pain: Denies   Orientation     Objective           Pt completed slums assessment. Please see below for results. Pt completed therapeutic activities table top to increase BUE strength/endurance and coordination for functional tasks. Pt placed/removed Wooden pegs from vertical board requiring verbal cues to switch between UE's vs only using L. Pt demonstrated ability to grasp 2 size pegs with R hand and reach to all heights of board to place/remove pieces. Pt placed/removed various size rubber washers on corresponding vertical dowels with multiple mistakes requiring cues to correct. Pt placed/removed 100 mushroom pegs on board table top level repetitively using RUE to reach across midline to retrieve/replace pegs in container. Pt visually scanned peg board to remove pegs by requested color with 100% accuracy. Pt engaged in hand strengthening/FMC task isolating and removing 15 beads from red grade theraputty requiring increased time to complete. Pt completed task to improve independence with manipulating fasteners during ADL tasks. The patient completed the [facility] (SLUMS) Examination on this date, which is a useful screening tool for detecting mild cognitive impairment and signs of dementia. Range of impairments based on score are indicated in the chart below:      High school education  Scoring Less than High School Education   27-30 Normal 25-30   21-26 Mild Neurocognitive disorder 20-24   1-20 Dementia 1-19     Patient's level of education: high school  Patient scored 4/30 on this date, which indicates a possible dementia or other significant neurocognitive disorder. Pt. with cognitive deficits which will be addressed in OT  Pt's scores indicate need for further cognitive assessment; physician notified and POC to be assessed as appropriate    Results of SLUMS assessment will be shared in team meeting to assess need for further action. Plan   Plan  Times per week: 5-7x  Plan weeks: 2 weeks  Current Treatment Recommendations: Strengthening, Functional Mobility Training, Balance Training, Neuromuscular Re-education, Cognitive/Perceptual Training, Safety Education & Training, Patient/Caregiver Education & Training, Equipment Evaluation, Education, & procurement, Self-Care / ADL, Home Management Training  Plan Comment: continue with current POC       Goals  Long term goals  Time Frame for Long term goals :  Within 2 weeks, pt to demonstrate progress in the following areas listed below to achieve specific LTG's as stated in initial evaluation  Long term goal 1: Improve independence during ADLs/IADLs  Long term goal 2: Improve strength and endurance to perform functional transfers  Long term goal 3: Improve fine motor coordination during self care  Long term goal 4: Improve cognition to demonstrate understanding of HEP  Long term goal 5: improve balance to safely perform functional mobility  Patient Goals   Patient goals : \"to get better\"       Therapy Time   Individual Concurrent Group Co-treatment   Time In 1400         Time Out 1500         Minutes 60              Therapeutic activities: 45 minutes  Cognitive Retraining: 15 minutes    Shravan Hyatt OT    Electronically signed by Shravan Hyatt OT on 9/14/2021 at 4:07 PM

## 2021-09-14 NOTE — CONSULTS
PODIATRIC MEDICINE AND SURGERY  CONSULT HISTORY AND PHYSICAL      Consulting Service:  Rehab  Requesting Provider: Dr. Christal Donaldson DO  Opinion/advice regarding: Elongated Toenails  Staff Doctor:  Dr. Gomes Factor:  This 77 y.o. male with presenting to rehab facility with painful tinea unguium in setting of recent acute CVA. Plan:  Exam and evaluation  Nails 1-5 bilateral debrided in length and thickness without incident using nail nippers  Educated patient on daily foot checks  Will f/u with local podiatrist    HPI: This very pleasant 77y.o. year old male with PMH of recent acute CVA seen today for nail care. Patient states that the toenails on both feet are long and difficult to trim especially since his recent stroke. He denies any pain to bilateral feet. Denies being a diabetic. Patient notes that he is trying to get back to ambulating normally while in the rehabilitation facility and is making slow improvements. Patient denies any nausea, vomiting, diarrhea, fevers, chills, chest pain, shortness of breath, head ache, or calf pain. No other pedal complaints. Past Medical History:   Diagnosis Date    Cellulitis of left hand 3/26/2019    Hypertension        Past Surgical History:   Procedure Laterality Date    BACK SURGERY         No current facility-administered medications on file prior to encounter. Current Outpatient Medications on File Prior to Encounter   Medication Sig Dispense Refill    naproxen sodium (ALEVE) 220 MG tablet Take 220 mg by mouth 2 times daily (with meals)      cloNIDine (CATAPRES) 0.1 MG tablet TAKE 1 TABLET BY MOUTH TWICE A  tablet 1    amLODIPine (NORVASC) 10 MG tablet Take 1 tablet by mouth daily 30 tablet 5    neomycin-bacitracin-polymyxin (NEOSPORIN) 400-5-5000 ointment Apply topically 2 times daily. 1 Tube 1       No Known Allergies    No family history on file.     Social History     Socioeconomic History    Marital status: Single Organization Meetings:     Marital Status:    Intimate Partner Violence:     Fear of Current or Ex-Partner:     Emotionally Abused:     Physically Abused:     Sexually Abused:          REVIEW OF SYSTEMS:  CONSTITUTIONAL:  No fevers, chills, nightsweats, unintended weight loss  HEENT:  Denies frequent or severe headaches, nasal congestion/sinus symptoms, problematic allergy problems. EYES:  No diplopia or blurry vision. CARDIOVASCULAR:  No chest pain, dyspnea, palpitations, orthopnea  PULM:  No dyspnea, unexplained cough. GI:  No dysphagia/odynophagia, problematic reflux, constipation, diarrhea, changes in stool habits, hematochezia, melena. :  No new urinary complaints, including dysuria, gross hematuria or pyuria. NEURO: Balance instability, Unsteady gait, Right sided weakness  MUSC-SKEL: No new joint pain, swelling, or erythema. PSY:  No concerns regarding depression, anxiety or panic. INTEGUMENTARY: No new skin changes (rash, new or changing mole, new growth), slight debris noted inbetween web spaces, No maceration, No new skin lesions       OBJECTIVE:  BP (!) 160/92   Pulse 81   Temp 98.5 °F (36.9 °C)   Resp 20   Ht 5' 8\" (1.727 m)   Wt 165 lb 3 oz (74.9 kg)   SpO2 100%   BMI 25.12 kg/m²   Patient is alert and oriented x 3 in NAD. Vascular:  Palpable Dorsalis Pedis and Palpable Posterior Tibial Pulses B/L   Capillary Fill time < 3  seconds to B/L digits  Skin temperature warm to cool  tibial tuberosity to the digits B/L  Hair growth present to digits  No pedal edema, no varicosities     Neurological:   Epicritic sensation intact B/L    Musculoskeletal/Orthopaedic:   5/5 muscle strength Dorsiflexion, Plantarflexion, Inversion, Eversion B/L  ROM intact pedal joints B/L. Dermatological:   Skin appears well hydrated and supple with good temperature, texture, turgor. No hyperkeratosis notes. Interspaces 1-4 are with slight debris B/L.   Nails 1 bilateral are slightly thick, elongated, with subungual debris  Nails 2 bilateral are slightly thick, elongated, with subungual debris  Nails 3 bilateral are slightly thick, elongated, with subungual debris  Nails 4 bilateral are slightly thick, elongated, with subungual debris  Nails 5 bilateral are slightly thick, elongated, with subungual debris       LABS:   Lab Results   Component Value Date    WBC 5.4 09/13/2021    HGB 16.6 09/13/2021    HCT 47.3 09/13/2021    MCV 91.1 09/13/2021     09/13/2021     Lab Results   Component Value Date     09/14/2021    K 4.4 09/14/2021     09/14/2021    CO2 21 09/14/2021    BUN 35 09/14/2021    CREATININE 0.98 09/14/2021    GLUCOSE 106 09/14/2021    CALCIUM 9.0 09/14/2021      Lab Results   Component Value Date    LABALBU 4.2 09/14/2021     No results found for: SEDRATE  No results found for: CRP  Lab Results   Component Value Date    LABA1C 5.3 09/10/2021     No results found for: EAG    Patient's case will be discussed with staff, who will provide final recommendations. Thank you for the consult.     Sandra Lara DPM, PGY-3  Please first page [facility], [phone]  September 14, 2021  9:37 AM

## 2021-09-14 NOTE — H&P
HISTORY & PHYSICAL       DATE OF ADMISSION:  9/13/2021    DATE OF SERVICE:  9/14/21    Subjective:    Markos Felipe, 77 y.o. male presents today with:     CHIEF COMPLAINT:  Patient presented through the emergency room on 9/10/2021 with dizziness and frequent falling. He states that his dizziness had been progressive over the past month but became much worse the day of admission. CT showed mild inferior cerebellar stroke neuro work-up also revealed bilateral lower extremity weakness consistent with spinal stenosis and an MRI of the cervical thoracic and lumbar spine as below shows severe stenosis especially at L2-L3 L3-L4 and L4-L5. Neurosurgery was consulted they did not feel that intervention as far as surgery was indicated at that time. He also has a history of cervical myelopathy with cervical fusion at C5 5 C6 in the past.  His numbness and weakness are consistent with the myelopathy but demyelinating polyneuropathy was also considered. Patient was also evaluated by speech and language pathology for cognitive and swallow deficits and found to have aphasia word finding deficits and oral pharyngeal dysphagia as noted below and prescribed a nectar thick diet after failing a modified barium swallow. Patient is also had occasional arrhythmia on telemetry had 4 beats of V. tach recently and remains on telemetry. Running to the RN he had a few beats of V. tach last night but is in sinus rhythm today. Neurologic Problem  The patient's primary symptoms include an altered mental status, clumsiness, focal sensory loss, focal weakness, a loss of balance, memory loss, slurred speech and weakness. The patient's pertinent negatives include no near-syncope, syncope or visual change. This is a new problem. The current episode started 1 to 4 weeks ago. The problem has been waxing and waning since onset. There was no focality noted.  Associated symptoms include back pain, bladder incontinence, confusion, dizziness, fatigue, light-headedness, palpitations and shortness of breath. Pertinent negatives include no abdominal pain, bowel incontinence, chest pain, diaphoresis, fever, headaches, nausea, neck pain, vertigo or vomiting. Past treatments include walking. The treatment provided mild relief. His past medical history is significant for a CVA and mood changes. There is no history of a bleeding disorder, a clotting disorder, dementia, head trauma, liver disease or seizures. I reviewed recent nursing note, \" Pt admitted from  with impaired gait and mobility secondary to Stroke. Right sided weakness and unsteady gait. 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 + for Dysphagia and aphasia- word finding. Pivot transfer. Skin has abrasion (old, scabbed over) with some bruising from old falls to elbows, right upper lip and bilateral knees. Podiatry consulted for toe nail trimming. Belongings noted: Shirt, jeans, shoes and hat along with his wallet that contains his ID, insurance card and $8 dollars all 1 dollar bills and his keys. Lives alone. Alert and oriented x 3. UA sent 9/12/21, cx pending. Pt had 4 beats of V tach, NP made aware no new orders-will continue to monitor on Telemetry. Pt slept well. Bed alarm on and call light within reach. \". The patient has stabilized medically andis able to participate at acute level rehab but is too medically complex for SNF due to need for therapy at the acute level with at least 15 hours a week of PT OT and cognitive and recreational therapy at an acute level with daily medical monitoring. Imaging:    Imaging and other studies reviewed and discussed with patient and staff    Echocardiogram   9/11/2021  Transthoracic Echocardiography Report     Left Ventricle Left ventricular ejection fraction is visually estimated at 60%. Impaired relaxation compatible with diastolic dysfunction.  ( reversed E/A ratio) Moderate concentric left ventricular hypertrophy. Right Ventricle Normal right ventricle structure and function. Normal right ventricle systolic pressure. Left Atrium Moderately dilated left atrium. Normal intact intra-atrial septum was noted with no evidence of significant intra-atrial communications neither by colour flow Doppler imaging nor by aggitated saline study. Right Atrium Normal right atrium. Mitral Valve Moderate annular calcification. Tricuspid Valve Normal tricuspid valve structure and function. Aortic Valve Normal aortic valve structure and function. Trivial aortic regurgitation is noted. Pulmonic Valve Normal pulmonic valve structure and function. Pericardial Effusion No evidence of pericardial effusion. Pleural Effusion No evidence of pleural effusion. Aorta \ Miscellaneous Miscellaneous normal findings were found. M-Mode Measurements (cm)   LVIDd: 4.6 cm                          LVIDs: 3.01 cm  IVSd: 1.98 cm                          IVSs: 2.14 cm  LVPWd: 1.08 cm                         AO Root Dimension: 3.04 cm  Rt. Vent.  Dimension: 3.05 cm                                         LVOT: 1.72 cm  Doppler Measurements:   AV Velocity:0.02 m/s                   MV Peak E-Wave: 1.25 m/s  AV Peak Gradient: 15.73 mmHg           MV Peak A-Wave: 1.54 m/s  AV Mean Gradient: 8.65 mmHg  AV Area (Continuity):1.66 cm^2         MV P1/2t: 103 msec  TR Velocity:2.47 m/s                   MVA by PHT2.14 cm^2  TR Gradient:24.4 mmHg                  Estimated RAP:10 mmHg                                         RVSP:34.4 mmHg  Valves  Mitral Valve   Peak E-Wave: 1.25 m/s                 Peak A-Wave: 1.54 m/s  P1/2t: 103 msec                       E/A Ratio: 0.81  Mean Velocity: 1.01 m/s               Peak Gradient: 6.28 mmHg  Mean Gradient: 4.78 mmHg              Deceleration Time: 407.7 msec  Area (PHT): 2.14 cm^2                 Area (continuity): 1.44 cm^2   Tissue Doppler   E' Septal Velocity: 0.07 m/s  E' Lateral Velocity: 0.06 m/s   Aortic Valve Peak Velocity: 1.98 m/s                Mean Velocity: 1.39 m/s  Peak Gradient: 15.73 mmHg              Mean Gradient: 8.65 mmHg  Area (continuity): 1.66 cm^2  AV VTI: 46.12 cm   Tricuspid Valve   Estimated RVSP: 34.4 mmHg               Estimated RAP: 10 mmHg  TR Velocity: 2.47 m/s                   TR Gradient: 24.4 mmHg   Pulmonic Valve   Peak Velocity: 0.95 m/s            Peak Gradient: 3.64 mmHg                                     Estimated PASP: 34.4 mmHg   LVOT   Peak Velocity: 1.51 m/s              Mean Velocity: 1.09 m/s  Peak Gradient: 9.17 mmHg             Mean Gradient: 5.32 mmHg  LVOT Diameter: 1.72 cm               LVOT VTI: 32.89 cm  Structures  Left Atrium   LA Volume/Index: 47.11 ml /24 m^2             LA Area: 16.59 cm^2   Left Ventricle   Diastolic Dimension: 4.6 cm          Systolic Dimension: 3.51 cm  Septum Diastolic: 7.93 cm            Septum Systolic: 4.99 cm  PW Diastolic: 4.61 cm                                       FS: 34.6 %  LV EDV/LV EDV Index: 97.45 ml/50 m^2 LV ESV/LV ESV Index: 35.36 ml/18 m^2  EF Calculated: 63.7 %                LV Length: 7.34 cm   LVOT Diameter: 1.72 cm   Right Atrium   RA Systolic Pressure: 10 mmHg   Right Ventricle   Diastolic Dimension: 1.46 cm                                     RV Systolic Pressure: 48.8 mmHg  Aorta/ Miscellaneous Aorta   Aortic Root: 3.04 cm  LVOT Diameter: 1.72 cm          CT HEAD  9/10/2021  There is no bleed, mass effect, or space occupying lesion. No extra-axial mass or fluid collections. Hypoattenuated White matter changes in the cerebral hemispheres noted. There is global atrophy. Findings likely reflect chronic small vessel vasculopathy. There is a vague area of low attenuation in the inferior posterior right cerebellar hemisphere with slight obliteration of adjacent sulci. An evolving nonhemorrhagic infarct in the cerebellum would have to be a consideration. Clinical correlation recommended.      NO BLEED, MASS EFFECT, OR EXTRA-AXIAL FLUID COLLECTION. EVOLVING NONHEMORRHAGIC INFARCT IN THE INFERIOR RIGHT CEREBELLAR HEMISPHERE. 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 XR CHEST  9/10/2021  Exam: XR CHEST PORTABLE History: Generalized weakness Technique: AP portable view of the chest obtained. Comparison: None available Findings: Atherosclerotic calcification of the thoracic aorta. The cardiomediastinal silhouette is within normal limits. No pneumothorax, pleural effusion, or consolidation. Question calcified pleural plaques on the left, which can be seen with asbestos related disease. No acute osseous abnormality. No radiographic evidence of acute intrathoracic process. Question left-sided pleural plaques which can be seen with asbestos related disease. MRI BRAIN   9/11/2021  There is a tiny focus of restricted diffusion in the left centrum semiovale (series 4 image 19). There are no extra-axial collections. There is no evidence of hemorrhage. The susceptibility images do not demonstrate evidence of hemosiderin deposition within the brain parenchyma or the leptomeninges. There are numerous patchy foci of T2/FLAIR hyperintensities in the subcortical and periventricular white matter in a symmetric distribution throughout both hemispheres. Chronic bilateral cerebellar infarcts. There is prominence of the sulci and ventricles consistent with moderate global cerebral atrophy and chronic involutional changes. No midline shift. The visualized portions of the orbits are within normal limits, the globes are intact. The visualized portions of the paranasal sinuses are within normal limits. Right mastoid effusion. Small foci of restricted diffusion in the left centrum semiovale, consistent with acute ischemia. Chronic microvascular changes and bilateral chronic cerebellar infarcts. FL MODIFIED BARIUM SWALLOW   9/13/2021  MILD ORAL AND MODERATE PHARYNGEAL DYSPHAGIA. RECOMMEND BY SPEECH PATHOLOGY TO FOLLOW.       US CAROTID patient. The patient remains highly medically complex and continues to have severe problems with activities of daily living and mobility. The patient was assessed to be able to tolerate intensive rehabilitation and therefore was admitted to Rehabilitation to address these needs. The patient has been found to have severe abnormality of gait and mobility with impaired self care and is admitted to the acute inpatient rehab program.       Prior Function; everyday activities:     Social History     Socioeconomic History    Marital status: Single     Spouse name: Not on file    Number of children: Not on file    Years of education: Not on file    Highest education level: Not on file   Occupational History    Not on file   Tobacco Use    Smoking status: Never Smoker    Smokeless tobacco: Never Used   Vaping Use    Vaping Use: Never used   Substance and Sexual Activity    Alcohol use: Never     Alcohol/week: 0.0 standard drinks    Drug use: Never    Sexual activity: Not on file   Other Topics Concern    Not on file   Social History Narrative    Lives With: Alone, sister lives in the area    Type of Home: [address] [place] [address]: One level    Home Access: Stairs to enter with rails- Number of Steps: 3- Rails: Both    Bathroom Shower/Tub: Tub/Shower unit    Home Equipment: Cane, Rolling walker (rollator)    ADL Assistance: [facility]: Independent    Homemaking Responsibilities: Yes    Ambulation Assistance: Independent (2ww)    Transfer Assistance: Independent    Active : No    He is a retired  for [address] [employer]. He learned how to be a  in [place] [employer] was stationed in [place] during the [address]. Never went overseas. Never  no kids graduated [employer].          Social Determinants of Health     Financial Resource Strain:     Difficulty of Paying Living Expenses:    Food Insecurity:     Worried About Running Out of Food in the Last Year:    [address] in the Last Year:    Transportation Needs:     Lack of Transportation (Medical):  Lack of Transportation (Non-Medical):    Physical Activity:     Days of Exercise per Week:     Minutes of Exercise per Session:    Stress:     Feeling of Stress :    Social Connections:     Frequency of Communication with Friends and Family:     Frequency of Social Gatherings with Friends and Family:     Attends Hindu Services:     Active Member of Clubs or Organizations:     Attends Club or Organization Meetings:     Marital Status:    Intimate Partner Violence:     Fear of Current or Ex-Partner:     Emotionally Abused:     Physically Abused:     Sexually Abused:      Social supports listed above. Prior Device(s) used:  As above    History of falls:  Rarely falls    In depth analysis of complex functional data; the patient has been:    Current Rehabilitation Assessments:    Physical therapy: FIMS:  Bed Mobility:      Transfers:Sit to Stand: Minimal Assistance, Stand by assistance (multiple trials with immproving ability with each trial)  Stand to sit: Minimal Assistance, Stand by assistance  Bed to Chair: Minimal assistance (pivot to right and left), Ambulation 1  Surface: level tile  Device: Rolling Walker  Assistance: Minimal assistance, Moderate assistance  Quality of Gait: wide ISAIAH, short step length, shuffling steps, decreased trunk and cervical rotation, poor walker control and management  Distance: 15 feet; 50 feet  Comments: Pt with decreased safety awareness and poor awareness deficits - able to follow instructions to correct however unable to carry over between trials,      FIMS:  , , Assessment: Pt demonstrates deficits as listed. He is requiring assistance with mobility at this time. Pt is in need of PT at this level of care prior to safe return to home    Occupational therapy: FIMS:   ,  , Assessment: Pt is  a 77 y.o. admitted for CVA. Pt with above deficits impairing ability to safety complete ADL's and IADLs. Pt would benefit from OT services to address deficits and maximize level of safety and function during ADL's. OCCUPATIONAL THERAPY  Hand Dominance: Right  ADL  Feeding: Thickened liquids;Setup (09/14/21 1158)  Grooming: Supervision;Verbal cueing (09/14/21 1158)  UE Bathing: Stand by assistance;Verbal cueing (09/14/21 1158)  LE Bathing: Contact guard assistance;Verbal cueing (09/14/21 1158)  UE Dressing: Unable to assess(comment) (gown) (09/14/21 1158)  LE Dressing: Contact guard assistance;Verbal cueing (09/14/21 1158)  Toileting: Unable to assess(comment) (pt did not need to go) (09/14/21 1158)  Additional Comments: Pt perseverative during ADL and requires verbal cues to recognize and sequence (09/14/21 1158)  Toilet Transfers  Toilet Transfer: Unable to assess (09/14/21 1201)  Toilet Transfers Comments: Pt did not need to go (09/14/21 1201)     Shower Transfers  Shower - Transfer From: Albino Hawks (to shower with walker, from shower to wheelchair) (09/14/21 1201)  Shower - Transfer Type: To and From (09/14/21 1201)  Shower - Transfer To: Shower seat with back (09/14/21 1201)  Shower - Technique: Ambulating (ambulating and stand step) (09/14/21 1201)  Shower Transfers: Minimal assistance (09/14/21 1201)    Speech therapy: FIMS:       SPEECH THERAPY  Motor Speech: Exceptions to [facility]  Comprehension: Exceptions  Verbal Expression: Exceptions to functional limits (Speech automatics I with 100% accuracy, sentence completion I with 100% accuracy, descriptive naming I with 60% accuracy)      Diet/Swallow:  Diet Solids Recommendation: Dysphagia Soft and Bite-Sized (Dysphagia III)  Liquid Consistency Recommendation: Mildly Thick (Nectar)  Dysphagia Outcome Severity Scale: Level 4: Mild moderate dysphagia- Intermittent supervision/cueing. One - two diet consistencies restricted    Compensatory Swallowing Strategies:  Alternate solids and liquids, No straws, Upright as possible for all oral intake, Swallow 2 times per bite/sip, Eat/Feed slowly  Therapeutic Interventions: Bolus control exercises, Diet tolerance monitoring, Oral motor exercises, Patient/Family education, Pharyngeal exercises, Vital Stim/NMES, Laryngeal exercises, Effortful swallow, Tongue base strengthening          COGNITION  OT: Cognition Comment: Comp; Supervision Express: Independent Social: Supervision Prob: Min A Mem: Supervision  SP:          Prior to admission patient was independent with all ADLs and mobilityand did not require any outside services.        Past Medical History:   Diagnosis Date    Cellulitis of left hand 3/26/2019    Hypertension        Past Surgical History:   Procedure Laterality Date    BACK SURGERY         Current Facility-Administered Medications   Medication Dose Route Frequency Provider Last Rate Last Admin    Vitamin D (CHOLECALCIFEROL) tablet 2,000 Units  2,000 Units Oral Dinner Horacio Akbar, DO        cyanocobalamin injection 1,000 mcg  1,000 mcg IntraMUSCular Weekly Luana Scullin, DO   1,000 mcg at 09/14/21 0807    coenzyme Q10 capsule 100 mg  100 mg Oral Daily Luana Scullin, DO   100 mg at 09/14/21 2014    lidocaine 4 % external patch 3 patch  3 patch TransDERmal Daily Luana Scullin, DO        acetaminophen (TYLENOL) tablet 650 mg  650 mg Oral Q4H PRN Luana Scullin, DO        bisacodyl (DULCOLAX) suppository 10 mg  10 mg Rectal Daily PRN Horacio Naomie, DO        fleet rectal enema 1 enema  1 enema Rectal Daily PRN Luana Scullin, DO        metoprolol tartrate (LOPRESSOR) tablet 50 mg  50 mg Oral BID Jaycob Wright MD   50 mg at 09/14/21 1305    acetaminophen (TYLENOL) tablet 650 mg  650 mg Oral Q6H PRN Zulma Rodríguez MD        Or    acetaminophen (TYLENOL) suppository 650 mg  650 mg Rectal Q6H PRN Zulma Rodríguez MD        enoxaparin (LOVENOX) injection 40 mg  40 mg SubCUTAneous Daily Zulma Rodríguez MD   40 mg at 09/14/21 0807    polyethylene glycol (GLYCOLAX) packet 17 g  17 g Oral Daily PRN Magi Kruger MD        amLODIPine (NORVASC) tablet 10 mg  10 mg Oral Daily Magi Kruger MD   10 mg at 09/14/21 0807    aspirin chewable tablet 81 mg  81 mg Oral Daily Magi Kruger MD   81 mg at 09/14/21 0807    atorvastatin (LIPITOR) tablet 80 mg  80 mg Oral Nightly Magi Kruger MD   80 mg at 09/13/21 2012    lisinopril (PRINIVIL;ZESTRIL) tablet 10 mg  10 mg Oral Daily Magi Kruger MD   10 mg at 09/14/21 0807       No Known Allergies                   FAMILY HISTORY:  Does not pertain tochief complaint. Review of Systems   Constitutional: Positive for activity change and fatigue. Negative for chills, diaphoresis and fever. HENT: Negative for congestion, ear discharge, ear pain, hearing loss, nosebleeds, sore throat and tinnitus. Eyes: Negative for photophobia, pain and redness. Respiratory: Positive for shortness of breath. Negative for cough, wheezing and stridor. Shortness of breath on exertion   Cardiovascular: Positive for palpitations. Negative for chest pain, leg swelling and near-syncope. Gastrointestinal: Positive for constipation. Negative for abdominal pain, blood in stool, bowel incontinence, diarrhea, nausea and vomiting. Endocrine: Negative for polydipsia. Genitourinary: Positive for bladder incontinence and difficulty urinating. Negative for dysuria, flank pain, frequency, hematuria and urgency. Musculoskeletal: Positive for back pain, gait problem and myalgias. Negative for neck pain. Skin: Negative for rash. Allergic/Immunologic: Positive for immunocompromised state. Negative for environmental allergies. Neurological: Positive for dizziness, focal weakness, weakness, light-headedness and loss of balance. Negative for vertigo, tremors, seizures, syncope and headaches. Hematological: Does not bruise/bleed easily.    Psychiatric/Behavioral: Positive for confusion, decreased concentration, dysphoric mood and memory loss. Negative for hallucinations and suicidal ideas. The patient is not nervous/anxious. Objective  BP (!) 160/92   Pulse 81   Temp 98.5 °F (36.9 °C)   Resp 20   Ht 5' 8\" (1.727 m)   Wt 165 lb 3 oz (74.9 kg)   SpO2 100%   BMI 25.12 kg/m² *    Physical Exam  Constitutional:       General: He is not in acute distress. Appearance: He is well-developed. He is ill-appearing. He is not toxic-appearing or diaphoretic. HENT:      Head: Normocephalic. Not macrocephalic and not microcephalic. No raccoon eyes or Castellon's sign. Mouth/Throat:      Pharynx: Oropharyngeal exudate present. Eyes:      General: No scleral icterus. Right eye: No discharge. Left eye: No discharge. Conjunctiva/sclera: Conjunctivae normal.      Pupils: Pupils are equal, round, and reactive to light. Neck:      Thyroid: No thyromegaly. Vascular: Normal carotid pulses. No carotid bruit, hepatojugular reflux or JVD. Trachea: No tracheal deviation. Pulmonary:      Effort: Pulmonary effort is normal.      Breath sounds: No stridor. Decreased breath sounds present. Musculoskeletal:      Cervical back: Normal range of motion. Tenderness present. Spinous process tenderness and muscular tenderness present. Thoracic back: Tenderness present. Decreased range of motion. Lumbar back: Tenderness present. Decreased range of motion. Skin:     General: Skin is warm and dry. Coloration: Skin is pale. Findings: Bruising present. Comments: Old IV sites   Neurological:      Sensory: Sensory deficit present. Coordination: Coordination abnormal. Finger-Nose-Finger Test abnormal, Heel to [employer] abnormal and Romberg Test abnormal.      Gait: Gait abnormal and tandem walk abnormal.      Deep Tendon Reflexes:      Reflex Scores:       Tricep reflexes are 1+ on the right side and 1+ on the left side.        Bicep reflexes are 1+ on the right side and 1+ on the left side. Brachioradialis reflexes are 1+ on the right side and 1+ on the left side. Patellar reflexes are 0 on the right side and 0 on the left side. Achilles reflexes are 0 on the right side and 0 on the left side. Psychiatric:         Attention and Perception: He is attentive. Mood and Affect: Mood is depressed. Mood is not anxious. Affect is not angry. Speech: Speech is delayed. Speech is not rapid and pressured. Behavior: Behavior is slowed. Behavior is not withdrawn. Thought Content: Thought content normal.         Cognition and Memory: Cognition is impaired. Memory is not impaired. He exhibits impaired recent memory. He does not exhibit impaired remote memory. Judgment: Judgment is not impulsive or inappropriate. Ortho Exam  Neurologic Exam     Mental Status   Attention: decreased. Concentration: decreased. Level of consciousness: drowsy  Knowledge: poor. Able to name object. Unable to read. Able to repeat. Unable to write. Abnormal comprehension. Cranial Nerves     CN III, IV, VI   Pupils are equal, round, and reactive to light. Motor Exam   Muscle bulk: decreased  Overall muscle tone: normal    Gait, Coordination, and Reflexes     Gait  Gait: shuffling and wide-based    Coordination   Romberg: positive  Finger to nose coordination: abnormal  Heel to shin coordination: abnormal  Tandem walking coordination: abnormal    Reflexes   Right brachioradialis: 1+  Left brachioradialis: 1+  Right biceps: 1+  Left biceps: 1+  Right triceps: 1+  Left triceps: 1+  Right patellar: 0  Left patellar: 0  Right achilles: 0  Left achilles: 0      After extensive review of the records and above physical exam, I have formulated the followingdiagnoses and plan:      DIAGNOSES:    1.   The patient was admitted to the acute rehabilitation unit with the primary rehab diagnoses being severe abnormality of gait and mobility and impaired self care and and confirmed on MRI as above-consult neurology control blood pressure monitor for diabetes add aspirin  3. Spinal stenosis of lumbar region with neurogenic claudication, Myelopathy concurrent with and due to spinal stenosis of cervical region  4. Ataxic gait due to cerebellar CVA-focus on balance and therapy  5. Significant oral pharyngeal dysphagia-consult speech-language pathology  6. Anxiety and depression-emotional support provided daily, vitamin B12, encourage participation in rehabilitation support group and recreational therapy, adjust/add medications (add vitamin B12 consider SSRI)  7. Hypertension, hypercholesterolemia, SC cerebrovascular disease, coronary artery disease, cardiac arrhythmia-continue telemetry-continue blood signs every shift focusing on heart rate and blood pressure checks, consult hospitalist for backup medical and adjust/add medications (aspirin, Norvasc, Lipitor, lisinopril, Lopressor) consult cardiology regarding V. tach titrate beta-blocker  8. Oral pharyngeal dysphagia with cognitive deficits-dysphagia soft with bite-size nectar thick liquid consult speech-language pathology--oral motor exercises          I am especially concerned about their recent medical complexities. The patient's high risk medication use includes -patient should consider low-dose opiates for pain if his other medications are insufficient as he has significant longstanding progressive cervical thoracic and lumbar stenosis. The patient is high risk for urinary tract infection, an admission urinalysis has been ordered. I will have the nurses check post void residual bladder volumes and place acatheter if excessive urine is retained in the bladder after voiding. The patient is risk for deep venous thromosis, complex deep venous thrombosis protocol prophylaxis has been ordered Lovenox.     The patient is high risk for orthostasis and a hydration program and orthostatic blood pressure screening have been ordered. I will attempt to get old records from the patient's previous hospital stay. Care everywhere on EPIC wasutilized. 3.  Current and previous medications were reviewed and summarized and compared to old medication lists from home and from the acute floor. 4.  Complex labs and x-rays were reviewed. I will review patient's old EKG and labs. 5.  Will provide emotional support for this patient regarding adjustment to their disability. Cognition and mood will be screened daily and addressed by rehabilitationpsychology and/or speech therapy as appropriate. I have encouraged the patient to attend the Rehab Adjustment to Disability Support Group and recreational therapy. 6.  Estimated length of stay is 2-3 weeks. Discharge to home with help from family and home health PT, OT, RN, and aide. Patient should be independent at discharge. 7.  The patient's medical and rehab prognosis are good. [address] regarding the patient's back up to general medical needs. A welcome letter was presented with an explanation of my services, my specialty and what to expect during the rehabilitation process. As well as introducing myself, I also wrote my name on their bedside marker board with their name as well as the names of the other physicians with an explanation of our individual roles in their care, as well as the rehab process.             Virgil Quintana D.O., F.A.A.P.M.&JOVANY.

## 2021-09-14 NOTE — PROGRESS NOTES
[facility]   Facility/Department: [facility]  Speech Language Pathology  Clinical Bedside Swallow Evaluation    Cherrie Garcia  :  (77 y.o.)   MRN: [id number]  ROOM: [place] [address]  ADMISSION DATE: 2021  PATIENT DIAGNOSIS(ES): Abnormality of gait following cerebrovascular accident (CVA) [P78.563, R26.9]  Abnormality of gait and mobility [R26.9]  No chief complaint on file. 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 ([facility] [address] 2021    Ataxic gait 2021    Numbness     Stroke determined by clinical assessment ([facility] [address] 09/10/2021    Cellulitis 2019    Cellulitis of left hand 2019    Shoulder dislocation 2016     Past Medical History:   Diagnosis Date    Cellulitis of left hand 3/26/2019    Hypertension      Past Surgical History:   Procedure Laterality Date    BACK SURGERY       No Known Allergies    DATE ONSET: 09/10/21    Date of Evaluation: 2021   Evaluating Therapist: SIVA Boyce      Recommended Diet and Intervention  Diet Solids Recommendation: Dysphagia Soft and Bite-Sized (Dysphagia III)  Liquid Consistency Recommendation: Mildly Thick (Nectar)  Recommended Form of Meds: Meds in puree  Recommendations: Dysphagia treatment  Therapeutic Interventions: Bolus control exercises, Diet tolerance monitoring, Oral motor exercises, Patient/Family education, Pharyngeal exercises, Vital Stim/NMES, Laryngeal exercises, Effortful swallow, Tongue base strengthening    Compensatory Swallowing Strategies  Compensatory Swallowing Strategies: Alternate solids and liquids; No straws;Upright as possible for all oral intake;Swallow 2 times per bite/sip;Eat/Feed slowly    Reason for Referral  Bettina Abdullahi was referred for a bedside swallow evaluation to assess the efficiency of his swallow function, identify signs and symptoms of aspiration and make recommendations regarding safe dietary consistencies, effective compensatory strategies, and safe eating environment. General  Chart Reviewed: Yes  Subjective  Subjective: Patient was cooperative, but appeared frustrated at times. Patient reported disliking the food he received for lunch this date. ST noted patient's diced carrots were not soft and were difficult to cut with a fork. Behavior/Cognition: Alert; Cooperative  Respiratory Status: Room air  O2 Device: None (Room air)  Communication Observation: Functional  Follows Directions: Simple  Dentition: Some missing teeth  Patient Positioning: Upright in chair  Baseline Vocal Quality: Normal  Prior Dysphagia History: MBS completed on 09/13/21 recommending Soft, bite size solids with nectar thick liquids. Consistencies Administered: Dysphagia Soft and Bite-Sized (Dysphagia III); Dysphagia Pureed (Dysphagia I); Nectar - cup    Current Diet level:  Current Diet : Dysphagia Soft and Bite-Sized (Dysphagia III)  Current Liquid Diet : Mildly Thick (Nectar)    Oral Motor Deficits  Oral/Motor  Oral Motor: Exceptions to [facility]  Labial ROM: Reduced right  Labial Symmetry: Abnormal symmetry right  Labial Strength: Reduced  Labial Coordination: Reduced  Lingual Strength: Reduced  Lingual Coordination: Reduced    Oral Phase Dysfunction  Oral Phase  Oral Phase: Exceptions  Oral Phase Dysfunction  Impaired Mastication: Soft Solid  Pocketing Right: Soft solid  Decreased Anterior to Posterior Transit: Soft solid  Lingual/Palatal Residue: Soft solid  Oral Phase  Oral Phase - Comment: Patient presented with mild to moderate oral dysphagia     Indicators of Pharyngeal Phase Dysfunction   Pharyngeal Phase  Pharyngeal Phase: Exceptions  Indicators of Pharyngeal Phase Dysfunction  Delayed Swallow: Nectar - cup  Pharyngeal Phase   Pharyngeal: Mild pharyngeal dysphagia is suspected    Impression  Dysphagia Diagnosis: Mild to moderate oral stage dysphagia;Mild pharyngeal stage dysphagia  Dysphagia Impression : Patient presented with mild to moderate oral dysphagia and suspected mild pharyngeal dysphagia characterized by increased mastication time with right side pocketing with diced carrots and peppered steak. Patient presented with reduced awareness of pocketing within the right buccal cavity. ST cued patient to utilize liquid wash to attempt to clear bolus. Nectar thick liquid wash reduced, but did not fully clear oral residue. Patient eventually expectorated mild amount of carrots from oral cavity. ST noted patient's diced carrots were not soft. ST cued patient to avoid consuming further trials of carrots. Patient presented with improved oral clearance of puree solids. Increased mastication time was also noted with soft, bite size solids and hard diced carrots. No overt s/s of aspiration was noted with all po trials. Patient did present with delayed swallow response with all po trials. Inconsistent double swallow was noted. Patient initially consumed large consecutive sips of nectar thick liquids. WIth verbal cues patient reduced sip size and only consumed 1 sip at a time. Vocal quality remained clear throughout the BSE. ST discussed Estim with patient. Patient is agreeable. Dysphagia Outcome Severity Scale: Level 4: Mild moderate dysphagia- Intermittent supervision/cueing. One - two diet consistencies restricted     Treatment Plan  Requires SLP Intervention: Yes  Duration/Frequency of Treatment: 3-5x/week for LOS or until all goals are met  D/C Recommendations:  To be determined       Treatment/Goals  Short-term Goals  Timeframe for Short-term Goals: 1-2 weeks  Goal 1: Pt will complete oral motor ROM and strengthening exercises with 90% accuracy, given cues as needed, in order to strengthen lingual/labial/buccal musculature to promote safety and efficiency of oral phase of swallow and decrease risk for pocketing. Goal 2: Pt will complete lingual exercises that promote anterior to posterior propulsion of bolus and improve tongue base retraction with 90% accuracy in order to strengthen the muscles of the swallow to decrease risk of aspiration and to increase ability to safely handle the least restrictive diet level. Goal 3: Pt will complete pharyngeal strengthening exercises (such as the Blanca, Effortful swallow, etc) with 90% accuracy in order to strengthen and establish and more effective swallow. Goal 4: Pt will tolerate therapeutic trials of thin from cup using small sip with demonstrating no overt s/s of difficulty or aspiration on 100% trials  Goal 5: Pt will demonstrate swallow strategies for safe and efficient swallow of soft/nectar consistencies in all given opportunities  Goal 6: Pt will tolerate 30 minutes of LANIE to suprahyoidal musculature and right facial musculature to improve hylolaryngeal elevation/management of secretions/increase UES elongation and laryngeal exursion to reduce pyriform sinus retention and decrease the risk of penetration and aspiration during oral intake. Long-term Goals  Timeframe for Long-term Goals: 2-3 weeks  Goal 1: Pt will tolerate least restrictive diet with no overt s/s of aspiration. Prognosis  Prognosis  Prognosis for safe diet advancement: good  Individuals consulted  Consulted and agree with results and recommendations: Patient;RN (kati)    Education  Patient Education: Patient educated on BSE results  Patient Education Response: Verbalizes understanding;Needs reinforcement  Safety Devices in place: Yes  Type of devices: Call light within reach; Chair alarm in place    [x]  Kati TOPETE notified   [x]  Dr. Lori Kaye notified via voicemail  [x]  Blue wrist band placed on patient  [x]  Swallow guide placed in room    Pain Assessment:  Pre-Treatment  Pain assessment: 0-10  Pain level: 0  Intervention:  Patient denies pain. Post-Treatment  Pain assessment: 0-10  Pain level: 0  Intervention:  Patient denies pain.          NATIONAL OUTCOMES MEASUREMENT SYSTEM (NOMS):    SWALLOWING  Ratin    Therapy Time  SLP Individual Minutes  Time In: 6315  Time Out: 9444  Minutes: 12            Signature: Electronically signed by SIVA Gutierrez on 2021 at 2:52 PM

## 2021-09-14 NOTE — CONSULTS
Consults    Patient Name: Allan Wilkinson Date: 2021  4:41 PM  MR #: [id number]  : 1945    Attending Physician: Sheldon Stout DO  Reason for consult: NSVT and HTN    History of Presenting Illness:      Earlene Mohamud is a 77 y.o. male on hospital day 1 with a history of . History Obtained From:  patient, electronic medical record     Pt admitted to Rehab after acute CVA. Still has residualr Left arm and leg weakness. Walks little with walker and assist. No cp no sob. Denies Palpitation. We are asked to evaluate for persistent HTN and 4 beat NSVT on Telemetry. Pt was not aware  History:      EKG: SR  Past Medical History:   Diagnosis Date    Cellulitis of left hand 3/26/2019    Hypertension      Past Surgical History:   Procedure Laterality Date    BACK SURGERY         Family History  No family history on file.   [] Unable to obtain due to ventilated and/ or neurologic status    Social History     Socioeconomic History    Marital status: Single     Spouse name: Not on file    Number of children: Not on file    Years of education: Not on file    Highest education level: Not on file   Occupational History    Not on file   Tobacco Use    Smoking status: Never Smoker    Smokeless tobacco: Never Used   Vaping Use    Vaping Use: Never used   Substance and Sexual Activity    Alcohol use: Never     Alcohol/week: 0.0 standard drinks    Drug use: Never    Sexual activity: Not on file   Other Topics Concern    Not on file   Social History Narrative    Lives With: Alone, sister lives in the area    Type of Home: [address] [place], [place] [address]vd: One level    Home Access: Stairs to enter with rails- Number of Steps: 3- Rails: Both    Bathroom Shower/Tub: Tub/Shower unit    Home Equipment: Cane, Rolling walker (rollator)    ADL Assistance: [address] [facility]: Independent    Homemaking Responsibilities: Yes    Ambulation Assistance: Independent (2ww) Transfer Assistance: Independent    Active : No    He is a retired  for [employer]. He learned how to be a  in [employer] was stationed in [place] during the [address]. Never went overseas. Never  no kids graduated [employer]. Social Determinants of Health     Financial Resource Strain:     Difficulty of Paying Living Expenses:    Food Insecurity:     Worried About Running Out of Food in the Last Year:     [address] in the Last Year:    Transportation Needs:     Lack of Transportation (Medical):  Lack of Transportation (Non-Medical):    Physical Activity:     Days of Exercise per Week:     Minutes of Exercise per Session:    Stress:     Feeling of Stress :    Social Connections:     Frequency of Communication with Friends and Family:     Frequency of Social Gatherings with Friends and Family:     Attends Anabaptist Services:     Active Member of Clubs or Organizations:     Attends Club or Organization Meetings:     Marital Status:    Intimate Partner Violence:     Fear of Current or Ex-Partner:     Emotionally Abused:     Physically Abused:     Sexually Abused:       [] Unable to obtain due to ventilated and/ or neurologic status      Home Medications:      Medications Prior to Admission: naproxen sodium (ALEVE) 220 MG tablet, Take 220 mg by mouth 2 times daily (with meals)  cloNIDine (CATAPRES) 0.1 MG tablet, TAKE 1 TABLET BY MOUTH TWICE A DAY  amLODIPine (NORVASC) 10 MG tablet, Take 1 tablet by mouth daily  neomycin-bacitracin-polymyxin (NEOSPORIN) 400-5-5000 ointment, Apply topically 2 times daily.     Current Hospital Medications:     Scheduled Meds:   Vitamin D  2,000 Units Oral Dinner    cyanocobalamin  1,000 mcg IntraMUSCular Weekly    coenzyme Q10  100 mg Oral Daily    lidocaine  3 patch TransDERmal Daily    metoprolol tartrate  50 mg Oral BID    enoxaparin  40 mg SubCUTAneous Daily    amLODIPine  10 mg Oral Daily    aspirin  81 mg Oral Daily    atorvastatin  80 mg Oral Nightly    lisinopril  10 mg Oral Daily     Continuous Infusions:  PRN Meds:.acetaminophen, bisacodyl, fleet, acetaminophen **OR** acetaminophen, polyethylene glycol  . Allergies:     No Known Allergies     Review of Systems:       Review of Systems   Constitutional: Negative. Negative for diaphoresis and fatigue. HENT: Negative. Eyes: Negative. Respiratory: Negative. Negative for cough, chest tightness, shortness of breath, wheezing and stridor. Cardiovascular: Negative. Negative for chest pain, palpitations and leg swelling. Gastrointestinal: Negative. Negative for blood in stool and nausea. Genitourinary: Negative. Musculoskeletal: Positive for gait problem. Skin: Negative. Neurological: Positive for weakness. Negative for dizziness, syncope and light-headedness. Hematological: Negative. Psychiatric/Behavioral: Negative. Objective Findings:     Vitals:BP (!) 160/92   Pulse 81   Temp 98.5 °F (36.9 °C)   Resp 20   Ht 5' 8\" (1.727 m)   Wt 165 lb 3 oz (74.9 kg)   SpO2 100%   BMI 25.12 kg/m²      Physical Examination:    Physical Exam   Constitutional: He appears healthy. No distress. HENT:   Normal cephalic and Atraumatic   Eyes: Pupils are equal, round, and reactive to light. Neck: Thyroid normal. No JVD present. No neck adenopathy. No thyromegaly present. Cardiovascular: Normal rate, regular rhythm, intact distal pulses and normal pulses. Murmur heard. Pulmonary/Chest: Effort normal and breath sounds normal. He has no wheezes. He has no rales. He exhibits no tenderness. Abdominal: Soft. Bowel sounds are normal. There is no abdominal tenderness. Musculoskeletal:         General: No tenderness or edema. Normal range of motion. Cervical back: Normal range of motion and neck supple. Neurological: He is alert and oriented to person, place, and time. Skin: Skin is warm.  No cyanosis. Nails show no clubbing. Results/ Medications reviewed 9/14/2021, 10:47 AM     Laboratory, Microbiology, Pathology, Radiology, Cardiology, Medications and Transcriptions reviewed  Scheduled Meds:   Vitamin D  2,000 Units Oral Dinner    cyanocobalamin  1,000 mcg IntraMUSCular Weekly    coenzyme Q10  100 mg Oral Daily    lidocaine  3 patch TransDERmal Daily    metoprolol tartrate  50 mg Oral BID    enoxaparin  40 mg SubCUTAneous Daily    amLODIPine  10 mg Oral Daily    aspirin  81 mg Oral Daily    atorvastatin  80 mg Oral Nightly    lisinopril  10 mg Oral Daily     Continuous Infusions:    Recent Labs     09/13/21  0629   WBC 5.4   HGB 16.6   HCT 47.3   MCV 91.1        Recent Labs     09/12/21  0655 09/13/21  0629 09/14/21  0750    142 142   K 4.0 4.1 4.4    107 108*   CO2 22 23 21   PHOS  --   --  3.1   BUN 26* 35* 35*   CREATININE 1.03 1.25* 0.98     No results for input(s): AST, ALT, ALB, BILIDIR, BILITOT, ALKPHOS in the last 72 hours. No results for input(s): LIPASE, AMYLASE in the last 72 hours. No results for input(s): PROT, INR in the last 72 hours.   Echocardiogram complete 2D with doppler with color    Result Date: 9/11/2021  Transthoracic Echocardiography Report (TTE)  Demographics   Patient Name    Jeraline Mcardle Gender               Male   Patient Number  [id number]       Race                 Unknown                                  Ethnicity   Visit Number    [id number]      Room Number          J396   Corporate ID                   Date of Study        09/11/2021   Accession       [id number]     Referring Physician  Number   Date of Birth   1945     Sonographer          Yane Linn   Age             77 year(s)     Interpreting         [facility])                                 Physician            Cardiology                                                      Karen Pratt  Procedure Type of Study   TTE procedure:ECHOCARDIOGRAM COMPLETE WITH BUBBLE STUDY. Procedure Date Date: 09/11/2021 Start: 08:15 AM Study Location: Portable Technical Quality: Adequate visualization Indications:Stroke. Patient Status: Routine Height: 68 inches Weight: 179 pounds BSA: 1.95 m^2 BMI: 27.22 kg/m^2 BP: 147/86 mmHg  Conclusions   Summary  Moderate annular calcification. Normal aortic valve structure and function. Trivial aortic regurgitation is  noted. Moderately dilated left atrium. Normal intact intra-atrial septum was noted with no evidence of  significant intra-atrial communications neither by colour flow Doppler  imaging nor by aggitated saline study. Left ventricular ejection fraction is visually estimated at 60%. Impaired relaxation compatible with diastolic dysfunction. ( reversed E/A  ratio)  Moderate concentric left ventricular hypertrophy. Signature   ----------------------------------------------------------------  Electronically signed by Connie Medina(Interpreting physician)  on 09/11/2021 11:17 AM  ----------------------------------------------------------------   Findings  Left Ventricle Left ventricular ejection fraction is visually estimated at 60%. Impaired relaxation compatible with diastolic dysfunction. ( reversed E/A ratio) Moderate concentric left ventricular hypertrophy. Right Ventricle Normal right ventricle structure and function. Normal right ventricle systolic pressure. Left Atrium Moderately dilated left atrium. Normal intact intra-atrial septum was noted with no evidence of significant intra-atrial communications neither by colour flow Doppler imaging nor by aggitated saline study. Right Atrium Normal right atrium. Mitral Valve Moderate annular calcification. Tricuspid Valve Normal tricuspid valve structure and function. Aortic Valve Normal aortic valve structure and function. Trivial aortic regurgitation is noted. Pulmonic Valve Normal pulmonic valve structure and function. Pericardial Effusion No evidence of pericardial effusion.  Pleural Effusion No evidence of pleural effusion. Aorta \ Miscellaneous Miscellaneous normal findings were found. M-Mode Measurements (cm)   LVIDd: 4.6 cm                          LVIDs: 3.01 cm  IVSd: 1.98 cm                          IVSs: 2.14 cm  LVPWd: 1.08 cm                         AO Root Dimension: 3.04 cm  Rt. Vent.  Dimension: 3.05 cm                                         LVOT: 1.72 cm  Doppler Measurements:   AV Velocity:0.02 m/s                   MV Peak E-Wave: 1.25 m/s  AV Peak Gradient: 15.73 mmHg           MV Peak A-Wave: 1.54 m/s  AV Mean Gradient: 8.65 mmHg  AV Area (Continuity):1.66 cm^2         MV P1/2t: 103 msec  TR Velocity:2.47 m/s                   MVA by PHT2.14 cm^2  TR Gradient:24.4 mmHg                  Estimated RAP:10 mmHg                                         RVSP:34.4 mmHg  Valves  Mitral Valve   Peak E-Wave: 1.25 m/s                 Peak A-Wave: 1.54 m/s  P1/2t: 103 msec                       E/A Ratio: 0.81  Mean Velocity: 1.01 m/s               Peak Gradient: 6.28 mmHg  Mean Gradient: 4.78 mmHg              Deceleration Time: 407.7 msec  Area (PHT): 2.14 cm^2                 Area (continuity): 1.44 cm^2   Tissue Doppler   E' Septal Velocity: 0.07 m/s  E' Lateral Velocity: 0.06 m/s   Aortic Valve   Peak Velocity: 1.98 m/s                Mean Velocity: 1.39 m/s  Peak Gradient: 15.73 mmHg              Mean Gradient: 8.65 mmHg  Area (continuity): 1.66 cm^2  AV VTI: 46.12 cm   Tricuspid Valve   Estimated RVSP: 34.4 mmHg               Estimated RAP: 10 mmHg  TR Velocity: 2.47 m/s                   TR Gradient: 24.4 mmHg   Pulmonic Valve   Peak Velocity: 0.95 m/s            Peak Gradient: 3.64 mmHg                                     Estimated PASP: 34.4 mmHg   LVOT   Peak Velocity: 1.51 m/s              Mean Velocity: 1.09 m/s  Peak Gradient: 9.17 mmHg             Mean Gradient: 5.32 mmHg  LVOT Diameter: 1.72 cm               LVOT VTI: 32.89 cm  Structures  Left Atrium   LA Volume/Index: 47.11 ml /24 m^2             LA Area: 16.59 cm^2   Left Ventricle   Diastolic Dimension: 4.6 cm          Systolic Dimension: 7.96 cm  Septum Diastolic: 5.49 cm            Septum Systolic: 7.14 cm  PW Diastolic: 5.83 cm                                       FS: 34.6 %  LV EDV/LV EDV Index: 97.45 ml/50 m^2 LV ESV/LV ESV Index: 35.36 ml/18 m^2  EF Calculated: 63.7 %                LV Length: 7.34 cm   LVOT Diameter: 1.72 cm   Right Atrium   RA Systolic Pressure: 10 mmHg   Right Ventricle   Diastolic Dimension: 2.49 cm                                     RV Systolic Pressure: 60.6 mmHg  Aorta/ Miscellaneous Aorta   Aortic Root: 3.04 cm  LVOT Diameter: 1.72 cm      CT HEAD WO CONTRAST    Result Date: 9/10/2021  EXAMINATION: CT HEAD WO CONTRAST CLINICAL HISTORY: generalized weakness COMPARISON:  NONE AVAILABLE An unenhanced scan is performed. FINDINGS:   There is no bleed, mass effect, or space occupying lesion. No extra-axial mass or fluid collections. Hypoattenuated White matter changes in the cerebral hemispheres noted. There is global atrophy. Findings likely reflect chronic small vessel vasculopathy. There is a vague area of low attenuation in the inferior posterior right cerebellar hemisphere with slight obliteration of adjacent sulci. An evolving nonhemorrhagic infarct in the cerebellum would have to be a consideration. Clinical correlation recommended. NO BLEED, MASS EFFECT, OR EXTRA-AXIAL FLUID COLLECTION. EVOLVING NONHEMORRHAGIC INFARCT IN THE INFERIOR RIGHT CEREBELLAR HEMISPHERE. 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 All CT scans at this facility use dose modulation, iterative reconstruction, and/or weight based dosing when appropriate to reduce radiation dose to as low as reasonably achievable. XR CHEST PORTABLE    Result Date: 9/10/2021  Exam: XR CHEST PORTABLE History: Generalized weakness Technique: AP portable view of the chest obtained.  Comparison: None available Findings: Atherosclerotic calcification of the thoracic aorta. The cardiomediastinal silhouette is within normal limits. No pneumothorax, pleural effusion, or consolidation. Question calcified pleural plaques on the left, which can be seen with asbestos related disease. No acute osseous abnormality. No radiographic evidence of acute intrathoracic process. Question left-sided pleural plaques which can be seen with asbestos related disease. MRI BRAIN WO CONTRAST    Result Date: 9/11/2021  EXAMINATION: MRI BRAIN WO CONTRAST CLINICAL HISTORY:  STROKE COMPARISONS: MR brain 4/5/2016 TECHNIQUE: Multiplanar multisequence images of the brain were obtained without contrast. Diffusion perfusion imaging was obtained. FINDINGS: There is a tiny focus of restricted diffusion in the left centrum semiovale (series 4 image 19). There are no extra-axial collections. There is no evidence of hemorrhage. The susceptibility images do not demonstrate evidence of hemosiderin deposition within the brain parenchyma or the leptomeninges. There are numerous patchy foci of T2/FLAIR hyperintensities in the subcortical and periventricular white matter in a symmetric distribution throughout both hemispheres. Chronic bilateral cerebellar infarcts. There is prominence of the sulci and ventricles consistent with moderate global cerebral atrophy and chronic involutional changes. No midline shift. The visualized portions of the orbits are within normal limits, the globes are intact. The visualized portions of the paranasal sinuses are within normal limits. Right mastoid effusion. Small foci of restricted diffusion in the left centrum semiovale, consistent with acute ischemia. Chronic microvascular changes and bilateral chronic cerebellar infarcts.  CRITICAL RESULTS: Communicated with Josseline Arroyo RN on 9/11/2021 at 12:51 PM.    FL MODIFIED BARIUM SWALLOW W VIDEO    Result Date: 9/13/2021  EXAMINATION: MODIFIED BARIUM SWALLOW CLINICAL DATA: DYSPHAGIA. COMPARISON: NONE. TECHNIQUE: A total of 15 dynamic image series over the course of 3.5 minutes were obtained. FINDINGS: In cooperation with speech pathology, a modified barium swallow using various consistencies of both solids and liquids with dynamic imaging was performed. Patient's oral stage characterized by mild oral dysphagia. There is premature entry to the level of vallecula. Patient's pharyngeal stage was characterized by moderate pharyngeal dysphagia. There is decreased hyolaryngeal excursion with moderate residuals in the vallecula. There is deep penetration on thin liquids from straw. No aspiration. MILD ORAL AND MODERATE PHARYNGEAL DYSPHAGIA. RECOMMEND BY SPEECH PATHOLOGY TO FOLLOW. US CAROTID ARTERY BILATERAL    Result Date: 9/11/2021  EXAMINATION: US CAROTID ARTERY BILATERAL HISTORY:   stroke . R26.9 Gait abnormality ICD10 COMPARISON:None TECHNIQUE: Carotid duplex sonograms which include gray scale and color flow evaluation are complimented with spectral waveform analysis. Please refer to chart below for specific carotid velocity measurements. The degree of stenosis recorded on this exam uses the same method of stratification used in the NASCET trials. This complies with ACR practice guidelines and the Society of radiology in ultrasound consensus statement and provides adequate information for clinical decision making. Society of Radiologists in Ultrasound (SRU) consensus statement was used to estimate internal carotid artery stenosis. RESULT: Mild to moderate plaque in the carotid arteries bilaterally. No significant increase in systolic flow or turbulence to indicate any hemodynamically significant narrowing in the right or left internal carotid arteries. There is antegrade flow identified in both vertebral arteries.  ARTERIAL BLOOD FLOW VELOCITY RIGHT PEAK SYSTOLIC VELOCITIES (PSV)                                               Prox CCA    129 cm/s             Mid CCA     114 cm/s Dist CCA    104 cm/s             Prox ICA    169 cm/s               Mid ICA     106 cm/s            Dist ICA    63 cm/s             Prox ECA     170   cm/s             Prox VERT   51 cm/s              ICA/CCA     1.48                        LEFT PEAK SYSTOLIC VELOCITIES (PSV)  Prox CCA    135 cm/s Mid CCA     166 cm/s Dist CCA    121 cm/s Prox ICA    119 cm/s Mid ICA     72 cm/s Dist ICA    61 cm/s Prox ECA    131 cm/s Prox VERT   56 cm/s ICA/CCA     0.72      50-69 % STENOSIS IN THE RIGHT ICA  <50 % STENOSIS IN THE LEFT ICA ANTEGRADE FLOW IN THE BILATERAL VERTEBRAL ARTERIES. MRI CERVICAL THORACIC LUMBAR SPINE WO CONTRAST LIMITED    Result Date: 9/12/2021  EXAMINATION: MRI CERVICAL THORACIC LUMBAR SPINE WO CONTRAST LIMITED COMPARISON:None CLINICAL HISTORY:  spinal stenosis  FINDINGS:Tailored MRI scans to the cervical thoracic and lumbar spine. Scan quality limited due to patient motion  CERVICAL REGION: Multilevel degenerative changes with severe canal stenosis at C2-3, C3-4, and C6-7. Moderate canal stenosis at C4-5. THORACIC REGION: No spinal canal stenosis LUMBAR REGION: Multilevel degenerative changes. Endplate irregularity with joint space narrowing and slight anterolisthesis at L3-4 Severe canal stenosis at L2-3 and L4-5. Moderate to severe canal stenosis at L3-4. No acute compression fracture. No epidural or paraspinal soft tissue mass. No abnormal cord signal intensity on the scans. Multiple areas of severe spinal canal stenosis in the cervical region. Severe canal stenosis at L2-3, L3-4 and L4-5.        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 [facility]) [I63.441] 09/13/2021     Priority: Low    Myelopathy concurrent with and due to spinal stenosis of cervical region ([facility] [address] [M48.02, G99.2] 09/13/2021     Priority: Low    Spinal stenosis of lumbar region with neurogenic claudication [M48.062] 09/13/2021     Priority: Low    Renal disease [N28.9] 09/13/2021     Priority: Low    Stroke determined by clinical assessment ([facility] [address] [I63.9] 09/10/2021     Priority: Low    Shoulder dislocation [S43.006A] 03/08/2016     Priority: Low         Impression/Plan:   1. CVA - Rehab  2. Cervical and lumbar stenosis  3. NSVT- add BB. Keep on Telemetry. ECG today and AM.   4. LVEF 60%  5. Mag and TSH WNL  6. HPL- resume Statin     Thank you for allowing us to participate in the care of this patient. Will continue to follow. Please call if questions or concerns arise.     Electronically signed by Rene Castro MD on 9/14/2021 at 10:47 AM

## 2021-09-14 NOTE — PROGRESS NOTES
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)  Patient Active Problem List    Diagnosis Date Noted    Abnormality of gait and mobility 2021    Gait abnormality 2021    Renal disease 2021    Hematuria 2021    Spinal stenosis of lumbar region with neurogenic claudication 2021    Myelopathy concurrent with and due to spinal stenosis of cervical region 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 ([facility] [address] 2021    Ataxic gait 2021    Numbness     Stroke determined by clinical assessment [facility] 09/10/2021    Cellulitis 2019    Cellulitis of left hand 2019    Shoulder dislocation 2016       Past Medical History:   Diagnosis Date    Cellulitis of left hand 3/26/2019    Hypertension      Past Surgical History:   Procedure Laterality Date    BACK SURGERY         Chart Reviewed: Yes  Family / Caregiver Present: No  Diagnosis: 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 (HCC)    Restrictions:  Restrictions/Precautions: Fall Risk, Swallowing - Thickened Liquids (nectar thick)       SUBJECTIVE:      Pre Treatment Pain Screening  Pain at present: 0    Post Treatment Pain Screening:  Pain Assessment  Pain Level: 0    Prior Level of Function:  Social/Functional History  Lives With: Alone  Type of Home: House  Home Layout: One level  Home Access: Stairs to enter with rails  Entrance Stairs - Number of Steps: 3  Entrance Stairs - Rails: Both  Bathroom Shower/Tub: Tub/Shower unit  Home Equipment: Cane, Rolling walker (rollator)  ADL Assistance: Independent  Homemaking Assistance: Independent  Homemaking Responsibilities: Yes  Ambulation Assistance: Independent (2ww)  Transfer Assistance: Independent  Active : No    OBJECTIVE:   Vision/Hearing:  Vision: Impaired  Vision Exceptions: Wears glasses for reading  Hearing: Within functional limits    Cognition:  Follows Commands: Within Functional Limits       ROM:  RLE AROM: WFL  LLE AROM : WFL    Strength:  Strength RLE  Comment: 4-/5  Strength LLE  Comment: 4-4+/5    Neuro:        Balance  Posture: Fair  Sitting - Static: Good  Sitting - Dynamic: Good;- (reaching greater than 4 inches requires SBA due to conern for balance reactions due to delay)  Standing - Static: Fair;- (pt able to stand with no UE support with SBA for 30 sec - min assist required for greater than 30 sec)  Standing - Dynamic: Poor (BUE support required for gait prior to CVA - LOB during gait with ww required min assist)  Comments: [place]:   Motor Control  Gross Motor?: Exceptions  Comments: impaired R UE and LE coordination    Bed mobility  Bridging: Modified independent   Rolling to Left: Stand by assistance  Rolling to Right: Stand by assistance  Supine to Sit: Minimal assistance  Sit to Supine: Stand by assistance  Comment: EOB safety a concern    Transfers  Sit to Stand: Minimal Assistance;Stand by assistance (multiple trials with immproving ability with each trial)  Stand to sit: Minimal Assistance;Stand by assistance  Bed to Chair: Minimal assistance (pivot to right and left)  Comment: min assist with toilet transfers; verebal cues for safety and technique with all transfers required    Ambulation  Ambulation?: Yes  Ambulation 1  Surface: level tile  Device: Rolling Walker  Assistance: Minimal assistance; Moderate assistance  Quality of Gait: wide ISAIAH, short step length, shuffling steps, decreased trunk and cervical rotation, poor walker control and management  Distance: 15 feet; 50 feet  Comments: Pt with decreased safety awareness and poor awareness deficits - able to follow instructions to correct however unable to carry over between trials    Stairs/Curb  Stairs?: No (unsafe to test)         Activity Tolerance  Activity Tolerance: Patient Tolerated treatment well          Quality Indicators ([facility]):  Rolling L and R: Supervision or Touching Assistance - 4  Sit>Supine: Supervision or Touching Assistance - 4  Supine>Sit: Supervision or Touching Assistance - 4  Sit>Stand: Supervision or Touching Assistance - 4  Chair/Bed>Chair Transfer: Supervision or Touching Assistance - 4  Car Transfers: Not attempted due to Medical Condition or Safety Concerns (I.e. unsafe or physician orders) - 80  Walk 10 ft: Partial/Moderate Assistance (helper does <50%) - 3  Walk 50 ft with two 90 degree turns: Partial/Moderate Assistance (helper does <50%) - 3  Walk 150 ft in [address]: Not attempted due to Medical Condition or Safety Concerns (I.e. unsafe or physician orders) - 80  Walking 10 ft on Unlevel Surface: Not attempted due to Medical Condition or Safety Concerns (I.e. unsafe or physician orders) - 80  Picking up Objects from Standing Position: Dependent (helper does all or +2 assist required) - 1  Stairs: No Not attempted due to medical condition or safety concerns (i.e. unsafe or physician order) - 88  WC Mobility: No Not Applicable (pt did not complete item prior to admission) - 9      ASSESSMENT:  Body structures, Functions, Activity limitations: Decreased functional mobility ; Decreased safe awareness;Decreased balance;Decreased strength;Decreased cognition;Decreased coordination  Decision Making: Medium Complexity  History: high  Exam: med  Clinical Presentation: med    PT Education: Goals;PT Role;Plan of Care;Precautions  Patient Education: poor awareness of limitations and carry over of instructions  Barriers to Learning: memory    CLINICAL IMPRESSION: Pt demonstrates deficits as listed. He is requiring assistance with mobility at this time.  Pt is in need of PT at this level of care prior to safe return to home    PLAN OF CARE:  Frequency: 1-2 treatment sessions per day, 5-7 days per week     Current Treatment Recommendations: Strengthening, Balance Training, Transfer Training, Endurance Training, Gait Training, Neuromuscular Re-education, Home Exercise Program, Cognitive Reorientation, Positioning, Equipment Evaluation, Education, & procurement, Safety Education & Training, Stair training, Functional Mobility Training, Cognitive/Perceptual Training    Patient's Goal:  to go home alone    GOALS:  Long term goals  Long term goal 1: Bed mobility with indep  Long term goal 2: sit to stand and bed transfers indep  Long term goal 3: Amb 150ft with 2ww or rollator indep  Long term goal 4: TUG <15.0 seconds with AD to demonstrate low fall risk  Long term goal 5: [place] balance testing at 25/56 or greater  Long term goal 6: SBA 4 stairs with rails    ELOS:   Plan weeks: 2-3    Therapy Time:    Individual   Time In 0850   Time Out 0923   Minutes [address], 09/14/21 at 9:48 AM

## 2021-09-14 NOTE — PLAN OF CARE
See OT evaluation for all goals and OT POC.  Electronically signed by Pradeep Rojas OT on 9/14/2021 at 1:22 PM

## 2021-09-14 NOTE — PROGRESS NOTES
Physical Therapy  [id number]  Physical Therapy Discharge      NAME: Shani Abebe    : 1945 (84 y.o.)  MRN: [id number]    Account: [de-identified]  Gender: male      Patient has been discharged from acute care hospital. DC patient from current PT program.      Electronically signed by Mague Arauz PT on 21 at 4:41 PM EDT

## 2021-09-14 NOTE — PROGRESS NOTES
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 Challenges included UE and LE targeted movement tasks with upright posture and varying ISAIAH positions, resistive static balance with average ISAIAH position, multiple trials of sit to stand with improved techinque  Neuromuscular Comments: Pt demonstrates need for rest between tasks. He is able to participate in narrow ISAIAH activities however balance with no UE support increases pts anxiety      Transfers  Sit to Stand: Minimal Assistance;Stand by assistance  Stand to sit: Minimal Assistance;Stand by assistance  Bed to Chair: Minimal assistance  Comment: multiple trials with poor carry over from within session and from previous session    Ambulation  Ambulation?: Yes  Ambulation 1  Surface: level tile  Device: Rolling Walker;Rollator  Assistance: Minimal assistance; Moderate assistance  Quality of Gait: wide ISAIAH, short step length, shuffling steps, decreased trunk and cervical rotation, poor walker control and management  Distance: 50 feet x 2  Comments: gait with ww with improved balance over rollator however RLE advancement improved with rollator.  will continue with each device to determine safest gait    Stairs/Curb  Stairs?: No (unsafe to test) Activity Tolerance  Activity Tolerance: Patient Tolerated treatment well          ASSESSMENT/PROGRESS TOWARDS GOALS:  Assessment: Pt demonstrates postural control and posture difficulties which overall impact his gait quality. He was able to participate well however carry over of safety and techniques is poor      Goals:  Long term goals  Long term goal 1: Bed mobility with indep  Long term goal 2: sit to stand and bed transfers indep  Long term goal 3: Amb 150ft with 2ww or rollator indep  Long term goal 4: TUG <15.0 seconds with AD to demonstrate low fall risk  Long term goal 5: [place] balance testing at 25/56 or greater  Long term goal 6: SBA 4 stairs with rails    PLAN OF CARE/Safety:   Safety Devices  Type of devices: Call light within reach; Left in bed;Bed alarm in place      Therapy Time:   Individual   Time In 1325   Time Out 1355   Minutes 30     Minutes:      Transfer/Bed mobility training: 10      Gait training: 10      Neuro re education:10        Maureen Xavier, PT, 09/14/21 at 2:07 PM

## 2021-09-14 NOTE — PROGRESS NOTES
[facility]   Facility/Department: [facility]  Speech Language Pathology  Initial Speech/Language/Cognitive Assessment    NAME:Shaggy King  : [birth year] (77 y.o.)   MRN: [id number]  [facility]  [facility]MISSION DATE: 2021  PATIENT DIAGNOSIS(ES): Abnormality of gait following cerebrovascular accident (CVA) [R47.862, R26.9]  Abnormality of gait and mobility [R26.9]  No chief complaint on file. 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 ([facility] 2021    Ataxic gait 2021    Numbness     Stroke determined by clinical assessment ([facility] [facility] 09/10/2021    Cellulitis 2019    Cellulitis of left hand 2019    Shoulder dislocation 2016     Past Medical History:   Diagnosis Date    Cellulitis of left hand 3/26/2019    Hypertension      Past Surgical History:   Procedure Laterality Date    BACK SURGERY         DATE ONSET: 9/10/2021    Date of Evaluation: 2021   Evaluating Therapist: Bud Dandy    Assessment:      Diagnosis: Moderate receptive/expressive aphasia deficits noted. This is characterized by difficulty with multiple step directions, difficulty answering yes/no questions, difficulty with sentence completion, and deficits in convergent/divergent naming and descriptive naming. Patient presented with delayed responses, reduced eye contact, slow processing and intermittent repetition needed to improve comprehension. ST is suspicious of cognitive deficit. Plan to asses cognition in future therapy session.     Recommendations:  Requires SLP Intervention: Yes  Duration/Frequency of Treatment: 3-5x/week for LOS or until all goals are met  D/C Recommendations: To be determined          Goals:  Short-term Goals  Timeframe for Short-term Goals: 1-2 weeks  Goal 1: Pt will complete descriptive naming tasks with 80% accuracy with min verbal cues to promote use of circumlocution strategy and help the patient express his/her basic personal, safety, and medical wants and needs in the presence of communication deficits. Goal 2: Pt will complete convergent and divergent naming tasks with 80% accuracy with mild cues to promote semantic organization and the overall effectiveness and efficiency for expression of his/her wants, needs, feelings, and ideas. Goal 3: Pt will follow 3-multi step written directions with 90% accuracy with min cues to improve the pt's reading comprehension and ability to utilize compensatory strategy of environmental referencing for maximum independence. Goal 4: Pt will answer high level yes/no questions with 90% accuracy with min cues to assist the caregiver in obtaining important information regarding the patient's personal, medical, and safety needs. Goal 5: Pt will describe an object by two attributes with 80% acc with mild cues to promote use of circumlocution strategy and help the patient express his/her basic personal, safety, and medical wants and needs. Goal 6: Within 1-5 days of implementing the ST POC, pt's functional reading/writing skills will be assessed in relation to executive functioning (e.g. bill paying, reading rx labels, completing personal information), with additional goals added to pt's POC as deemed necesary by treating SLP. Long-term Goals  Timeframe for Long-term Goals: 2-3 weeks  Goal 1: Pt will improve his/her Expressive Language abilities to a modified I level for expression of complex wants, needs, feelings/ideas, and medical/safety information.   Goal 2: Pt will improve her/his Receptive Language abilities to a modified I level for comprehension of conversation and safety directions with familiar and unfamiliar communication partners. Patient's goals: Patient is agreeable to ST poc. Subjective:   Previous level of function and limitations: Patient baseline skills unknown. General  Chart Reviewed: Yes  Patient assessed for rehabilitation services?: Yes  Family / Caregiver Present: No  Subjective  Subjective: Patient was pleasant and copperative during the SLE. Vision  Vision: Impaired  Vision Exceptions: Wears glasses for reading  Hearing  Hearing: Within functional limits           Objective:     Oral/Motor  Oral Motor: Exceptions to [facility]  Labial ROM: Reduced right  Labial Symmetry: Abnormal symmetry right  Labial Strength: Reduced  Labial Coordination: Reduced  Lingual Strength: Reduced  Lingual Coordination: Reduced    Auditory Comprehension  Comprehension: Exceptions  Yes/No Questions: Mild (mild-moderate answered yes/no questions with 73% accuracy)  Basic Questions:  (WFL with simple WH- questions)  Complex Questions:  (WFL I with complex questions reffering to a story problem)  One Step Basic Commands:  (WFL I with 100% accuracy)  Two Step Basic Commands:  (WFL I with 100% accuracy)  Multistep Basic Commands: Severe (0% accuracy I with 3step and 4step commands)  Pictures:  (WFL I with 100% accuracy)  Conversation: Mild (intermittent repetition instructions during eval were needed)  Interfering Components: Processing speed  Effective Techniques: Repetition    Reading Comprehension  Reading Status: Unable to assess (to be assessed in therapy)    Expression  Primary Mode of Expression: Verbal (Patient unable to state birthday)    Verbal Expression  Verbal Expression: Exceptions to functional limits (Speech automatics I with 100% accuracy, sentence completion I with 75% accuracy, descriptive naming I with 60% accuracy)  Repetition:  (WFL)  Convergent:  Moderate (moderate convergent naming I with 40% accuracy)  Divergent: Severe (0% accuracy with abstact and concrete divergent (patient stated wheels))  Responsive:  (WFL I with 100% accuracy)  Interfering Components:  (if task was too difficult patient utilized one word response. i.e. naming three items that have wheels with no motor, patient stated (wheels))  Effective Techniques: Provide extra time; Word retrived strategies    Written Expression  Dominant Hand: Right  Written Expression: Unable to assess    Motor Speech  Motor Speech: Exceptions to [facility]  Intelligibility:  [facility])  Dysarthria : Mild (mild reduced articulation at sentence and conversation level)    Pragmatics/Social Functioning  Pragmatics: Exceptions to [facility]  Affect:  [facility])  Eye Contact: Mild  Monotone:  (WFL)  Turn Taking:  [facility])    Cognition:      Orientation  Overall Orientation Status: Within Functional Limits  Orientation Level: Oriented to place;Oriented to person  Memory  Memory: Unable to assess (full assessment not completed, delayed recall 1/3 items. ST suspects memory deficits. Will assess in therapy.)  Problem Solving  Problem Solving: Unable to assess (problem solving was not assesed)  Numeric Reasoning  Numeric Reasoning: Unable to assess  Abstract Reasoning  Abstract Reasoning: Unable to assess  Safety/Judgement  Safety/Judgement: Unable to assess         Prognosis:  Speech Therapy Prognosis  Prognosis: Good  Individuals consulted  Consulted and agree with results and recommendations: RN;Patient Chad Arauz)    Education:  Patient Education: Patient educated on SLE results  Patient Education Response: Verbalizes understanding;Needs reinforcement  Safety Devices in place: Yes  Type of devices: Call light within reach; Chair alarm in place    Pain Assessment:  Pre-Treatment  Pain assessment: 0-10  Pain level: 0  Intervention:  Patient denies pain. Post-Treatment  Pain assessment: 0-10  Pain level: 0  Intervention:  Patient denies pain.     NATIONAL OUTCOMES MEASUREMENT SYSTEM (NOMS):  SPOKEN LANGUAGE COMPREHENSION  Rating: 3    SPOKEN LANGUAGE EXPRESSION  Rating: 3    MOTOR SPEECH  Ratin                 Therapy Time   Time in: 930  Time out:1000  30 minutes          Signature: Electronically signed by Brent Root on 2021 at 3:30 PM

## 2021-09-14 NOTE — PROGRESS NOTES
Pt admitted from  with impaired gait and mobility secondary to Stroke. Right sided weakness and unsteady gait. 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 + for Dysphagia and aphasia- word finding. Pivot transfer. Skin has abrasion (old, scabbed over) with some bruising from old falls to elbows, right upper lip and bilateral knees. Podiatry consulted for toe nail trimming. Belongings noted: Shirt, jeans, shoes and hat along with his wallet that contains his ID, insurance card and $8 dollars all 1 dollar bills and his keys. Lives alone. Alert and oriented x 3. UA sent 9/12/21, cx pending. Pt had 4 beats of V tach, NP made aware no new orders-will continue to monitor on Telemetry. Pt slept well. Bed alarm on and call light within reach.  Electronically signed by Ayaka Storm RN on 9/14/2021 at 5:40 AM

## 2021-09-14 NOTE — PROGRESS NOTES
Occupational Therapy   Occupational Therapy Initial Assessment  Date: 2021   Patient Name: Darrius Mcguire  MRN: [id number]     : 1945    Date of Service: 2021    Discharge Recommendations:  Continue to assess pending progress  OT Equipment Recommendations  Other: Continue to assess    Assessment   Performance deficits / Impairments: Decreased functional mobility ; Decreased ADL status; Decreased strength;Decreased safe awareness;Decreased high-level IADLs;Decreased cognition;Decreased balance;Decreased coordination;Decreased fine motor control  Assessment: Pt is  a 77 y.o. admitted for CVA. Pt with above deficits impairing ability to safety complete ADL's and IADLs. Pt would benefit from OT services to address deficits and maximize level of safety and function during ADL's. Prognosis: Good  Decision Making: Medium Complexity  History: Multi comorb  Exam: 9 perf deficits  Assistance / Modification: Min A  REQUIRES OT FOLLOW UP: Yes  Activity Tolerance  Activity Tolerance: Patient Tolerated treatment well  Safety Devices  Safety Devices in place: Yes  Type of devices: All fall risk precautions in place  Restraints  Initially in place: No           Patient Diagnosis(es): There were no encounter diagnoses. has a past medical history of Cellulitis of left hand and Hypertension. has a past surgical history that includes back surgery. Restrictions  Restrictions/Precautions  Restrictions/Precautions: Fall Risk, Swallowing - Thickened Liquids (nectar thick liquids)    Subjective   General  Chart Reviewed: Yes  Patient assessed for rehabilitation services?: Yes  Family / Caregiver Present: No  Referring Practitioner: Dr. Lori Kaye  Diagnosis: Impaired mobility, gait, and ADL's 2º inferior cerebellar stroke  Subjective  Subjective:  \"This is the best wash up I've had yet\"  Patient Currently in Pain: Denies  Pain Assessment  Pain Assessment: 0-10  Pain Level: 0  Pre Treatment Pain Screening  Pain at present: 0  Scale Used: Numeric Score  Intervention List: Patient able to continue with treatment  Vital Signs  Patient Currently in Pain: Denies  Social/Functional History  Social/Functional History  Lives With: Alone  Type of Home: House  Home Layout: One level  Home Access: Stairs to enter with rails  Entrance Stairs - Number of Steps: 3  Entrance Stairs - Rails: Both  Bathroom Shower/Tub: Tub/Shower unit  Bathroom Equipment: Hand-held shower  Home Equipment: Cane, Rolling walker (rollator)  ADL Assistance: [address]: Independent  Homemaking Responsibilities: Yes  Ambulation Assistance: Independent (2ww)  Transfer Assistance: Independent  Active : Yes  Mode of Transportation: [facility]  Occupation: Retired  Type of occupation:   Leisure & Hobbies: working on cars  IADL Comments: completes own medicaions/finances  Additional Comments: sister local       Objective   Vision: Impaired  Vision Exceptions: Wears glasses for reading  Hearing: Exceptions to [facility]  Hearing Exceptions: Hard of hearing/hearing concerns      Orientation  Overall Orientation Status: Within Functional Limits     Observation/Palpation  Posture: Good  Observation: pleasant, on RA, no acute distress, agreeable to OT evaluation     Balance  Sitting Balance: Supervision  Standing Balance: Contact guard assistance  Functional Mobility  Functional - Mobility Device: Rolling Walker  Activity:  (to bathroom, wheelchair from bathroom)  Assist Level: Minimal assistance  Functional Mobility Comments: shuffling gait pattern, lateral leans, difficulty sequencing with ww  Toilet Transfers  Toilet Transfer: Unable to assess  Toilet Transfers Comments: Pt did not need to go  Shower Transfers  Shower - Transfer From: Checo Squires (to shower with walker, from shower to wheelchair)  Shower - Transfer Type: To and From  Shower - Transfer To:  Shower seat with back  Shower - Technique: Ambulating (ambulating and stand step)  Shower Transfers: Minimal assistance     ADL  Feeding: Thickened liquids;Setup  Grooming: Supervision;Verbal cueing  UE Bathing: Stand by assistance;Verbal cueing  LE Bathing: Contact guard assistance;Verbal cueing  UE Dressing: Unable to assess(comment) (gown)  LE Dressing: Contact guard assistance;Verbal cueing  Toileting: Unable to assess(comment) (pt did not need to go)  Additional Comments: Pt perseverative during ADL and requires verbal cues to recognize and sequence     Tone RUE  RUE Tone: Normotonic  Tone LUE  LUE Tone: Normotonic  Coordination  Movements Are Fluid And Coordinated: No  Coordination and Movement description: Decreased speed;Right UE;Left UE;Fine motor impairments        Transfers  Sit to stand: Contact guard assistance  Stand to sit: Contact guard assistance  Vision - Basic Assessment  Prior Vision: Wears glasses only for reading  Visual History: No significant visual history  Patient Visual Report: No visual complaint reported. Visual Field Cut: No  Oculo Motor Control: Impaired  Impairments: Convergence     Cognition  Overall Cognitive Status: Exceptions  Arousal/Alertness: Delayed responses to stimuli  Following Commands: Follows one step commands with repetition  Attention Span: Appears intact  Memory: Decreased short term memory  Safety Judgement: Decreased awareness of need for assistance;Decreased awareness of need for safety  Problem Solving: Assistance required to generate solutions;Assistance required to implement solutions;Assistance required to identify errors made;Assistance required to correct errors made  Insights: Decreased awareness of deficits  Initiation: Requires cues for some  Sequencing: Requires cues for some  Cognition Comment: Comp; Supervision Express:  Independent Social: Supervision Prob: Min A Mem: Supervision     Perception  Overall Perceptual Status: WFL     Sensation  Overall Sensation Status: WFL        LUE AROM (degrees)  LUE AROM : WFL  Left Hand AROM (degrees)  Left Hand AROM: WFL  RUE AROM (degrees)  RUE AROM : WFL  Right Hand AROM (degrees)  Right Hand AROM: WFL  LUE Strength  Gross LUE Strength: Exceptions to [facility]  L Hand General: 4/5  LUE Strength Comment: 4/5 all planes  RUE Strength  Gross RUE Strength: Exceptions to [facility]  R Hand General: 3+/5  RUE Strength Comment: 3+/5 all planes     Hand Dominance  Hand Dominance: Right             Plan   Plan  Times per week: 5-7x  Plan weeks: 2 weeks  Current Treatment Recommendations: Strengthening, Functional Mobility Training, Balance Training, Neuromuscular Re-education, Cognitive/Perceptual Training, Safety Education & Training, Patient/Caregiver Education & Training, Equipment Evaluation, Education, & procurement, Self-Care / ADL, Home Management Training    G-Code     OutComes Score                                                  AM-PAC Score             Goals  Long term goals  Time Frame for Long term goals : Within 2 weeks, pt to demonstrate progress in the following areas listed below to achieve specific LTG's as stated in initial evaluation  Long term goal 1: Improve independence during ADLs/IADLs  Long term goal 2: Improve strength and endurance to perform functional transfers  Long term goal 3: Improve fine motor coordination during self care  Long term goal 4: Improve cognition to demonstrate understanding of HEP  Long term goal 5: improve balance to safely perform functional mobility  Patient Goals   Patient goals : \"to get better\"      [x]  Patient will complete self care as followed using the recommended adaptive equipment and/or adaptive techniques as instructed:  Feeding: Mod I  Grooming: Mod I  Bathing: Mod I  UE Dressing: Mod I  LE Dressing: Mod I  Toileting: Mod I  Toilet Transfers: Mod I  Tub Transfers: Mod I     [x]  Patient will sequence self-care routine with no verbal/tactile cues.       []  Patient will improve UE sensation and/or utilize compensatory techniques for safe completion of self-care as projected. [x]  Patient will improve static and dynamic standing balance to complete pants management at Mod I level     [x]  Patient will improve R UE Function (AROM, strength, motor control, tone normalization) to complete ADLs as projected. [x]  Patient will improve functional endurance to tolerate/complete 60 minutes of ADLs. [x]  Patient will improve B UE strength and endurance to [facility] in order to participate in self-care activities as projected. [x]  Patient will improve B hand fine motor coordination to [facility] in order to manage clothing fasteners/self-care containers in a timely manner     []  Patient will improve visual perception to in order to improve participation in self care and leisure activities     [x]  Patient will perform kitchen mobility at device level without episodes of LOB and good safety awareness      [x]  Patient will perform basic room mobility at Mod I level. [x]  Patient will access appropriate D/C site with as few architectural barriers as possible. []  Patient and/or caregiver will demonstrate understanding of hip precautions with verbal/tactile cues. []  Patient and/or caregiver will demonstrate understanding of energy conservation techniques with verbal/tactile cues. [x]  Patient and/or caregiver will demonstrate understanding of recommended HEP for UE strengthening. [x]  Patient's cognition will improve to safely perform ADLs:  Comprehension: Mod I  Expression: Independent  Social Interaction: Independent  Problem Solving: Mod I  Memory:  Mod I          Therapy Time   Individual Concurrent Group Co-treatment   Time In 1030         Time Out 1130         Minutes 60           Eval: 60 minutes     Edouard Murphy OT   Electronically signed by Edouard Murphy OT on 9/14/2021 at 1:18 PM

## 2021-09-15 LAB
EKG ATRIAL RATE: 57 BPM
EKG ATRIAL RATE: 59 BPM
EKG P AXIS: -21 DEGREES
EKG P-R INTERVAL: 142 MS
EKG P-R INTERVAL: 162 MS
EKG Q-T INTERVAL: 444 MS
EKG Q-T INTERVAL: 446 MS
EKG QRS DURATION: 92 MS
EKG QRS DURATION: 96 MS
EKG QTC CALCULATION (BAZETT): 434 MS
EKG QTC CALCULATION (BAZETT): 439 MS
EKG R AXIS: 44 DEGREES
EKG R AXIS: 51 DEGREES
EKG T AXIS: 50 DEGREES
EKG T AXIS: 52 DEGREES
EKG VENTRICULAR RATE: 57 BPM
EKG VENTRICULAR RATE: 59 BPM

## 2021-09-15 PROCEDURE — 97110 THERAPEUTIC EXERCISES: CPT

## 2021-09-15 PROCEDURE — 92526 ORAL FUNCTION THERAPY: CPT

## 2021-09-15 PROCEDURE — 6370000000 HC RX 637 (ALT 250 FOR IP): Performed by: INTERNAL MEDICINE

## 2021-09-15 PROCEDURE — 97129 THER IVNTJ 1ST 15 MIN: CPT

## 2021-09-15 PROCEDURE — 97130 THER IVNTJ EA ADDL 15 MIN: CPT

## 2021-09-15 PROCEDURE — 99233 SBSQ HOSP IP/OBS HIGH 50: CPT | Performed by: PSYCHIATRY & NEUROLOGY

## 2021-09-15 PROCEDURE — 99232 SBSQ HOSP IP/OBS MODERATE 35: CPT | Performed by: PHYSICAL MEDICINE & REHABILITATION

## 2021-09-15 PROCEDURE — APPSS45 APP SPLIT SHARED TIME 31-45 MINUTES: Performed by: STUDENT IN AN ORGANIZED HEALTH CARE EDUCATION/TRAINING PROGRAM

## 2021-09-15 PROCEDURE — 97530 THERAPEUTIC ACTIVITIES: CPT

## 2021-09-15 PROCEDURE — 92507 TX SP LANG VOICE COMM INDIV: CPT

## 2021-09-15 PROCEDURE — 93010 ELECTROCARDIOGRAM REPORT: CPT | Performed by: INTERNAL MEDICINE

## 2021-09-15 PROCEDURE — 6360000002 HC RX W HCPCS: Performed by: INTERNAL MEDICINE

## 2021-09-15 PROCEDURE — 97535 SELF CARE MNGMENT TRAINING: CPT

## 2021-09-15 PROCEDURE — 99232 SBSQ HOSP IP/OBS MODERATE 35: CPT | Performed by: INTERNAL MEDICINE

## 2021-09-15 PROCEDURE — 6370000000 HC RX 637 (ALT 250 FOR IP): Performed by: PHYSICAL MEDICINE & REHABILITATION

## 2021-09-15 PROCEDURE — 93005 ELECTROCARDIOGRAM TRACING: CPT | Performed by: INTERNAL MEDICINE

## 2021-09-15 PROCEDURE — 1180000000 HC REHAB R&B

## 2021-09-15 RX ADMIN — Medication 100 MG: at 08:50

## 2021-09-15 RX ADMIN — METOPROLOL TARTRATE 50 MG: 50 TABLET, FILM COATED ORAL at 08:51

## 2021-09-15 RX ADMIN — ATORVASTATIN CALCIUM 80 MG: 80 TABLET, FILM COATED ORAL at 21:45

## 2021-09-15 RX ADMIN — METOPROLOL TARTRATE 50 MG: 50 TABLET, FILM COATED ORAL at 21:45

## 2021-09-15 RX ADMIN — ASPIRIN 81 MG: 81 TABLET, CHEWABLE ORAL at 08:50

## 2021-09-15 RX ADMIN — Medication 2000 UNITS: at 16:42

## 2021-09-15 RX ADMIN — AMLODIPINE BESYLATE 10 MG: 10 TABLET ORAL at 08:51

## 2021-09-15 RX ADMIN — ENOXAPARIN SODIUM 40 MG: 100 INJECTION SUBCUTANEOUS at 08:50

## 2021-09-15 RX ADMIN — LISINOPRIL 10 MG: 10 TABLET ORAL at 08:51

## 2021-09-15 ASSESSMENT — PAIN SCALES - GENERAL
PAINLEVEL_OUTOF10: 0

## 2021-09-15 ASSESSMENT — ENCOUNTER SYMPTOMS
BACK PAIN: 1
WHEEZING: 0
EYES NEGATIVE: 1
COLOR CHANGE: 0
BLOOD IN STOOL: 0
COUGH: 0
SHORTNESS OF BREATH: 0
GASTROINTESTINAL NEGATIVE: 1
CHOKING: 0
CHEST TIGHTNESS: 0
RESPIRATORY NEGATIVE: 1
TROUBLE SWALLOWING: 0
NAUSEA: 0
STRIDOR: 0
VOMITING: 0

## 2021-09-15 NOTE — PLAN OF CARE
Problem: Falls - Risk of:  Goal: Will remain free from falls  Description: Will remain free from falls  Outcome: Ongoing  Goal: Absence of physical injury  Description: Absence of physical injury  Outcome: Ongoing     Problem: Mobility - Impaired:  Goal: Mobility will improve  Description: Mobility will improve  Outcome: Ongoing     Problem: IP COMMUNICATION/DYSARTHRIA  Goal: LTG - patient will improve expressive language skills to allow for communication of wants and needs in daily activities  Outcome: Ongoing     Problem: IP SWALLOWING  Goal: LTG - patient will tolerate the least restrictive diet consistency to allow for safe consumption of daily meals  9/15/2021 0124 by Wilfrid Del Rosario. Aiden Rowan RN  Outcome: Ongoing  9/14/2021 1257 by SIVA Wilson  Outcome: Ongoing     Problem: Skin Integrity:  Goal: Will show no infection signs and symptoms  Description: Will show no infection signs and symptoms  Outcome: Ongoing  Goal: Absence of new skin breakdown  Description: Absence of new skin breakdown  Outcome: Ongoing     Problem: IP BALANCE  Goal: LTG - patient will maintain standing balance to allow for completion of daily activities  9/15/2021 0124 by Wilfrid Del Rosario.  Jaimie Armstrong RN  Outcome: Ongoing  9/14/2021 1321 by Jostin Art OT  Outcome: Ongoing

## 2021-09-15 NOTE — PROGRESS NOTES
Physical Therapy  Physical Therapy Rehab Treatment Note  Facility/Department: [facility]  Room: [place] [address]       NAME: Daniela Dempsey  :  (33 y.o.)  MRN: [id number]  CODE STATUS: Full Code    Date of Service: 9/15/2021  Chart Reviewed: Yes  Family / Caregiver Present: No    Restrictions:  Restrictions/Precautions: Fall Risk, Swallowing - Thickened Liquids       SUBJECTIVE: Subjective: \"I'm okay. \"  Pain Screening  Patient Currently in Pain: No  Pre Treatment Pain Screening  Pain at present: 0  Intervention List: Patient able to continue with treatment    Post Treatment Pain Screening:  Pain Assessment  Pain Level: 0    OBJECTIVE:   Transfers  Sit to Stand: Contact guard assistance  Stand to sit: Minimal Assistance  Comment: VC's for technique and approach to chair, improved follow through for technique this session. Instability with retro walking to approach to chair requiring min A. Multiple trsfs performed to improve overall technique    Ambulation  Ambulation?: Yes  More Ambulation?: No  Ambulation 1  Surface: carpet  Device: Rolling Walker  Assistance: Minimal assistance; Moderate assistance  Quality of Gait: short step length, decreased stance time on R, decreased foot clearance on R, non reciprocal, ff posture  Gait Deviations: Slow Princess; Shuffles;Decreased step length;Decreased step height;Decreased head and trunk rotation  Distance: 50' x 2  Comments: Improved foot clearance on R with cues. Exercises  Hip Flexion: x10  Hip Abduction: x10 side kicks (vc's for technique)  Knee Long Arc Quad: x20  Neurodevelopmental Techniques: fwd stepping over objects to increase step length and height x10 reps x 2 sets in // bars  Comments: 2\" step ups x10 to increase strength in RLE and initiate stair training in // bars     ASSESSMENT/PROGRESS TOWARDS GOALS:  Assessment: Pt exhibits improved technique during gait training this session with vc/tc's for increasing foot clearance on R.  Initiated stair training this session with 2\" step ups and to help improve foot clearance. Pt performs better in more quiet environment. Inreased time required for step by step cues for tasks with occ visual demos. Goals:  Long term goals  Long term goal 1: Bed mobility with indep  Long term goal 2: sit to stand and bed transfers indep  Long term goal 3: Amb 150ft with 2ww or rollator indep  Long term goal 4: TUG <15.0 seconds with AD to demonstrate low fall risk  Long term goal 5: [place] balance testing at 25/56 or greater  Long term goal 6: SBA 4 stairs with rails    PLAN OF CARE/Safety:   Plan Comment: Cont.  POC      Therapy Time:   Individual   Time In 1300   Time Out 1330   Minutes 30     Minutes:      Transfer/Bed mobility training:  10      Gait trainin      Neuro re education: 0     Therapeutic ex: [address], PTA, 09/15/21 at 2:16 PM

## 2021-09-15 NOTE — PROGRESS NOTES
Neurology Follow up    SUBJECTIVE: Patient seen and examined for neurology follow-up for acute left ischemic centrum semiovale infarct. Patient with some mild right-sided weakness. Patient currently on soft and bite sized with mildly thick nectar. Blood pressure within normal limits. Continues on high intensity statin and aspirin combination. Patient was found to have cervical stenosis with myelopathy as well as lumbar canal stenosis with neurogenic claudication and neurosurgery recommended follow-up as an outpatient.     Patient is transferred for rehabilitation is a small CVA  Current Facility-Administered Medications   Medication Dose Route Frequency Provider Last Rate Last Admin    Vitamin D (CHOLECALCIFEROL) tablet 2,000 Units  2,000 Units Oral Dinner Luana Scullin, DO   2,000 Units at 09/14/21 1704    cyanocobalamin injection 1,000 mcg  1,000 mcg IntraMUSCular Weekly Luana Scullin, DO   1,000 mcg at 09/14/21 3997    coenzyme Q10 capsule 100 mg  100 mg Oral Daily Luana Scullin, DO   100 mg at 09/14/21 0807    lidocaine 4 % external patch 3 patch  3 patch TransDERmal Daily Luana Scullin, DO        acetaminophen (TYLENOL) tablet 650 mg  650 mg Oral Q4H PRN Luana Scullin, DO        bisacodyl (DULCOLAX) suppository 10 mg  10 mg Rectal Daily PRN Milta Prime, DO        fleet rectal enema 1 enema  1 enema Rectal Daily PRN Luana Scullin, DO        metoprolol tartrate (LOPRESSOR) tablet 50 mg  50 mg Oral BID Raine Putnam MD   50 mg at 09/14/21 2154    acetaminophen (TYLENOL) tablet 650 mg  650 mg Oral Q6H PRN Dave Cadet MD        Or    acetaminophen (TYLENOL) suppository 650 mg  650 mg Rectal Q6H PRN Dave Cadet MD        enoxaparin (LOVENOX) injection 40 mg  40 mg SubCUTAneous Daily Dave Cadet MD   40 mg at 09/14/21 0807    polyethylene glycol (GLYCOLAX) packet 17 g  17 g Oral Daily PRN Dave Cadet MD        amLODIPine (NORVASC) tablet 10 mg  10 mg Oral Daily Dave Cadet MD 10 mg at 09/14/21 6281    aspirin chewable tablet 81 mg  81 mg Oral Daily Oliver Dan MD   81 mg at 09/14/21 0807    atorvastatin (LIPITOR) tablet 80 mg  80 mg Oral Nightly Oliver Dan MD   80 mg at 09/14/21 2154    lisinopril (PRINIVIL;ZESTRIL) tablet 10 mg  10 mg Oral Daily Oliver Dan MD   10 mg at 09/14/21 0807       PHYSICAL EXAM:    /69   Pulse 55   Temp 97.6 °F (36.4 °C) (Oral)   Resp 18   Ht 5' 8\" (1.727 m)   Wt 165 lb 3 oz (74.9 kg)   SpO2 99%   BMI 25.12 kg/m²    General Appearance:      Skin:  normal  CVS - Normal sounds, No murmurs , No carotid Bruits  RS -CTA  Abdomen Soft, BS present  Review of Systems   Constitutional: Negative for appetite change and fever. HENT: Negative for ear pain and trouble swallowing. Eyes: Negative for visual disturbance. Respiratory: Negative for choking and shortness of breath. Cardiovascular: Negative for chest pain and palpitations. Gastrointestinal: Negative for nausea and vomiting. Musculoskeletal: Positive for back pain. Negative for gait problem, joint swelling, myalgias, neck pain and neck stiffness. Skin: Negative for color change. Neurological: Positive for weakness. Negative for dizziness, tremors, seizures, syncope, facial asymmetry, speech difficulty, light-headedness, numbness and headaches. Psychiatric/Behavioral: Negative for behavioral problems, confusion, hallucinations and sleep disturbance.       Mental Status Exam:             Level of Alertness:   awake            Orientation:   person, place, time            Memory:   normal                    Language:  normal      Funduscopic Exam:     Cranial Nerves            Cranial nerve III           Pupils:  equal, round, reactive to light      Cranial nerves III, IV, VI           Extraocular Movements: intact      Cranial nerve V           Facial sensation:  intact      Cranial nerve VII           Facial strength: intact      Cranial nerve VIII           Hearing: intact      Cranial nerve IX           Palate:  intact      Cranial nerve XI         Shoulder shrug:  intact      Cranial nerve XII          Tongue movement:  normal    Motor: Right upper extremity weakness of 4 /5 with a drift  Drift:  Motor exam   Tone:  normal  Abnormal Movements:  absent            Sensory: No definite findings        Pinprick             Right Upper Extremity:  normal             Left Upper Extremity:  normal             Right Lower Extremity:  normal             Left Lower Extremity:  normal           Vibration                         Touch            Proprioception                 Coordination:           Finger/Nose   Right:  abnormal              Left:  normal          Heel-Knee-Shin                Right:  normal              Left:  normal          Rapid Alternating Movements              Right:  normal              Left:  normal          Gait:                       Casual:  normal                         Romberg:  normal            Reflexes:             Deep Tendon Reflexes:             Reflexes are 2 + including ankle reflexes.              Plantar response:                Right:  downgoing               Left:  downgoing    Vascular:  Cardiac Exam:  normal         Echocardiogram complete 2D with doppler with color    Result Date: 9/11/2021  Transthoracic Echocardiography Report (TTE)  Demographics   Patient Name    Ilir Lebron Gender               Male   Patient Number  [id number]       Race                 Unknown                                  Ethnicity   Visit Number    [id number]      Room Number          N676   Corporate ID                   Date of Study        09/11/2021   Accession       [id number]     Referring Physician  Number   Date of Birth   1945     Sonographer          [facility]   Age             77 year(s)     Interpreting         [facility]                                 Physician            Cardiology Romi Ordaz  Procedure Type of Study   TTE procedure:ECHOCARDIOGRAM COMPLETE WITH BUBBLE STUDY. Procedure Date Date: 09/11/2021 Start: 08:15 AM Study Location: Portable Technical Quality: Adequate visualization Indications:Stroke. Patient Status: Routine Height: 68 inches Weight: 179 pounds BSA: 1.95 m^2 BMI: 27.22 kg/m^2 BP: 147/86 mmHg  Conclusions   Summary  Moderate annular calcification. Normal aortic valve structure and function. Trivial aortic regurgitation is  noted. Moderately dilated left atrium. Normal intact intra-atrial septum was noted with no evidence of  significant intra-atrial communications neither by colour flow Doppler  imaging nor by aggitated saline study. Left ventricular ejection fraction is visually estimated at 60%. Impaired relaxation compatible with diastolic dysfunction. ( reversed E/A  ratio)  Moderate concentric left ventricular hypertrophy. Signature   ----------------------------------------------------------------  Electronically signed by Franklin Medina(Interpreting physician)  on 09/11/2021 11:17 AM  ----------------------------------------------------------------   Findings  Left Ventricle Left ventricular ejection fraction is visually estimated at 60%. Impaired relaxation compatible with diastolic dysfunction. ( reversed E/A ratio) Moderate concentric left ventricular hypertrophy. Right Ventricle Normal right ventricle structure and function. Normal right ventricle systolic pressure. Left Atrium Moderately dilated left atrium. Normal intact intra-atrial septum was noted with no evidence of significant intra-atrial communications neither by colour flow Doppler imaging nor by aggitated saline study. Right Atrium Normal right atrium. Mitral Valve Moderate annular calcification. Tricuspid Valve Normal tricuspid valve structure and function. Aortic Valve Normal aortic valve structure and function. Trivial aortic regurgitation is noted.  Pulmonic Valve Normal pulmonic valve structure and function. Pericardial Effusion No evidence of pericardial effusion. Pleural Effusion No evidence of pleural effusion. Aorta \ Miscellaneous Miscellaneous normal findings were found. M-Mode Measurements (cm)   LVIDd: 4.6 cm                          LVIDs: 3.01 cm  IVSd: 1.98 cm                          IVSs: 2.14 cm  LVPWd: 1.08 cm                         AO Root Dimension: 3.04 cm  Rt. Vent.  Dimension: 3.05 cm                                         LVOT: 1.72 cm  Doppler Measurements:   AV Velocity:0.02 m/s                   MV Peak E-Wave: 1.25 m/s  AV Peak Gradient: 15.73 mmHg           MV Peak A-Wave: 1.54 m/s  AV Mean Gradient: 8.65 mmHg  AV Area (Continuity):1.66 cm^2         MV P1/2t: 103 msec  TR Velocity:2.47 m/s                   MVA by PHT2.14 cm^2  TR Gradient:24.4 mmHg                  Estimated RAP:10 mmHg                                         RVSP:34.4 mmHg  Valves  Mitral Valve   Peak E-Wave: 1.25 m/s                 Peak A-Wave: 1.54 m/s  P1/2t: 103 msec                       E/A Ratio: 0.81  Mean Velocity: 1.01 m/s               Peak Gradient: 6.28 mmHg  Mean Gradient: 4.78 mmHg              Deceleration Time: 407.7 msec  Area (PHT): 2.14 cm^2                 Area (continuity): 1.44 cm^2   Tissue Doppler   E' Septal Velocity: 0.07 m/s  E' Lateral Velocity: 0.06 m/s   Aortic Valve   Peak Velocity: 1.98 m/s                Mean Velocity: 1.39 m/s  Peak Gradient: 15.73 mmHg              Mean Gradient: 8.65 mmHg  Area (continuity): 1.66 cm^2  AV VTI: 46.12 cm   Tricuspid Valve   Estimated RVSP: 34.4 mmHg               Estimated RAP: 10 mmHg  TR Velocity: 2.47 m/s                   TR Gradient: 24.4 mmHg   Pulmonic Valve   Peak Velocity: 0.95 m/s            Peak Gradient: 3.64 mmHg                                     Estimated PASP: 34.4 mmHg   LVOT   Peak Velocity: 1.51 m/s              Mean Velocity: 1.09 m/s  Peak Gradient: 9.17 mmHg             Mean Gradient: 5.32 mmHg  LVOT Diameter: 1.72 cm               LVOT VTI: 32.89 cm  Structures  Left Atrium   LA Volume/Index: 47.11 ml /24 m^2             LA Area: 16.59 cm^2   Left Ventricle   Diastolic Dimension: 4.6 cm          Systolic Dimension: 5.89 cm  Septum Diastolic: 1.78 cm            Septum Systolic: 6.88 cm  PW Diastolic: 4.34 cm                                       FS: 34.6 %  LV EDV/LV EDV Index: 97.45 ml/50 m^2 LV ESV/LV ESV Index: 35.36 ml/18 m^2  EF Calculated: 63.7 %                LV Length: 7.34 cm   LVOT Diameter: 1.72 cm   Right Atrium   RA Systolic Pressure: 10 mmHg   Right Ventricle   Diastolic Dimension: 4.46 cm                                     RV Systolic Pressure: 09.2 mmHg  Aorta/ Miscellaneous Aorta   Aortic Root: 3.04 cm  LVOT Diameter: 1.72 cm      CT HEAD WO CONTRAST    Result Date: 9/10/2021  EXAMINATION: CT HEAD WO CONTRAST CLINICAL HISTORY: generalized weakness COMPARISON:  NONE AVAILABLE An unenhanced scan is performed. FINDINGS:   There is no bleed, mass effect, or space occupying lesion. No extra-axial mass or fluid collections. Hypoattenuated White matter changes in the cerebral hemispheres noted. There is global atrophy. Findings likely reflect chronic small vessel vasculopathy. There is a vague area of low attenuation in the inferior posterior right cerebellar hemisphere with slight obliteration of adjacent sulci. An evolving nonhemorrhagic infarct in the cerebellum would have to be a consideration. Clinical correlation recommended. NO BLEED, MASS EFFECT, OR EXTRA-AXIAL FLUID COLLECTION. EVOLVING NONHEMORRHAGIC INFARCT IN THE INFERIOR RIGHT CEREBELLAR HEMISPHERE. 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 All CT scans at this facility use dose modulation, iterative reconstruction, and/or weight based dosing when appropriate to reduce radiation dose to as low as reasonably achievable.     XR CHEST PORTABLE    Result Date: 9/10/2021  Exam: XR CHEST PORTABLE History: Generalized weakness Technique: AP portable view of the chest obtained. Comparison: None available Findings: Atherosclerotic calcification of the thoracic aorta. The cardiomediastinal silhouette is within normal limits. No pneumothorax, pleural effusion, or consolidation. Question calcified pleural plaques on the left, which can be seen with asbestos related disease. No acute osseous abnormality. No radiographic evidence of acute intrathoracic process. Question left-sided pleural plaques which can be seen with asbestos related disease. MRI BRAIN WO CONTRAST    Result Date: 9/11/2021  EXAMINATION: MRI BRAIN WO CONTRAST CLINICAL HISTORY:  STROKE COMPARISONS: MR brain 4/5/2016 TECHNIQUE: Multiplanar multisequence images of the brain were obtained without contrast. Diffusion perfusion imaging was obtained. FINDINGS: There is a tiny focus of restricted diffusion in the left centrum semiovale (series 4 image 19). There are no extra-axial collections. There is no evidence of hemorrhage. The susceptibility images do not demonstrate evidence of hemosiderin deposition within the brain parenchyma or the leptomeninges. There are numerous patchy foci of T2/FLAIR hyperintensities in the subcortical and periventricular white matter in a symmetric distribution throughout both hemispheres. Chronic bilateral cerebellar infarcts. There is prominence of the sulci and ventricles consistent with moderate global cerebral atrophy and chronic involutional changes. No midline shift. The visualized portions of the orbits are within normal limits, the globes are intact. The visualized portions of the paranasal sinuses are within normal limits. Right mastoid effusion. Small foci of restricted diffusion in the left centrum semiovale, consistent with acute ischemia. Chronic microvascular changes and bilateral chronic cerebellar infarcts.  CRITICAL RESULTS: Communicated with Frank Mata RN on 9/11/2021 at 12:51 PM.    US CAROTID ARTERY BILATERAL    Result Date: 9/11/2021  EXAMINATION: US CAROTID ARTERY BILATERAL HISTORY:   stroke . R26.9 Gait abnormality ICD10 COMPARISON:None TECHNIQUE: Carotid duplex sonograms which include gray scale and color flow evaluation are complimented with spectral waveform analysis. Please refer to chart below for specific carotid velocity measurements. The degree of stenosis recorded on this exam uses the same method of stratification used in the NASCET trials. This complies with ACR practice guidelines and the Society of radiology in ultrasound consensus statement and provides adequate information for clinical decision making. Society of Radiologists in Ultrasound (SRU) consensus statement was used to estimate internal carotid artery stenosis. RESULT: Mild to moderate plaque in the carotid arteries bilaterally. No significant increase in systolic flow or turbulence to indicate any hemodynamically significant narrowing in the right or left internal carotid arteries. There is antegrade flow identified in both vertebral arteries. ARTERIAL BLOOD FLOW VELOCITY RIGHT PEAK SYSTOLIC VELOCITIES (PSV)                                               Prox CCA    129 cm/s             Mid CCA     114 cm/s              Dist CCA    104 cm/s             Prox ICA    169 cm/s               Mid ICA     106 cm/s            Dist ICA    63 cm/s             Prox ECA     170   cm/s             Prox VERT   51 cm/s              ICA/CCA     1.48                        LEFT PEAK SYSTOLIC VELOCITIES (PSV)  Prox CCA    135 cm/s Mid CCA     166 cm/s Dist CCA    121 cm/s Prox ICA    119 cm/s Mid ICA     72 cm/s Dist ICA    61 cm/s Prox ECA    131 cm/s Prox VERT   56 cm/s ICA/CCA     0.72      50-69 % STENOSIS IN THE RIGHT ICA  <50 % STENOSIS IN THE LEFT ICA ANTEGRADE FLOW IN THE BILATERAL VERTEBRAL ARTERIES.        Recent Labs     09/13/21  0629   WBC 5.4   HGB 16.6        Recent Labs     09/13/21  0629 09/14/21  0750    142   K 4.1 4.4    108*   CO2 23 21 BUN 35* 35*   CREATININE 1.25* 0.98   GLUCOSE 97 106*     No results for input(s): BILITOT, ALKPHOS, AST, ALT in the last 72 hours. Lab Results   Component Value Date    PROTIME 10.3 03/26/2019    INR 1.0 03/26/2019     No results found for: LITHIUM, DILFRTOT, VALPROATE    ASSESSMENT AND PLAN  Left centrum semiovale acute ischemic infarct  Carotid duplex with 50 to 69% stenosis in the right internal carotid artery and less than 50% in the left internal carotid artery  hemoGlobin A1c 5.3  Total cholesterol 145, triglycerides 67, HDL 38,LDL 94  Echocardiogram with ejection fraction of 60% with negative bubble study  Continue aspirin and high intensity statin  Blood pressure control improved  Dysphasia nectar thick liquids  MRI of the cervical, thoracic and lumbar spine completed with multiple areas of severe spinal canal stenosis in the cervical spine. Neurosurgery has been consulted with plans for follow-up once medically stable after stroke for possible surgical intervention. I have personally performed a face to face diagnostic evaluation on this patient, reviewed all data and investigations, and am the sole provider of all clinical decisions on the neurological status of this patient. Small left CVA with minor right-sided findings. Cervical spine MRI shows severe canal stenosis at the cervical level and we continue to observe him for neurosurgical evaluations. Jordan Martinez MD, [address] American Board of Psychiatry & Neurology  Board Certified in Vascular Neurology  Board Certified in Neuromuscular Medicine  Certified in Neurorehabilitation       Patient continues on aspirin and statin. Right-sided weakness improving. Continue PT/OT/SLP.

## 2021-09-15 NOTE — PROGRESS NOTES
Assumed care of pt at [address] [place] [address] on 9/14/21. Pt can be impulsive but can be redirected. Pt up to void in bathroom 3 times tonight. Rolling walker and one assist. Pt's gait is slow and unsteady with right sided weakness. Pt is on telemetry and monitor tech stated that pt was SB-NSR Rate 55-65. Pt is on nectar thick liquids and did not have much in room to drink . Pt offered nectar thick juices tonight at bedside and has drank 4/ 120 cc juices tonight. Speech clear. Cooperative. Call light in reach and bed alarm on. Electronically signed by Margarito Hector on 9/15/2021 at 12:48 AM

## 2021-09-15 NOTE — PROGRESS NOTES
Progress Note  Patient: Naveed Power  Unit/Bed: K809/O666-84  YOB: 1945  MRN: [id number]  Acct: [de-identified]   Admitting Diagnosis: Abnormality of gait following cerebrovascular accident (CVA) [Q47.839, R26.9]  Abnormality of gait and mobility [R26.9]  Admit Date:  9/13/2021  Hospital Day: 2    Chief Complaint: NSVT HTN    Histories:  Past Medical History:   Diagnosis Date    Cellulitis of left hand 3/26/2019    Hypertension      Past Surgical History:   Procedure Laterality Date    BACK SURGERY       History reviewed. No pertinent family history. Social History     Socioeconomic History    Marital status: Single     Spouse name: None    Number of children: None    Years of education: None    Highest education level: None   Occupational History    None   Tobacco Use    Smoking status: Never Smoker    Smokeless tobacco: Never Used   Vaping Use    Vaping Use: Never used   Substance and Sexual Activity    Alcohol use: Never     Alcohol/week: 0.0 standard drinks    Drug use: Never    Sexual activity: None   Other Topics Concern    None   Social History Narrative    Lives With: Alone, sister lives in the area    Type of Home: [address] in [address]: One level    Home Access: Stairs to enter with rails- Number of Steps: 3- Rails: Both    Bathroom Shower/Tub: Tub/Shower unit    Home Equipment: Cane, Rolling walker (rollator)    ADL Assistance: [address]: Independent    Homemaking Responsibilities: Yes    Ambulation Assistance: Independent (2ww)    Transfer Assistance: Independent    Active : No    He is a retired  for [address] [place] [address]. He learned how to be a  in [employer] was stationed in [place] during the [employer] [employer] [facility]. Never went overseas. Never  no kids graduated [employer] school.          Social Determinants of Health     Financial Resource Strain:     Difficulty of Paying Living Expenses: Food Insecurity:     Worried About Running Out of Food in the Last Year:     [address] in the Last Year:    Transportation Needs:     Lack of Transportation (Medical):  Lack of Transportation (Non-Medical):    Physical Activity:     Days of Exercise per Week:     Minutes of Exercise per Session:    Stress:     Feeling of Stress :    Social Connections:     Frequency of Communication with Friends and Family:     Frequency of Social Gatherings with Friends and Family:     Attends Sikhism Services:     Active Member of Clubs or Organizations:     Attends Club or Organization Meetings:     Marital Status:    Intimate Partner Violence:     Fear of Current or Ex-Partner:     Emotionally Abused:     Physically Abused:     Sexually Abused:        Subjective/HPI no acute events overnight. participating with Therapy. No cp no sob    EKG: SB 57        Review of Systems:   Review of Systems   Constitutional: Negative. Negative for diaphoresis and fatigue. HENT: Negative. Eyes: Negative. Respiratory: Negative. Negative for cough, chest tightness, shortness of breath, wheezing and stridor. Cardiovascular: Negative. Negative for chest pain, palpitations and leg swelling. Gastrointestinal: Negative. Negative for blood in stool and nausea. Genitourinary: Negative. Musculoskeletal: Positive for gait problem. Skin: Negative. Neurological: Positive for weakness. Negative for dizziness, syncope and light-headedness. Hematological: Negative. Psychiatric/Behavioral: Negative. Physical Examination:    /65   Pulse 58   Temp 97.6 °F (36.4 °C) (Oral)   Resp 18   Ht 5' 8\" (1.727 m)   Wt 165 lb 3 oz (74.9 kg)   SpO2 99%   BMI 25.12 kg/m²    Physical Exam   Constitutional: He appears healthy. No distress. HENT:   Normal cephalic and Atraumatic   Eyes: Pupils are equal, round, and reactive to light. Neck: Thyroid normal. No JVD present. No neck adenopathy.  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 ([facility] [address] [I63.441] 09/13/2021     Priority: Low    Myelopathy concurrent with and due to spinal stenosis of cervical region ([facility] [address] [M48.02, G99.2] 09/13/2021     Priority: Low    Spinal stenosis of lumbar region with neurogenic claudication [M48.062] 09/13/2021     Priority: Low    Renal disease [N28.9] 09/13/2021     Priority: Low    Stroke determined by clinical assessment ([facility] [address] [I63.9] 09/10/2021     Priority: Low    Shoulder dislocation [S43.006A] 03/08/2016     Priority: Low        Assessment/Plan:  1. CVA - Rehab  2. Cervical and lumbar stenosis  3. NSVT- add BB. Keep on Telemetry. ECG stable. Telemetry shows no further NSVT on BB. LVEF 60%  4. Mag and TSH WNL  5. HTN Stable- continue meds.   6. HPL- resume Statin       Electronically signed by Cornel Connell MD on 9/15/2021 at 10:14 AM

## 2021-09-15 NOTE — PROGRESS NOTES
back  Shower - Technique: Ambulating  Shower Transfers: Minimal assistance;Verbal cues  Shower Transfers Comments: grab bars - assist to maneuver ww x 1     Therapist found a wallet, keys and comb in patient pants while preparing pants to be laundered, Elmer Duarte RN notified that therapist placed wallet and keys in drawer of patients bedside table. Comb was left in the bathroom for use. Plan   Plan  Times per week: 5-7x  Plan weeks: 2 weeks  Current Treatment Recommendations: Strengthening, Functional Mobility Training, Balance Training, Neuromuscular Re-education, Cognitive/Perceptual Training, Safety Education & Training, Patient/Caregiver Education & Training, Equipment Evaluation, Education, & procurement, Self-Care / ADL, Home Management Training    Plan Comment: continue with current POC     Goals  Long term goals  Time Frame for Long term goals :  Within 2 weeks, pt to demonstrate progress in the following areas listed below to achieve specific LTG's as stated in initial evaluation  Long term goal 1: Improve independence during ADLs/IADLs  Long term goal 2: Improve strength and endurance to perform functional transfers  Long term goal 3: Improve fine motor coordination during self care  Long term goal 4: Improve cognition to demonstrate understanding of HEP  Long term goal 5: improve balance to safely perform functional mobility  Patient Goals   Patient goals : \"to get better\"       Therapy Time   Individual Concurrent Group Co-treatment   Time In 1100         Time Out 1200         Minutes 60             ADL/IADL trainin minutes     Electronically signed by JASKARAN Pascal on 9/15/21 at 12:14 PM EDT    JASKARAN Pascal

## 2021-09-15 NOTE — PROGRESS NOTES
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]  Abnormality of gait and mobility [R26.9]  Speech Dx: Dysphagia and Aphasia    Date: 9/15/2021    Subjective:  Alert and Cooperative        Interventions used this date:  Expressive Language, Receptive Language and Oral Motor Treatment    Objective/Assessment:  Patient progressing towards goals:  Short-term Goals  Timeframe for Short-term Goals: 1-2 weeks  Goal 1: Pt will complete descriptive naming tasks with 80% accuracy with min verbal cues to promote use of circumlocution strategy and help the patient express his/her basic personal, safety, and medical wants and needs in the presence of communication deficits. Patient completed descriptive naming task I with 71% accuracy, with sentence completion 93% accuracy, with initial sound cue 100% accuracy. Goal 2: Pt will complete convergent and divergent naming tasks with 80% accuracy with mild cues to promote semantic organization and the overall effectiveness and efficiency for expression of his/her wants, needs, feelings, and ideas. Patient named 3 members of each category I with 70% accuracy, with [address]- questions 90% accuracy, with initial sound cue 100% accuracy. Goal 3: Pt will follow 3-multi step written directions with 90% accuracy with min cues to improve the pt's reading comprehension and ability to utilize compensatory strategy of environmental referencing for maximum independence.   Goal 4: Pt will answer high level yes/no questions with 90% accuracy with min cues to assist the caregiver in obtaining important information regarding the patient's personal, medical, and safety needs. Patient completed general yes/no questions I with 100% accuracy. Patient answered complex yes/no questions I with 83% accuracy. Goal 5: Pt will describe an object by two attributes with 80% acc with mild cues to promote use of circumlocution strategy and help the patient express his/her basic personal, safety, and medical wants and needs. Goal 6: Within 1-5 days of implementing the ST POC, pt's functional reading/writing skills will be assessed in relation to executive functioning (e.g. bill paying, reading rx labels, completing personal information), with additional goals added to pt's POC as deemed necesary by treating SLP. Long-term Goals  Timeframe for Long-term Goals: 2-3 weeks  Goal 1: Pt will improve his/her Expressive Language abilities to a modfied I level for expression of complex wants, needs, feelings/ideas, and medical/safety information. Goal 2: Pt will improve her/his Receptive Language abilities to a modfied I level for comprehension of conversation and safety directions with familiar and unfamiliar communication partners. Short-term Goals  Timeframe for Short-term Goals: 1-2 weeks  Goal 1: Pt will complete oral motor ROM and strengthening exercises with 90% accuracy, given cues as needed, in order to strengthen lingual/labial/buccal musculature to promote safety and efficiency of oral phase of swallow and decrease risk for pocketing. Patient completed the following lingual exercises:    Lingual protrusion X10 reduced strength and coordination. Lingual lateralization X10 with left side weakness. Lingual pull back 0/5 accuracy. Patient was unable to coordinate movement. Lingual palate push 0/5 accuracy. Patient was unable to coordinate movement. Lingual circles X10 with reduced strength on upper lip.      Goal 2: Pt will complete lingual exercises that promote anterior to posterior propulsion of bolus and improve tongue base retraction with 90% accuracy in order to strengthen the muscles of the swallow to decrease risk of aspiration and to increase ability to safely handle the least restrictive diet level. Goal 3: Pt will complete pharyngeal strengthening exercises (such as the Blanca, Effortful swallow, etc) with 90% accuracy in order to strengthen and establish and more effective swallow. Goal 4: Pt will tolerate therapeutic trials of thin from cup using small sip with demonstrating no overt s/s of difficulty or aspiration on 100% trials  Goal 5: Pt will demonstrate swallow strategies for safe and efficient swallow of soft/nectar consistencies in all given opportunities  Goal 6: Pt will tolerate 30 minutes of LANIE to suprahyoidal musculature and right facial musculature to improve hylolaryngeal elevation/management of secretions/increase UES elongation and laryngeal exursion to reduce pyriform sinus retention and decrease the risk of penetration and aspiration during oral intake. Compensatory Swallowing Strategies: Alternate solids and liquids, No straws, Upright as possible for all oral intake, Swallow 2 times per bite/sip, Eat/Feed slowly      Treatment/Activity Tolerance:  Patient tolerated treatment well    Plan:  Continue per POC    Pain Assessment:  Pre-Treatment  Pain assessment: 0-10  Pain level: 0  Intervention:  Patient denies pain. Post-Treatment  Pain assessment: 0-10  Pain level: 0  Intervention:  Patient denies pain. Patient/Caregiver Education:  Patient educated on session and progression towards goals.     Safety Devices:  Chair alarm in place      [id number] Alex Miller ([facility]):    SWALLOWING  Ratin    SPOKEN LANGUAGE COMPREHENSION  Rating:  3    SPOKEN LANGUAGE EXPRESSION  Rating:  3    MOTOR SPEECH  Ratin            Therapy Time  SLP Individual Minutes  Time In: 0930  Time Out: 1000  Minutes: 30              Signature: Electronically signed by Roland Cuenca on 9/15/2021 at 10:39 AM

## 2021-09-15 NOTE — PROGRESS NOTES
Occupational Therapy  Facility/Department: [facility]  Daily Treatment Note  NAME: Hubert Tabor  : 1945  MRN: [id number]    Date of Service: 9/15/2021    Discharge Recommendations:  Continue to assess pending progress       Assessment      Activity Tolerance  Activity Tolerance: Patient Tolerated treatment well  Safety Devices  Safety Devices in place: Yes  Type of devices: All fall risk precautions in place         Patient Diagnosis(es): There were no encounter diagnoses. has a past medical history of Cellulitis of left hand and Hypertension. has a past surgical history that includes back surgery. Restrictions  Restrictions/Precautions  Restrictions/Precautions: Fall Risk, Swallowing - Thickened Liquids  Subjective   General  Chart Reviewed: Yes  Patient assessed for rehabilitation services?: Yes  Family / Caregiver Present: No  Referring Practitioner: Dr. Niyah Rodríguez  Diagnosis: Impaired mobility, gait, and ADL's 2º inferior cerebellar stroke  Pain Assessment  Pain Assessment: 0-10  Pain Level: 0  Pre Treatment Pain Screening  Pain at present: 0  Scale Used: Numeric Score  Intervention List: Patient able to continue with treatment  Vital Signs  Patient Currently in Pain: No   Orientation     Objective    Pt was able to state his name but had errors with stating his date of birth. Pt. engaged in B hand fine motor and problem solving task to promote independence and safety with ADls. Pt. was asked to  one card at a time from the pile and then sorted them by suite. Pt had no difficulty picking up a card but was unable to state what the suite was. Pt was able to place the cards in the correct pile however would consistently say out loud the wrong suit. Pt also would not put the card in a pile until the therapist stated \" where does it go\" then he would put it in the right pile. Pt was able to place all the cards in the piles correctly except 3 cards.    Pt. continued with problem solving but incorporated UE strengthening as well to improve transfers. Pt had 1 lb wrist weights on B wrists and reached to grab large pegs from a container and then placed them in the pegboard according to color. Pt had 1 error with the colors. Pt then placed marbles on top of the pegs with each hand. Pt required mod verbal cues to continue by going to the next row to place the marbles on. Pt removed the marbles from the pegs and placed them in the container. Pt had mod difficulty following the directions on how to remove the marbles. Pt. removed the pegs as well and did not required directions repeated. Pt. tolerated the wrist weights well. Plan   Plan  Times per week: 5-7x  Plan weeks: 2 weeks  Current Treatment Recommendations: Strengthening, Functional Mobility Training, Balance Training, Neuromuscular Re-education, Cognitive/Perceptual Training, Safety Education & Training, Patient/Caregiver Education & Training, Equipment Evaluation, Education, & procurement, Self-Care / ADL, Home Management Training  Plan Comment: continue with current POC    Goals  Long term goals  Time Frame for Long term goals :  Within 2 weeks, pt to demonstrate progress in the following areas listed below to achieve specific LTG's as stated in initial evaluation  Long term goal 1: Improve independence during ADLs/IADLs  Long term goal 2: Improve strength and endurance to perform functional transfers  Long term goal 3: Improve fine motor coordination during self care  Long term goal 4: Improve cognition to demonstrate understanding of HEP  Long term goal 5: improve balance to safely perform functional mobility  Patient Goals   Patient goals : \"to get better\"       Therapy Time   Individual Concurrent Group Co-treatment   Time In 1400         Time Out 1500         Minutes 60              Therapeutic activities: 30 minutes  Cognitive Retrainin minutes      JASKARAN Quintero Electronically signed by JASKARAN Quintero on 9/15/2021 at 3:18 PM

## 2021-09-15 NOTE — PROGRESS NOTES
Subjective: The patient complains of severe acute on chronic progressive fatigue and balance and cognitive deficits due to cerebellar CVA partially relieved by rest,medications, PT,  OT,   SLP and rest and exacerbated by recent illness. I am concerned about patients medical complexities including risk for arrhythmia as cause for embolic CVA. I reviewed current care and plans for further care with other rehab providers including nursing and case management. According to recent nursing note, \" Pt can be impulsive but can be redirected. Pt up to void in bathroom 3 times tonight. Rolling walker and one assist. Pt's gait is slow and unsteady with right sided weakness. Pt is on telemetry and monitor tech stated that pt was SB-NSR Rate 55-65. Pt is on nectar thick liquids and did not have much in room to drink . Pt offered nectar thick juices tonight at bedside and has drank 4/ 120 cc juices tonight. Speech clear. Cooperative. Call light in reach and bed alarm on. \".    ROS x10: The patient also complains of severely impaired mobility and activities of daily living. Otherwise no new problems with vision, hearing, nose, mouth, throat, dermal, cardiovascular, GI, , pulmonary, musculoskeletal, psychiatric or neurological. See Rehab H&P on Rehab chart dated . Vital signs:  /65   Pulse 58   Temp 97.6 °F (36.4 °C) (Oral)   Resp 18   Ht 5' 8\" (1.727 m)   Wt 165 lb 3 oz (74.9 kg)   SpO2 99%   BMI 25.12 kg/m²   I/O:   PO/Intake:  fair PO intake, dysphagia soft bite-size with nectar thick liquids    Bowel/Bladder:  continent, no problems noted. General:  Patient is well developed, adequately nourished, non-obese and     well kempt. HEENT:    PERRLA, hearing intact to loud voice, external inspection of ear     and nose benign. Inspection of lips, tongue and gums benign  Musculoskeletal: No significant change in strength or tone. All joints stable.       Inspection and palpation of digits and nails show no clubbing,       cyanosis or inflammatory conditions. Neuro/Psychiatric: Affect: flat but pleasant. Alert and oriented to person, place and     Situation with  min cues. No significant change in deep tendon reflexes or     sensation  Lungs:  Diminished, CTA-B. Respiration effort is normal at rest.     Heart:   S1 = S2, RRR. No loud murmurs. Abdomen:  Soft, non-tender, no enlargement of liver or spleen. Extremities:  No significant lower extremity edema or tenderness. Skin:   Intact to general survey, no visualized or palpated problems. Rehabilitation:  Physical therapy:   Bed Mobility:      Transfers: Sit to Stand: Contact guard assistance  Stand to sit: Minimal Assistance, Moderate Assistance  Bed to Chair: Minimal assistance, Ambulation 1  Surface: carpet  Device: Rolling Walker  Assistance: Minimal assistance, Moderate assistance  Quality of Gait: short step length, decreased stance time on R, decreased foot clearance on R, non reciprocal, ff posture  Gait Deviations: Slow Princess, Shuffles, Decreased step length, Decreased step height, Decreased head and trunk rotation  Distance: 50' x 2  Comments: VC's for upright posture throughout gait training with poor follow through.,      FIMS:  ,  , Assessment: Pt demonstrates decreased safety with trsfs secondary to poor carryover of vc's for improved safety and quality. Occupational therapy:    ,  , Assessment: Pt is  a 77 y.o. admitted for CVA. Pt with above deficits impairing ability to safety complete ADL's and IADLs. Pt would benefit from OT services to address deficits and maximize level of safety and function during ADL's. Speech therapy:        Lab/X-ray studies reviewed, analyzed and discussed with patient and staff:      telemetry and monitor tech stated that pt was SB-NSR Rate 55-65.        Recent Results (from the past 24 hour(s))   EKG 12 Lead    Collection Time: 09/14/21  3:34 PM   Result Value Ref Range    Ventricular Rate 59 BPM    Atrial Rate 59 BPM    P-R Interval 162 ms    QRS Duration 92 ms    Q-T Interval 444 ms    QTc Calculation (Bazett) 439 ms    P Axis -21 degrees    R Axis 44 degrees    T Axis 50 degrees   EKG 12 Lead    Collection Time: 09/15/21  1:45 AM   Result Value Ref Range    Ventricular Rate 57 BPM    Atrial Rate 57 BPM    P-R Interval 142 ms    QRS Duration 96 ms    Q-T Interval 446 ms    QTc Calculation (Bazett) 434 ms    R Axis 51 degrees    T Axis 52 degrees       Echocardiogram  9/11/2021  Transthoracic Echocardiography  Left Ventricle Left ventricular ejection fraction is visually estimated at 60%. Impaired relaxation compatible with diastolic dysfunction. ( reversed E/A ratio) Moderate concentric left ventricular hypertrophy. Right Ventricle Normal right ventricle structure and function. Normal right ventricle systolic pressure. Left Atrium Moderately dilated left atrium. Normal intact intra-atrial septum was noted with no evidence of significant intra-atrial communications neither by colour flow Doppler imaging nor by aggitated saline study. Right Atrium Normal right atrium. Mitral Valve Moderate annular calcification. Tricuspid Valve Normal tricuspid valve structure and function. Aortic Valve Normal aortic valve structure and function. Trivial aortic regurgitation is noted. Pulmonic Valve Normal pulmonic valve structure and function. Pericardial Effusion No evidence of pericardial effusion. Pleural Effusion No evidence of pleural effusion. Aorta \ Miscellaneous Miscellaneous normal findings were found. M-Mode Measurements (cm)   LVIDd: 4.6 cm                          LVIDs: 3.01 cm  IVSd: 1.98 cm                          IVSs: 2.14 cm  LVPWd: 1.08 cm                         AO Root Dimension: 3.04 cm  Rt. Vent.  Dimension: 3.05 cm                                         LVOT: 1.72 cm  Doppler Measurements:   AV Velocity:0.02 m/s                   MV Peak E-Wave: 1.25 m/s  AV Peak Gradient: 15.73 mmHg MV Peak A-Wave: 1.54 m/s  AV Mean Gradient: 8.65 mmHg  AV Area (Continuity):1.66 cm^2         MV P1/2t: 103 msec  TR Velocity:2.47 m/s                   MVA by PHT2.14 cm^2  TR Gradient:24.4 mmHg                  Estimated RAP:10 mmHg                                         RVSP:34.4 mmHg  Valves  Mitral Valve   Peak E-Wave: 1.25 m/s                 Peak A-Wave: 1.54 m/s  P1/2t: 103 msec                       E/A Ratio: 0.81  Mean Velocity: 1.01 m/s               Peak Gradient: 6.28 mmHg  Mean Gradient: 4.78 mmHg              Deceleration Time: 407.7 msec  Area (PHT): 2.14 cm^2                 Area (continuity): 1.44 cm^2   Tissue Doppler   E' Septal Velocity: 0.07 m/s  E' Lateral Velocity: 0.06 m/s   Aortic Valve   Peak Velocity: 1.98 m/s                Mean Velocity: 1.39 m/s  Peak Gradient: 15.73 mmHg              Mean Gradient: 8.65 mmHg  Area (continuity): 1.66 cm^2  AV VTI: 46.12 cm   Tricuspid Valve   Estimated RVSP: 34.4 mmHg               Estimated RAP: 10 mmHg  TR Velocity: 2.47 m/s                   TR Gradient: 24.4 mmHg   Pulmonic Valve   Peak Velocity: 0.95 m/s            Peak Gradient: 3.64 mmHg                                     Estimated PASP: 34.4 mmHg   LVOT   Peak Velocity: 1.51 m/s              Mean Velocity: 1.09 m/s  Peak Gradient: 9.17 mmHg             Mean Gradient: 5.32 mmHg  LVOT Diameter: 1.72 cm               LVOT VTI: 32.89 cm  Structures  Left Atrium   LA Volume/Index: 47.11 ml /24 m^2             LA Area: 16.59 cm^2   Left Ventricle   Diastolic Dimension: 4.6 cm          Systolic Dimension: 4.12 cm  Septum Diastolic: 1.72 cm            Septum Systolic: 7.07 cm  PW Diastolic: 6.90 cm                                       FS: 34.6 %  LV EDV/LV EDV Index: 97.45 ml/50 m^2 LV ESV/LV ESV Index: 35.36 ml/18 m^2  EF Calculated: 63.7 %                LV Length: 7.34 cm   LVOT Diameter: 1.72 cm   Right Atrium   RA Systolic Pressure: 10 mmHg   Right Ventricle   Diastolic Dimension: 3.05 cm                                     RV Systolic Pressure: 91.2 mmHg  Aorta/ Miscellaneous Aorta   Aortic Root: 3.04 cm  LVOT Diameter: 1.72 cm      CT HEAD  : 9/10/2021  There is no bleed, mass effect, or space occupying lesion. No extra-axial mass or fluid collections. Hypoattenuated White matter changes in the cerebral hemispheres noted. There is global atrophy. Findings likely reflect chronic small vessel vasculopathy. There is a vague area of low attenuation in the inferior posterior right cerebellar hemisphere with slight obliteration of adjacent sulci. An evolving nonhemorrhagic infarct in the cerebellum would have to be a consideration. Clinical correlation recommended. NO BLEED, MASS EFFECT, OR EXTRA-AXIAL FLUID COLLECTION. EVOLVING NONHEMORRHAGIC INFARCT IN THE INFERIOR RIGHT CEREBELLAR HEMISPHERE. 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 XR CHEST   9/10/2021 Atherosclerotic calcification of the thoracic aorta. The cardiomediastinal silhouette is within normal limits. No pneumothorax, pleural effusion, or consolidation. Question calcified pleural plaques on the left, which can be seen with asbestos related disease. No acute osseous abnormality. No radiographic evidence of acute intrathoracic process. Question left-sided pleural plaques which can be seen with asbestos related disease. MRI BRAIN   9/11/2021 There is a tiny focus of restricted diffusion in the left centrum semiovale (series 4 image 19). There are no extra-axial collections. There is no evidence of hemorrhage. The susceptibility images do not demonstrate evidence of hemosiderin deposition within the brain parenchyma or the leptomeninges. There are numerous patchy foci of T2/FLAIR hyperintensities in the subcortical and periventricular white matter in a symmetric distribution throughout both hemispheres. Chronic bilateral cerebellar infarcts.  There is prominence of the sulci and ventricles consistent and to improve the P/AROM. Therapeutic modifications regarding activities in therapies, place, amount of time per day and intensity of therapy made daily. In bed therapies or bedside therapies prn.   2. Bowel and Bladder dysfunction neurogenic bowel and bladder due to CVA:  frequent toileting, ambulate to bathroom with assistance, check post void residuals. Check for C.difficile x1 if >2 loose stools in 24 hours, continue bowel & bladder program.  Monitor bowel and bladder function. Lactinex 2 PO every AC. MOM prn, Brown Bomb prn, Glycerin suppository prn, enema prn.  3. Severe neck mid back and low back pain as well as generalized OA pain Multiple areas of severe spinal canal stenosis in the cervical region. Severe canal stenosis at L2-3, L3-4 and L4-5. : reassess pain every shift and prior to and after each therapy session, give prn Tylenol and scheduled Tylenol, modalities prn in therapy, masage, Lidoderm, K-pad prn. Consider scheduled AM pain meds. 4. Skin healing and breakdown risk:  continue pressure relief program.  Daily skin exams and reports from nursing. 5. Severe fatigue due to nutritional and hydration deficiency: Add and titrate vitamin B12 vitamin D and CoQ10 continue to monitor I&Os, calorie counts prn, dietary consult prn.  6. Acute episodic insomnia with situational adjustment disorder:  prn Ambien, monitor for day time sedation. 7. Falls risk elevated:  patient to use call light to get nursing assistance to get up, bed and chair alarm. 8. Elevated DVT risk: progressive activities in PT, continue prophylaxis FITZ hose, elevation and Lovenox. 9. Complex discharge planning:  Weekly team meeting every  Thursday to assess progress towards goals, discuss and address social, psychological and medical comorbidities and to address difficulties they may be having progressing in therapy. Patient and family education is in progress.   The patient is to follow-up with their family physician after discharge. Complex Active General Medical Issues that complicate care Assess & Plan:    1. History of bilateral shoulder pain with history of shoulder dislocation generalized osteoarthritis  2. Stroke determined by clinical assessment and confirmed on MRI as above-consult neurology control blood pressure monitor for diabetes add aspirin  3. Spinal stenosis of lumbar region with neurogenic claudication, Myelopathy concurrent with and due to spinal stenosis of cervical region  4. Ataxic gait due to cerebellar CVA-focus on balance and therapy  5. Significant oral pharyngeal dysphagia-consult speech-language pathology  6. Anxiety and depression-emotional support provided daily, vitamin B12, encourage participation in rehabilitation support group and recreational therapy, adjust/add medications (add vitamin B12 consider SSRI)  7. Hypertension, hypercholesterolemia, SC cerebrovascular disease, coronary artery disease, cardiac arrhythmia-continue telemetry-continue blood signs every shift focusing on heart rate and blood pressure checks, consult hospitalist for backup medical and adjust/add medications (aspirin, Norvasc, Lipitor, lisinopril, Lopressor) consult cardiology regarding V. tach titrate beta-blocker  8.  Oral pharyngeal dysphagia with cognitive deficits-dysphagia soft with bite-size nectar thick liquid consult speech-language pathology--oral motor exercises       Electronically signed by Farideh Membreno DO on 9/15/21 at 8:03 AM RUPA Feldman D.O., PM&R     Attending    [facility] Raisa Lassiter

## 2021-09-16 PROCEDURE — 97129 THER IVNTJ 1ST 15 MIN: CPT

## 2021-09-16 PROCEDURE — 6370000000 HC RX 637 (ALT 250 FOR IP): Performed by: INTERNAL MEDICINE

## 2021-09-16 PROCEDURE — 99233 SBSQ HOSP IP/OBS HIGH 50: CPT | Performed by: PSYCHIATRY & NEUROLOGY

## 2021-09-16 PROCEDURE — 97530 THERAPEUTIC ACTIVITIES: CPT

## 2021-09-16 PROCEDURE — 92526 ORAL FUNCTION THERAPY: CPT

## 2021-09-16 PROCEDURE — 92507 TX SP LANG VOICE COMM INDIV: CPT

## 2021-09-16 PROCEDURE — 6370000000 HC RX 637 (ALT 250 FOR IP): Performed by: PHYSICAL MEDICINE & REHABILITATION

## 2021-09-16 PROCEDURE — 97110 THERAPEUTIC EXERCISES: CPT

## 2021-09-16 PROCEDURE — 97535 SELF CARE MNGMENT TRAINING: CPT

## 2021-09-16 PROCEDURE — 99233 SBSQ HOSP IP/OBS HIGH 50: CPT | Performed by: PHYSICAL MEDICINE & REHABILITATION

## 2021-09-16 PROCEDURE — 97130 THER IVNTJ EA ADDL 15 MIN: CPT

## 2021-09-16 PROCEDURE — 97112 NEUROMUSCULAR REEDUCATION: CPT

## 2021-09-16 PROCEDURE — 93005 ELECTROCARDIOGRAM TRACING: CPT | Performed by: INTERNAL MEDICINE

## 2021-09-16 PROCEDURE — 1180000000 HC REHAB R&B

## 2021-09-16 PROCEDURE — 97116 GAIT TRAINING THERAPY: CPT

## 2021-09-16 PROCEDURE — 6360000002 HC RX W HCPCS: Performed by: INTERNAL MEDICINE

## 2021-09-16 PROCEDURE — APPSS45 APP SPLIT SHARED TIME 31-45 MINUTES: Performed by: STUDENT IN AN ORGANIZED HEALTH CARE EDUCATION/TRAINING PROGRAM

## 2021-09-16 RX ADMIN — METOPROLOL TARTRATE 50 MG: 50 TABLET, FILM COATED ORAL at 09:30

## 2021-09-16 RX ADMIN — Medication 100 MG: at 09:30

## 2021-09-16 RX ADMIN — Medication 2000 UNITS: at 17:40

## 2021-09-16 RX ADMIN — METOPROLOL TARTRATE 50 MG: 50 TABLET, FILM COATED ORAL at 20:17

## 2021-09-16 RX ADMIN — LISINOPRIL 10 MG: 10 TABLET ORAL at 09:30

## 2021-09-16 RX ADMIN — ENOXAPARIN SODIUM 40 MG: 100 INJECTION SUBCUTANEOUS at 09:30

## 2021-09-16 RX ADMIN — ASPIRIN 81 MG: 81 TABLET, CHEWABLE ORAL at 09:29

## 2021-09-16 RX ADMIN — ATORVASTATIN CALCIUM 80 MG: 80 TABLET, FILM COATED ORAL at 20:17

## 2021-09-16 RX ADMIN — AMLODIPINE BESYLATE 10 MG: 10 TABLET ORAL at 09:30

## 2021-09-16 ASSESSMENT — ENCOUNTER SYMPTOMS
TROUBLE SWALLOWING: 0
BACK PAIN: 1
SHORTNESS OF BREATH: 0
VOMITING: 0
NAUSEA: 0
CHOKING: 0
COLOR CHANGE: 0

## 2021-09-16 ASSESSMENT — PAIN SCALES - GENERAL
PAINLEVEL_OUTOF10: 0

## 2021-09-16 NOTE — CARE COORDINATION
[address] [place] [address] NOTE  Room: R233/R233-01  Admit Date: 2021       Date: 2021  Patient Name: Kate Griffin        MRN: [id number]    : 1945  [de-identified]77 y.o.)  Gender: male        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 (HCC)    CO MORBIDITIES:      Past Medical History:   Diagnosis Date    Cellulitis of left hand 3/26/2019    Hypertension      Past Surgical History:   Procedure Laterality Date    BACK SURGERY          Restrictions  Restrictions/Precautions: Fall Risk, Swallowing - Thickened Liquids  CASE MANAGEMENT    Social/Functional History  Social/Functional History  Lives With: Alone  Type of Home: House  Home Layout: One level  Home Access: Stairs to enter with rails  Entrance Stairs - Number of Steps: 3  Entrance Stairs - Rails: Both  Bathroom Shower/Tub: Tub/Shower unit  Bathroom Equipment: Hand-held shower  Home Equipment: Cane, Rolling walker (rollator)  ADL Assistance: Independent  Homemaking Assistance: Independent  Homemaking Responsibilities: Yes  Ambulation Assistance: Independent (2ww)  Transfer Assistance: Independent  Active : Yes  Mode of Transportation: [place]  Occupation: Retired  Type of occupation:   Leisure & Hobbies: working on cars  IADL Comments: completes own medicaions/finances  Additional Comments: sister local       Pts personal preferences: n/a    Pts assets/resources/support system: sister     COVERAGE INFORMATION:Payor: MEDICARE / Plan: MEDICARE PART A AND B / Product Type: *No Product type* /       NURSING  Weight: 165 lb 3 oz (74.9 kg) / Body mass index is 25.12 kg/m². ADULT DIET;  Dysphagia - Soft and Bite Sized; Mildly Thick (Nectar)    SpO2: 98 % (21 0623)  No active isolations    Skin Issues: No    Pain Managed: Yes    Bladder continence: No    Bowel continence: Yes      Other: forgetful       PHYSICAL THERAPY  Bed mobility:  Supine to Sit: Minimal assistance (09/14/21 0906)  Sit to Supine: Stand by assistance (09/14/21 0906)  Transfers:  Sit to Stand: Contact guard assistance (09/15/21 1414)  Bed to Chair: Minimal assistance (09/14/21 1358)  Gait:   Device: Rolling Walker (09/15/21 1409)  Assistance: Minimal assistance; Moderate assistance (09/15/21 1409)  Distance: 50' x 2 (09/15/21 1409)  Quality of Gait: short step length, decreased stance time on R, decreased foot clearance on R, non reciprocal, ff posture (09/15/21 1409)  Comments: Improved foot clearance on R with cues. (09/15/21 1409)  Stairs:     W/C mobility:     LTG:  Long term goal 1: Bed mobility with indep  Long term goal 2: sit to stand and bed transfers indep  Long term goal 3: Amb 150ft with 2ww or rollator indep  Long term goal 4: TUG <15.0 seconds with AD to demonstrate low fall risk  Long term goal 5: [place] balance testing at 25/56 or greater  PT Treatment Time:  1.0 hrs      OCCUPATIONAL THERAPY  Hand Dominance: Right  ADL  Feeding: Setup; Thickened liquids (09/15/21 1209)  Grooming: Minimal assistance; Increased time to complete;Verbal cueing (MIN A for thoroughness with shaving with electric razor) (09/15/21 1209)  UE Bathing: Stand by assistance;Verbal cueing; Increased time to complete (09/15/21 1209)  LE Bathing: Stand by assistance;Verbal cueing; Increased time to complete (09/15/21 1209)  UE Dressing: Supervision; Increased time to complete (09/15/21 1209)  LE Dressing: Minimal assistance;Verbal cueing; Increased time to complete (A to tie pants) (09/15/21 1209)  Toileting: None (denied need) (09/15/21 1209)  Additional Comments: Pt perseverative during ADL and requires verbal cues to recognize and sequence (09/14/21 1158)  Toilet Transfers  Toilet Transfer: Unable to assess (09/14/21 1201)  Toilet Transfers Comments: Pt did not need to go (09/14/21 1201)     Shower Transfers  Shower - Transfer From: Kelsey Montgomery (09/15/21 1210)  Shower - Transfer Type:  To and From (09/15/21 1210)  Shower - Transfer To: Shower seat with back (09/15/21 1210)  Shower - Technique: Ambulating (09/15/21 1210)  Shower Transfers: Minimal assistance;Verbal cues (09/15/21 1210)  Shower Transfers Comments: grab bars - assist to maneuver ww x 1 (09/15/21 1210)  LTG:  Eating  Assistance Needed: Setup or clean-up assistance  CARE Score: 5  Discharge Goal: Independent, Oral Hygiene  Assistance Needed: Supervision or touching assistance  CARE Score: 4  Discharge Goal: Independent, [address]  Reason if not Attempted: Not applicable (pt did not need to go)  CARE Score: 9  Discharge Goal: Independent, Shower/Bathe Self  Assistance Needed: Supervision or touching assistance  CARE Score: 4  Discharge Goal: Independent  Upper Body Dressing  Reason if not Attempted: Not applicable (gown)  CARE Score: 9  Discharge Goal: Independent, Lower Body Dressing  Assistance Needed: Supervision or touching assistance  CARE Score: 4  Discharge Goal: Independent, Putting On/Taking Off Footwear  Assistance Needed: Supervision or touching assistance  CARE Score: 4  Discharge Goal: Independent, Toilet Transfer  Assistance Needed: Supervision or touching assistance  Reason if not Attempted: Not applicable (pt did not need to go)  CARE Score: 9  Discharge Goal: Independent  OT Treatment Time: 2.0 hrs      SPEECH THERAPY    Motor Speech: Exceptions to [facility]  Comprehension:  (Moderate auditory comprehension deficits continue to be noted with yes/no questions and muti step directions. Recommend repetition and simplified instructions)  Verbal Expression:  (Moderate verbal expression deficits continue to be noted in the areas of divergent/convergent naming, initiation, and confrontational naming.  Min to mod assist including word retrieval strategies improve accuracy)      Diet/Swallow:  Diet Solids Recommendation: Dysphagia Soft and Bite-Sized (Dysphagia III)  Liquid Consistency Recommendation: Mildly Thick (Nectar)  Dysphagia Outcome Severity Scale: Level 4: Mild moderate dysphagia- Intermittent supervision/cueing. One - two diet consistencies restricted    Compensatory Swallowing Strategies: Alternate solids and liquids, No straws, Upright as possible for all oral intake, Swallow 2 times per bite/sip, Eat/Feed slowly  Therapeutic Interventions: Bolus control exercises, Diet tolerance monitoring, Oral motor exercises, Patient/Family education, Pharyngeal exercises, Vital Stim/NMES, Laryngeal exercises, Effortful swallow, Tongue base strengthening      LTG:  Long-term Goals  Timeframe for Long-term Goals: 2-3 weeks  Goal 1: Pt will improve his/her Expressive Language abilities to a modfied I level for expression of complex wants, needs, feelings/ideas, and medical/safety information. Goal 2: Pt will improve her/his Receptive Language abilities to a modfied I level for comprehension of conversation and safety directions with familiar and unfamiliar communication partners. Long-term Goals  Timeframe for Long-term Goals: 2-3 weeks  Goal 1: Pt will tolerate least restrictive diet with no overt s/s of aspiration. COGNITION  OT: Cognition Comment: Comp; Supervision Express: Independent Social: Supervision Prob: Min A Mem: Supervision  SP:Memory: Unable to assess (full assessment not completed, delayed recall 1/3 items. ST suspects memory deficits. Will assess in therapy.)  Problem Solving: Unable to assess (problem solving was not assesed)    RECREATIONAL THERAPY  Attendance to recreational therapy programs:    []  Pet Therapy  [] Music Therapy  [] Art Therapy    [] Recreation Therapy Group [] Support Group           Patient social interaction (mood, participation): good      Patient strengths: was independent    Patients goal: to go home    Problems/Barriers: lives alone        1. Safety:          - Intervention / Plan:    [x]  falls protocol     [x]  PT/OT    [x]  SP        - Results:         2.  Potential DME needs:         - Intervention

## 2021-09-16 NOTE — CARE COORDINATION
LSW met with patient and informed him of projected discharge date of 9/28. LSW explained recommendation of possible SNF at discharge due to patient's decreased safety awareness. LSW asked patient what his thoughts were regarding that, patient stated \"I don't have one\". LSW asked patient if he had any questions, to which patient replied that he did not. SUNDEEPW is currently waiting for return call from Giselle Vogt (sister).  Electronically signed by DIONISIO Martinez, SANJAY on 9/16/2021 at 12:47 PM

## 2021-09-16 NOTE — PROGRESS NOTES
Physical Therapy  Facility/Department: [facility] [id number]/[id number]  Physical Therapy Discharge      NAME: Evonne Natarajan    : 1945 (15 y.o.)  MRN: [id number]    Account: [de-identified]  Gender: male      Patient has been discharged from acute care hospital. DC patient from current PT program.      Electronically signed by Carlton Ramsay PT on 21 at 12:55 PM EDT

## 2021-09-16 NOTE — PROGRESS NOTES
Occupational Therapy  Facility/Department: [facility]  Daily Treatment Note  NAME: Colby More  : 1945  MRN: [id number]    Date of Service: 2021    Discharge Recommendations:  Continue to assess pending progress    Assessment  Activity Tolerance  Activity Tolerance: Patient Tolerated treatment well  Safety Devices  Safety Devices in place: Yes  Type of devices: All fall risk precautions in place       Patient Diagnosis(es): There were no encounter diagnoses. has a past medical history of Cellulitis of left hand and Hypertension. has a past surgical history that includes back surgery. Restrictions  Restrictions/Precautions  Restrictions/Precautions: Fall Risk, Swallowing - Thickened Liquids     Subjective  General  Chart Reviewed: Yes  Patient assessed for rehabilitation services?: Yes  Response to previous treatment: Patient with no complaints from previous session  Family / Caregiver Present: No  Referring Practitioner: Dr. Haque Severe  Diagnosis: Impaired mobility, gait, and ADL's 2º inferior cerebellar stroke  Pre Treatment Pain Screening  Pain at present: 0  Scale Used: Numeric Score  Intervention List: Patient able to continue with treatment  Vital Signs  Patient Currently in Pain: No     Objective     Coordination  Fine Motor:   Theraputty: Green putty (medium resistance), squeezing, rolling, pinching, folding, inserting and removing beads. Pt successfully inserted/removed 15 of 15 beads with Min difficulty. Pt dropped 1 bead throughout. Pt completed activity to improve hand strength/fine motor coordination for mgmt of clothing fasteners/ADL containers in a timely manner. Cognition  Overall Cognitive Status: Exceptions  Arousal/Alertness: Delayed responses to stimuli  Following Commands:  Follows one step commands with repetition  Attention Span: Appears intact  Safety Judgement: Decreased awareness of need for assistance  Problem Solving: Assistance required to identify errors made;Assistance required to generate solutions  Initiation: Requires cues for some  Sequencing: Requires cues for some     Racecar: Pt used bilateral hands to disassemble/assemble a \"racecar\" made out of wooden blocks, threaded rods, and wing nuts. Pt had Min difficulty physically with activity and worked at a steady pace without rest breaks. Pt had Min difficulty with cognitive challenges of activity and made 1 error when he incorrectly assembled the \"body\" parts of the car. Pt required assistance to identify and correct error. Pt completed activity to improve cognition for safe ADL/IADL completion. Upper Extremity Function  UE Strengthing:   Rings: Pt wore 1# wrist weights to don/doff rubber rings onto their corresponding vertical graded maryse. Pt had Min difficulty physically with activity and worked at a slow and steady pace with occasional rest breaks. Pt had Mod difficulty with cognitive challenges of activity and required assistance to identify and correct errors. Repetitive reaching at and above shoulder level to increase BUE strength and endurance for improved transfers. Problem solving incorporated into activity. Plan  Plan  Times per week: 5-7x  Plan weeks: 2 weeks  Current Treatment Recommendations: Strengthening, Functional Mobility Training, Balance Training, Neuromuscular Re-education, Cognitive/Perceptual Training, Safety Education & Training, Patient/Caregiver Education & Training, Equipment Evaluation, Education, & procurement, Self-Care / ADL, Home Management Training  Plan Comment: continue with current POC    Goals  Long term goals  Time Frame for Long term goals :  Within 2 weeks, pt to demonstrate progress in the following areas listed below to achieve specific LTG's as stated in initial evaluation  Long term goal 1: Improve independence during ADLs/IADLs  Long term goal 2: Improve strength and endurance to perform functional transfers  Long term goal 3: Improve fine motor coordination during self care  Long term goal 4: Improve cognition to demonstrate understanding of HEP  Long term goal 5: improve balance to safely perform functional mobility  Patient Goals   Patient goals : \"to get better\"    Therapy Time   Individual Concurrent Group Co-treatment   Time In 1100         Time Out 1200         Minutes 60         Therapeutic activities: 50 minutes  Cognitive Retraining: 10 minutes    Electronically signed by JASKARAN Malloy on 9/16/2021 at 12:10 PM    JASKARAN Malloy

## 2021-09-16 NOTE — PROGRESS NOTES
Neurology Follow up    SUBJECTIVE: Patient seen and examined for neurology follow-up for acute left ischemic centrum semiovale infarct. Patient with some mild right-sided weakness. Participating in physical therapy on arrival.  Circumduction noted in the right lower extremity. Some weakness with dorsiflexion of foot and hip flexion of right lower extremity. Significant gait ataxia and unclear if this is related to previous cerebellar infarcts or cervical stenosis. Patient has no new or worsening symptoms. No complaints at this time.     Events noted     Current Facility-Administered Medications   Medication Dose Route Frequency Provider Last Rate Last Admin    Vitamin D (CHOLECALCIFEROL) tablet 2,000 Units  2,000 Units Oral Dinner Luana Scullin, DO   2,000 Units at 09/15/21 1642    cyanocobalamin injection 1,000 mcg  1,000 mcg IntraMUSCular Weekly Luana Scullin, DO   1,000 mcg at 09/14/21 5363    coenzyme Q10 capsule 100 mg  100 mg Oral Daily Luana Scullin, DO   100 mg at 09/16/21 0930    lidocaine 4 % external patch 3 patch  3 patch TransDERmal Daily Luana Scullin, DO        acetaminophen (TYLENOL) tablet 650 mg  650 mg Oral Q4H PRN Luana Scullin, DO        bisacodyl (DULCOLAX) suppository 10 mg  10 mg Rectal Daily PRN Kunal Fuel, DO        fleet rectal enema 1 enema  1 enema Rectal Daily PRN Luanaher Cazaresin, DO        metoprolol tartrate (LOPRESSOR) tablet 50 mg  50 mg Oral BID Haresh Simms MD   50 mg at 09/16/21 0930    acetaminophen (TYLENOL) tablet 650 mg  650 mg Oral Q6H PRN Mo Flores MD        Or    acetaminophen (TYLENOL) suppository 650 mg  650 mg Rectal Q6H PRN Mo Flores MD        enoxaparin (LOVENOX) injection 40 mg  40 mg SubCUTAneous Daily Mo Flores MD   40 mg at 09/16/21 0930    polyethylene glycol (GLYCOLAX) packet 17 g  17 g Oral Daily PRN Mo Flores MD        amLODIPine (NORVASC) tablet 10 mg  10 mg Oral Daily Mo Flores MD   10 mg at 09/16/21 0930 aspirin chewable tablet 81 mg  81 mg Oral Daily Dex Kerr MD   81 mg at 09/16/21 0929    atorvastatin (LIPITOR) tablet 80 mg  80 mg Oral Nightly Dex Kerr MD   80 mg at 09/15/21 2145    lisinopril (PRINIVIL;ZESTRIL) tablet 10 mg  10 mg Oral Daily Dex Kerr MD   10 mg at 09/16/21 0930       PHYSICAL EXAM:    /64   Pulse 50   Temp 98.6 °F (37 °C)   Resp 16   Ht 5' 8\" (1.727 m)   Wt 165 lb 3 oz (74.9 kg)   SpO2 98%   BMI 25.12 kg/m²    General Appearance:      Skin:  normal  CVS - Normal sounds, No murmurs , No carotid Bruits  RS -CTA  Abdomen Soft, BS present  Review of Systems   Constitutional: Negative for appetite change and fever. HENT: Negative for ear pain and trouble swallowing. Eyes: Negative for visual disturbance. Respiratory: Negative for choking and shortness of breath. Cardiovascular: Negative for chest pain and palpitations. Gastrointestinal: Negative for nausea and vomiting. Musculoskeletal: Positive for back pain. Negative for gait problem, joint swelling, myalgias, neck pain and neck stiffness. Skin: Negative for color change. Neurological: Positive for weakness. Negative for dizziness, tremors, seizures, syncope, facial asymmetry, speech difficulty, light-headedness, numbness and headaches. Psychiatric/Behavioral: Negative for behavioral problems, confusion, hallucinations and sleep disturbance.       Mental Status Exam:             Level of Alertness:   awake            Orientation:   person, place, time            Memory:   normal                    Language:  normal      Funduscopic Exam:     Cranial Nerves            Cranial nerve III           Pupils:  equal, round, reactive to light      Cranial nerves III, IV, VI           Extraocular Movements: intact      Cranial nerve V           Facial sensation:  intact      Cranial nerve VII           Facial strength: intact      Cranial nerve VIII           Hearing:  intact      Cranial nerve IX Palate:  intact      Cranial nerve XI         Shoulder shrug:  intact      Cranial nerve XII          Tongue movement:  normal    Motor: Right upper extremity weakness of 4 /5 with a drift  Drift:  Motor exam   Tone:  normal  Abnormal Movements:  absent            Sensory: No definite findings        Pinprick             Right Upper Extremity:  normal             Left Upper Extremity:  normal             Right Lower Extremity:  normal             Left Lower Extremity:  normal           Vibration                         Touch            Proprioception                 Coordination:           Finger/Nose   Right:  abnormal              Left:  normal          Heel-Knee-Shin                Right:  normal              Left:  normal          Rapid Alternating Movements              Right:  normal              Left:  normal          Gait:                       Casual:  normal                         Romberg:  normal            Reflexes:             Deep Tendon Reflexes:             Reflexes are 2 + including ankle reflexes.              Plantar response:                Right:  downgoing               Left:  downgoing    Vascular:  Cardiac Exam:  normal         Echocardiogram complete 2D with doppler with color    Result Date: 9/11/2021  Transthoracic Echocardiography Report (TTE)  Demographics   Patient Name    Jeraline Mcardle Gender               Male   Patient Number  [id number]       Race                 Unknown                                  Ethnicity   Visit Number    [id number]      Room Number          Q668   Corporate ID                   Date of Study        09/11/2021   Accession       [id number]     Referring Physician  Number   Date of Birth   1945     Sonographer          Yane Linn   Age             77 year(s)     Interpreting         [facility])                                 Physician            Cardiology                                                      [address] [place] [address]  Procedure Type of Study   TTE procedure:ECHOCARDIOGRAM COMPLETE WITH BUBBLE STUDY. Procedure Date Date: 09/11/2021 Start: 08:15 AM Study Location: Portable Technical Quality: Adequate visualization Indications:Stroke. Patient Status: Routine Height: 68 inches Weight: 179 pounds BSA: 1.95 m^2 BMI: 27.22 kg/m^2 BP: 147/86 mmHg  Conclusions   Summary  Moderate annular calcification. Normal aortic valve structure and function. Trivial aortic regurgitation is  noted. Moderately dilated left atrium. Normal intact intra-atrial septum was noted with no evidence of  significant intra-atrial communications neither by colour flow Doppler  imaging nor by aggitated saline study. Left ventricular ejection fraction is visually estimated at 60%. Impaired relaxation compatible with diastolic dysfunction. ( reversed E/A  ratio)  Moderate concentric left ventricular hypertrophy. Signature   ----------------------------------------------------------------  Electronically signed by Daniela Medina(Interpreting physician)  on 09/11/2021 11:17 AM  ----------------------------------------------------------------   Findings  Left Ventricle Left ventricular ejection fraction is visually estimated at 60%. Impaired relaxation compatible with diastolic dysfunction. ( reversed E/A ratio) Moderate concentric left ventricular hypertrophy. Right Ventricle Normal right ventricle structure and function. Normal right ventricle systolic pressure. Left Atrium Moderately dilated left atrium. Normal intact intra-atrial septum was noted with no evidence of significant intra-atrial communications neither by colour flow Doppler imaging nor by aggitated saline study. Right Atrium Normal right atrium. Mitral Valve Moderate annular calcification. Tricuspid Valve Normal tricuspid valve structure and function. Aortic Valve Normal aortic valve structure and function. Trivial aortic regurgitation is noted. Pulmonic Valve Normal pulmonic valve structure and function.  Pericardial Effusion No evidence of pericardial effusion. Pleural Effusion No evidence of pleural effusion. Aorta \ Miscellaneous Miscellaneous normal findings were found. M-Mode Measurements (cm)   LVIDd: 4.6 cm                          LVIDs: 3.01 cm  IVSd: 1.98 cm                          IVSs: 2.14 cm  LVPWd: 1.08 cm                         AO Root Dimension: 3.04 cm  Rt. Vent.  Dimension: 3.05 cm                                         LVOT: 1.72 cm  Doppler Measurements:   AV Velocity:0.02 m/s                   MV Peak E-Wave: 1.25 m/s  AV Peak Gradient: 15.73 mmHg           MV Peak A-Wave: 1.54 m/s  AV Mean Gradient: 8.65 mmHg  AV Area (Continuity):1.66 cm^2         MV P1/2t: 103 msec  TR Velocity:2.47 m/s                   MVA by PHT2.14 cm^2  TR Gradient:24.4 mmHg                  Estimated RAP:10 mmHg                                         RVSP:34.4 mmHg  Valves  Mitral Valve   Peak E-Wave: 1.25 m/s                 Peak A-Wave: 1.54 m/s  P1/2t: 103 msec                       E/A Ratio: 0.81  Mean Velocity: 1.01 m/s               Peak Gradient: 6.28 mmHg  Mean Gradient: 4.78 mmHg              Deceleration Time: 407.7 msec  Area (PHT): 2.14 cm^2                 Area (continuity): 1.44 cm^2   Tissue Doppler   E' Septal Velocity: 0.07 m/s  E' Lateral Velocity: 0.06 m/s   Aortic Valve   Peak Velocity: 1.98 m/s                Mean Velocity: 1.39 m/s  Peak Gradient: 15.73 mmHg              Mean Gradient: 8.65 mmHg  Area (continuity): 1.66 cm^2  AV VTI: 46.12 cm   Tricuspid Valve   Estimated RVSP: 34.4 mmHg               Estimated RAP: 10 mmHg  TR Velocity: 2.47 m/s                   TR Gradient: 24.4 mmHg   Pulmonic Valve   Peak Velocity: 0.95 m/s            Peak Gradient: 3.64 mmHg                                     Estimated PASP: 34.4 mmHg   LVOT   Peak Velocity: 1.51 m/s              Mean Velocity: 1.09 m/s  Peak Gradient: 9.17 mmHg             Mean Gradient: 5.32 mmHg  LVOT Diameter: 1.72 cm               LVOT VTI: 32.89 cm Structures  Left Atrium   LA Volume/Index: 47.11 ml /24 m^2             LA Area: 16.59 cm^2   Left Ventricle   Diastolic Dimension: 4.6 cm          Systolic Dimension: 8.79 cm  Septum Diastolic: 2.33 cm            Septum Systolic: 4.63 cm  PW Diastolic: 3.69 cm                                       FS: 34.6 %  LV EDV/LV EDV Index: 97.45 ml/50 m^2 LV ESV/LV ESV Index: 35.36 ml/18 m^2  EF Calculated: 63.7 %                LV Length: 7.34 cm   LVOT Diameter: 1.72 cm   Right Atrium   RA Systolic Pressure: 10 mmHg   Right Ventricle   Diastolic Dimension: 7.81 cm                                     RV Systolic Pressure: 92.8 mmHg  Aorta/ Miscellaneous Aorta   Aortic Root: 3.04 cm  LVOT Diameter: 1.72 cm      CT HEAD WO CONTRAST    Result Date: 9/10/2021  EXAMINATION: CT HEAD WO CONTRAST CLINICAL HISTORY: generalized weakness COMPARISON:  NONE AVAILABLE An unenhanced scan is performed. FINDINGS:   There is no bleed, mass effect, or space occupying lesion. No extra-axial mass or fluid collections. Hypoattenuated White matter changes in the cerebral hemispheres noted. There is global atrophy. Findings likely reflect chronic small vessel vasculopathy. There is a vague area of low attenuation in the inferior posterior right cerebellar hemisphere with slight obliteration of adjacent sulci. An evolving nonhemorrhagic infarct in the cerebellum would have to be a consideration. Clinical correlation recommended. NO BLEED, MASS EFFECT, OR EXTRA-AXIAL FLUID COLLECTION. EVOLVING NONHEMORRHAGIC INFARCT IN THE INFERIOR RIGHT CEREBELLAR HEMISPHERE. 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 All CT scans at this facility use dose modulation, iterative reconstruction, and/or weight based dosing when appropriate to reduce radiation dose to as low as reasonably achievable. XR CHEST PORTABLE    Result Date: 9/10/2021  Exam: XR CHEST PORTABLE History: Generalized weakness Technique: AP portable view of the chest obtained. BILATERAL HISTORY:   stroke . R26.9 Gait abnormality ICD10 COMPARISON:None TECHNIQUE: Carotid duplex sonograms which include gray scale and color flow evaluation are complimented with spectral waveform analysis. Please refer to chart below for specific carotid velocity measurements. The degree of stenosis recorded on this exam uses the same method of stratification used in the NASCET trials. This complies with ACR practice guidelines and the Society of radiology in ultrasound consensus statement and provides adequate information for clinical decision making. Society of Radiologists in Ultrasound (SRU) consensus statement was used to estimate internal carotid artery stenosis. RESULT: Mild to moderate plaque in the carotid arteries bilaterally. No significant increase in systolic flow or turbulence to indicate any hemodynamically significant narrowing in the right or left internal carotid arteries. There is antegrade flow identified in both vertebral arteries. ARTERIAL BLOOD FLOW VELOCITY RIGHT PEAK SYSTOLIC VELOCITIES (PSV)                                               Prox CCA    129 cm/s             Mid CCA     114 cm/s              Dist CCA    104 cm/s             Prox ICA    169 cm/s               Mid ICA     106 cm/s            Dist ICA    63 cm/s             Prox ECA     170   cm/s             Prox VERT   51 cm/s              ICA/CCA     1.48                        LEFT PEAK SYSTOLIC VELOCITIES (PSV)  Prox CCA    135 cm/s Mid CCA     166 cm/s Dist CCA    121 cm/s Prox ICA    119 cm/s Mid ICA     72 cm/s Dist ICA    61 cm/s Prox ECA    131 cm/s Prox VERT   56 cm/s ICA/CCA     0.72      50-69 % STENOSIS IN THE RIGHT ICA  <50 % STENOSIS IN THE LEFT ICA ANTEGRADE FLOW IN THE BILATERAL VERTEBRAL ARTERIES. No results for input(s): WBC, HGB, PLT in the last 72 hours.   Recent Labs     09/14/21  0750      K 4.4   *   CO2 21   BUN 35*   CREATININE 0.98   GLUCOSE 106*     No results for input(s): BILITOT, ALKPHOS, AST, ALT in the last 72 hours. Lab Results   Component Value Date    PROTIME 10.3 03/26/2019    INR 1.0 03/26/2019     No results found for: LITHIUM, DILFRTOT, VALPROATE    ASSESSMENT AND PLAN  Left centrum semiovale acute ischemic infarct  Carotid duplex with 50 to 69% stenosis in the right internal carotid artery and less than 50% in the left internal carotid artery  hemoGlobin A1c 5.3  Total cholesterol 145, triglycerides 67, HDL 38,LDL 94  Echocardiogram with ejection fraction of 60% with negative bubble study  Continue aspirin and high intensity statin  Dysphasia- nectar thick liquids  MRI of the cervical, thoracic and lumbar spine completed with multiple areas of severe spinal canal stenosis in the cervical spine. Neurosurgery has been consulted with plans for follow-up once medically stable after stroke for possible surgical intervention. Patient able to ambulate with assistance of walker. Circumduction and right lower extremity noted. Patient has weakness in flexion of right hip and dorsiflexion of right foot and unclear at this is related to left central semiovale infarct versus severe lumbar stenosis at L2-L5. Patient also has gait ataxia which could be due to severe cervical canal stenosis and old cerebellar infarcts. Azotemia which is new noted today. Patient overall improving in strength. Continue on aspirin and high intensity statin. Continue PT/OT/SLP. I have personally performed a face to face diagnostic evaluation on this patient, reviewed all data and investigations, and am the sole provider of all clinical decisions on the neurological status of this patient. exam noted for right lower ext weakness      Jordan Díaz MD, Odalis Foy, American Board of Psychiatry & Neurology  Board Certified in Vascular Neurology  Board Certified in Neuromuscular Medicine  Certified in Abhijeet Antunez

## 2021-09-16 NOTE — PROGRESS NOTES
Subjective: The patient complains of severe acute on chronic progressive fatigue and balance and cognitive deficits due to cerebellar CVA partially relieved by rest,medications, PT,  OT,   SLP and rest and exacerbated by recent illness. I am concerned about patients medical complexities including risk for arrhythmia as cause for embolic CVA and severe aphasia. He is to start E stim today for swallow. I reviewed current care and plans for further care with other rehab providers including nursing and case management. According to recent nursing note, \"Pt can be impulsive but can be redirected. Pt up to void in bathroom 3 times tonight. Rolling walker and one assist. Pt's gait is slow and unsteady with right sided weakness. Pt is on telemetry and monitor tech stated that pt was SB-NSR Rate 55-65. Pt is on nectar thick liquids and did not have much in room to drink . Pt offered nectar thick juices tonight at bedside and has drank 4/ 120 cc juices tonight. Speech clear. Cooperative. \".    ROS x10: The patient also complains of severely impaired mobility and activities of daily living. Otherwise no new problems with vision, hearing, nose, mouth, throat, dermal, cardiovascular, GI, , pulmonary, musculoskeletal, psychiatric or neurological. See Rehab H&P on Rehab chart dated . Vital signs:  /64   Pulse 50   Temp 98.6 °F (37 °C)   Resp 16   Ht 5' 8\" (1.727 m)   Wt 165 lb 3 oz (74.9 kg)   SpO2 98%   BMI 25.12 kg/m²   I/O:   PO/Intake:  fair PO intake, dysphagia soft bite-size with nectar thick liquids    Bowel/Bladder:  continent, no problems noted. General:  Patient is well developed, adequately nourished, non-obese and     well kempt. HEENT:    PERRLA, hearing intact to loud voice, external inspection of ear     and nose benign. Inspection of lips, tongue and gums benign  Musculoskeletal: No significant change in strength or tone. All joints stable.       Inspection and palpation of digits and nails show no clubbing,       cyanosis or inflammatory conditions. Neuro/Psychiatric: Affect: flat but pleasant. Alert and oriented to person, place and     Situation with  min cues. No significant change in deep tendon reflexes or     sensation  Lungs:  Diminished, CTA-B. Respiration effort is normal at rest.     Heart:   S1 = S2, RRR. No loud murmurs. Abdomen:  Soft, non-tender, no enlargement of liver or spleen. Extremities:  No significant lower extremity edema or tenderness. Skin:   Intact to general survey, no visualized or palpated problems. Rehabilitation:  Physical therapy:   Bed Mobility:      Transfers: Sit to Stand: Contact guard assistance  Stand to sit: Contact guard assistance, Moderate Assistance  Bed to Chair: Minimal assistance, Ambulation 1  Surface: carpet  Device: Rolling Walker  Assistance: Minimal assistance  Quality of Gait: variable RLE step length, decreased foot clearance on R, tc's for increasing step length on R, non reciprocal, poor weight shifting  Gait Deviations: Slow Princess, Shuffles, Decreased step length, Decreased step height, Decreased head and trunk rotation  Distance: 45' x 2  Comments: theraband wrap trialed for hip flexion. Initially improving R hip flexion, poor carryover.,      FIMS:  ,  , Assessment: Pt continueing with work towards goals. Carry over of technique during session poor. Pt would beenfit from repetition of task to promote motor memory for attainment of goals    Occupational therapy:    ,  , Assessment: Pt is  a 77 y.o. admitted for CVA. Pt with above deficits impairing ability to safety complete ADL's and IADLs. Pt would benefit from OT services to address deficits and maximize level of safety and function during ADL's. Speech therapy:        Lab/X-ray studies reviewed, analyzed and discussed with patient and staff:      telemetry and monitor tech stated that pt was SB-NSR Rate 55-65.        No results found for this or any previous visit (from the past 24 hour(s)). Echocardiogram  9/11/2021  Transthoracic Echocardiography  Left Ventricle Left ventricular ejection fraction is visually estimated at 60%. Impaired relaxation compatible with diastolic dysfunction. ( reversed E/A ratio) Moderate concentric left ventricular hypertrophy. Right Ventricle Normal right ventricle structure and function. Normal right ventricle systolic pressure. Left Atrium Moderately dilated left atrium. Normal intact intra-atrial septum was noted with no evidence of significant intra-atrial communications neither by colour flow Doppler imaging nor by aggitated saline study. Right Atrium Normal right atrium. Mitral Valve Moderate annular calcification. Tricuspid Valve Normal tricuspid valve structure and function. Aortic Valve Normal aortic valve structure and function. Trivial aortic regurgitation is noted. Pulmonic Valve Normal pulmonic valve structure and function. Pericardial Effusion No evidence of pericardial effusion. Pleural Effusion No evidence of pleural effusion. Aorta \ Miscellaneous Miscellaneous normal findings were found. CT HEAD  : 9/10/2021  There is no bleed, mass effect, or space occupying lesion. No extra-axial mass or fluid collections. Hypoattenuated White matter changes in the cerebral hemispheres noted. There is global atrophy. Findings likely reflect chronic small vessel vasculopathy. There is a vague area of low attenuation in the inferior posterior right cerebellar hemisphere with slight obliteration of adjacent sulci. An evolving nonhemorrhagic infarct in the cerebellum would have to be a consideration. Clinical correlation recommended. NO BLEED, MASS EFFECT, OR EXTRA-AXIAL FLUID COLLECTION. EVOLVING NONHEMORRHAGIC INFARCT IN THE INFERIOR RIGHT CEREBELLAR HEMISPHERE. 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 XR CHEST   9/10/2021 Atherosclerotic calcification of the thoracic aorta.  The cardiomediastinal silhouette is within normal limits. No pneumothorax, pleural effusion, or consolidation. Question calcified pleural plaques on the left, which can be seen with asbestos related disease. No acute osseous abnormality. No radiographic evidence of acute intrathoracic process. Question left-sided pleural plaques which can be seen with asbestos related disease. MRI BRAIN   9/11/2021 There is a tiny focus of restricted diffusion in the left centrum semiovale (series 4 image 19). There are no extra-axial collections. There is no evidence of hemorrhage. The susceptibility images do not demonstrate evidence of hemosiderin deposition within the brain parenchyma or the leptomeninges. There are numerous patchy foci of T2/FLAIR hyperintensities in the subcortical and periventricular white matter in a symmetric distribution throughout both hemispheres. Chronic bilateral cerebellar infarcts. There is prominence of the sulci and ventricles consistent with moderate global cerebral atrophy and chronic involutional changes. No midline shift. The visualized portions of the orbits are within normal limits, the globes are intact. The visualized portions of the paranasal sinuses are within normal limits. Right mastoid effusion. Small foci of restricted diffusion in the left centrum semiovale, consistent with acute ischemia. Chronic microvascular changes and bilateral chronic cerebellar infarcts. CRITICAL RESULTS: Communicated with Olena Li RN on 9/11/2021 at 12:51 PM.    FL MODIFIED BARIUM  9/13/2021: In cooperation with speech pathology, a modified barium swallow using various consistencies of both solids and liquids with dynamic imaging was performed. Patient's oral stage characterized by mild oral dysphagia. There is premature entry to the level of vallecula. Patient's pharyngeal stage was characterized by moderate pharyngeal dysphagia.  There is decreased hyolaryngeal excursion with moderate residuals in the vallecula. There is deep penetration on thin liquids from straw. No aspiration. MILD ORAL AND MODERATE PHARYNGEAL DYSPHAGIA. RECOMMEND BY SPEECH PATHOLOGY TO FOLLOW. US CAROTID ARTERY BILATERAL  9/11/2021 Mild to moderate plaque in the carotid arteries bilaterally. No significant increase in systolic flow or turbulence to indicate any hemodynamically significant narrowing in the right or left internal carotid arteries. There is antegrade flow identified in both vertebral arteries. ARTERIAL BLOOD FLOW VELOCITY RIGHT PEAK SYSTOLIC VELOCITIES (PSV)                                               Prox CCA    129 cm/s             Mid CCA     114 cm/s              Dist CCA    104 cm/s             Prox ICA    169 cm/s               Mid ICA     106 cm/s            Dist ICA    63 cm/s             Prox ECA     170   cm/s             Prox VERT   51 cm/s              ICA/CCA     1.48                        LEFT PEAK SYSTOLIC VELOCITIES (PSV)  Prox CCA    135 cm/s Mid CCA     166 cm/s Dist CCA    121 cm/s Prox ICA    119 cm/s Mid ICA     72 cm/s Dist ICA    61 cm/s Prox ECA    131 cm/s Prox VERT   56 cm/s ICA/CCA     0.72      50-69 % STENOSIS IN THE RIGHT ICA  <50 % STENOSIS IN THE LEFT ICA ANTEGRADE FLOW IN THE BILATERAL VERTEBRAL ARTERIES. MRI CERVICAL THORACIC LUMBAR SPINE   9/12/2021     CERVICAL REGION: Multilevel degenerative changes with severe canal stenosis at C2-3, C3-4, and C6-7. Moderate canal stenosis at C4-5. THORACIC REGION: No spinal canal stenosis    LUMBAR REGION: Multilevel degenerative changes. Endplate irregularity with joint space narrowing and slight anterolisthesis at L3-4 Severe canal stenosis at L2-3 and L4-5. Moderate to severe canal stenosis at L3-4. No acute compression fracture. No epidural or paraspinal soft tissue mass. No abnormal cord signal intensity on the scans. Multiple areas of severe spinal canal stenosis in the cervical region.  Severe canal stenosis at L2-3, L3-4 and L4-5. Previous extensive, complex labs, notes and diagnostics reviewed and analyzed. ALLERGIES:    Allergies as of 09/13/2021    (No Known Allergies)      (please also verify by checking MAR)      Today I evaluated this patient for periodic reassessment of medical and functional status. The patient was discussed in detail at the treatment team meeting focusing on current medical issues, progress in therapies, social issues, psychological issues, barriers to progress and strategies to address these barriers, and discharge planning. See the addendum to rehab progress note-as a second progress note in the chart. The patient continues to be high risk for future disability and their medical and rehabilitation prognosis continue to be good and therefore, we will continue the patient's rehabilitation course as planned. The patient's tentative discharge date was set. Patient and family education was discussed. The patient was made aware of the team discussion regarding their progress.  '    Complex Physical Medicine & Rehab Issues Assess & Plan:   1. 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 Functional and medical status reassessed regarding patients ability to participate in therapies and patient found to be able to participate in acute intensive comprehensive inpatient rehabilitation program including PT/OT to improve balance, ambulation, ADLs, and to improve the P/AROM. Therapeutic modifications regarding activities in therapies, place, amount of time per day and intensity of therapy made daily. In bed therapies or bedside therapies prn.   2. Bowel and Bladder dysfunction neurogenic bowel and bladder due to CVA:  frequent toileting, ambulate to bathroom with assistance, check post void residuals.   Check for C.difficile x1 if >2 loose stools in 24 hours, continue bowel & bladder program.  Monitor bowel and bladder function. Lactinex 2 PO every AC. MOM prn, Brown Bomb prn, Glycerin suppository prn, enema prn.  3. Severe neck mid back and low back pain as well as generalized OA pain Multiple areas of severe spinal canal stenosis in the cervical region. Severe canal stenosis at L2-3, L3-4 and L4-5. : reassess pain every shift and prior to and after each therapy session, give prn Tylenol and scheduled Tylenol, modalities prn in therapy, masage, Lidoderm, K-pad prn. Consider scheduled AM pain meds. 4. Skin healing and breakdown risk:  continue pressure relief program.  Daily skin exams and reports from nursing. 5. Severe fatigue due to nutritional and hydration deficiency: Add and titrate vitamin B12 vitamin D and CoQ10 continue to monitor I&Os, calorie counts prn, dietary consult prn.  6. Acute episodic insomnia with situational adjustment disorder:  prn Ambien, monitor for day time sedation. 7. Falls risk elevated:  patient to use call light to get nursing assistance to get up, bed and chair alarm. 8. Elevated DVT risk: progressive activities in PT, continue prophylaxis FITZ hose, elevation and Lovenox. 9. Complex discharge planning:  DC home 9/28/21--to SNF vs home with family and [facility]. Weekly team meeting every  Thursday to assess progress towards goals, discuss and address social, psychological and medical comorbidities and to address difficulties they may be having progressing in therapy. Patient and family education is in progress. The patient is to follow-up with their family physician after discharge. Complex Active General Medical Issues that complicate care Assess & Plan:    1. History of bilateral shoulder pain with history of shoulder dislocation generalized osteoarthritis  2. Stroke determined by clinical assessment and confirmed on MRI as above-consult neurology control blood pressure monitor for diabetes add aspirin  3.    Spinal stenosis of lumbar region with neurogenic claudication, Myelopathy concurrent with and due to spinal stenosis of cervical region  4. Ataxic gait due to cerebellar CVA-focus on balance and therapy  5. Significant oral pharyngeal dysphagia-consult speech-language pathology  6. Anxiety and depression-emotional support provided daily, vitamin B12, encourage participation in rehabilitation support group and recreational therapy, adjust/add medications (add vitamin B12 consider SSRI)  7. Hypertension, hypercholesterolemia, SC cerebrovascular disease, coronary artery disease, cardiac arrhythmia-continue telemetry-continue blood signs every shift focusing on heart rate and blood pressure checks, consult hospitalist for backup medical and adjust/add medications (aspirin, Norvasc, Lipitor, lisinopril, Lopressor) consult cardiology regarding V. tach titrate beta-blocker  8.  Oral pharyngeal dysphagia with cognitive deficits-dysphagia soft with bite-size nectar thick liquid consult speech-language pathology--oral motor exercises       Electronically signed by Nadira Yanes DO on 9/15/21 at 8:03 AM RUPA Arellano D.O., PM&R     Attending    [facility] Raisa Lassiter

## 2021-09-16 NOTE — PROGRESS NOTES
Physical Therapy  Physical Therapy Rehab Treatment Note  Facility/Department: [facility]  [facility]       NAME: Bettina Abdullahi  :  (83 y.o.)  MRN: [id number]  CODE STATUS: Full Code    Date of Service: 2021  Chart Reviewed: Yes  Family / Caregiver Present: No    Restrictions:  Restrictions/Precautions: Fall Risk, Swallowing - Thickened Liquids       SUBJECTIVE: Subjective: \"I wasn't very hungry for lunch. \"  Pain Screening  Patient Currently in Pain: No  Pre Treatment Pain Screening  Pain at present: 0  Intervention List: Patient able to continue with treatment    Post Treatment Pain Screening:  Pain Assessment  Pain Level: 0    OBJECTIVE:     Bed Mobility  Rolling to Right: Stand by assistance  Supine to Sit: Contact guard assistance   Sit to Supine: unable to assess (pt up in chair post tx)  Scooting: Stand by assistance   Comment: HOB elevated, use of HR. Transfers  Sit to Stand: Contact guard assistance  Stand to sit: Contact guard assistance; Moderate Assistance  Comment: VC's for technique. Poor safety and follow through of cues with approach to chair. Pt reaching out for w/c vs turning with [employer]. Mod A needed to turn pt's hips into chair secondary to pt letting go of [employer] and attempting to sit to early. Multiple STS performed to improve technique    Ambulation  Ambulation?: Yes  More Ambulation?: No  Ambulation 1  Surface: carpet  Device: Rolling Walker  Assistance: Minimal assistance  Quality of Gait: variable RLE step length, decreased foot clearance on R, tc's for increasing step length on R, non reciprocal, poor weight shifting  Distance: 45' x 2  Comments: theraband wrap trialed for hip flexion. Initially improving R hip flexion, poor carryover.       Exercises  Neurodevelopmental Techniques: fwd stepping with targets at [employer] to increase step length and improve motor planning, BLE and alternating BLE  Comments: standing marches and heel raises x10     ASSESSMENT/PROGRESS TOWARDS

## 2021-09-16 NOTE — PROGRESS NOTES
[facility]  Facility/Department: [facility]  Speech Language Pathology   Treatment Note          Rox Shahid  1945  [id number]        Rehab Dx/Hx: Abnormality of gait following cerebrovascular accident (CVA) [J50.488, R26.9]  Abnormality of gait and mobility [R26.9]    Precautions: falls and aspirations    Medical Dx: Abnormality of gait following cerebrovascular accident (CVA) [D84.323, R26.9]  Abnormality of gait and mobility [R26.9]  Speech Dx: Dysphagia and Aphasia    Date: 9/16/2021    Subjective:  Alert, Cooperative and Pleasant        Interventions used this date:  Expressive Language, Receptive Language and Oral Motor Treatment    Objective/Assessment:  Patient progressing towards goals:  Short-term Goals  Timeframe for Short-term Goals: 1-2 weeks  Goal 1: Pt will complete descriptive naming tasks with 80% accuracy with min verbal cues to promote use of circumlocution strategy and help the patient express his/her basic personal, safety, and medical wants and needs in the presence of communication deficits. Patient completed verb and item descriptive naming task I with 25% accuracy, with repetition 37% accuracy, with mod cueing 50% accuracy. Goal 2: Pt will complete convergent and divergent naming tasks with 80% accuracy with mild cues to promote semantic organization and the overall effectiveness and efficiency for expression of his/her wants, needs, feelings, and ideas. Patient completed concrete divergent naming task I with 33% accuracy, increased to 80% accuracy with min to mod cueing. Goal 4: Pt will answer high level yes/no questions with 90% accuracy with min cues to assist the caregiver in obtaining important information regarding the patient's personal, medical, and safety needs.  Patient answered simple yes/no questions I with 90% accuracy, with repetition 100% accuracy,     Long-term Goals  Timeframe for Long-term Goals: 2-3 weeks  Goal 1: Pt will improve his/her Expressive Language abilities to a modfied I level for expression of complex wants, needs, feelings/ideas, and medical/safety information. Goal 2: Pt will improve her/his Receptive Language abilities to a modfied I level for comprehension of conversation and safety directions with familiar and unfamiliar communication partners. Short-term Goals  Timeframe for Short-term Goals: 1-2 weeks  Goal 1: Pt will complete oral motor ROM and strengthening exercises with 90% accuracy, given cues as needed, in order to strengthen lingual/labial/buccal musculature to promote safety and efficiency of oral phase of swallow and decrease risk for pocketing. Patient completed labial and lingual exercises with demonstrations from ST  Lingual elevation: X10 with mild reduced elevation  Lingual [place]: X10 with moderate reduced coordination  Labial pucker/smile: X10 with mild reduced strength. Goal 6: Pt will tolerate 30 minutes of LANIE to suprahyoidal musculature and right facial musculature to improve hylolaryngeal elevation/management of secretions/increase UES elongation and laryngeal exursion to reduce pyriform sinus retention and decrease the risk of penetration and aspiration during oral intake. LANIE could not be completed secondary to patient having hair on face and neck. Therefore electrodes would not stick. Compensatory Swallowing Strategies: Alternate solids and liquids, No straws, Upright as possible for all oral intake, Swallow 2 times per bite/sip, Eat/Feed slowly      Treatment/Activity Tolerance:  Patient tolerated treatment well    Plan:  Continue per POC    Pain Assessment:  Pre-Treatment  Pain assessment: 0-10  Pain level: 0  Intervention:  Patient denies pain. Post-Treatment  Pain assessment: 0-10  Pain level: 0  Intervention:  Patient denies pain. Patient/Caregiver Education:  Patient educated on session and progression towards goals.     Safety Devices:  Bed alarm in place and Call light within reach      [id number] Alex Miller (NOM[facility]):    SWALLOWING  Ratin    SPOKEN LANGUAGE COMPREHENSION  Ratin    SPOKEN LANGUAGE EXPRESSION  Rating:  3    Therapy Time  SLP Individual Minutes  Time In: 6829  Time Out: 1313  Minutes: 30      10 dysphagia   20 language        Signature: Electronically signed by SIVA Alvarez on 2021 at 2:48 PM

## 2021-09-16 NOTE — PLAN OF CARE
Nutrition Problem #1: Inadequate oral intake  Intervention: Food and/or Nutrient Delivery: Continue Current Diet, Start Oral Nutrition Supplement  Nutritional Goals: Intake >75% of meals/supplement. stable weight.

## 2021-09-16 NOTE — PROGRESS NOTES
Occupational Therapy  Facility/Department: [facility]  Daily Treatment Note  NAME: Bettina Abdullahi  : 1945  MRN: [id number]    Date of Service: 2021    Discharge Recommendations:  Continue to assess pending progress       Assessment      REQUIRES OT FOLLOW UP: Yes  Activity Tolerance  Activity Tolerance: Patient Tolerated treatment well  Safety Devices  Safety Devices in place: Yes  Type of devices: All fall risk precautions in place         Patient Diagnosis(es): There were no encounter diagnoses. has a past medical history of Cellulitis of left hand and Hypertension. has a past surgical history that includes back surgery. Restrictions  Restrictions/Precautions  Restrictions/Precautions: Fall Risk, Swallowing - Thickened Liquids  Subjective   General  Chart Reviewed: Yes  Patient assessed for rehabilitation services?: Yes  Response to previous treatment: Patient with no complaints from previous session  Family / Caregiver Present: No  Referring Practitioner: Dr. Goyo Mayers  Diagnosis: Impaired mobility, gait, and ADL's 2º inferior cerebellar stroke  Subjective  Subjective: \"It always helps. \" - a hot shower to make you feel better  Pain Assessment  Pain Assessment: 0-10  Pain Level: 0  Pre Treatment Pain Screening  Pain at present: 0  Scale Used: Numeric Score  Intervention List: Patient able to continue with treatment  Vital Signs  Patient Currently in Pain: Denies   Orientation  Orientation  Overall Orientation Status: Within Functional Limits  Objective         While seated at tabletop, pt attempted to create as many words as he could using letter tiles and B UEs to improve sequencing and problem solving during ADLs. Pt demonstrates mod difficulty to initiate task and requires verbal cues to begin words by therapist providing first letter of a word or being provided with a category such as \"color\". Pt able to create total of 4 words with significant increased time. Task was ended.   Pt continued working on cognition and sorted cards onto [employer] using B UEs. Pt requires mod verbal cues to initiate task. Pt often appears to be in a daze. Pt began to sort cards with mod errors and requires verbal cues to recognize errors. Pt able to sort total of 14 cards with significant increased time. Pt demonstrates mod difficulty to perform task. While seated at tabletop, pt retrieved plastic beads from green theraputty (medium resistance) using B UEs to improve /pinch strength during self care. Pt worked at a slower pace to manipulate through putty and demonstrates min difficulty. Pt able to retrieve all 20 beads with increased time and good activity tolerance. Plan   Plan  Times per week: 5-7x  Plan weeks: 2 weeks  Current Treatment Recommendations: Strengthening, Functional Mobility Training, Balance Training, Neuromuscular Re-education, Cognitive/Perceptual Training, Safety Education & Training, Patient/Caregiver Education & Training, Equipment Evaluation, Education, & procurement, Self-Care / ADL, Home Management Training  Plan Comment: continue with current POC  G-Code     OutComes Score                                                  AM-PAC Score             Goals  Long term goals  Time Frame for Long term goals :  Within 2 weeks, pt to demonstrate progress in the following areas listed below to achieve specific LTG's as stated in initial evaluation  Long term goal 1: Improve independence during ADLs/IADLs  Long term goal 2: Improve strength and endurance to perform functional transfers  Long term goal 3: Improve fine motor coordination during self care  Long term goal 4: Improve cognition to demonstrate understanding of HEP  Long term goal 5: improve balance to safely perform functional mobility  Patient Goals   Patient goals : \"to get better\"       Therapy Time   Individual Concurrent Group Co-treatment   Time In 1500         Time Out 1600         Minutes 60           Therapeutic activities: 20 minutes  Cognitive Retrainin minutes     Priscilla Bhatt OT   Electronically signed by Priscilla Bhatt OT on 2021 at 4:28 PM

## 2021-09-16 NOTE — CARE COORDINATION
LSW called Kay (patient's sister) and left voicemail with call back number.  Electronically signed by DIONISIO Rene, SANJAY on 9/16/2021 at 10:04 AM

## 2021-09-16 NOTE — PROGRESS NOTES
INDIVIDUALIZED OVERALL REHAB PLAN OF CARE  ADDENDUM TO REHAB PROGRESS NOTE-for audit purposes must also refer to this day's clinical note and combine the information      Date: 2021  Patient Name: Darrius Mcguire   Room: Q654/I720-04    MRN: [id number]    : 1945  (77 y.o.)  Gender: male       Today 2021 during weekly team meeting, I reviewed the patient Darrius Mcguire in detail with the therapists and nurses involved in patient's care gathering complex physiatric data regarding current medical issues, progress in therapies, factors limiting progress, social issues, psychological issues, ongoing therapeutic plans and discharge planning. Legend:  I= independent Im =Modified independent  S=Supervised SB=stand by MCKEON=set up CG=contact kristen Min= minimal Mod=Moderate Max=maximal Max of 2 =maximal assist of 2 people      CURRENT FUNCTIONAL STATUS:    NURSING ISSUES:     Severe Aphasia. Pt can be impulsive but can be redirected. Pt up to void in bathroom 3 times tonight. Rolling walker and one assist. Pt's gait is slow and unsteady with right sided weakness. Pt is on telemetry and monitor tech stated that pt was SB-NSR Rate 55-65. Pt is on nectar thick liquids and did not have much in room to drink . Pt offered nectar thick juices tonight at bedside and has drank 4/ 120 cc juices tonight. Speech clear. Cooperative. Nursing will continue to focus on bowel and bladder continence transitioning toward independence by time of discharge. Monitoring post void residuals monitoring for severe constipation and bowel obstruction. Focus on achieving ADL goals with co-treating with OT when possible.     PHYSICAL THERAPY  Bed mobility:  Supine to Sit: Minimal assistance (21)  Sit to Supine: Stand by assistance (21 09)  Transfers:  Sit to Stand: Contact guard assistance (09/15/21 1414)  Bed to Chair: Minimal assistance (21 1358)  Gait:   Device: Rolling Walker (09/15/21 1409)  Assistance: (Dysphagia III)  Liquid Consistency Recommendation: Mildly Thick (Nectar)  Dysphagia Outcome Severity Scale: Level 4: Mild moderate dysphagia- Intermittent supervision/cueing. One - two diet consistencies restricted    Compensatory Swallowing Strategies: Alternate solids and liquids, No straws, Upright as possible for all oral intake, Swallow 2 times per bite/sip, Eat/Feed slowly  Therapeutic Interventions: Bolus control exercises, Diet tolerance monitoring, Oral motor exercises, Patient/Family education, Pharyngeal exercises, Vital Stim/NMES, Laryngeal exercises, Effortful swallow, Tongue base strengthening          COGNITION  OT: Cognition Comment: Comp; Supervision Express: Independent Social: Supervision Prob: Min A Mem: Supervision  SP:Memory: Unable to assess (full assessment not completed, delayed recall 1/3 items. ST suspects memory deficits. Will assess in therapy.)  Problem Solving: Unable to assess (problem solving was not assesed)        THERAPY, MEDICAL AND NURSING COORDINATION:    []  Pain medication before therapies     []  Check orthostatic BP      [x]  Ambulate to the bathroom in room    [x]  Add scheduled rest beaks     []  In room therapies      Discharge date set for:              9/28/21---possibly to skilled vs directly home      Home with:   alone  with help from   sister            And:      Home Health Care:     [x]  PT    [x]  OT    [x]  ST   [x]  Aide   []  SW    [x]  RN                       Equipment:  WW      At D/C their function is goaled at:   PT:Long term goal 1: Bed mobility with indep  Long term goal 2: sit to stand and bed transfers indep  Long term goal 3: Amb 150ft with 2ww or rollator indep  Long term goal 4: TUG <15.0 seconds with AD to demonstrate low fall risk  Long term goal 5: [place] balance testing at 25/56 or greater  OT:Eating  Assistance Needed: Setup or clean-up assistance  CARE Score: 5  Discharge Goal: Independent, Oral Hygiene  Assistance Needed: Supervision or touching assistance  CARE Score: 4  Discharge Goal: Independent, [address]  Reason if not Attempted: Not applicable (pt did not need to go)  CARE Score: 9  Discharge Goal: Independent, Shower/Bathe Self  Assistance Needed: Supervision or touching assistance  CARE Score: 4  Discharge Goal: Independent  Upper Body Dressing  Reason if not Attempted: Not applicable (gown)  CARE Score: 9  Discharge Goal: Independent, Lower Body Dressing  Assistance Needed: Supervision or touching assistance  CARE Score: 4  Discharge Goal: Independent, Putting On/Taking Off Footwear  Assistance Needed: Supervision or touching assistance  CARE Score: 4  Discharge Goal: Independent, Toilet Transfer  Assistance Needed: Supervision or touching assistance  Reason if not Attempted: Not applicable (pt did not need to go)  CARE Score: 9  Discharge Goal: Independent  SP:Long-term Goals  Timeframe for Long-term Goals: 2-3 weeks  Goal 1: Pt will improve his/her Expressive Language abilities to a modfied I level for expression of complex wants, needs, feelings/ideas, and medical/safety information. Goal 2: Pt will improve her/his Receptive Language abilities to a modfied I level for comprehension of conversation and safety directions with familiar and unfamiliar communication partners. Long-term Goals  Timeframe for Long-term Goals: 2-3 weeks  Goal 1: Pt will tolerate least restrictive diet with no overt s/s of aspiration.            From a cognitive standpoint they will need:        24 hr supervision  --progress to occasional           Significant problems/ barriers to functional progress include: Pt is at a high risk for functional loss,    [x]  Acute infection/UTI    []  Low BP's     []  COPD flare-up   []  Uncontrolled blood sugar     []  Progressive anemia         []  Severe pain exacerbation     [x]  Impaired mental status    []  Urinary incontinence    []  Bowel incontinence         Add E stim in SLPPlan to correct barriers to functional progress: Add scheduled rest breaks, control pain by using ice Lidoderm rest and massage as well as pain medications prior to therapy. Based on a comprehensive evaluation of the above, the individualized therapy and Discharge plan will be:    -Times stated are an average that will be varied based on the patient's daily need. PT  1 1/2  hrs/day 5-7 days per week           OT  1 1/2hrs per day 5-7 days per week ST ____1/2__hrs /day 3-5 days per week       Estimated LOS 2 week(s)    - Overall functional prognosis:     [x]  Good    []  Fair    []  Poor -Medical Prognosis:   [x]  Good    []  Fair    []  Poor    This patient was made aware of the discussion of Plan of Care, their projected dicharge date and their projected function at discharge.        Jaki Onofre, DO

## 2021-09-16 NOTE — PROGRESS NOTES
Comprehensive Nutrition Assessment    Type and Reason for Visit:  Initial, Consult, Positive Nutrition Screen    Nutrition Recommendations/Plan: Continue Current Diet, Start Oral Nutrition Supplement (fortified pudding x1/day and frozen supplement bid)    Nutrition Assessment:  Pt is at nutritional risk due to reported decreased appetite/noted decreased intake, with weight loss. To provide thick supplement tid/monitor intake. Malnutrition Assessment:  Malnutrition Status: At risk for malnutrition (Comment)    Context:  Acute Illness       Estimated Daily Nutrient Needs:  Energy (kcal):  3493-4119 (kg x 24-26); Weight Used for Energy Requirements:  Admission     Protein (g):  84-96 (kg x 1.2-1.3); Weight Used for Protein Requirements:  Ideal        Fluid (ml/day):  ~1950; Method Used for Fluid Requirements:  1 ml/kcal      Nutrition Related Findings:  PMH-htn, adm s/p CVA. Pt reports decreased appetite/intake currently and pta (?time frame). Trace BLE edema noted. MBS (9/13) Dys 3 with MT liquids. Pt agreeable to supplements. Wounds:  None       Current Nutrition Therapies:    ADULT DIET; Dysphagia - Soft and Bite Sized; Mildly Thick (Nectar)    Anthropometric Measures:  · Height: 5' 8\" (172.7 cm)  · Current Body Weight:   165 lb (74.8 kg) (bedscale)  · Admission Body Weight: 165 lb (74.8 kg) (bedscale)    · Usual Body Weight: 170 lb (77.1 kg) (4/2019)     · Ideal Body Weight: 154 lbs; % Ideal Body Weight  >100%  · BMI:  25    · BMI Categories: Overweight (BMI 25.0-29. 9)       Nutrition Diagnosis:   · Inadequate oral intake related to  (reported decreased appetite) as evidenced by poor intake prior to admission (intake < 50%)  · Swallowing difficulty related to cognitive or neurological impairment as evidenced by swallow study results    Nutrition Interventions:   Food and/or Nutrient Delivery:  Continue Current Diet, Start Oral Nutrition Supplement  Nutrition Education/Counseling:  Education not indicated   Coordination of Nutrition Care:  Continue to monitor while inpatient    Goals:  Intake >75% of meals/supplement. stable weight.        Nutrition Monitoring and Evaluation:   Food/Nutrient Intake Outcomes:  Food and Nutrient Intake, Supplement Intake  Physical Signs/Symptoms Outcomes:  Weight, Chewing or Swallowing     Electronically signed by Armani Armstrong RD, GWEN on 9/16/21 at 3:27 PM EDT

## 2021-09-16 NOTE — PROGRESS NOTES
Physical Therapy Rehab Treatment Note  Facility/Department: [facility]  Room: [facility]       NAME: Rox Shahid  :  (16 y.o.)  MRN: [id number]  CODE STATUS: Full Code    Date of Service: 2021       Restrictions:  Restrictions/Precautions: Fall Risk, Swallowing - Thickened Liquids (nectar thick)       SUBJECTIVE:            Pre and post Treatment Pain Screenin/10       OBJECTIVE:               Neuromuscular Education  PNF: standing and ambulatory balance facilitation. challenges included targeted movement activies with varying ISAIAH and forward gait, stepping over objects during gait and in static position, weight shift tasks with varing ISAIAH positions  Neuromuscular Comments: poor follow thru with RLE motor control for foot clearance in all tasks         Transfers  Sit to Stand: Stand by assistance;Minimal Assistance  Stand to sit: Stand by assistance;Minimal Assistance  Stand Pivot Transfers: Minimal Assistance  Comment: multiple trials with cues for technique for each approach to the chair with poor carry over; assistance for task completion required as well    Ambulation 1  Surface: carpet  Device: Rolling Walker  Assistance: Minimal assistance  Quality of Gait: variable RLE step length with improved with either verbal cues or physical facilitation, variable walker management and safety, wide ISAIAH with poor weight to left for RLE advancement,  Distance: 50 feet  Comments: poor carry over of techniques throughout session    Stairs/Curb  Stairs?: No                    ASSESSMENT/PROGRESS TOWARDS GOALS:  Assessment: Pt continueing with work towards goals. Carry over of technique during session poor.  Pt would beenfit from repetition of task to promote motor memory for attainment of goals    Goals:  Long term goals  Long term goal 1: Bed mobility with indep  Long term goal 2: sit to stand and bed transfers indep  Long term goal 3: Amb 150ft with 2ww or rollator indep  Long term goal 4: TUG <15.0 seconds with AD to demonstrate low fall risk  Long term goal 5: [place] balance testing at 25/56 or greater  Long term goal 6: SBA 4 stairs with rails    PLAN OF CARE/Safety:   Safety Devices  Type of devices: Left in chair;Chair alarm in place      Therapy Time:   Individual   Time In 0955   Time Out 1030   Minutes 35     Minutes:      Transfer/Bed mobility training:10      Gait training:10      Neuro re education:10           Siddharth Goel PT, 09/16/21 at 1:08 PM

## 2021-09-17 LAB
EKG ATRIAL RATE: 58 BPM
EKG P AXIS: -20 DEGREES
EKG P-R INTERVAL: 160 MS
EKG Q-T INTERVAL: 448 MS
EKG QRS DURATION: 100 MS
EKG QTC CALCULATION (BAZETT): 439 MS
EKG R AXIS: 43 DEGREES
EKG T AXIS: 40 DEGREES
EKG VENTRICULAR RATE: 58 BPM

## 2021-09-17 PROCEDURE — 93010 ELECTROCARDIOGRAM REPORT: CPT | Performed by: INTERNAL MEDICINE

## 2021-09-17 PROCEDURE — 1180000000 HC REHAB R&B

## 2021-09-17 PROCEDURE — 6360000002 HC RX W HCPCS: Performed by: INTERNAL MEDICINE

## 2021-09-17 PROCEDURE — 97535 SELF CARE MNGMENT TRAINING: CPT

## 2021-09-17 PROCEDURE — 97530 THERAPEUTIC ACTIVITIES: CPT

## 2021-09-17 PROCEDURE — 97116 GAIT TRAINING THERAPY: CPT

## 2021-09-17 PROCEDURE — 6370000000 HC RX 637 (ALT 250 FOR IP): Performed by: INTERNAL MEDICINE

## 2021-09-17 PROCEDURE — 6370000000 HC RX 637 (ALT 250 FOR IP): Performed by: PHYSICAL MEDICINE & REHABILITATION

## 2021-09-17 PROCEDURE — 92526 ORAL FUNCTION THERAPY: CPT

## 2021-09-17 PROCEDURE — 93005 ELECTROCARDIOGRAM TRACING: CPT | Performed by: INTERNAL MEDICINE

## 2021-09-17 PROCEDURE — 97110 THERAPEUTIC EXERCISES: CPT

## 2021-09-17 PROCEDURE — 99232 SBSQ HOSP IP/OBS MODERATE 35: CPT | Performed by: PHYSICAL MEDICINE & REHABILITATION

## 2021-09-17 PROCEDURE — 99233 SBSQ HOSP IP/OBS HIGH 50: CPT | Performed by: INTERNAL MEDICINE

## 2021-09-17 PROCEDURE — 92507 TX SP LANG VOICE COMM INDIV: CPT

## 2021-09-17 RX ORDER — LISINOPRIL 20 MG/1
20 TABLET ORAL 2 TIMES DAILY
Status: DISCONTINUED | OUTPATIENT
Start: 2021-09-17 | End: 2021-09-28 | Stop reason: HOSPADM

## 2021-09-17 RX ADMIN — METOPROLOL TARTRATE 50 MG: 50 TABLET, FILM COATED ORAL at 06:56

## 2021-09-17 RX ADMIN — AMLODIPINE BESYLATE 10 MG: 10 TABLET ORAL at 06:56

## 2021-09-17 RX ADMIN — ENOXAPARIN SODIUM 40 MG: 100 INJECTION SUBCUTANEOUS at 06:57

## 2021-09-17 RX ADMIN — ATORVASTATIN CALCIUM 80 MG: 80 TABLET, FILM COATED ORAL at 19:56

## 2021-09-17 RX ADMIN — LISINOPRIL 10 MG: 10 TABLET ORAL at 06:56

## 2021-09-17 RX ADMIN — LISINOPRIL 20 MG: 20 TABLET ORAL at 19:56

## 2021-09-17 RX ADMIN — Medication 100 MG: at 06:56

## 2021-09-17 RX ADMIN — ASPIRIN 81 MG: 81 TABLET, CHEWABLE ORAL at 06:56

## 2021-09-17 RX ADMIN — METOPROLOL TARTRATE 25 MG: 25 TABLET, FILM COATED ORAL at 19:57

## 2021-09-17 ASSESSMENT — ENCOUNTER SYMPTOMS
NAUSEA: 0
STRIDOR: 0
BLOOD IN STOOL: 0
EYES NEGATIVE: 1
RESPIRATORY NEGATIVE: 1
CHEST TIGHTNESS: 0
COUGH: 0
GASTROINTESTINAL NEGATIVE: 1
SHORTNESS OF BREATH: 0
WHEEZING: 0

## 2021-09-17 ASSESSMENT — PAIN SCALES - GENERAL
PAINLEVEL_OUTOF10: 0

## 2021-09-17 NOTE — PROGRESS NOTES
Subjective: The patient complains of severe acute on chronic progressive fatigue and balance and cognitive deficits due to cerebellar CVA partially relieved by rest,medications, PT,  OT,   SLP and rest and exacerbated by recent illness. I am concerned about patients medical complexities including risk for arrhythmia as cause for embolic CVA and severe aphasia. He is to start E stim today for swallow. He is still on telemetry currently sinus rhythm but vacillates into V. tach at times cardiac medications adjusted. I reviewed current care and plans for further care with other rehab providers including nursing and case management. According to recent nursing note, \" Pt denied any pain. Continent. LBM 9/16/21. Pivot transfer. Gait ataxia. Right sided weakness. Telemetry SB did get up into the 170s but mostly in the 50's. Pt slept well \". ROS x10: The patient also complains of severely impaired mobility and activities of daily living. Otherwise no new problems with vision, hearing, nose, mouth, throat, dermal, cardiovascular, GI, , pulmonary, musculoskeletal, psychiatric or neurological. See Rehab H&P on Rehab chart dated . Vital signs:  BP (!) 167/83   Pulse 65   Temp 97.9 °F (36.6 °C) (Oral)   Resp 16   Ht 5' 8\" (1.727 m)   Wt 165 lb 3 oz (74.9 kg)   SpO2 100%   BMI 25.12 kg/m²   I/O:   PO/Intake:  fair PO intake, dysphagia soft bite-size with nectar thick liquids    Bowel/Bladder:  continent, occasional loose stools  General:  Patient is well developed, adequately nourished, non-obese and     well kempt. HEENT:    PERRLA, hearing intact to loud voice, external inspection of ear     and nose benign. Inspection of lips, tongue and gums benign  Musculoskeletal: No significant change in strength or tone. All joints stable. Inspection and palpation of digits and nails show no clubbing,       cyanosis or inflammatory conditions.    Neuro/Psychiatric: Affect: flat but pleasant. Alert and oriented to person, place and     Situation with  min cues. No significant change in deep tendon reflexes or     sensation  Lungs:  Diminished, CTA-B. Respiration effort is normal at rest.     Heart:   S1 = S2, RRR. No loud murmurs. Abdomen:  Soft, non-tender, no enlargement of liver or spleen. Extremities:  No significant lower extremity edema or tenderness. Skin:   Intact to general survey, no visualized or palpated problems. Rehabilitation:  Physical therapy:   Bed Mobility: Scooting: Stand by assistance    Transfers: Sit to Stand: Contact guard assistance  Stand to sit: Contact guard assistance, Moderate Assistance  Bed to Chair: Minimal assistance, Ambulation 1  Surface: carpet  Device: Rolling Walker  Assistance: Minimal assistance  Quality of Gait: variable RLE step length, decreased foot clearance on R, tc's for increasing step length on R, non reciprocal, poor weight shifting  Gait Deviations: Slow Princess, Shuffles, Decreased step length, Decreased step height, Decreased head and trunk rotation  Distance: 45' x 2  Comments: theraband wrap trialed for hip flexion. Initially improving R hip flexion, poor carryover.,      FIMS:  ,  , Assessment: Pt continues to display poor carry over of cues for improved quality and safety of tasks. Theraband wrap trialed during gait training. Pt initially exhibiting improved hip flexion and clearing RLE during first 20', pt then returning to shuffling gait. Max cues needed during apporach to chair along with mod A from this PTA d/t pt letting go of [employer] mid trsf. Fwd stepping activity utilized to improve step length and motor planning. Pt requiring step by step cues to complete task. Occupational therapy:    ,  , Assessment: Pt is  a 77 y.o. admitted for CVA. Pt with above deficits impairing ability to safety complete ADL's and IADLs.   Pt would benefit from OT services to address deficits and maximize level of safety and function during ADL's.    Speech therapy:        Lab/X-ray studies reviewed, analyzed and discussed with patient and staff:      telemetry and monitor tech stated that pt was SB-NSR Rate 55-65. No results found for this or any previous visit (from the past 24 hour(s)). Echocardiogram  9/11/2021  Transthoracic Echocardiography  Left Ventricle Left ventricular ejection fraction is visually estimated at 60%. Impaired relaxation compatible with diastolic dysfunction. ( reversed E/A ratio) Moderate concentric left ventricular hypertrophy. Right Ventricle Normal right ventricle structure and function. Normal right ventricle systolic pressure. Left Atrium Moderately dilated left atrium. Normal intact intra-atrial septum was noted with no evidence of significant intra-atrial communications neither by colour flow Doppler imaging nor by aggitated saline study. Right Atrium Normal right atrium. Mitral Valve Moderate annular calcification. Tricuspid Valve Normal tricuspid valve structure and function. Aortic Valve Normal aortic valve structure and function. Trivial aortic regurgitation is noted. Pulmonic Valve Normal pulmonic valve structure and function. Pericardial Effusion No evidence of pericardial effusion. Pleural Effusion No evidence of pleural effusion. Aorta \ Miscellaneous Miscellaneous normal findings were found. CT HEAD  : 9/10/2021  There is no bleed, mass effect, or space occupying lesion. No extra-axial mass or fluid collections. Hypoattenuated White matter changes in the cerebral hemispheres noted. There is global atrophy. Findings likely reflect chronic small vessel vasculopathy. There is a vague area of low attenuation in the inferior posterior right cerebellar hemisphere with slight obliteration of adjacent sulci. An evolving nonhemorrhagic infarct in the cerebellum would have to be a consideration. Clinical correlation recommended. NO BLEED, MASS EFFECT, OR EXTRA-AXIAL FLUID COLLECTION.  EVOLVING NONHEMORRHAGIC INFARCT IN THE INFERIOR RIGHT CEREBELLAR HEMISPHERE. 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 XR CHEST   9/10/2021 Atherosclerotic calcification of the thoracic aorta. The cardiomediastinal silhouette is within normal limits. No pneumothorax, pleural effusion, or consolidation. Question calcified pleural plaques on the left, which can be seen with asbestos related disease. No acute osseous abnormality. No radiographic evidence of acute intrathoracic process. Question left-sided pleural plaques which can be seen with asbestos related disease. MRI BRAIN   9/11/2021 There is a tiny focus of restricted diffusion in the left centrum semiovale (series 4 image 19). There are no extra-axial collections. There is no evidence of hemorrhage. The susceptibility images do not demonstrate evidence of hemosiderin deposition within the brain parenchyma or the leptomeninges. There are numerous patchy foci of T2/FLAIR hyperintensities in the subcortical and periventricular white matter in a symmetric distribution throughout both hemispheres. Chronic bilateral cerebellar infarcts. There is prominence of the sulci and ventricles consistent with moderate global cerebral atrophy and chronic involutional changes. No midline shift. The visualized portions of the orbits are within normal limits, the globes are intact. The visualized portions of the paranasal sinuses are within normal limits. Right mastoid effusion. Small foci of restricted diffusion in the left centrum semiovale, consistent with acute ischemia. Chronic microvascular changes and bilateral chronic cerebellar infarcts. CRITICAL RESULTS: Communicated with Candace Fernández RN on 9/11/2021 at 12:51 PM.    FL MODIFIED BARIUM  9/13/2021: In cooperation with speech pathology, a modified barium swallow using various consistencies of both solids and liquids with dynamic imaging was performed.  Patient's oral stage characterized by mild oral dysphagia. There is premature entry to the level of vallecula. Patient's pharyngeal stage was characterized by moderate pharyngeal dysphagia. There is decreased hyolaryngeal excursion with moderate residuals in the vallecula. There is deep penetration on thin liquids from straw. No aspiration. MILD ORAL AND MODERATE PHARYNGEAL DYSPHAGIA. RECOMMEND BY SPEECH PATHOLOGY TO FOLLOW. US CAROTID ARTERY BILATERAL  9/11/2021 Mild to moderate plaque in the carotid arteries bilaterally. No significant increase in systolic flow or turbulence to indicate any hemodynamically significant narrowing in the right or left internal carotid arteries. There is antegrade flow identified in both vertebral arteries. ARTERIAL BLOOD FLOW VELOCITY RIGHT PEAK SYSTOLIC VELOCITIES (PSV)                                               Prox CCA    129 cm/s             Mid CCA     114 cm/s              Dist CCA    104 cm/s             Prox ICA    169 cm/s               Mid ICA     106 cm/s            Dist ICA    63 cm/s             Prox ECA     170   cm/s             Prox VERT   51 cm/s              ICA/CCA     1.48                        LEFT PEAK SYSTOLIC VELOCITIES (PSV)  Prox CCA    135 cm/s Mid CCA     166 cm/s Dist CCA    121 cm/s Prox ICA    119 cm/s Mid ICA     72 cm/s Dist ICA    61 cm/s Prox ECA    131 cm/s Prox VERT   56 cm/s ICA/CCA     0.72      50-69 % STENOSIS IN THE RIGHT ICA  <50 % STENOSIS IN THE LEFT ICA ANTEGRADE FLOW IN THE BILATERAL VERTEBRAL ARTERIES. MRI CERVICAL THORACIC LUMBAR SPINE   9/12/2021     CERVICAL REGION: Multilevel degenerative changes with severe canal stenosis at C2-3, C3-4, and C6-7. Moderate canal stenosis at C4-5. THORACIC REGION: No spinal canal stenosis    LUMBAR REGION: Multilevel degenerative changes. Endplate irregularity with joint space narrowing and slight anterolisthesis at L3-4 Severe canal stenosis at L2-3 and L4-5. Moderate to severe canal stenosis at L3-4.  No acute compression fracture. No epidural or paraspinal soft tissue mass. No abnormal cord signal intensity on the scans. Multiple areas of severe spinal canal stenosis in the cervical region. Severe canal stenosis at L2-3, L3-4 and L4-5. Previous extensive, complex labs, notes and diagnostics reviewed and analyzed. ALLERGIES:    Allergies as of 09/13/2021    (No Known Allergies)      (please also verify by checking MAR)       Yesterday I evaluated this patient for periodic reassessment of medical and functional status. The patient was discussed in detail at the treatment team meeting focusing on current medical issues, progress in therapies, social issues, psychological issues, barriers to progress and strategies to address these barriers, and discharge planning. See the hand written addendum to rehab progress note. The patient continues to be high risk for future disability and their medical and rehabilitation prognosis continue to be good and therefore, we will continue the patient's rehabilitation course as planned. The patient's tentative discharge date was set. Patient and family education was discussed. The patient was made aware of the team discussion regarding their progress. Discharge plans were discussed along with barriers to progress and strategies to address these barriers, patient encouraged to continue to discuss discharge plans with . Complex Physical Medicine & Rehab Issues Assess & Plan:   1. 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 Functional and medical status reassessed regarding patients ability to participate in therapies and patient found to be able to participate in acute intensive comprehensive inpatient rehabilitation program including PT/OT to improve balance, ambulation, ADLs, and to improve the P/AROM.   Therapeutic modifications regarding activities in therapies, place, amount of time per day and intensity of therapy made daily. In bed therapies or bedside therapies prn.   2. Bowel and Bladder dysfunction neurogenic bowel and bladder due to CVA:  frequent toileting, ambulate to bathroom with assistance, check post void residuals. Check for C.difficile x1 if >2 loose stools in 24 hours, continue bowel & bladder program.  Monitor bowel and bladder function. Lactinex 2 PO every AC. MOM prn, Brown Bomb prn, Glycerin suppository prn, enema prn.  3. Severe neck mid back and low back pain as well as generalized OA pain Multiple areas of severe spinal canal stenosis in the cervical region. Severe canal stenosis at L2-3, L3-4 and L4-5. : reassess pain every shift and prior to and after each therapy session, give prn Tylenol and scheduled Tylenol, modalities prn in therapy, masage, Lidoderm, K-pad prn. Consider scheduled AM pain meds. 4. Skin healing and breakdown risk:  continue pressure relief program.  Daily skin exams and reports from nursing. 5. Severe fatigue due to nutritional and hydration deficiency: Add and titrate vitamin B12 vitamin D and CoQ10 continue to monitor I&Os, calorie counts prn, dietary consult prn.  6. Acute episodic insomnia with situational adjustment disorder:  prn Ambien, monitor for day time sedation. 7. Falls risk elevated:  patient to use call light to get nursing assistance to get up, bed and chair alarm. 8. Elevated DVT risk: progressive activities in PT, continue prophylaxis FITZ hose, elevation and Lovenox. 9. Complex discharge planning:  DC home 9/28/21--to SNF vs home with family and [facility] Tinsley. Weekly team meeting every  Thursday to assess progress towards goals, discuss and address social, psychological and medical comorbidities and to address difficulties they may be having progressing in therapy. Patient and family education is in progress. The patient is to follow-up with their family physician after discharge.         Complex Active General Medical Issues that complicate care Assess & Plan:    1. History of bilateral shoulder pain with history of shoulder dislocation generalized osteoarthritis  2. Stroke determined by clinical assessment and confirmed on MRI as above-consult neurology control blood pressure monitor for diabetes add aspirin  3. Spinal stenosis of lumbar region with neurogenic claudication, Myelopathy concurrent with and due to spinal stenosis of cervical region  4. Ataxic gait due to cerebellar CVA-focus on balance and therapy  5. Significant oral pharyngeal dysphagia-consult speech-language pathology  6. Anxiety and depression-emotional support provided daily, vitamin B12, encourage participation in rehabilitation support group and recreational therapy, adjust/add medications (add vitamin B12 consider SSRI)  7. Hypertension, hypercholesterolemia, SC cerebrovascular disease, coronary artery disease, cardiac arrhythmia-continue telemetry-continue blood signs every shift focusing on heart rate and blood pressure checks, consult hospitalist for backup medical and adjust/add medications (aspirin, Norvasc, Lipitor, lisinopril, Lopressor) consult cardiology regarding V. tach titrate beta-blocker  8.  Oral pharyngeal dysphagia with cognitive deficits-dysphagia soft with bite-size nectar thick liquid consult speech-language pathology--oral motor exercises       Electronically signed by Rylee Wolfe DO on 9/15/21 at 8:03 AM EDT       Erin Jackson D.O., PM&R     Attending    [address]

## 2021-09-17 NOTE — PROGRESS NOTES
Occupational Therapy  Facility/Department: [facility]  Daily Treatment Note  NAME: Darrius Mcguire  : 1945  MRN: [id number]    Date of Service: 2021    Discharge Recommendations:  Continue to assess pending progress    Assessment: Pt very distracted today. Pt required Mod/Max verbal/visual cues for initiation, sequencing, attention to task, and to open eyes and remain awake. Pt takes frequent and prolonged rest breaks. Activity Tolerance  Activity Tolerance: Patient limited by fatigue;Treatment limited secondary to decreased cognition  Safety Devices  Safety Devices in place: Yes  Type of devices: All fall risk precautions in place       Patient Diagnosis(es): There were no encounter diagnoses. has a past medical history of Cellulitis of left hand and Hypertension. has a past surgical history that includes back surgery. Restrictions  Restrictions/Precautions  Restrictions/Precautions: Fall Risk, Swallowing - Thickened Liquids     Subjective: Pt quiet and minimally responds when spoken to. Pt required increased cues to follow directions and engage in task. Pt reports being tired this morning. General  Chart Reviewed: Yes  Patient assessed for rehabilitation services?: Yes  Response to previous treatment: Patient reporting fatigue but able to participate  Family / Caregiver Present: No  Referring Practitioner: Dr. Lori Kaye  Diagnosis: Impaired mobility, gait, and ADL's 2º inferior cerebellar stroke  Pre Treatment Pain Screening  Pain at present: 0  Scale Used: Numeric Score  Intervention List: Patient able to continue with treatment  Vital Signs  Patient Currently in Pain: No     Objective    Cognition  Overall Cognitive Status: Exceptions  Arousal/Alertness: Delayed responses to stimuli  Following Commands: Follows one step commands with repetition; Follows one step commands with increased time (Mod repetition with visual cues)     Upper Extremity Function  UE Strengthing:   Large Pegs: Pt used alternating hands to insert/remove large pegs into a large board and don/doff marbles atop pegs. (x100 pegs; x50 marbles). Pt had Min/Mod difficulty physically with activity and worked at a significantly slow pace with frequent and prolonged rest breaks. Pt dropped 3 marbles during activity. Pt required Mod/Max cues for directions, attention to task, and to remain awake. Pt completed activity to increase BUE strength/endurance for improved transfers. Hand fine motor coordination challenges incorporated into activity. Plan   Plan  Times per week: 5-7x  Plan weeks: 2 weeks  Current Treatment Recommendations: Strengthening, Functional Mobility Training, Balance Training, Neuromuscular Re-education, Cognitive/Perceptual Training, Safety Education & Training, Patient/Caregiver Education & Training, Equipment Evaluation, Education, & procurement, Self-Care / ADL, Home Management Training  Plan Comment: continue with current POC    Goals  Long term goals  Time Frame for Long term goals :  Within 2 weeks, pt to demonstrate progress in the following areas listed below to achieve specific LTG's as stated in initial evaluation  Long term goal 1: Improve independence during ADLs/IADLs  Long term goal 2: Improve strength and endurance to perform functional transfers  Long term goal 3: Improve fine motor coordination during self care  Long term goal 4: Improve cognition to demonstrate understanding of HEP  Long term goal 5: improve balance to safely perform functional mobility  Patient Goals   Patient goals : \"to get better\"    Therapy Time   Individual Concurrent Group Co-treatment   Time In 1100         Time Out 1200         Minutes 60         Therapeutic activities: 60 minutes    Electronically signed by JASKARAN Hartman on 9/17/2021 at 2:21 PM    JASKARAN Hartman

## 2021-09-17 NOTE — PROGRESS NOTES
[facility]  Facility/Department: [facility]  Speech Language Pathology   Treatment Note          Jaclyn March 1945  [id number]/Z354-64        Rehab Dx/Hx: Abnormality of gait following cerebrovascular accident (CVA) [M09.118, R26.9]  Abnormality of gait and mobility [R26.9]    Precautions: falls and aspirations    Medical Dx: Abnormality of gait following cerebrovascular accident (CVA) [V61.907, R26.9]  Abnormality of gait and mobility [R26.9]  Speech Dx: Dysphagia and Aphasia    Date: 9/17/2021    Subjective:  Alert and Cooperative        Interventions used this date:  Expressive Language, Dysphagia Treatment and Instruction in Compensatory Strategies    Objective/Assessment:  Patient progressing towards goals:  Short-term Goals  Timeframe for Short-term Goals: 1-2 weeks  Goal 1: Pt will complete descriptive naming tasks with 80% accuracy with min verbal cues to promote use of circumlocution strategy and help the patient express his/her basic personal, safety, and medical wants and needs in the presence of communication deficits. Pt completed confrontational naming task with common objects 1/4x with mod-max cues including phonemic, carrier phrase and binary choices. SLP attempted to have patient \"show me\" how to use a comb and pt required hand over hand to complete task. Upon conclusion of structured therapy tasks SLP asked \"do you need anything else? \" and patient answered \"no\" and patient responded with \"goodbye\" after clinician  Goal 2: Pt will complete convergent and divergent naming tasks with 80% accuracy with mild cues to promote semantic organization and the overall effectiveness and efficiency for expression of his/her wants, needs, feelings, and ideas. Goal 3: Pt will follow 3-multi step written directions with 90% accuracy with min cues to improve the pt's reading comprehension and ability to utilize compensatory strategy of environmental referencing for maximum independence.   Goal 4: Pt will answer high level yes/no questions with 90% accuracy with min cues to assist the caregiver in obtaining important information regarding the patient's personal, medical, and safety needs. Goal 5: Pt will describe an object by two attributes with 80% acc with mild cues to promote use of circumlocution strategy and help the patient express his/her basic personal, safety, and medical wants and needs. Goal 6: Within 1-5 days of implementing the ST POC, pt's functional reading/writing skills will be assessed in relation to executive functioning (e.g. bill paying, reading rx labels, completing personal information), with additional goals added to pt's POC as deemed necesary by treating SLP. Long-term Goals  Timeframe for Long-term Goals: 2-3 weeks  Goal 1: Pt will improve his/her Expressive Language abilities to a modfied I level for expression of complex wants, needs, feelings/ideas, and medical/safety information. Goal 2: Pt will improve her/his Receptive Language abilities to a modfied I level for comprehension of conversation and safety directions with familiar and unfamiliar communication partners. Short-term Goals  Timeframe for Short-term Goals: 1-2 weeks  Goal 1: Pt will complete oral motor ROM and strengthening exercises with 90% accuracy, given cues as needed, in order to strengthen lingual/labial/buccal musculature to promote safety and efficiency of oral phase of swallow and decrease risk for pocketing. Goal 2: Pt will complete lingual exercises that promote anterior to posterior propulsion of bolus and improve tongue base retraction with 90% accuracy in order to strengthen the muscles of the swallow to decrease risk of aspiration and to increase ability to safely handle the least restrictive diet level. Goal 3: Pt will complete pharyngeal strengthening exercises (such as the Blanca, Effortful swallow, etc) with 90% accuracy in order to strengthen and establish and more effective swallow.   completed effortful swallow exercise 10/10x with min verbal cues  Goal 4: Pt will tolerate therapeutic trials of thin from cup using small sip with demonstrating no overt s/s of difficulty or aspiration on 100% trials  Pt tolerated trials of thin liquid in 5/7 trials with 2x delayed cough. Pt took small sips from cup but required moderate verbal cues to use double swallow strategy   Goal 5: Pt will demonstrate swallow strategies for safe and efficient swallow of soft/nectar consistencies in all given opportunities  Goal 6: Pt will tolerate 30 minutes of LANIE to suprahyoidal musculature and right facial musculature to improve hylolaryngeal elevation/management of secretions/increase UES elongation and laryngeal exursion to reduce pyriform sinus retention and decrease the risk of penetration and aspiration during oral intake. Compensatory Swallowing Strategies: Alternate solids and liquids, No straws, Upright as possible for all oral intake, Swallow 2 times per bite/sip, Eat/Feed slowly      Treatment/Activity Tolerance:  Patient tolerated treatment well    Plan:  Continue per POC    Pain Assessment:  Pre-Treatment  Pain assessment: 0-10  Pain level: 0  Intervention:  Patient denies pain. Post-Treatment  Pain assessment: 0-10  Pain level: 0  Intervention:  Patient denies pain. Patient/Caregiver Education:  Patient educated on session and progression towards goals. Education needs reinforcement.     Safety Devices:  Call light within reach and Chair alarm in place      [id number] Alex Miller ([facility]):    SWALLOWING  Ratin    SPOKEN LANGUAGE COMPREHENSION  Ratin    SPOKEN LANGUAGE EXPRESSION  Rating:  3      Therapy Time  SLP Individual Minutes  Time In: 1030  Time Out: 1100  Minutes: 30              Signature: Electronically signed by SIVA Cruz on 2021 at 12:34 PM

## 2021-09-17 NOTE — PROGRESS NOTES
Physical Therapy Rehab Treatment Note  [facility]  [facility]       NAME: Lyssa Velez  :  (29 y.o.)  MRN: [id number]  CODE STATUS: Full Code    Date of Service: 2021  Chart Reviewed: Yes    Restrictions:  Restrictions/Precautions: Fall Risk, Swallowing - Thickened Liquids       SUBJECTIVE: Subjective: I feel good right now, pt reports no aches pains or complaints at this time. Pain Screening  Patient Currently in Pain: No       Post Treatment Pain Screenin/10       OBJECTIVE:               Neuromuscular Education  Neuromuscular Comments: Dynamic stepping drills performed with 4 inch box - intermitten foot drag both on floor and box level. Lateral side stepping with VCs to increased ISAIAH and weight shifting- poor foot placement with inconsistant step length. Ambulation  Ambulation?: Yes  Ambulation 1  Surface: carpet  Device: Rolling Walker  Assistance: Contact guard assistance  Quality of Gait: Varied R LE step length and clearance, inconsistant with carry over, Heavy VCs to focus on Right foot clearance for improved safety with gait. Gait Deviations: Slow Princess; Shuffles;Decreased step length;Decreased step height;Decreased head and trunk rotation  Distance: 25ft or less with focus on moving from spot to spot and approcah to chair. 50' x 1 for distance. Comments: Improved foot clearance and step length with Right LE this PM tx session with imporved carry over during straight line walking, continued shuffling of Right foot with turns with poor foot clearance. Stairs/Curb  Stairs?: No         Activity Tolerance  Activity Tolerance: Patient Tolerated treatment well    Exercises  Hamstring Sets: curls with YTB x10 ea  Hip Flexion: 1# x12  Hip Abduction: x12 side kicks  Knee Long Arc Quad: 1# x 15  Ankle Pumps: PF/DF x 15 ea.   Other exercises  Other exercises?: Yes  Other exercises 1: Seated Hip ADD ball squeeze x 15  Other exercises 2: Seated Hip ABD YTB x 15     ASSESSMENT/PROGRESS TOWARDS GOALS: on going with focus on strength and endurance. Body structures, Functions, Activity limitations: Decreased functional mobility ; Decreased safe awareness;Decreased balance;Decreased strength;Decreased cognition;Decreased coordination  Assessment: Improved carry over with right foot clearance with straight line walking this PM tx session, continues to drag right foot with turns and approach to chair.     Goals:  Long term goals  Long term goal 1: Bed mobility with indep  Long term goal 2: sit to stand and bed transfers indep  Long term goal 3: Amb 150ft with 2ww or rollator indep  Long term goal 4: TUG <15.0 seconds with AD to demonstrate low fall risk  Long term goal 5: [place] balance testing at 25/56 or greater  Long term goal 6: SBA 4 stairs with rails    PLAN OF CARE/Safety:   Safety Devices  Type of devices: Left in chair;Chair alarm in place      Therapy Time:   Individual   Time In 0   Time Out 1500   Minutes 30     Minutes:30        Transfer/Bed mobility trainin      Gait training:15      Neuro re education:0     Therapeutic ex:15      Ken Rai PTA, 21 at 4:29 PM

## 2021-09-17 NOTE — PLAN OF CARE
Problem: Falls - Risk of:  Goal: Will remain free from falls  Description: Will remain free from falls  Outcome: Ongoing  Goal: Absence of physical injury  Description: Absence of physical injury  Outcome: Ongoing     Problem: Mobility - Impaired:  Goal: Mobility will improve  Description: Mobility will improve  Outcome: Ongoing     Problem: IP COMMUNICATION/DYSARTHRIA  Goal: LTG - patient will improve expressive language skills to allow for communication of wants and needs in daily activities  Outcome: Ongoing     Problem: IP SWALLOWING  Goal: LTG - patient will tolerate the least restrictive diet consistency to allow for safe consumption of daily meals  Outcome: Ongoing     Problem: Skin Integrity:  Goal: Will show no infection signs and symptoms  Description: Will show no infection signs and symptoms  Outcome: Ongoing  Goal: Absence of new skin breakdown  Description: Absence of new skin breakdown  Outcome: Ongoing     Problem: IP BALANCE  Goal: LTG - patient will maintain standing balance to allow for completion of daily activities  Outcome: Ongoing     Problem: Nutrition  Goal: Optimal nutrition therapy  Outcome: Ongoing

## 2021-09-17 NOTE — PROGRESS NOTES
Pt denied any pain. Continent. LBM 9/16/21. Pivot transfer. Gait ataxia. Right sided weakness. Telemetry SB did get up into the 170s but mostly in the 50's. Pt slept well.  Electronically signed by Zachary Reno RN on 9/17/2021 at 5:25 AM

## 2021-09-17 NOTE — PROGRESS NOTES
Physical Therapy Rehab Treatment Note  Facility/Department: [facility]  Room: R2/R233-01       NAME: Marylee Liter  :  (18 y.o.)  MRN: [id number]  CODE STATUS: Full Code    Date of Service: 2021  Chart Reviewed: Yes    Restrictions:  Restrictions/Precautions: Fall Risk, Swallowing - Thickened Liquids       SUBJECTIVE: Subjective: Therapy has been pretty good. Pain Screening  Patient Currently in Pain: Denies       Post Treatment Pain Screenin/10       OBJECTIVE:               Neuromuscular Education  Neuromuscular Comments: Dynamic stepping drills performed with 4 inch box - intermitten foot drag both on floor and box level. Lateral side stepping with VCs to increased ISAIAH and weight shifting- poor foot placement with inconsistant step length. Ambulation  Ambulation?: Yes  Ambulation 1  Surface: carpet  Device: Rolling Walker  Assistance: Minimal assistance  Quality of Gait: Varied R LE step length and clearance, inconsistant with carry over, Heavy VCs to focus on Right foot clearance for improved safety with gait. Gait Deviations: Slow Princess; Shuffles;Decreased step length;Decreased step height;Decreased head and trunk rotation  Distance: 39' for distance, multiple short distance with focus on consistant turns and approch to chair. Stairs/Curb  Stairs?: No                    ASSESSMENT/PROGRESS TOWARDS GOALS:  Body structures, Functions, Activity limitations: Decreased functional mobility ; Decreased safe awareness;Decreased balance;Decreased strength;Decreased cognition;Decreased coordination  Assessment: Pt continues to requried high level of VCs and Education on improved quality of gait with decreased speed, Fair- poor carry over with techniques throughout this tx session.     Goals:  Long term goals  Long term goal 1: Bed mobility with indep  Long term goal 2: sit to stand and bed transfers indep  Long term goal 3: Amb 150ft with 2ww or rollator indep  Long term goal 4: TUG <15.0 seconds with AD to demonstrate low fall risk  Long term goal 5: [place] balance testing at 25/56 or greater  Long term goal 6: SBA 4 stairs with rails    PLAN OF CARE/Safety:          Therapy Time:   Individual   Time In 0930   Time Out 1000   Minutes 30     Minutes:30      Transfer/Bed mobility training:10      Gait training:15      Neuro re education:5     Therapeutic ex:0      Michaelyn Fraction, PTA, 09/17/21 at 11:18 AM

## 2021-09-17 NOTE — PLAN OF CARE
Problem: Falls - Risk of:  Goal: Will remain free from falls  Description: Will remain free from falls  Outcome: Ongoing  Goal: Absence of physical injury  Description: Absence of physical injury  Outcome: Ongoing     Problem: Mobility - Impaired:  Goal: Mobility will improve  Description: Mobility will improve  Outcome: Ongoing     Problem: IP COMMUNICATION/DYSARTHRIA  Goal: LTG - patient will improve expressive language skills to allow for communication of wants and needs in daily activities  Outcome: Ongoing     Problem: IP SWALLOWING  Goal: LTG - patient will tolerate the least restrictive diet consistency to allow for safe consumption of daily meals  Outcome: Ongoing     Problem: Skin Integrity:  Goal: Will show no infection signs and symptoms  Description: Will show no infection signs and symptoms  Outcome: Ongoing  Goal: Absence of new skin breakdown  Description: Absence of new skin breakdown  Outcome: Ongoing     Problem: Nutrition  Goal: Optimal nutrition therapy  Outcome: Ongoing

## 2021-09-17 NOTE — PROGRESS NOTES
Occupational Therapy  Facility/Department: [facility]  Daily Treatment Note  NAME: Tristian Awad  : 1945  MRN: [id number]    Date of Service: 2021    Discharge Recommendations:  Continue to assess pending progress    Assessment   Activity Tolerance  Activity Tolerance: Patient Tolerated treatment well  Safety Devices  Safety Devices in place: Yes  Type of devices: All fall risk precautions in place         Patient Diagnosis(es): There were no encounter diagnoses. has a past medical history of Cellulitis of left hand and Hypertension. has a past surgical history that includes back surgery. Restrictions  Restrictions/Precautions  Restrictions/Precautions: Fall Risk, Swallowing - Thickened Liquids     Subjective  General  Chart Reviewed: Yes  Patient assessed for rehabilitation services?: Yes  Response to previous treatment: Patient with no complaints from previous session  Family / Caregiver Present: No  Referring Practitioner: Dr. Denise Finch  Diagnosis: Impaired mobility, gait, and ADL's 2º inferior cerebellar stroke  Pre Treatment Pain Screening  Pain at present: 0  Scale Used: Numeric Score  Intervention List: Patient able to continue with treatment  Vital Signs  Patient Currently in Pain: No     Objective    Blocks: Pt used alternating hands to don/doff colored blocks onto vertical graded rods (x68 blocks). Rods were placed on a low table in front of patient. Blocks were placed on a low chair to the right of patient. Placement of objects out of reach to facilitate dynamic sitting with trunk rotation, forward flexion, and lateral flexion. Pt had Min difficulty physically with activity and worked at a slow and steady pace without rest breaks. Pt completed activity to improve trunk control for safe sitting during ADL completion. Socks: Pt stood/sat and used bilateral hands to retrieve socks that were spread out across the tabletop.  Pt matched/folded socks and placed them in a basket placed on the floor to the right of him. Pt stood only once with CGA for 1 minute and 37 seconds before requiring a seated rest break. Pt's knees noted to buckle. Pt declined to attempt standing again despite education and encouragement. Pt reports fatigue in BLEs. Pt completed activity while seated. Pt had Min difficulty physically with activity and worked at a steady pace without rest breaks. Pt had Mod difficulty with cognitive challenges of activity and required Min A for problem solving. Pt completed activity to improve functional endurance for ADL/IADL completion. ADL  Grooming: Contact guard assistance (To complete hand hygiene while standing at sink)  Toileting: Minimal assistance (Pt sat to urinate. Assistance to bring pants up to knees from floor in order to don past hips. Pt able to complete hygiene and doff pants)     Toilet Transfers  Toilet - Technique: Stand pivot  Equipment Used: Grab bars  Toilet Transfer: Contact guard assistance     Transfers  Sit to stand: Contact guard assistance  Stand to sit: Contact guard assistance      Plan  Plan  Times per week: 5-7x  Plan weeks: 2 weeks  Current Treatment Recommendations: Strengthening, Functional Mobility Training, Balance Training, Neuromuscular Re-education, Cognitive/Perceptual Training, Safety Education & Training, Patient/Caregiver Education & Training, Equipment Evaluation, Education, & procurement, Self-Care / ADL, Home Management Training  Plan Comment: continue with current POC    Goals  Long term goals  Time Frame for Long term goals :  Within 2 weeks, pt to demonstrate progress in the following areas listed below to achieve specific LTG's as stated in initial evaluation  Long term goal 1: Improve independence during ADLs/IADLs  Long term goal 2: Improve strength and endurance to perform functional transfers  Long term goal 3: Improve fine motor coordination during self care  Long term goal 4: Improve cognition to demonstrate understanding of HEP  Long term goal 5: improve balance to safely perform functional mobility  Patient Goals   Patient goals : \"to get better\"    Therapy Time   Individual Concurrent Group Co-treatment   Time In 1500         Time Out 1600         Minutes 60         ADL/IADL training: 10 minutes  Therapeutic activities: 50 minutes    Electronically signed by JASKARAN Galindo on 9/17/2021 at 4:21 PM    JASKARAN Galindo

## 2021-09-17 NOTE — PROGRESS NOTES
Progress Note  Patient: Preeti Clemons  Unit/Bed: G446/N542-97  YOB: 1945  MRN: [id number]  Acct: [de-identified]   Admitting Diagnosis: Abnormality of gait following cerebrovascular accident (CVA) [B28.956, R26.9]  Abnormality of gait and mobility [R26.9]  Admit Date:  9/13/2021  Hospital Day: 4    Chief Complaint: NSVT HTN Dyann Rai    Histories:  Past Medical History:   Diagnosis Date    Cellulitis of left hand 3/26/2019    Hypertension      Past Surgical History:   Procedure Laterality Date    BACK SURGERY       History reviewed. No pertinent family history. Social History     Socioeconomic History    Marital status: Single     Spouse name: None    Number of children: None    Years of education: None    Highest education level: None   Occupational History    None   Tobacco Use    Smoking status: Never Smoker    Smokeless tobacco: Never Used   Vaping Use    Vaping Use: Never used   Substance and Sexual Activity    Alcohol use: Never     Alcohol/week: 0.0 standard drinks    Drug use: Never    Sexual activity: None   Other Topics Concern    None   Social History Narrative    Lives With: Alone, sister lives in the area    Type of [address]: One level    Home Access: Stairs to enter with rails- Number of Steps: 3- Rails: Both    Bathroom Shower/Tub: Tub/Shower unit    Home Equipment: Cane, Rolling walker (rollator)    ADL Assistance: [address]: Independent    Homemaking Responsibilities: Yes    Ambulation Assistance: Independent (2ww)    Transfer Assistance: Independent    Active : No    He is a retired  for [address] [place] [address]. He learned how to be a  in [place] was stationed in [place] during the [employer]. Never went overseas. Never  no kids graduated [employer].          Social Determinants of Health     Financial Resource Strain:     Difficulty of Paying Living Expenses:    Food Insecurity:     Worried About Running Out of Food in the Last Year:     [address] in the Last Year:    Transportation Needs:     Lack of Transportation (Medical):  Lack of Transportation (Non-Medical):    Physical Activity:     Days of Exercise per Week:     Minutes of Exercise per Session:    Stress:     Feeling of Stress :    Social Connections:     Frequency of Communication with Friends and Family:     Frequency of Social Gatherings with Friends and Family:     Attends Scientologist Services:     Active Member of Clubs or Organizations:     Attends Club or Organization Meetings:     Marital Status:    Intimate Partner Violence:     Fear of Current or Ex-Partner:     Emotionally Abused:     Physically Abused:     Sexually Abused:        Subjective/HPI no acute events overnight. participating with Therapy. No cp no sob. Minor headache. EKG: SB 48        Review of Systems:   Review of Systems   Constitutional: Negative. Negative for diaphoresis and fatigue. HENT: Negative. Eyes: Negative. Respiratory: Negative. Negative for cough, chest tightness, shortness of breath, wheezing and stridor. Cardiovascular: Negative. Negative for chest pain, palpitations and leg swelling. Gastrointestinal: Negative. Negative for blood in stool and nausea. Genitourinary: Negative. Musculoskeletal: Positive for gait problem. Skin: Negative. Neurological: Positive for weakness. Negative for dizziness, syncope and light-headedness. Hematological: Negative. Psychiatric/Behavioral: Negative. Physical Examination:    BP (!) 167/83   Pulse 65   Temp 97.9 °F (36.6 °C) (Oral)   Resp 16   Ht 5' 8\" (1.727 m)   Wt 165 lb 3 oz (74.9 kg)   SpO2 100%   BMI 25.12 kg/m²    Physical Exam   Constitutional: He appears healthy. No distress. HENT:   Normal cephalic and Atraumatic   Eyes: Pupils are equal, round, and reactive to light.    Neck: Thyroid normal. No JVD present. No neck adenopathy. No thyromegaly present. Cardiovascular: Normal rate, regular rhythm, intact distal pulses and normal pulses. Murmur heard. Pulmonary/Chest: Effort normal and breath sounds normal. He has no wheezes. He has no rales. He exhibits no tenderness. Abdominal: Soft. Bowel sounds are normal. There is no abdominal tenderness. Musculoskeletal:         General: No tenderness or edema. Normal range of motion. Cervical back: Normal range of motion and neck supple. Neurological: He is alert and oriented to person, place, and time. Skin: Skin is warm. No cyanosis. Nails show no clubbing.        LABS:  CBC:   Lab Results   Component Value Date    WBC 5.4 09/13/2021    RBC 5.19 09/13/2021    HGB 16.6 09/13/2021    HCT 47.3 09/13/2021    MCV 91.1 09/13/2021    MCH 31.9 09/13/2021    MCHC 35.0 09/13/2021    RDW 13.0 09/13/2021     09/13/2021     CBC with Differential:    Lab Results   Component Value Date    WBC 5.4 09/13/2021    RBC 5.19 09/13/2021    HGB 16.6 09/13/2021    HCT 47.3 09/13/2021     09/13/2021    MCV 91.1 09/13/2021    MCH 31.9 09/13/2021    MCHC 35.0 09/13/2021    RDW 13.0 09/13/2021    LYMPHOPCT 29.9 09/13/2021    MONOPCT 10.8 09/13/2021    BASOPCT 0.7 09/13/2021    MONOSABS 0.6 09/13/2021    LYMPHSABS 1.6 09/13/2021    EOSABS 0.2 09/13/2021    BASOSABS 0.0 09/13/2021     CMP:    Lab Results   Component Value Date     09/14/2021    K 4.4 09/14/2021     09/14/2021    CO2 21 09/14/2021    BUN 35 09/14/2021    CREATININE 0.98 09/14/2021    GFRAA >60.0 09/14/2021    LABGLOM >60.0 09/14/2021    GLUCOSE 106 09/14/2021    PROT 7.2 09/10/2021    LABALBU 4.2 09/14/2021    CALCIUM 9.0 09/14/2021    BILITOT 1.0 09/10/2021    ALKPHOS 79 09/10/2021    AST 36 09/10/2021    ALT 52 09/10/2021     BMP:    Lab Results   Component Value Date     09/14/2021    K 4.4 09/14/2021     09/14/2021    CO2 21 09/14/2021    BUN 35 09/14/2021    LABALBU 4.2

## 2021-09-18 PROCEDURE — 97112 NEUROMUSCULAR REEDUCATION: CPT

## 2021-09-18 PROCEDURE — 6370000000 HC RX 637 (ALT 250 FOR IP): Performed by: INTERNAL MEDICINE

## 2021-09-18 PROCEDURE — 6370000000 HC RX 637 (ALT 250 FOR IP): Performed by: PHYSICAL MEDICINE & REHABILITATION

## 2021-09-18 PROCEDURE — 99233 SBSQ HOSP IP/OBS HIGH 50: CPT | Performed by: PSYCHIATRY & NEUROLOGY

## 2021-09-18 PROCEDURE — 97116 GAIT TRAINING THERAPY: CPT

## 2021-09-18 PROCEDURE — APPSS45 APP SPLIT SHARED TIME 31-45 MINUTES: Performed by: STUDENT IN AN ORGANIZED HEALTH CARE EDUCATION/TRAINING PROGRAM

## 2021-09-18 PROCEDURE — 97535 SELF CARE MNGMENT TRAINING: CPT

## 2021-09-18 PROCEDURE — 6360000002 HC RX W HCPCS: Performed by: INTERNAL MEDICINE

## 2021-09-18 PROCEDURE — 1180000000 HC REHAB R&B

## 2021-09-18 PROCEDURE — 97530 THERAPEUTIC ACTIVITIES: CPT

## 2021-09-18 PROCEDURE — 97110 THERAPEUTIC EXERCISES: CPT

## 2021-09-18 RX ORDER — QUETIAPINE FUMARATE 25 MG/1
25 TABLET, FILM COATED ORAL 2 TIMES DAILY PRN
Status: DISCONTINUED | OUTPATIENT
Start: 2021-09-18 | End: 2021-09-28 | Stop reason: HOSPADM

## 2021-09-18 RX ADMIN — LISINOPRIL 20 MG: 20 TABLET ORAL at 08:10

## 2021-09-18 RX ADMIN — ASPIRIN 81 MG: 81 TABLET, CHEWABLE ORAL at 08:10

## 2021-09-18 RX ADMIN — ENOXAPARIN SODIUM 40 MG: 100 INJECTION SUBCUTANEOUS at 08:11

## 2021-09-18 RX ADMIN — ATORVASTATIN CALCIUM 80 MG: 80 TABLET, FILM COATED ORAL at 20:59

## 2021-09-18 RX ADMIN — METOPROLOL TARTRATE 25 MG: 25 TABLET, FILM COATED ORAL at 20:59

## 2021-09-18 RX ADMIN — LISINOPRIL 20 MG: 20 TABLET ORAL at 20:59

## 2021-09-18 RX ADMIN — AMLODIPINE BESYLATE 10 MG: 10 TABLET ORAL at 08:10

## 2021-09-18 RX ADMIN — Medication 2000 UNITS: at 17:06

## 2021-09-18 RX ADMIN — METOPROLOL TARTRATE 25 MG: 25 TABLET, FILM COATED ORAL at 08:10

## 2021-09-18 RX ADMIN — Medication 100 MG: at 08:10

## 2021-09-18 ASSESSMENT — ENCOUNTER SYMPTOMS
CHOKING: 0
BACK PAIN: 1
NAUSEA: 0
TROUBLE SWALLOWING: 0
SHORTNESS OF BREATH: 0
COLOR CHANGE: 0
VOMITING: 0

## 2021-09-18 ASSESSMENT — PAIN SCALES - GENERAL
PAINLEVEL_OUTOF10: 0

## 2021-09-18 NOTE — PROGRESS NOTES
Occupational Therapy  Facility/Department: [facility]  Daily Treatment Note  NAME: Sylvester Oropeza  : 1945  MRN: [id number]    Date of Service: 2021    Discharge Recommendations:  Continue to assess pending progress       Assessment      Activity Tolerance  Activity Tolerance: Patient Tolerated treatment well  Activity Tolerance: fair+  Safety Devices  Safety Devices in place: Yes  Type of devices: All fall risk precautions in place         Patient Diagnosis(es): There were no encounter diagnoses. has a past medical history of Cellulitis of left hand and Hypertension. has a past surgical history that includes back surgery. Restrictions  Restrictions/Precautions  Restrictions/Precautions: Fall Risk, Swallowing - Thickened Liquids  Subjective   General  Referring Practitioner: Dr. Betsy Mcneill  Diagnosis: Impaired mobility, gait, and ADL's 2º inferior cerebellar stroke  Subjective  Pre Treatment Pain Screening  Pain at present: 0  Scale Used: Numeric Score  Intervention List: Patient able to continue with treatment  Vital Signs  Patient Currently in Pain: Denies   Orientation     Objective    ADL  Toileting: Maximum assistance (For hygiene and pants up only.)  Additional Comments: Pt on toilet upon therapist arrival.     Toilet Transfers  Toilet - Technique: Stand pivot  Equipment Used: Grab bars  Toilet Transfer: Contact guard assistance  Toilet Transfers Comments: Toilet>w/c only     Pt engaged in problem solving and perceptual tasks to increase IND and safety with ADLs. Pt engaged in PVC pipe tasks to sort pieces by category with visual reference. Pt required max A and constant cueing to correctly sort pieces, stating multiple times he did not know what he was doing. Pt was unable to complete a 3D pipe tree design. Graded task down to large pegs design x5 color columns. Pt required 4 cues initially to begin task correctly. He needed 2 additional cues to correct errors/continue d/t confusion.  Pt then cleaned up pegs with B 1# wrist weights, alternating R/L UEs to  reach in various planes to place in container; to promote functional reach and B UE strengthening. Pt reported this was tiring. Nuts and bolts task to promote [facility]. Pt was able to connect  with Min difficulty manipulating pieces. Pt required modeling for 4/6 sets and step by step cueing to sequence and continue. Pt worked slowly for all tasks. Plan   Plan  Times per week: 5-7x  Plan weeks: 2 weeks  Current Treatment Recommendations: Strengthening, Functional Mobility Training, Balance Training, Neuromuscular Re-education, Cognitive/Perceptual Training, Safety Education & Training, Patient/Caregiver Education & Training, Equipment Evaluation, Education, & procurement, Self-Care / ADL, Home Management Training  Plan Comment: continue with current POC    Goals  Long term goals  Time Frame for Long term goals :  Within 2 weeks, pt to demonstrate progress in the following areas listed below to achieve specific LTG's as stated in initial evaluation  Long term goal 1: Improve independence during ADLs/IADLs  Long term goal 2: Improve strength and endurance to perform functional transfers  Long term goal 3: Improve fine motor coordination during self care  Long term goal 4: Improve cognition to demonstrate understanding of HEP  Long term goal 5: improve balance to safely perform functional mobility  Patient Goals   Patient goals : \"to get better\"       Therapy Time   Individual Concurrent Group Co-treatment   Time In 0900         Time Out 1000         Minutes 60              ADL/IADL training: 10 minutes  Therapeutic activities: 50 minutes      DONNA Arora, OTR/L    Electronically signed by John Jensen OT on 9/18/2021 at 12:32 PM

## 2021-09-18 NOTE — PROGRESS NOTES
Neurology Follow up    SUBJECTIVE: Patient seen and examined for neurology follow-up for acute left ischemic centrum semiovale infarct. Patient with some mild right-sided weakness. Patient overall doing well. Continues to participate in physical therapy. Has bouts of agitation throughout the day and has been started on Seroquel 25 mg as needed.     No new changes,      Current Facility-Administered Medications   Medication Dose Route Frequency Provider Last Rate Last Admin    QUEtiapine (SEROQUEL) tablet 25 mg  25 mg Oral BID PRN Peter Velasquez MD        metoprolol tartrate (LOPRESSOR) tablet 25 mg  25 mg Oral BID Gayla Martinez MD   25 mg at 09/18/21 0810    lisinopril (PRINIVIL;ZESTRIL) tablet 20 mg  20 mg Oral BID Gayla Martinez MD   20 mg at 09/18/21 6039    Vitamin D (CHOLECALCIFEROL) tablet 2,000 Units  2,000 Units Oral Dinner Luana Scullin, DO   2,000 Units at 09/18/21 1706    cyanocobalamin injection 1,000 mcg  1,000 mcg IntraMUSCular Weekly Luana Scullin, DO   1,000 mcg at 09/14/21 7059    coenzyme Q10 capsule 100 mg  100 mg Oral Daily Luana Scullin, DO   100 mg at 09/18/21 0810    lidocaine 4 % external patch 3 patch  3 patch TransDERmal Daily Luana Scullin, DO        acetaminophen (TYLENOL) tablet 650 mg  650 mg Oral Q4H PRN Luana Scullin, DO        bisacodyl (DULCOLAX) suppository 10 mg  10 mg Rectal Daily PRN Racheal Caraballo, DO        fleet rectal enema 1 enema  1 enema Rectal Daily PRN Luana Scullin, DO        acetaminophen (TYLENOL) tablet 650 mg  650 mg Oral Q6H PRN Reno Garcia MD        Or    acetaminophen (TYLENOL) suppository 650 mg  650 mg Rectal Q6H PRN Reno Garcia MD        enoxaparin (LOVENOX) injection 40 mg  40 mg SubCUTAneous Daily Reno Garcia MD   40 mg at 09/18/21 0811    polyethylene glycol (GLYCOLAX) packet 17 g  17 g Oral Daily PRN Reno Garcia MD        amLODIPine (NORVASC) tablet 10 mg  10 mg Oral Daily Reno Garcia MD   10 mg at 09/18/21 9065    aspirin chewable tablet 81 mg  81 mg Oral Daily Dave Cadet MD   81 mg at 09/18/21 0810    atorvastatin (LIPITOR) tablet 80 mg  80 mg Oral Nightly Dave Cadet MD   80 mg at 09/17/21 1956       PHYSICAL EXAM:    BP (!) 144/73   Pulse 59   Temp 97.7 °F (36.5 °C) (Oral)   Resp 17   Ht 5' 8\" (1.727 m)   Wt 165 lb 3 oz (74.9 kg)   SpO2 99%   BMI 25.12 kg/m²    General Appearance:      Skin:  normal  CVS - Normal sounds, No murmurs , No carotid Bruits  RS -CTA  Abdomen Soft, BS present  Review of Systems   Constitutional: Negative for appetite change and fever. HENT: Negative for ear pain and trouble swallowing. Eyes: Negative for visual disturbance. Respiratory: Negative for choking and shortness of breath. Cardiovascular: Negative for chest pain and palpitations. Gastrointestinal: Negative for nausea and vomiting. Musculoskeletal: Positive for back pain. Negative for gait problem, joint swelling, myalgias, neck pain and neck stiffness. Skin: Negative for color change. Neurological: Positive for weakness. Negative for dizziness, tremors, seizures, syncope, facial asymmetry, speech difficulty, light-headedness, numbness and headaches. Psychiatric/Behavioral: Negative for behavioral problems, confusion, hallucinations and sleep disturbance.       Mental Status Exam:             Level of Alertness:   awake            Orientation:   person, place, time            Memory:   normal                    Language:  normal      Funduscopic Exam:     Cranial Nerves            Cranial nerve III           Pupils:  equal, round, reactive to light      Cranial nerves III, IV, VI           Extraocular Movements: intact      Cranial nerve V           Facial sensation:  intact      Cranial nerve VII           Facial strength: intact      Cranial nerve VIII           Hearing:  intact      Cranial nerve IX           Palate:  intact      Cranial nerve XI         Shoulder shrug:  intact      Cranial 09/11/2021 Start: 08:15 AM Study Location: Portable Technical Quality: Adequate visualization Indications:Stroke. Patient Status: Routine Height: 68 inches Weight: 179 pounds BSA: 1.95 m^2 BMI: 27.22 kg/m^2 BP: 147/86 mmHg  Conclusions   Summary  Moderate annular calcification. Normal aortic valve structure and function. Trivial aortic regurgitation is  noted. Moderately dilated left atrium. Normal intact intra-atrial septum was noted with no evidence of  significant intra-atrial communications neither by colour flow Doppler  imaging nor by aggitated saline study. Left ventricular ejection fraction is visually estimated at 60%. Impaired relaxation compatible with diastolic dysfunction. ( reversed E/A  ratio)  Moderate concentric left ventricular hypertrophy. Signature   ----------------------------------------------------------------  Electronically signed by Franklin Medina(Interpreting physician)  on 09/11/2021 11:17 AM  ----------------------------------------------------------------   Findings  Left Ventricle Left ventricular ejection fraction is visually estimated at 60%. Impaired relaxation compatible with diastolic dysfunction. ( reversed E/A ratio) Moderate concentric left ventricular hypertrophy. Right Ventricle Normal right ventricle structure and function. Normal right ventricle systolic pressure. Left Atrium Moderately dilated left atrium. Normal intact intra-atrial septum was noted with no evidence of significant intra-atrial communications neither by colour flow Doppler imaging nor by aggitated saline study. Right Atrium Normal right atrium. Mitral Valve Moderate annular calcification. Tricuspid Valve Normal tricuspid valve structure and function. Aortic Valve Normal aortic valve structure and function. Trivial aortic regurgitation is noted. Pulmonic Valve Normal pulmonic valve structure and function. Pericardial Effusion No evidence of pericardial effusion.  Pleural Effusion No evidence of pleural effusion. Aorta \ Miscellaneous Miscellaneous normal findings were found. M-Mode Measurements (cm)   LVIDd: 4.6 cm                          LVIDs: 3.01 cm  IVSd: 1.98 cm                          IVSs: 2.14 cm  LVPWd: 1.08 cm                         AO Root Dimension: 3.04 cm  Rt. Vent.  Dimension: 3.05 cm                                         LVOT: 1.72 cm  Doppler Measurements:   AV Velocity:0.02 m/s                   MV Peak E-Wave: 1.25 m/s  AV Peak Gradient: 15.73 mmHg           MV Peak A-Wave: 1.54 m/s  AV Mean Gradient: 8.65 mmHg  AV Area (Continuity):1.66 cm^2         MV P1/2t: 103 msec  TR Velocity:2.47 m/s                   MVA by PHT2.14 cm^2  TR Gradient:24.4 mmHg                  Estimated RAP:10 mmHg                                         RVSP:34.4 mmHg  Valves  Mitral Valve   Peak E-Wave: 1.25 m/s                 Peak A-Wave: 1.54 m/s  P1/2t: 103 msec                       E/A Ratio: 0.81  Mean Velocity: 1.01 m/s               Peak Gradient: 6.28 mmHg  Mean Gradient: 4.78 mmHg              Deceleration Time: 407.7 msec  Area (PHT): 2.14 cm^2                 Area (continuity): 1.44 cm^2   Tissue Doppler   E' Septal Velocity: 0.07 m/s  E' Lateral Velocity: 0.06 m/s   Aortic Valve   Peak Velocity: 1.98 m/s                Mean Velocity: 1.39 m/s  Peak Gradient: 15.73 mmHg              Mean Gradient: 8.65 mmHg  Area (continuity): 1.66 cm^2  AV VTI: 46.12 cm   Tricuspid Valve   Estimated RVSP: 34.4 mmHg               Estimated RAP: 10 mmHg  TR Velocity: 2.47 m/s                   TR Gradient: 24.4 mmHg   Pulmonic Valve   Peak Velocity: 0.95 m/s            Peak Gradient: 3.64 mmHg                                     Estimated PASP: 34.4 mmHg   LVOT   Peak Velocity: 1.51 m/s              Mean Velocity: 1.09 m/s  Peak Gradient: 9.17 mmHg             Mean Gradient: 5.32 mmHg  LVOT Diameter: 1.72 cm               LVOT VTI: 32.89 cm  Structures  Left Atrium   LA Volume/Index: 47.11 ml /24 m^2             LA Area: 16.59 cm^2   Left Ventricle   Diastolic Dimension: 4.6 cm          Systolic Dimension: 8.39 cm  Septum Diastolic: 1.89 cm            Septum Systolic: 9.99 cm  PW Diastolic: 2.95 cm                                       FS: 34.6 %  LV EDV/LV EDV Index: 97.45 ml/50 m^2 LV ESV/LV ESV Index: 35.36 ml/18 m^2  EF Calculated: 63.7 %                LV Length: 7.34 cm   LVOT Diameter: 1.72 cm   Right Atrium   RA Systolic Pressure: 10 mmHg   Right Ventricle   Diastolic Dimension: 5.33 cm                                     RV Systolic Pressure: 67.2 mmHg  Aorta/ Miscellaneous Aorta   Aortic Root: 3.04 cm  LVOT Diameter: 1.72 cm      CT HEAD WO CONTRAST    Result Date: 9/10/2021  EXAMINATION: CT HEAD WO CONTRAST CLINICAL HISTORY: generalized weakness COMPARISON:  NONE AVAILABLE An unenhanced scan is performed. FINDINGS:   There is no bleed, mass effect, or space occupying lesion. No extra-axial mass or fluid collections. Hypoattenuated White matter changes in the cerebral hemispheres noted. There is global atrophy. Findings likely reflect chronic small vessel vasculopathy. There is a vague area of low attenuation in the inferior posterior right cerebellar hemisphere with slight obliteration of adjacent sulci. An evolving nonhemorrhagic infarct in the cerebellum would have to be a consideration. Clinical correlation recommended. NO BLEED, MASS EFFECT, OR EXTRA-AXIAL FLUID COLLECTION. EVOLVING NONHEMORRHAGIC INFARCT IN THE INFERIOR RIGHT CEREBELLAR HEMISPHERE. 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 All CT scans at this facility use dose modulation, iterative reconstruction, and/or weight based dosing when appropriate to reduce radiation dose to as low as reasonably achievable. XR CHEST PORTABLE    Result Date: 9/10/2021  Exam: XR CHEST PORTABLE History: Generalized weakness Technique: AP portable view of the chest obtained.  Comparison: None available Findings: Atherosclerotic calcification of the thoracic aorta. The cardiomediastinal silhouette is within normal limits. No pneumothorax, pleural effusion, or consolidation. Question calcified pleural plaques on the left, which can be seen with asbestos related disease. No acute osseous abnormality. No radiographic evidence of acute intrathoracic process. Question left-sided pleural plaques which can be seen with asbestos related disease. MRI BRAIN WO CONTRAST    Result Date: 9/11/2021  EXAMINATION: MRI BRAIN WO CONTRAST CLINICAL HISTORY:  STROKE COMPARISONS: MR brain 4/5/2016 TECHNIQUE: Multiplanar multisequence images of the brain were obtained without contrast. Diffusion perfusion imaging was obtained. FINDINGS: There is a tiny focus of restricted diffusion in the left centrum semiovale (series 4 image 19). There are no extra-axial collections. There is no evidence of hemorrhage. The susceptibility images do not demonstrate evidence of hemosiderin deposition within the brain parenchyma or the leptomeninges. There are numerous patchy foci of T2/FLAIR hyperintensities in the subcortical and periventricular white matter in a symmetric distribution throughout both hemispheres. Chronic bilateral cerebellar infarcts. There is prominence of the sulci and ventricles consistent with moderate global cerebral atrophy and chronic involutional changes. No midline shift. The visualized portions of the orbits are within normal limits, the globes are intact. The visualized portions of the paranasal sinuses are within normal limits. Right mastoid effusion. Small foci of restricted diffusion in the left centrum semiovale, consistent with acute ischemia. Chronic microvascular changes and bilateral chronic cerebellar infarcts. CRITICAL RESULTS: Communicated with Josseline Arroyo RN on 9/11/2021 at 12:51 PM.    US CAROTID ARTERY BILATERAL    Result Date: 9/11/2021  EXAMINATION: US CAROTID ARTERY BILATERAL HISTORY:   stroke .  R26.9 Gait abnormality ICD10 COMPARISON:None TECHNIQUE: Carotid duplex sonograms which include gray scale and color flow evaluation are complimented with spectral waveform analysis. Please refer to chart below for specific carotid velocity measurements. The degree of stenosis recorded on this exam uses the same method of stratification used in the NASCET trials. This complies with ACR practice guidelines and the Society of radiology in ultrasound consensus statement and provides adequate information for clinical decision making. Society of Radiologists in Ultrasound (SRU) consensus statement was used to estimate internal carotid artery stenosis. RESULT: Mild to moderate plaque in the carotid arteries bilaterally. No significant increase in systolic flow or turbulence to indicate any hemodynamically significant narrowing in the right or left internal carotid arteries. There is antegrade flow identified in both vertebral arteries. ARTERIAL BLOOD FLOW VELOCITY RIGHT PEAK SYSTOLIC VELOCITIES (PSV)                                               Prox CCA    129 cm/s             Mid CCA     114 cm/s              Dist CCA    104 cm/s             Prox ICA    169 cm/s               Mid ICA     106 cm/s            Dist ICA    63 cm/s             Prox ECA     170   cm/s             Prox VERT   51 cm/s              ICA/CCA     1.48                        LEFT PEAK SYSTOLIC VELOCITIES (PSV)  Prox CCA    135 cm/s Mid CCA     166 cm/s Dist CCA    121 cm/s Prox ICA    119 cm/s Mid ICA     72 cm/s Dist ICA    61 cm/s Prox ECA    131 cm/s Prox VERT   56 cm/s ICA/CCA     0.72      50-69 % STENOSIS IN THE RIGHT ICA  <50 % STENOSIS IN THE LEFT ICA ANTEGRADE FLOW IN THE BILATERAL VERTEBRAL ARTERIES. No results for input(s): WBC, HGB, PLT in the last 72 hours. No results for input(s): NA, K, CL, CO2, BUN, CREATININE, GLUCOSE in the last 72 hours. No results for input(s): BILITOT, ALKPHOS, AST, ALT in the last 72 hours.   Lab Results   Component Value Date PROTIME 10.3 03/26/2019    INR 1.0 03/26/2019     No results found for: LITHIUM, DILFRTOT, VALPROATE    ASSESSMENT AND PLAN  Left centrum semiovale acute ischemic infarct  Carotid duplex with 50 to 69% stenosis in the right internal carotid artery and less than 50% in the left internal carotid artery  hemoGlobin A1c 5.3  Total cholesterol 145, triglycerides 67, HDL 38,LDL 94  Echocardiogram with ejection fraction of 60% with negative bubble study  Continue aspirin and high intensity statin  Dysphasia- nectar thick liquids  MRI of the cervical, thoracic and lumbar spine completed with multiple areas of severe spinal canal stenosis in the cervical spine. Neurosurgery has been consulted with plans for follow-up once medically stable after stroke for possible surgical intervention. Patient able to ambulate with assistance of walker. Circumduction and right lower extremity noted. Patient has weakness in flexion of right hip and dorsiflexion of right foot and unclear at this is related to left central semiovale infarct versus severe lumbar stenosis at L2-L5. Patient also has gait ataxia which could be due to severe cervical canal stenosis and old cerebellar infarcts. Azotemia which is new noted today. Patient overall improving in strength. Continue on aspirin and high intensity statin. Continue PT/OT/SLP. Azotemia improving. Continues to participate in physical therapy be. Has had some behavioral issues and agitation throughout the day and has been started on Seroquel. Overall improving in strength. Continue PT/OT/SLP. I have personally performed a face to face diagnostic evaluation on this patient, reviewed all data and investigations, and am the sole provider of all clinical decisions on the neurological status of this patient. nonfocal, gait ataxia, encephalopathy      Jordan Alvarenga MD, Kunal Vidal, American Board of Psychiatry & Neurology  Board Certified in Vascular Neurology  Board Certified in Neuromuscular Medicine  Certified in Neurorehabilitation

## 2021-09-18 NOTE — PROGRESS NOTES
Pt denied any pain. Continent. LBM 9/17/21. Pivot transfer. Gait ataxia. Right sided weakness. Telemetry SB. Pt slept well.  Electronically signed by Janene Andrade RN on 9/18/2021 at 5:27 AM

## 2021-09-18 NOTE — PLAN OF CARE
Problem: Falls - Risk of:  Goal: Will remain free from falls  Description: Will remain free from falls  9/18/2021 0026 by Prakash Aviles RN  Outcome: Ongoing     Problem: Falls - Risk of:  Goal: Absence of physical injury  Description: Absence of physical injury  9/18/2021 0026 by Prakash Aviles RN  Outcome: Ongoing

## 2021-09-18 NOTE — PROGRESS NOTES
Occupational Therapy  Facility/Department: [facility]  Daily Treatment Note  NAME: Marylee Liter  :   MRN: [id number]    Date of Service: 2021    Discharge Recommendations:  Continue to assess pending progress       Assessment Pt tolerated treatment fair. Pt at first verbally agitated, but able to redirect with increased time. Pt participated in remainder of activities when informed he would be able to try to call his sister after OT treatment. Pt with Mod to Max VC's to correct errors, follow instructions, and to problem solve during activities. Activity Tolerance  Activity Tolerance: Patient Tolerated treatment well  Activity Tolerance: fair+  Safety Devices  Safety Devices in place: Yes  Type of devices: All fall risk precautions in place         Patient Diagnosis(es): There were no encounter diagnoses. has a past medical history of Cellulitis of left hand and Hypertension. has a past surgical history that includes back surgery. Restrictions  Restrictions/Precautions  Restrictions/Precautions: Fall Risk, Swallowing - Thickened Liquids     Subjective   General  Chart Reviewed: Yes  Patient assessed for rehabilitation services?: Yes  Response to previous treatment: Patient with no complaints from previous session  Family / Caregiver Present: No  Referring Practitioner: Dr. Jean Carlos Thorpe  Diagnosis: Impaired mobility, gait, and ADL's 2º inferior cerebellar stroke  Subjective  Subjective: \"It always helps. \" - a hot shower to make you feel better     Pt denies pain prior and after OT treatment. Orientation: OTR oriented pt to time and location. Objective      Attempted  game. Pt upset and swiped checkers off board. Pt perseverating on contacting sister. Informed pt he would be able to contact sister afterward. Pt calmed down. Placed 1 lb weights on B wrist. Pt stood for 13 min total with 2 rest breaks for card sorting activity and resistive clips.  Pt unable to follow directions to sort cards, with Max verbal cues. Pt able to manage 1-8 clips with R and L UE. Pt using L hand more when instructed to use R hand. Pt not completing full shoulder flexion with RUE for placing clips on vertical pole. Pt completed a few minutes of arm bike with increase VC's. Pt able to drop connect 4 pieces into board, board placed further away for increased posture and shoulder flexion with elbow extension with wrist weights. FM activity to thread 10 pipe pieces. Pt with Mod VC's to correctly orient piece to thread. Plan   Plan  Times per week: 5-7x  Plan weeks: 2 weeks  Current Treatment Recommendations: Strengthening, Functional Mobility Training, Balance Training, Neuromuscular Re-education, Cognitive/Perceptual Training, Safety Education & Training, Patient/Caregiver Education & Training, Equipment Evaluation, Education, & procurement, Self-Care / ADL, Home Management Training  Plan Comment: continue with current POC    Goals  Long term goals  Time Frame for Long term goals :  Within 2 weeks, pt to demonstrate progress in the following areas listed below to achieve specific LTG's as stated in initial evaluation  Long term goal 1: Improve independence during ADLs/IADLs  Long term goal 2: Improve strength and endurance to perform functional transfers  Long term goal 3: Improve fine motor coordination during self care  Long term goal 4: Improve cognition to demonstrate understanding of HEP  Long term goal 5: improve balance to safely perform functional mobility  Patient Goals   Patient goals : \"to get better\"       Therapy Time   Individual Concurrent Group Co-treatment   Time In 1300         Time Out 1400         Minutes 60              OT Therapeutic activities 60 minutes for 4 unit(s),  [facility], OTR/L

## 2021-09-18 NOTE — PROGRESS NOTES
Physical Therapy Rehab Treatment Note  Facility/Department: [facility]  Room: [place] [address]       NAME: Sara Sifuentes  : 1945 (89 y.o.)  MRN: [id number]  CODE STATUS: Full Code    Date of Service: 2021  Chart Reviewed: Yes  Patient assessed for rehabilitation services?: Yes  Family / Caregiver Present: No    Restrictions:  Restrictions/Precautions: Fall Risk, Swallowing - Thickened Liquids       SUBJECTIVE: Subjective: didnt really sleep last night  Pain Screening  Patient Currently in Pain: No  Pre Treatment Pain Screening  Pain at present: 0  Scale Used: Numeric Score  Intervention List: Patient able to continue with treatment    Post Treatment Pain Screening:  Pain Assessment  Pain Assessment: 0-10  Pain Level: 0    OBJECTIVE:        Transfers  Sit to Stand: Contact guard assistance  Stand to sit: Contact guard assistance; Moderate Assistance (assistance guiding hips to chair needed)  Comment: VC for approach to chair and proximity to chair before sitting, pt reaching out in front of him instead of getting close to chair and turning all the way around. VC for hand placement as pt attempts to pull from [employer], attempted STS x10 intervention but pt confused and unable to follow directions    Ambulation  Ambulation?: Yes  More Ambulation?: No  Ambulation 1  Surface: carpet  Device: Rolling Walker  Assistance: Minimal assistance  Quality of Gait: Varied R LE step length and clearance, inconsistant with carry over, Heavy VCs to focus on Right foot clearance for improved safety with gait. Gait Deviations: Slow Princess; Shuffles;Decreased step length;Decreased step height;Decreased head and trunk rotation  Distance: 25ft x2  Comments: focus placed on quailty of gait and RLE foot clearance. decreased RLE foot clearance which worsens with fatigue.                    Exercises  Hamstring Sets: curls with YTB x15 ea (difficult for pt)  Hip Flexion: 1# x15  Hip Abduction: x15 side kicks  Knee Long Arc Quad: 1# x 15  Ankle Pumps: ankle pumps and heel raises x20 each  Other exercises  Other exercises?: Yes  Other exercises 1: Seated Hip ADD ball squeeze x 20  Other exercises 2: Seated Hip ABD YTB x 15 (difficult for pt)  Other exercises 3: standing marching x10, heel raises x10 (decreased foot clearance during marching. increased difficulty and fatigue with standing thereex.)  Other exercises 4: standing FR OBOS 1UE support  Other exercises 5: no UE support: static stand x30 sec, EC x10 sec, NBOS x30 sec, cervical head turns x10  Other exercises 6: seated tapping on 4\" block to focus on foot clearance 2x10     ASSESSMENT/PROGRESS TOWARDS GOALS: Pt demonstrated decreased RLE foot clearance and poor carryover of VC/ TC for correction. During balance interventions pt was able to perform most without any major episodes of LOB. CGA/SBA for most neuro interventions.         Goals:  Long term goals  Long term goal 1: Bed mobility with indep  Long term goal 2: sit to stand and bed transfers indep  Long term goal 3: Amb 150ft with 2ww or rollator indep  Long term goal 4: TUG <15.0 seconds with AD to demonstrate low fall risk  Long term goal 5: [place] balance testing at 25/56 or greater  Long term goal 6: SBA 4 stairs with rails    PLAN OF CARE/Safety:   Safety Devices  Type of devices: Left in chair;Chair alarm in place      Therapy Time:   Individual   Time In 1000   Time Out 1100   Minutes 60     Minutes:60      Transfer/Bed mobility trainin      Gait trainin      Neuro re education:10     Therapeutic ex:Vaibhav Guerrero PTA, 21 at 11:09 AM

## 2021-09-19 PROCEDURE — 6370000000 HC RX 637 (ALT 250 FOR IP): Performed by: PHYSICAL MEDICINE & REHABILITATION

## 2021-09-19 PROCEDURE — 6370000000 HC RX 637 (ALT 250 FOR IP): Performed by: INTERNAL MEDICINE

## 2021-09-19 PROCEDURE — 6360000002 HC RX W HCPCS: Performed by: INTERNAL MEDICINE

## 2021-09-19 PROCEDURE — 1180000000 HC REHAB R&B

## 2021-09-19 RX ADMIN — Medication 2000 UNITS: at 17:05

## 2021-09-19 RX ADMIN — METOPROLOL TARTRATE 25 MG: 25 TABLET, FILM COATED ORAL at 08:25

## 2021-09-19 RX ADMIN — ATORVASTATIN CALCIUM 80 MG: 80 TABLET, FILM COATED ORAL at 21:39

## 2021-09-19 RX ADMIN — AMLODIPINE BESYLATE 10 MG: 10 TABLET ORAL at 08:25

## 2021-09-19 RX ADMIN — ENOXAPARIN SODIUM 40 MG: 100 INJECTION SUBCUTANEOUS at 08:25

## 2021-09-19 RX ADMIN — LISINOPRIL 20 MG: 20 TABLET ORAL at 08:25

## 2021-09-19 RX ADMIN — ASPIRIN 81 MG: 81 TABLET, CHEWABLE ORAL at 08:25

## 2021-09-19 RX ADMIN — LISINOPRIL 20 MG: 20 TABLET ORAL at 21:39

## 2021-09-19 RX ADMIN — METOPROLOL TARTRATE 25 MG: 25 TABLET, FILM COATED ORAL at 21:39

## 2021-09-19 RX ADMIN — Medication 100 MG: at 08:25

## 2021-09-19 ASSESSMENT — PAIN SCALES - GENERAL: PAINLEVEL_OUTOF10: 0

## 2021-09-19 NOTE — PROGRESS NOTES
Subjective: The patient complains of severe acute on chronic progressive fatigue and balance and cognitive deficits due to cerebellar CVA partially relieved by rest,medications, PT,  OT,   SLP and rest and exacerbated by recent illness. ROS x10: The patient also complains of severely impaired mobility and activities of daily living. Otherwise no new problems with vision, hearing, nose, mouth, throat, dermal, cardiovascular, GI, , pulmonary, musculoskeletal, psychiatric or neurological. See Rehab H&P on Rehab chart dated . Vital signs:  BP (!) 153/87   Pulse 68   Temp 97.5 °F (36.4 °C) (Oral)   Resp 16   Ht 5' 8\" (1.727 m)   Wt 165 lb 3 oz (74.9 kg)   SpO2 100%   BMI 25.12 kg/m²   I/O:   PO/Intake:  fair PO intake, dysphagia soft bite-size with nectar thick liquids    Bowel/Bladder:  continent, occasional loose stools  General:  Patient is well developed, adequately nourished, non-obese and     well kempt. HEENT:    PERRLA, hearing intact to loud voice, external inspection of ear     and nose benign. Inspection of lips, tongue and gums benign  Musculoskeletal: No significant change in strength or tone. All joints stable. Inspection and palpation of digits and nails show no clubbing,       cyanosis or inflammatory conditions. Neuro/Psychiatric: Affect: flat but pleasant. Alert and oriented to person, place and     Situation with  min cues. No significant change in deep tendon reflexes or     sensation  Lungs:  Diminished, CTA-B. Respiration effort is normal at rest.     Heart:   S1 = S2, RRR. No loud murmurs. Abdomen:  Soft, non-tender, no enlargement of liver or spleen. Extremities:  No significant lower extremity edema or tenderness. Skin:   Intact to general survey, no visualized or palpated problems.     Rehabilitation:  Physical therapy:   Bed Mobility: Scooting: Stand by assistance    Transfers: Sit to Stand: Contact guard assistance  Stand to sit: Contact guard assistance, Moderate Assistance (assistance guiding hips to chair needed)  Bed to Chair: Minimal assistance, Ambulation 1  Surface: carpet  Device: Rolling Walker  Assistance: Minimal assistance  Quality of Gait: Varied R LE step length and clearance, inconsistant with carry over, Heavy VCs to focus on Right foot clearance for improved safety with gait. Gait Deviations: Slow Princess, Shuffles, Decreased step length, Decreased step height, Decreased head and trunk rotation  Distance: 25ft x2  Comments: focus placed on quailty of gait and RLE foot clearance. decreased RLE foot clearance which worsens with fatigue.,      FIMS:  ,  , Assessment: Improved carry over with right foot clearance with straight line walking this PM tx session, continues to drag right foot with turns and approach to chair. Occupational therapy:    ,  , Assessment: Pt is  a 77 y.o. admitted for CVA. Pt with above deficits impairing ability to safety complete ADL's and IADLs. Pt would benefit from OT services to address deficits and maximize level of safety and function during ADL's. Speech therapy:        Lab/X-ray studies reviewed, analyzed and discussed with patient and staff:      telemetry and monitor tech stated that pt was SB-NSR Rate 55-65. No results found for this or any previous visit (from the past 24 hour(s)). Echocardiogram  9/11/2021  Transthoracic Echocardiography  Left Ventricle Left ventricular ejection fraction is visually estimated at 60%. Impaired relaxation compatible with diastolic dysfunction. ( reversed E/A ratio) Moderate concentric left ventricular hypertrophy. Right Ventricle Normal right ventricle structure and function. Normal right ventricle systolic pressure. Left Atrium Moderately dilated left atrium. Normal intact intra-atrial septum was noted with no evidence of significant intra-atrial communications neither by colour flow Doppler imaging nor by aggitated saline study.  Right Atrium Normal right atrium. Mitral Valve Moderate annular calcification. Tricuspid Valve Normal tricuspid valve structure and function. Aortic Valve Normal aortic valve structure and function. Trivial aortic regurgitation is noted. Pulmonic Valve Normal pulmonic valve structure and function. Pericardial Effusion No evidence of pericardial effusion. Pleural Effusion No evidence of pleural effusion. Aorta \ Miscellaneous Miscellaneous normal findings were found. CT HEAD  : 9/10/2021  There is no bleed, mass effect, or space occupying lesion. No extra-axial mass or fluid collections. Hypoattenuated White matter changes in the cerebral hemispheres noted. There is global atrophy. Findings likely reflect chronic small vessel vasculopathy. There is a vague area of low attenuation in the inferior posterior right cerebellar hemisphere with slight obliteration of adjacent sulci. An evolving nonhemorrhagic infarct in the cerebellum would have to be a consideration. Clinical correlation recommended. NO BLEED, MASS EFFECT, OR EXTRA-AXIAL FLUID COLLECTION. EVOLVING NONHEMORRHAGIC INFARCT IN THE INFERIOR RIGHT CEREBELLAR HEMISPHERE. 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 XR CHEST   9/10/2021 Atherosclerotic calcification of the thoracic aorta. The cardiomediastinal silhouette is within normal limits. No pneumothorax, pleural effusion, or consolidation. Question calcified pleural plaques on the left, which can be seen with asbestos related disease. No acute osseous abnormality. No radiographic evidence of acute intrathoracic process. Question left-sided pleural plaques which can be seen with asbestos related disease. MRI BRAIN   9/11/2021 There is a tiny focus of restricted diffusion in the left centrum semiovale (series 4 image 19). There are no extra-axial collections. There is no evidence of hemorrhage.  The susceptibility images do not demonstrate evidence of hemosiderin deposition within the brain parenchyma or the leptomeninges. There are numerous patchy foci of T2/FLAIR hyperintensities in the subcortical and periventricular white matter in a symmetric distribution throughout both hemispheres. Chronic bilateral cerebellar infarcts. There is prominence of the sulci and ventricles consistent with moderate global cerebral atrophy and chronic involutional changes. No midline shift. The visualized portions of the orbits are within normal limits, the globes are intact. The visualized portions of the paranasal sinuses are within normal limits. Right mastoid effusion. Small foci of restricted diffusion in the left centrum semiovale, consistent with acute ischemia. Chronic microvascular changes and bilateral chronic cerebellar infarcts. CRITICAL RESULTS: Communicated with Shree Wiggins RN on 9/11/2021 at 12:51 PM.    FL MODIFIED BARIUM  9/13/2021: In cooperation with speech pathology, a modified barium swallow using various consistencies of both solids and liquids with dynamic imaging was performed. Patient's oral stage characterized by mild oral dysphagia. There is premature entry to the level of vallecula. Patient's pharyngeal stage was characterized by moderate pharyngeal dysphagia. There is decreased hyolaryngeal excursion with moderate residuals in the vallecula. There is deep penetration on thin liquids from straw. No aspiration. MILD ORAL AND MODERATE PHARYNGEAL DYSPHAGIA. RECOMMEND BY SPEECH PATHOLOGY TO FOLLOW. US CAROTID ARTERY BILATERAL  9/11/2021 Mild to moderate plaque in the carotid arteries bilaterally. No significant increase in systolic flow or turbulence to indicate any hemodynamically significant narrowing in the right or left internal carotid arteries. There is antegrade flow identified in both vertebral arteries.  ARTERIAL BLOOD FLOW VELOCITY RIGHT PEAK SYSTOLIC VELOCITIES (PSV)                                               Prox CCA    129 cm/s             Mid CCA     114 cm/s Dist CCA    104 cm/s             Prox ICA    169 cm/s               Mid ICA     106 cm/s            Dist ICA    63 cm/s             Prox ECA     170   cm/s             Prox VERT   51 cm/s              ICA/CCA     1.48                        LEFT PEAK SYSTOLIC VELOCITIES (PSV)  Prox CCA    135 cm/s Mid CCA     166 cm/s Dist CCA    121 cm/s Prox ICA    119 cm/s Mid ICA     72 cm/s Dist ICA    61 cm/s Prox ECA    131 cm/s Prox VERT   56 cm/s ICA/CCA     0.72      50-69 % STENOSIS IN THE RIGHT ICA  <50 % STENOSIS IN THE LEFT ICA ANTEGRADE FLOW IN THE BILATERAL VERTEBRAL ARTERIES. MRI CERVICAL THORACIC LUMBAR SPINE   9/12/2021     CERVICAL REGION: Multilevel degenerative changes with severe canal stenosis at C2-3, C3-4, and C6-7. Moderate canal stenosis at C4-5. THORACIC REGION: No spinal canal stenosis    LUMBAR REGION: Multilevel degenerative changes. Endplate irregularity with joint space narrowing and slight anterolisthesis at L3-4 Severe canal stenosis at L2-3 and L4-5. Moderate to severe canal stenosis at L3-4. No acute compression fracture. No epidural or paraspinal soft tissue mass. No abnormal cord signal intensity on the scans. Multiple areas of severe spinal canal stenosis in the cervical region. Severe canal stenosis at L2-3, L3-4 and L4-5. Previous extensive, complex labs, notes and diagnostics reviewed and analyzed. ALLERGIES:    Allergies as of 09/13/2021    (No Known Allergies)      (please also verify by checking MAR)       Yesterday I evaluated this patient for periodic reassessment of medical and functional status. The patient was discussed in detail at the treatment team meeting focusing on current medical issues, progress in therapies, social issues, psychological issues, barriers to progress and strategies to address these barriers, and discharge planning. See the hand written addendum to rehab progress note.   The patient continues to be high risk for future disability and their medical and rehabilitation prognosis continue to be good and therefore, we will continue the patient's rehabilitation course as planned. The patient's tentative discharge date was set. Patient and family education was discussed. The patient was made aware of the team discussion regarding their progress. Discharge plans were discussed along with barriers to progress and strategies to address these barriers, patient encouraged to continue to discuss discharge plans with . Complex Physical Medicine & Rehab Issues Assess & Plan:   1. 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 Functional and medical status reassessed regarding patients ability to participate in therapies and patient found to be able to participate in acute intensive comprehensive inpatient rehabilitation program including PT/OT to improve balance, ambulation, ADLs, and to improve the P/AROM. Therapeutic modifications regarding activities in therapies, place, amount of time per day and intensity of therapy made daily. In bed therapies or bedside therapies prn.   2. Bowel and Bladder dysfunction neurogenic bowel and bladder due to CVA:  frequent toileting, ambulate to bathroom with assistance, check post void residuals. Check for C.difficile x1 if >2 loose stools in 24 hours, continue bowel & bladder program.  Monitor bowel and bladder function. Lactinex 2 PO every AC. MOM prn, Brown Bomb prn, Glycerin suppository prn, enema prn.  3. Severe neck mid back and low back pain as well as generalized OA pain Multiple areas of severe spinal canal stenosis in the cervical region. Severe canal stenosis at L2-3, L3-4 and L4-5. : reassess pain every shift and prior to and after each therapy session, give prn Tylenol and scheduled Tylenol, modalities prn in therapy, masage, Lidoderm, K-pad prn. Consider scheduled AM pain meds.   4. Skin healing and breakdown risk:  continue pressure relief program.  Daily skin exams and reports from nursing. 5. Severe fatigue due to nutritional and hydration deficiency: Add and titrate vitamin B12 vitamin D and CoQ10 continue to monitor I&Os, calorie counts prn, dietary consult prn.  6. Acute episodic insomnia with situational adjustment disorder:  prn Ambien, monitor for day time sedation. 7. Falls risk elevated:  patient to use call light to get nursing assistance to get up, bed and chair alarm. 8. Elevated DVT risk: progressive activities in PT, continue prophylaxis FITZ hose, elevation and Lovenox. 9. Complex discharge planning:  DC home 9/28/21--to SNF vs home with family and [place] . Weekly team meeting every  Thursday to assess progress towards goals, discuss and address social, psychological and medical comorbidities and to address difficulties they may be having progressing in therapy. Patient and family education is in progress. The patient is to follow-up with their family physician after discharge. Complex Active General Medical Issues that complicate care Assess & Plan:    1. History of bilateral shoulder pain with history of shoulder dislocation generalized osteoarthritis  2. Stroke determined by clinical assessment and confirmed on MRI as above-consult neurology control blood pressure monitor for diabetes add aspirin  3. Spinal stenosis of lumbar region with neurogenic claudication, Myelopathy concurrent with and due to spinal stenosis of cervical region  4. Ataxic gait due to cerebellar CVA-focus on balance and therapy  5. Significant oral pharyngeal dysphagia-consult speech-language pathology  6. Anxiety and depression-emotional support provided daily, vitamin B12, encourage participation in rehabilitation support group and recreational therapy, adjust/add medications (add vitamin B12 consider SSRI)  7.  Hypertension, hypercholesterolemia, [place] cerebrovascular disease, coronary artery disease, cardiac arrhythmia-continue telemetry-continue blood signs every shift focusing on heart rate and blood pressure checks, consult hospitalist for backup medical and adjust/add medications (aspirin, Norvasc, Lipitor, lisinopril, Lopressor) consult cardiology regarding V. tach titrate beta-blocker  8.  Oral pharyngeal dysphagia with cognitive deficits-dysphagia soft with bite-size nectar thick liquid consult speech-language pathology--oral motor exercises     Anish Guo D.O., PM&R     Attending    [address]

## 2021-09-19 NOTE — PROGRESS NOTES
Subjective: The patient complains of severe acute on chronic progressive fatigue and balance and cognitive deficits due to cerebellar CVA partially relieved by rest,medications, PT,  OT,   SLP and rest and exacerbated by recent illness. ROS x10: The patient also complains of severely impaired mobility and activities of daily living. Otherwise no new problems with vision, hearing, nose, mouth, throat, dermal, cardiovascular, GI, , pulmonary, musculoskeletal, psychiatric or neurological. See Rehab H&P on Rehab chart dated . Vital signs:  BP (!) 153/87   Pulse 68   Temp 97.5 °F (36.4 °C) (Oral)   Resp 16   Ht 5' 8\" (1.727 m)   Wt 165 lb 3 oz (74.9 kg)   SpO2 100%   BMI 25.12 kg/m²   I/O:   PO/Intake:  fair PO intake, dysphagia soft bite-size with nectar thick liquids    Bowel/Bladder:  continent, occasional loose stools  General:  Patient is well developed, adequately nourished, non-obese and     well kempt. HEENT:    PERRLA, hearing intact to loud voice, external inspection of ear     and nose benign. Inspection of lips, tongue and gums benign  Musculoskeletal: No significant change in strength or tone. All joints stable. Inspection and palpation of digits and nails show no clubbing,       cyanosis or inflammatory conditions. Neuro/Psychiatric: Affect: flat but pleasant. Alert and oriented to person, place and     Situation with  min cues. No significant change in deep tendon reflexes or     sensation  Lungs:  Diminished, CTA-B. Respiration effort is normal at rest.     Heart:   S1 = S2, RRR. No loud murmurs. Abdomen:  Soft, non-tender, no enlargement of liver or spleen. Extremities:  No significant lower extremity edema or tenderness. Skin:   Intact to general survey, no visualized or palpated problems.     Rehabilitation:  Physical therapy:   Bed Mobility: Scooting: Stand by assistance    Transfers: Sit to Stand: Contact guard assistance  Stand to sit: Contact guard assistance, Moderate Assistance (assistance guiding hips to chair needed)  Bed to Chair: Minimal assistance, Ambulation 1  Surface: carpet  Device: Rolling Walker  Assistance: Minimal assistance  Quality of Gait: Varied R LE step length and clearance, inconsistant with carry over, Heavy VCs to focus on Right foot clearance for improved safety with gait. Gait Deviations: Slow Princess, Shuffles, Decreased step length, Decreased step height, Decreased head and trunk rotation  Distance: 25ft x2  Comments: focus placed on quailty of gait and RLE foot clearance. decreased RLE foot clearance which worsens with fatigue.,      FIMS:  ,  , Assessment: Improved carry over with right foot clearance with straight line walking this PM tx session, continues to drag right foot with turns and approach to chair. Occupational therapy:    ,  , Assessment: Pt is  a 77 y.o. admitted for CVA. Pt with above deficits impairing ability to safety complete ADL's and IADLs. Pt would benefit from OT services to address deficits and maximize level of safety and function during ADL's. Speech therapy:        Lab/X-ray studies reviewed, analyzed and discussed with patient and staff:      telemetry and monitor tech stated that pt was SB-NSR Rate 55-65. No results found for this or any previous visit (from the past 24 hour(s)). Echocardiogram  9/11/2021  Transthoracic Echocardiography  Left Ventricle Left ventricular ejection fraction is visually estimated at 60%. Impaired relaxation compatible with diastolic dysfunction. ( reversed E/A ratio) Moderate concentric left ventricular hypertrophy. Right Ventricle Normal right ventricle structure and function. Normal right ventricle systolic pressure. Left Atrium Moderately dilated left atrium. Normal intact intra-atrial septum was noted with no evidence of significant intra-atrial communications neither by colour flow Doppler imaging nor by aggitated saline study.  Right Atrium Normal right atrium. Mitral Valve Moderate annular calcification. Tricuspid Valve Normal tricuspid valve structure and function. Aortic Valve Normal aortic valve structure and function. Trivial aortic regurgitation is noted. Pulmonic Valve Normal pulmonic valve structure and function. Pericardial Effusion No evidence of pericardial effusion. Pleural Effusion No evidence of pleural effusion. Aorta \ Miscellaneous Miscellaneous normal findings were found. CT HEAD  : 9/10/2021  There is no bleed, mass effect, or space occupying lesion. No extra-axial mass or fluid collections. Hypoattenuated White matter changes in the cerebral hemispheres noted. There is global atrophy. Findings likely reflect chronic small vessel vasculopathy. There is a vague area of low attenuation in the inferior posterior right cerebellar hemisphere with slight obliteration of adjacent sulci. An evolving nonhemorrhagic infarct in the cerebellum would have to be a consideration. Clinical correlation recommended. NO BLEED, MASS EFFECT, OR EXTRA-AXIAL FLUID COLLECTION. EVOLVING NONHEMORRHAGIC INFARCT IN THE INFERIOR RIGHT CEREBELLAR HEMISPHERE. 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 XR CHEST   9/10/2021 Atherosclerotic calcification of the thoracic aorta. The cardiomediastinal silhouette is within normal limits. No pneumothorax, pleural effusion, or consolidation. Question calcified pleural plaques on the left, which can be seen with asbestos related disease. No acute osseous abnormality. No radiographic evidence of acute intrathoracic process. Question left-sided pleural plaques which can be seen with asbestos related disease. MRI BRAIN   9/11/2021 There is a tiny focus of restricted diffusion in the left centrum semiovale (series 4 image 19). There are no extra-axial collections. There is no evidence of hemorrhage.  The susceptibility images do not demonstrate evidence of hemosiderin deposition within the brain parenchyma or the leptomeninges. There are numerous patchy foci of T2/FLAIR hyperintensities in the subcortical and periventricular white matter in a symmetric distribution throughout both hemispheres. Chronic bilateral cerebellar infarcts. There is prominence of the sulci and ventricles consistent with moderate global cerebral atrophy and chronic involutional changes. No midline shift. The visualized portions of the orbits are within normal limits, the globes are intact. The visualized portions of the paranasal sinuses are within normal limits. Right mastoid effusion. Small foci of restricted diffusion in the left centrum semiovale, consistent with acute ischemia. Chronic microvascular changes and bilateral chronic cerebellar infarcts. CRITICAL RESULTS: Communicated with Louisa Maya RN on 9/11/2021 at 12:51 PM.    FL MODIFIED BARIUM  9/13/2021: In cooperation with speech pathology, a modified barium swallow using various consistencies of both solids and liquids with dynamic imaging was performed. Patient's oral stage characterized by mild oral dysphagia. There is premature entry to the level of vallecula. Patient's pharyngeal stage was characterized by moderate pharyngeal dysphagia. There is decreased hyolaryngeal excursion with moderate residuals in the vallecula. There is deep penetration on thin liquids from straw. No aspiration. MILD ORAL AND MODERATE PHARYNGEAL DYSPHAGIA. RECOMMEND BY SPEECH PATHOLOGY TO FOLLOW. US CAROTID ARTERY BILATERAL  9/11/2021 Mild to moderate plaque in the carotid arteries bilaterally. No significant increase in systolic flow or turbulence to indicate any hemodynamically significant narrowing in the right or left internal carotid arteries. There is antegrade flow identified in both vertebral arteries.  ARTERIAL BLOOD FLOW VELOCITY RIGHT PEAK SYSTOLIC VELOCITIES (PSV)                                               Prox CCA    129 cm/s             Mid CCA     114 cm/s Dist CCA    104 cm/s             Prox ICA    169 cm/s               Mid ICA     106 cm/s            Dist ICA    63 cm/s             Prox ECA     170   cm/s             Prox VERT   51 cm/s              ICA/CCA     1.48                        LEFT PEAK SYSTOLIC VELOCITIES (PSV)  Prox CCA    135 cm/s Mid CCA     166 cm/s Dist CCA    121 cm/s Prox ICA    119 cm/s Mid ICA     72 cm/s Dist ICA    61 cm/s Prox ECA    131 cm/s Prox VERT   56 cm/s ICA/CCA     0.72      50-69 % STENOSIS IN THE RIGHT ICA  <50 % STENOSIS IN THE LEFT ICA ANTEGRADE FLOW IN THE BILATERAL VERTEBRAL ARTERIES. MRI CERVICAL THORACIC LUMBAR SPINE   9/12/2021     CERVICAL REGION: Multilevel degenerative changes with severe canal stenosis at C2-3, C3-4, and C6-7. Moderate canal stenosis at C4-5. THORACIC REGION: No spinal canal stenosis    LUMBAR REGION: Multilevel degenerative changes. Endplate irregularity with joint space narrowing and slight anterolisthesis at L3-4 Severe canal stenosis at L2-3 and L4-5. Moderate to severe canal stenosis at L3-4. No acute compression fracture. No epidural or paraspinal soft tissue mass. No abnormal cord signal intensity on the scans. Multiple areas of severe spinal canal stenosis in the cervical region. Severe canal stenosis at L2-3, L3-4 and L4-5. Previous extensive, complex labs, notes and diagnostics reviewed and analyzed. ALLERGIES:    Allergies as of 09/13/2021    (No Known Allergies)      (please also verify by checking MAR)       Yesterday I evaluated this patient for periodic reassessment of medical and functional status. The patient was discussed in detail at the treatment team meeting focusing on current medical issues, progress in therapies, social issues, psychological issues, barriers to progress and strategies to address these barriers, and discharge planning. See the hand written addendum to rehab progress note.   The patient continues to be high risk for future disability and their medical and rehabilitation prognosis continue to be good and therefore, we will continue the patient's rehabilitation course as planned. The patient's tentative discharge date was set. Patient and family education was discussed. The patient was made aware of the team discussion regarding their progress. Discharge plans were discussed along with barriers to progress and strategies to address these barriers, patient encouraged to continue to discuss discharge plans with . Complex Physical Medicine & Rehab Issues Assess & Plan:   1. 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 Functional and medical status reassessed regarding patients ability to participate in therapies and patient found to be able to participate in acute intensive comprehensive inpatient rehabilitation program including PT/OT to improve balance, ambulation, ADLs, and to improve the P/AROM. Therapeutic modifications regarding activities in therapies, place, amount of time per day and intensity of therapy made daily. In bed therapies or bedside therapies prn.   2. Bowel and Bladder dysfunction neurogenic bowel and bladder due to CVA:  frequent toileting, ambulate to bathroom with assistance, check post void residuals. Check for C.difficile x1 if >2 loose stools in 24 hours, continue bowel & bladder program.  Monitor bowel and bladder function. Lactinex 2 PO every AC. MOM prn, Brown Bomb prn, Glycerin suppository prn, enema prn.  3. Severe neck mid back and low back pain as well as generalized OA pain Multiple areas of severe spinal canal stenosis in the cervical region. Severe canal stenosis at L2-3, L3-4 and L4-5. : reassess pain every shift and prior to and after each therapy session, give prn Tylenol and scheduled Tylenol, modalities prn in therapy, masage, Lidoderm, K-pad prn. Consider scheduled AM pain meds.   4. Skin healing and breakdown risk:  continue pressure relief program.  Daily skin exams and reports from nursing. 5. Severe fatigue due to nutritional and hydration deficiency: Add and titrate vitamin B12 vitamin D and CoQ10 continue to monitor I&Os, calorie counts prn, dietary consult prn.  6. Acute episodic insomnia with situational adjustment disorder:  prn Ambien, monitor for day time sedation. 7. Falls risk elevated:  patient to use call light to get nursing assistance to get up, bed and chair alarm. 8. Elevated DVT risk: progressive activities in PT, continue prophylaxis FITZ hose, elevation and Lovenox. 9. Complex discharge planning:  DC home 9/28/21--to SNF vs home with family and [place] . Weekly team meeting every  Thursday to assess progress towards goals, discuss and address social, psychological and medical comorbidities and to address difficulties they may be having progressing in therapy. Patient and family education is in progress. The patient is to follow-up with their family physician after discharge. Complex Active General Medical Issues that complicate care Assess & Plan:    1. History of bilateral shoulder pain with history of shoulder dislocation generalized osteoarthritis  2. Stroke determined by clinical assessment and confirmed on MRI as above-consult neurology control blood pressure monitor for diabetes add aspirin  3. Spinal stenosis of lumbar region with neurogenic claudication, Myelopathy concurrent with and due to spinal stenosis of cervical region  4. Ataxic gait due to cerebellar CVA-focus on balance and therapy  5. Significant oral pharyngeal dysphagia-consult speech-language pathology  6. Anxiety and depression-emotional support provided daily, vitamin B12, encourage participation in rehabilitation support group and recreational therapy, adjust/add medications (add vitamin B12 consider SSRI)  7.  Hypertension, hypercholesterolemia, SC cerebrovascular disease, coronary artery disease, cardiac arrhythmia-continue telemetry-continue blood signs every shift focusing on heart rate and blood pressure checks, consult hospitalist for backup medical and adjust/add medications (aspirin, Norvasc, Lipitor, lisinopril, Lopressor) consult cardiology regarding V. tach titrate beta-blocker  8.  Oral pharyngeal dysphagia with cognitive deficits-dysphagia soft with bite-size nectar thick liquid consult speech-language pathology--oral motor exercises     LYLE Zhou MD.HUSAM., PM&R     Attending    [address]

## 2021-09-20 PROCEDURE — 99232 SBSQ HOSP IP/OBS MODERATE 35: CPT | Performed by: INTERNAL MEDICINE

## 2021-09-20 PROCEDURE — 97129 THER IVNTJ 1ST 15 MIN: CPT

## 2021-09-20 PROCEDURE — 97032 APPL MODALITY 1+ESTIM EA 15: CPT

## 2021-09-20 PROCEDURE — 99232 SBSQ HOSP IP/OBS MODERATE 35: CPT | Performed by: PHYSICAL MEDICINE & REHABILITATION

## 2021-09-20 PROCEDURE — 97535 SELF CARE MNGMENT TRAINING: CPT

## 2021-09-20 PROCEDURE — 97116 GAIT TRAINING THERAPY: CPT

## 2021-09-20 PROCEDURE — 97530 THERAPEUTIC ACTIVITIES: CPT

## 2021-09-20 PROCEDURE — 97112 NEUROMUSCULAR REEDUCATION: CPT

## 2021-09-20 PROCEDURE — 6360000002 HC RX W HCPCS: Performed by: INTERNAL MEDICINE

## 2021-09-20 PROCEDURE — 92526 ORAL FUNCTION THERAPY: CPT

## 2021-09-20 PROCEDURE — 6370000000 HC RX 637 (ALT 250 FOR IP): Performed by: PHYSICAL MEDICINE & REHABILITATION

## 2021-09-20 PROCEDURE — 6370000000 HC RX 637 (ALT 250 FOR IP): Performed by: INTERNAL MEDICINE

## 2021-09-20 PROCEDURE — 1180000000 HC REHAB R&B

## 2021-09-20 RX ADMIN — LISINOPRIL 20 MG: 20 TABLET ORAL at 09:26

## 2021-09-20 RX ADMIN — Medication 100 MG: at 09:26

## 2021-09-20 RX ADMIN — ENOXAPARIN SODIUM 40 MG: 100 INJECTION SUBCUTANEOUS at 09:25

## 2021-09-20 RX ADMIN — Medication 2000 UNITS: at 16:43

## 2021-09-20 RX ADMIN — AMLODIPINE BESYLATE 10 MG: 10 TABLET ORAL at 09:26

## 2021-09-20 RX ADMIN — ASPIRIN 81 MG: 81 TABLET, CHEWABLE ORAL at 09:25

## 2021-09-20 RX ADMIN — LISINOPRIL 20 MG: 20 TABLET ORAL at 20:55

## 2021-09-20 RX ADMIN — ATORVASTATIN CALCIUM 80 MG: 80 TABLET, FILM COATED ORAL at 20:55

## 2021-09-20 RX ADMIN — METOPROLOL TARTRATE 25 MG: 25 TABLET, FILM COATED ORAL at 20:55

## 2021-09-20 RX ADMIN — METOPROLOL TARTRATE 25 MG: 25 TABLET, FILM COATED ORAL at 09:25

## 2021-09-20 ASSESSMENT — ENCOUNTER SYMPTOMS
COUGH: 0
WHEEZING: 0
STRIDOR: 0
RESPIRATORY NEGATIVE: 1
EYES NEGATIVE: 1
NAUSEA: 0
GASTROINTESTINAL NEGATIVE: 1
CHEST TIGHTNESS: 0
BLOOD IN STOOL: 0
SHORTNESS OF BREATH: 0

## 2021-09-20 ASSESSMENT — PAIN SCALES - GENERAL
PAINLEVEL_OUTOF10: 0

## 2021-09-20 NOTE — PROGRESS NOTES
[facility]  Facility/Department: [facility]  Speech Language Pathology   Treatment Note          Darrius Mcguire  1945  [id number]        Rehab Dx/Hx: Abnormality of gait following cerebrovascular accident (CVA) [N42.552, R26.9]  Abnormality of gait and mobility [R26.9]    Precautions: falls and aspirations    Medical Dx: Abnormality of gait following cerebrovascular accident (CVA) [K07.023, R26.9]  Abnormality of gait and mobility [R26.9]  Speech Dx: Dysphagia, Aphasia and Cognitive Impairment    Date: 9/20/2021    Subjective:  Alert and Cooperative        Interventions used this date:  Dysphagia Treatment and Estim/NMES    Objective/Assessment:  Patient progressing towards goals:    Short-term Goals  Timeframe for Short-term Goals: 1-2 weeks  Goal 1: Pt will complete oral motor ROM and strengthening exercises with 90% accuracy, given cues as needed, in order to strengthen lingual/labial/buccal musculature to promote safety and efficiency of oral phase of swallow and decrease risk for pocketing. Completed labial retraction exercise during estim with 70% acc with minimal retractions and moderate verbal cues for exercise  Goal 2: Pt will complete lingual exercises that promote anterior to posterior propulsion of bolus and improve tongue base retraction with 90% accuracy in order to strengthen the muscles of the swallow to decrease risk of aspiration and to increase ability to safely handle the least restrictive diet level. Pt completed lingual protrusion exercise with 3/8 acc after moderate verbal cues. Pt had decreased protrusion during exercise   Goal 3: Pt will complete pharyngeal strengthening exercises (such as the Blanca, Effortful swallow, etc) with 90% accuracy in order to strengthen and establish and more effective swallow. Completed effortful swallow exercise with estim and had 80% acc for timing of swallow with stimulation.    Goal 4: Pt will tolerate therapeutic trials of thin from cup using small sip with demonstrating no overt s/s of difficulty or aspiration on 100% trials  Goal 5: Pt will demonstrate swallow strategies for safe and efficient swallow of soft/nectar consistencies in all given opportunities  Goal 6: Pt will tolerate 30 minutes of LANIE to suprahyoidal musculature and right facial musculature to improve hylolaryngeal elevation/management of secretions/increase UES elongation and laryngeal exursion to reduce pyriform sinus retention and decrease the risk of penetration and aspiration during oral intake. Patient received LANIE to the suprahyoidal and right facial musculature x 16 minutes, 30 Hz, 50 microseconds, 5 sec on/25 sec off duty cycle with the intensity of 10 while performing pharyngeal and labial exercises. Compensatory Swallowing Strategies: Alternate solids and liquids, No straws, Upright as possible for all oral intake, Swallow 2 times per bite/sip, Eat/Feed slowly      Treatment/Activity Tolerance:  Patient tolerated treatment well    Plan:  Continue per POC    Pain Assessment:  Pre-Treatment  Pain assessment: 0-10  Pain level: 0  Intervention:  Patient denies pain. Post-Treatment  Pain assessment: 0-10  Pain level: 0  Intervention:  Patient denies pain. Patient/Caregiver Education:  Patient educated on session and progression towards goals. Education needs reinforcement.     Safety Devices:  Chair alarm in place      [id number] Alex Miller ([facility]):    SWALLOWING  Ratin      Therapy Time  SLP Individual Minutes  Time In: 1400  Time Out: 1430  Minutes: 30              Signature: Electronically signed by SIVA Wheeler on 2021 at 3:18 PM

## 2021-09-20 NOTE — PROGRESS NOTES
Occupational Therapy  Facility/Department: [facility]  Daily Treatment Note  NAME: Baljinder Mcgraw  : 1945  MRN: [id number]    Date of Service: 2021    Discharge Recommendations:  Continue to assess pending progress       Assessment      REQUIRES OT FOLLOW UP: Yes  Activity Tolerance  Activity Tolerance: Patient Tolerated treatment well  Safety Devices  Safety Devices in place: Yes  Type of devices: All fall risk precautions in place         Patient Diagnosis(es): There were no encounter diagnoses. has a past medical history of Cellulitis of left hand and Hypertension. has a past surgical history that includes back surgery. Restrictions  Restrictions/Precautions  Restrictions/Precautions: Fall Risk, Swallowing - Thickened Liquids  Subjective   General  Chart Reviewed: Yes  Patient assessed for rehabilitation services?: Yes  Response to previous treatment: Patient with no complaints from previous session  Family / Caregiver Present: No  Referring Practitioner: Dr. Monge Screen  Diagnosis: Impaired mobility, gait, and ADL's 2º inferior cerebellar stroke  Subjective  Subjective: \"It always helps. \" - a hot shower to make you feel better  Pain Assessment  Pain Assessment: 0-10  Pain Level: 0  Pre Treatment Pain Screening  Pain at present: 0  Scale Used: Numeric Score  Intervention List: Patient able to continue with treatment  Vital Signs  Patient Currently in Pain: Denies   Orientation  Orientation  Overall Orientation Status: Within Functional Limits  Objective    While seated at tabletop, pt created 5 amounts of money using bills, coins, and B UEs to improve problem solving during IADLs. Pt demonstrates difficulty initiating task and sequencing. Pt was provided with max verbal and tactile cues. Pt able to correctly identify how much a quarter was worth but unable to state how much a dime, nickel, and ed were worth.  Pt able to correctly select pennies when he needed 3 or 4 more cents for a given amount. Pt able to create all 5 amounts with significant increased time and effort. Pt demonstrates aphasia during task. Pt then sorted bills and coins with min difficulty and requires verbal cues to recognize errors but was able to correct independently. Pt completed shaving task using the R UE to prepare for speech therapy as he is planning to be receiving e-stim. Therapist began shaving pt and then had pt attempt to shave. Pt able to shave minimally on the L side of his face but demonstrates min difficulty and requires a total of max A to complete. ADL  Grooming: Maximum assistance (shaving)      While seated at tabletop, pt threaded wooden beads of various shapes and sizes onto shoelace using B UEs to improve fine motor coordination during self care. Pt demonstrates difficulty initiating task and requires verbal cues to initiate. Pt able to fill string with beads but demonstrates min difficulty to complete. Pt also began to assemble beads onto the end of string that had a knot and requires verbal cues to reorient string to the correct side. Pt tolerated task well. Plan   Plan  Times per week: 5-7x  Plan weeks: 2 weeks  Current Treatment Recommendations: Strengthening, Functional Mobility Training, Balance Training, Neuromuscular Re-education, Cognitive/Perceptual Training, Safety Education & Training, Patient/Caregiver Education & Training, Equipment Evaluation, Education, & procurement, Self-Care / ADL, Home Management Training  Plan Comment: continue with current POC  G-Code     OutComes Score                                                  AM-PAC Score             Goals  Long term goals  Time Frame for Long term goals :  Within 2 weeks, pt to demonstrate progress in the following areas listed below to achieve specific LTG's as stated in initial evaluation  Long term goal 1: Improve independence during ADLs/IADLs  Long term goal 2: Improve strength and endurance to perform functional

## 2021-09-20 NOTE — PROGRESS NOTES
Occupational Therapy  Facility/Department: [facility]  Daily Treatment Note  NAME: Nonnie Kocher  : 1945  MRN: [id number]    Date of Service: 2021    Discharge Recommendations:  Continue to assess pending progress       Assessment      Activity Tolerance  Activity Tolerance: Patient Tolerated treatment well  Safety Devices  Safety Devices in place: Yes  Type of devices: All fall risk precautions in place         Patient Diagnosis(es): There were no encounter diagnoses. has a past medical history of Cellulitis of left hand and Hypertension. has a past surgical history that includes back surgery. Restrictions  Restrictions/Precautions  Restrictions/Precautions: Fall Risk, Swallowing - Thickened Liquids  Subjective   General  Chart Reviewed: Yes  Patient assessed for rehabilitation services?: Yes  Response to previous treatment: Patient with no complaints from previous session  Family / Caregiver Present: No  Referring Practitioner: Dr. Ashlee Cohen  Diagnosis: Impaired mobility, gait, and ADL's 2º inferior cerebellar stroke  Subjective  Subjective: \"It always helps. \" - a hot shower to make you feel better  Pre Treatment Pain Screening  Pain at present: 0  Vital Signs  Patient Currently in Pain: Denies   Orientation     Objective    Pt completed card sorting task to promote IND with sequencing ADLs, pt with multiple errors sorting into suit, this JASKARAN graded task multiple times as pt was making multiple piles of cards in no particular order. Pt completed peg task to assess visual perception, pt was unable to follow simple pattern with heavy cuing for initiation, tech, locating items, attn to task and max difficulty overall with increased time for all. BUE strengthening to support ease with stands and transfers with 1# weight x30 reps all planes including shoulder, biceps, triceps, and freq cuing for proper tech, pacing, fair carryover.     STS completed to promote and support IND in showers, pant mgnt, mobility x5 reps 3 sets with facilitation for upright posture, forward gaze with Min A overall. Plan   Plan  Times per week: 5-7x  Plan weeks: 2 weeks  Current Treatment Recommendations: Strengthening, Functional Mobility Training, Balance Training, Neuromuscular Re-education, Cognitive/Perceptual Training, Safety Education & Training, Patient/Caregiver Education & Training, Equipment Evaluation, Education, & procurement, Self-Care / ADL, Home Management Training  Plan Comment: continue with current POC    Goals  Long term goals  Time Frame for Long term goals :  Within 2 weeks, pt to demonstrate progress in the following areas listed below to achieve specific LTG's as stated in initial evaluation  Long term goal 1: Improve independence during ADLs/IADLs  Long term goal 2: Improve strength and endurance to perform functional transfers  Long term goal 3: Improve fine motor coordination during self care  Long term goal 4: Improve cognition to demonstrate understanding of HEP  Long term goal 5: improve balance to safely perform functional mobility  Patient Goals   Patient goals : \"to get better\"       Therapy Time   Individual Concurrent Group Co-treatment   Time In 1100         Time Out 1200         Minutes 60              Therapeutic activities: 45 minutes  Visual Perceptual training: 15 minutes      JASKARAN Flaherty Electronically signed by JASKARAN Flaherty on 9/20/2021 at 11:58 AM

## 2021-09-20 NOTE — PROGRESS NOTES
3Physical Therapy Rehab Treatment Note  [facility]Department: [facility]  [facility]       NAME: Sylvester Oropeza  : 1945 (18 y.o.)  MRN: [id number]  CODE STATUS: Full Code    Date of Service: 2021       Restrictions:  Restrictions/Precautions: Fall Risk, Swallowing - Thickened Liquids (nectar thick)       SUBJECTIVE:            Pre and post Treatment Pain Screenin/10       OBJECTIVE:                    Bed mobility  Bridging: Independent  Rolling to Left: Independent  Rolling to Right: Independent  Supine to Sit: Modified independent (with rail)  Sit to Supine: Modified independent (no rail)    Transfers  Sit to Stand: Stand by assistance;Contact guard assistance (mild post LOB with intermittent assist for correction required)  Stand to sit: Stand by assistance;Contact guard assistance  Bed to Chair: Stand by assistance;Contact guard assistance (poor apporach to surfacew with cueing and occasionally contact required for performance safely)  Car Transfer: Minimal Assistance    Ambulation  Ambulation?: Yes  Ambulation 1  Surface: carpet  Device: Rolling Walker  Assistance: Stand by assistance;Contact guard assistance  Quality of Gait: frequent cues for technqiue and quality improvement, wide ISAIAH with short RLE step length, poor scanning of environment and ability to maneuver without cueing, flexed trunk with ability to attain upright with cues only  Distance: 150 ft; 75 feet; 50 feet    Stairs/Curb  Stairs?: Yes  Stairs  # Steps : 4  Stairs Height: 6\"  Rails: Bilateral  Assistance: Minimal assistance         Activity Tolerance  Activity Tolerance: Patient Tolerated treatment well          ASSESSMENT/PROGRESS TOWARDS GOALS:  Assessment: Pt demonstrates improved overall gait quality however with fatigue quality does degrade. He demonstrates poor carry over of instructions and difficulty with motor planning.  Pt reports he is concerned about returning to home alone and \"putting everything together so as to not get stuck anywhere\".  CM notified    Goals:  Long term goals  Long term goal 1: Bed mobility with indep  Long term goal 2: sit to stand and bed transfers SBA with cues for wafety 50 % of trials  Long term goal 3: Amb 150ft with 2ww  feet  Long term goal 4: CGA 4 stairs with rails  Long term goal 5: [place] balance testing at 25/56 or greater    PLAN OF CARE/Safety:   Safety Devices  Type of devices: Left in chair;Chair alarm in place;Call light within reach      Therapy Time:   Individual   Time In 0955   Time Out 1025   Minutes 30     Minutes:      Transfer/Bed mobility training:15      Gait training:15        Alayna Swanson PT, 09/20/21 at 11:54 AM
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
## 2021-09-20 NOTE — PROGRESS NOTES
INDIVIDUALIZED OVERALL REHAB PLAN OF CARE  ADDENDUM TO REHAB PROGRESS NOTE-for audit purposes must also refer to this day's clinical note and combine the information      Date: 2021  Patient Name: Kate Griffin   Room: R061/H064-26    MRN: [id number]    : 1945  (77 y.o.)  Gender: male       Today 2021 during weekly team meeting, I reviewed the patient Kate Griffin in detail with the therapists and nurses involved in patient's care gathering complex physiatric data regarding current medical issues, progress in therapies, factors limiting progress, social issues, psychological issues, ongoing therapeutic plans and discharge planning. Legend:  I= independent Im =Modified independent  S=Supervised SB=stand by MCKEON=set up CG=contact kristen Min= minimal Mod=Moderate Max=maximal Max of 2 =maximal assist of 2 people      CURRENT FUNCTIONAL STATUS:    NURSING ISSUES:     Severe Aphasia. Continent. LBM 21. Cooperative. Nursing will continue to focus on bowel and bladder continence transitioning toward independence by time of discharge. Monitoring post void residuals monitoring for severe constipation and bowel obstruction. Focus on achieving ADL goals with co-treating with OT when possible. PHYSICAL THERAPY  Bed mobility:  Supine to Sit: Modified independent (21 1456)  Sit to Supine: Modified independent (21 1456)  Transfers:  Sit to Stand: Stand by assistance (21 1455)  Bed to Chair: Stand by assistance (21 1455)  Gait:   Device: Rolling Walker (21 1454)  Assistance: Contact guard assistance (21 1454)  Distance: 75' (21 1454)  Quality of Gait: WBOS decreased foot clearance absent heel strike often bumps objects on R side requires cues for walker management (21 1454)  Comments: focus placed on quailty of gait and RLE foot clearance.  decreased RLE foot clearance which worsens with fatigue. (21 1027)  Stairs:  # Steps : 4 (21 1001)  Rails: Bilateral (09/20/21 1001)  Assistance: Minimal assistance (09/20/21 1001)  W/C mobility:         OCCUPATIONAL THERAPY  Hand Dominance: Right  ADL  Feeding: Setup; Thickened liquids (09/15/21 1209)  Grooming: Contact guard assistance (To complete hand hygiene while standing at sink) (09/17/21 1613)  UE Bathing: Stand by assistance;Verbal cueing; Increased time to complete (09/15/21 1209)  LE Bathing: Stand by assistance;Verbal cueing; Increased time to complete (09/15/21 1209)  UE Dressing: Supervision; Increased time to complete (09/15/21 1209)  LE Dressing: Minimal assistance;Verbal cueing; Increased time to complete (A to tie pants) (09/15/21 1209)  Toileting: Maximum assistance (For hygiene and pants up only.) (09/18/21 1222)  Additional Comments: Pt on toilet upon therapist arrival. (09/18/21 1222)  Toilet Transfers  Toilet - Technique: Stand pivot (09/18/21 1223)  Equipment Used: Grab bars (09/18/21 1223)  Toilet Transfer: Contact guard assistance (09/18/21 1223)  Toilet Transfers Comments: Toilet>w/c only (09/18/21 1223)     Shower Transfers  Shower - Transfer From: Druscilla Covert (09/15/21 1210)  Shower - Transfer Type: To and From (09/15/21 1210)  Shower - Transfer To: Shower seat with back (09/15/21 1210)  Shower - Technique: Ambulating (09/15/21 1210)  Shower Transfers: Minimal assistance;Verbal cues (09/15/21 1210)  Shower Transfers Comments: grab bars - assist to maneuver ww x 1 (09/15/21 1210)      SPEECH THERAPY  Motor Speech: Exceptions to [facility] (mild reduced articulation at sentence to conversation level)  Comprehension:  (Moderate auditory comprehension deficits continue to be noted with yes/no questions and muti step directions. Recommend repetition and simplified instructions)  Verbal Expression:  (Moderate verbal expression deficits continue to be noted in the areas of divergent/convergent naming, initiation, and confrontational naming.  Min to mod assist including word retrieval strategies improve clean-up assistance  CARE Score: 5  Discharge Goal: Independent, Oral Hygiene  Assistance Needed: Supervision or touching assistance  CARE Score: 4  Discharge Goal: Independent, [address]  Reason if not Attempted: Not applicable (pt did not need to go)  CARE Score: 9  Discharge Goal: Independent, Shower/Bathe Self  Assistance Needed: Supervision or touching assistance  CARE Score: 4  Discharge Goal: Independent  Upper Body Dressing  Reason if not Attempted: Not applicable (gown)  CARE Score: 9  Discharge Goal: Independent, Lower Body Dressing  Assistance Needed: Supervision or touching assistance  CARE Score: 4  Discharge Goal: Independent, Putting On/Taking Off Footwear  Assistance Needed: Supervision or touching assistance  CARE Score: 4  Discharge Goal: Independent, Toilet Transfer  Assistance Needed: Supervision or touching assistance  Reason if not Attempted: Not applicable (pt did not need to go)  CARE Score: 9  Discharge Goal: Independent  SP:Long-term Goals  Timeframe for Long-term Goals: 2-3 weeks  Goal 1: Pt will improve his/her Expressive Language abilities to a modfied I level for expression of complex wants, needs, feelings/ideas, and medical/safety information. Goal 2: Pt will improve her/his Receptive Language abilities to a modfied I level for comprehension of conversation and safety directions with familiar and unfamiliar communication partners. Long-term Goals  Timeframe for Long-term Goals: 2-3 weeks  Goal 1: Pt will tolerate least restrictive diet with no overt s/s of aspiration.            From a cognitive standpoint they will need:        24 hr supervision  --progress to occasional           Significant problems/ barriers to functional progress include: Pt is at a high risk for functional loss,    [x]  Acute infection/UTI    []  Low BP's     []  COPD flare-up   []  Uncontrolled blood sugar     []  Progressive anemia         []  Severe pain exacerbation     [x]  Impaired mental status []  Urinary incontinence    []  Bowel incontinence         Add E stim in SLPPlan to correct barriers to functional progress: Add scheduled rest breaks, control pain by using ice Lidoderm rest and massage as well as pain medications prior to therapy. Based on a comprehensive evaluation of the above, the individualized therapy and Discharge plan will be:    -Times stated are an average that will be varied based on the patient's daily need. PT  1 1/2  hrs/day 5-7 days per week           OT  1 1/2hrs per day 5-7 days per week ST ____1/2__hrs /day 3-5 days per week       Estimated LOS 2 week(s)    - Overall functional prognosis:     [x]  Good    []  Fair    []  Poor -Medical Prognosis:   [x]  Good    []  Fair    []  Poor    This patient was made aware of the discussion of Plan of Care, their projected dicharge date and their projected function at discharge.        Aram Rebollar DO

## 2021-09-20 NOTE — PROGRESS NOTES
Hospitalist Progress Note  9/20/2021 1:55 PM    Assessment and Plan:     1. Acute left centrum semiovale ischemic infarct: Neurology following. Continue ASA and high intensity statin  2. Dysphagia: s/p MBS on 9/13. Diet per ST recommendations. 3. Spinal canal stenosis with neurogenic claudication: MRI of the spine showed  severe canal stenosis at L2-3, L3-4 and L4-5. Evaluated by NSx during medical admit, conservative management for now. Outpatient f/u with NSx   4. HTN: On Amlodipine and lisinopril. Cardiology following. Metoprolol added to regimen. Trend Bps  5. NSVT: Episode of NSVT 9/13, asymptomatic. Cardiology following. BB added to regimen. 9/20: BB lowered d/t bradycardia. Continue to monitor. 6. Renal disease: Stable Cr, 0.98 on admit to rehab. Trend renal function. Avoid new nephrotoxic agents  7. R sided weakness, Gait instability and Decreased Functional Status 2/2 CVA: Fall precautions. PT OT to evaluate. Maximize nutrition status. Assessing if needs DME at home. SW on board. 8. Bowel Regimen and GI PPx: stool softners PRN ordered with hold parameters for loose stools or diarrhea. On antiacid  Diet: ADULT DIET; Dysphagia - Soft and Bite Sized; Mildly Thick ([place])  Adult Oral Nutrition Supplement; Frozen Oral Supplement  9. Adult Oral Nutrition Supplement; Fortified Pudding Oral Supplement  10. Advance Directive: Full Code   11. Nutrition status: Supplemental Vitamins ordered. Dietitian assessment  12. DVT prophylaxis: Chemical prophylaxis SQ enoxaparin  13. Discharge planning: [place] on board. 14. High Risk Readmission Screening Tool Score Noted.      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 *      ** Total time spent reviewing medical records, evaluating patient, speaking with RN's and consultants where I was focused exclusively on this patient: 35 minutes. This time is excluding time spent performing procedures or significant events occurring earlier or later in the day requiring my attention and focus. Subjective:   Admit Date: 9/13/2021  PCP: Aline Carrillo MD     No acute events overnight. participating with Therapy. Feels well. Walking well. On exam, denies any headache or dizziness. No chest pain, palpitations, shortness of breath. No abdominal pain or nausea. He does continue to have some residual right-sided weakness. Objective:     Vitals:    09/19/21 1901 09/20/21 0805 09/20/21 1011 09/20/21 1015   BP: (!) 143/85 129/68  129/68   Pulse: 73 69 67 67   Resp: 18 18  18   Temp: 97.8 °F (36.6 °C) 98.6 °F (37 °C)  98.6 °F (37 °C)   TempSrc: Oral   Oral   SpO2: 100% 100%     Weight:       Height:         General appearance: Patient is awake, alert, answering questions appropriately. Lungs: Clear to auscultation bilaterally, no rhonchi, rales, wheezes  Heart:  S1, S2 normal, RRR, murmur noted, no gallop or rub  Abdomen: Soft, nontender, nondistended, no guarding or rigidity, bowel sounds present in all 4 quadrants  Extremities:  no cyanosis, no edema bilat lower exts, no calf tenderness bilaterally.  Dry skin noted       Medications:      metoprolol tartrate  25 mg Oral BID    lisinopril  20 mg Oral BID    Vitamin D  2,000 Units Oral Dinner    cyanocobalamin  1,000 mcg IntraMUSCular Weekly    coenzyme Q10  100 mg Oral Daily    lidocaine  3 patch TransDERmal Daily    enoxaparin  40 mg SubCUTAneous Daily    amLODIPine  10 mg Oral Daily    aspirin  81 mg Oral Daily    atorvastatin  80 mg Oral Nightly LABS Reviewed    IMAGING Reviewed    HOWARD Stevens - CNP  Rounding Hospitalist

## 2021-09-20 NOTE — PROGRESS NOTES
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 Pain Screening  Patient Currently in Pain: Denies  Pre Treatment Pain Screening  Pain at present: 0  Scale Used: Numeric Score  Intervention List: Patient able to continue with treatment    Post Treatment Pain Screening:  Pain Assessment  Pain Assessment: 0-10  Pain Level: 0    OBJECTIVE:   Follows Commands: Within Functional Limits      Outcomes Measures:  [place] Balance Score: 22     Bed mobility  Rolling to Left: Independent  Rolling to Right: Independent  Supine to Sit: Modified independent  Sit to Supine: Modified independent  Scooting: Stand by assistance    Transfers  Sit to Stand: Stand by assistance  Stand to sit: Stand by assistance  Bed to Chair: Stand by assistance    Ambulation  Ambulation?: Yes  More Ambulation?: No  Ambulation 1  Surface: carpet  Device: Rolling Walker  Assistance: Contact guard assistance  Quality of Gait: WBOS decreased foot clearance absent heel strike often bumps objects on R side requires cues for walker management  Distance: 75'    Stairs/Curb  Stairs?: No  ASSESSMENT/PROGRESS TOWARDS GOALS:  Body structures, Functions, Activity limitations: Decreased functional mobility ; Decreased safe awareness;Decreased balance;Decreased strength;Decreased cognition;Decreased coordination  Assessment: Patient completed LAWTON balance test scoring 22/56 decreasing risk of falls.  Gait training limited by fatigue in p.m.     Goals:  Long term goals  Long term goal 1: Bed mobility with indep  Long term goal 2: sit to stand and bed transfers SBA with cues for wafety 50 % of trials  Long term goal 3: Amb 150ft with 2ww  feet  Long term goal 4: CGA 4 stairs with rails  Long term goal 5: Lawton balance testing at 25/56 or greater  Long term goal 6: SBA 4 stairs with rails    PLAN OF CARE/Safety:   Safety Devices  Type of devices: Left in chair;Chair alarm in place;Call light within reach      Therapy Time:   Individual   Time In 1430   Time Out 1500   Minutes 30     Minutes: 30      Gait training: 10      Neuro re education: Dianelys Dejesus PTA, 09/20/21 at 4:39 PM

## 2021-09-20 NOTE — PROGRESS NOTES
Progress Note  Patient: Maria R Padron  Unit/Bed: A648/P217-98  YOB: 1945  MRN: [id number]  Acct: [de-identified]   Admitting Diagnosis: Abnormality of gait following cerebrovascular accident (CVA) [X59.624, R26.9]  Abnormality of gait and mobility [R26.9]  Admit Date:  9/13/2021  Hospital Day: 7    Chief Complaint: NSVT HTN [place] Anatoliy    Histories:  Past Medical History:   Diagnosis Date    Cellulitis of left hand 3/26/2019    Hypertension      Past Surgical History:   Procedure Laterality Date    BACK SURGERY       History reviewed. No pertinent family history. Social History     Socioeconomic History    Marital status: Single     Spouse name: None    Number of children: None    Years of education: None    Highest education level: None   Occupational History    None   Tobacco Use    Smoking status: Never Smoker    Smokeless tobacco: Never Used   Vaping Use    Vaping Use: Never used   Substance and Sexual Activity    Alcohol use: Never     Alcohol/week: 0.0 standard drinks    Drug use: Never    Sexual activity: None   Other Topics Concern    None   Social History Narrative    Lives With: Alone, sister lives in the area    Type of Home: [address]: One level    Home Access: Stairs to enter with rails- Number of Steps: 3- Rails: Both    Bathroom Shower/Tub: Tub/Shower unit    Home Equipment: Cane, Rolling walker (rollator)    ADL Assistance: [address] [facility]: Independent    Homemaking Responsibilities: Yes    Ambulation Assistance: Independent (2ww)    Transfer Assistance: Independent    Active : No    He is a retired  for [address] [place] [address]. He learned how to be a  in [place] [employer] was stationed in [place] during the [employer] [employer]. Never went overseas. Never  no kids graduated [employer] school.          Social Determinants of Health     Financial Resource Strain:     Difficulty of Paying Living Expenses:    Food Insecurity:     Worried About Running Out of Food in the Last Year:     [address] in the Last Year:    Transportation Needs:     Lack of Transportation (Medical):  Lack of Transportation (Non-Medical):    Physical Activity:     Days of Exercise per Week:     Minutes of Exercise per Session:    Stress:     Feeling of Stress :    Social Connections:     Frequency of Communication with Friends and Family:     Frequency of Social Gatherings with Friends and Family:     Attends Baptism Services:     Active Member of Clubs or Organizations:     Attends Club or Organization Meetings:     Marital Status:    Intimate Partner Violence:     Fear of Current or Ex-Partner:     Emotionally Abused:     Physically Abused:     Sexually Abused:        Subjective/HPI no acute events overnight. participating with Therapy. No cp no sob. Feels well. Walking well    EKG: SB 72        Review of Systems:   Review of Systems   Constitutional: Negative. Negative for diaphoresis and fatigue. HENT: Negative. Eyes: Negative. Respiratory: Negative. Negative for cough, chest tightness, shortness of breath, wheezing and stridor. Cardiovascular: Negative. Negative for chest pain, palpitations and leg swelling. Gastrointestinal: Negative. Negative for blood in stool and nausea. Genitourinary: Negative. Musculoskeletal: Positive for gait problem. Skin: Negative. Neurological: Positive for weakness. Negative for dizziness, syncope and light-headedness. Hematological: Negative. Psychiatric/Behavioral: Negative. Physical Examination:    /68   Pulse 67   Temp 98.6 °F (37 °C) (Oral)   Resp 18   Ht 5' 8\" (1.727 m)   Wt 165 lb 3 oz (74.9 kg)   SpO2 100%   BMI 25.12 kg/m²    Physical Exam   Constitutional: He appears healthy. No distress. HENT:   Normal cephalic and Atraumatic   Eyes: Pupils are equal, round, and reactive to light.    Neck: Thyroid normal. No JVD present. No neck adenopathy. No thyromegaly present. Cardiovascular: Normal rate, regular rhythm, intact distal pulses and normal pulses. Murmur heard. Pulmonary/Chest: Effort normal and breath sounds normal. He has no wheezes. He has no rales. He exhibits no tenderness. Abdominal: Soft. Bowel sounds are normal. There is no abdominal tenderness. Musculoskeletal:         General: No tenderness or edema. Normal range of motion. Cervical back: Normal range of motion and neck supple. Neurological: He is alert and oriented to person, place, and time. Skin: Skin is warm. No cyanosis. Nails show no clubbing.        LABS:  CBC:   Lab Results   Component Value Date    WBC 5.4 09/13/2021    RBC 5.19 09/13/2021    HGB 16.6 09/13/2021    HCT 47.3 09/13/2021    MCV 91.1 09/13/2021    MCH 31.9 09/13/2021    MCHC 35.0 09/13/2021    RDW 13.0 09/13/2021     09/13/2021     CBC with Differential:    Lab Results   Component Value Date    WBC 5.4 09/13/2021    RBC 5.19 09/13/2021    HGB 16.6 09/13/2021    HCT 47.3 09/13/2021     09/13/2021    MCV 91.1 09/13/2021    MCH 31.9 09/13/2021    MCHC 35.0 09/13/2021    RDW 13.0 09/13/2021    LYMPHOPCT 29.9 09/13/2021    MONOPCT 10.8 09/13/2021    BASOPCT 0.7 09/13/2021    MONOSABS 0.6 09/13/2021    LYMPHSABS 1.6 09/13/2021    EOSABS 0.2 09/13/2021    BASOSABS 0.0 09/13/2021     CMP:    Lab Results   Component Value Date     09/14/2021    K 4.4 09/14/2021     09/14/2021    CO2 21 09/14/2021    BUN 35 09/14/2021    CREATININE 0.98 09/14/2021    GFRAA >60.0 09/14/2021    LABGLOM >60.0 09/14/2021    GLUCOSE 106 09/14/2021    PROT 7.2 09/10/2021    LABALBU 4.2 09/14/2021    CALCIUM 9.0 09/14/2021    BILITOT 1.0 09/10/2021    ALKPHOS 79 09/10/2021    AST 36 09/10/2021    ALT 52 09/10/2021     BMP:    Lab Results   Component Value Date     09/14/2021    K 4.4 09/14/2021     09/14/2021    CO2 21 09/14/2021    BUN 35 09/14/2021    LABALBU 4.2 09/14/2021    CREATININE 0.98 09/14/2021    CALCIUM 9.0 09/14/2021    GFRAA >60.0 09/14/2021    LABGLOM >60.0 09/14/2021    GLUCOSE 106 09/14/2021     Magnesium:    Lab Results   Component Value Date    MG 1.9 09/10/2021     Troponin:    Lab Results   Component Value Date    TROPONINI <0.010 09/10/2021        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 CVA - Rehab  2. Cervical and lumbar stenosis  3. NSVT-   Telemetry shows no further NSVT on BB but now [place]. Will lower BB to 25 bid. - stable now. continue low dose BB. 4. LVEF 60%  5. Mag and TSH WNL  6. HTN - stable   7.  HPL- resume Statin       Electronically signed by Sayda Mejia MD on 9/20/2021 at 11:15 AM

## 2021-09-20 NOTE — PLAN OF CARE
Problem: Falls - Risk of:  Goal: Will remain free from falls  Description: Will remain free from falls  Outcome: Ongoing  Goal: Absence of physical injury  Description: Absence of physical injury  Outcome: Ongoing     Problem: Mobility - Impaired:  Goal: Mobility will improve  Description: Mobility will improve  Outcome: Ongoing     Problem: IP COMMUNICATION/DYSARTHRIA  Goal: LTG - patient will improve expressive language skills to allow for communication of wants and needs in daily activities  Outcome: Ongoing     Problem: IP SWALLOWING  Goal: LTG - patient will tolerate the least restrictive diet consistency to allow for safe consumption of daily meals  Outcome: Ongoing     Problem: Skin Integrity:  Goal: Will show no infection signs and symptoms  Description: Will show no infection signs and symptoms  Outcome: Ongoing  Goal: Absence of new skin breakdown  Description: Absence of new skin breakdown  Outcome: Ongoing     Problem: IP BALANCE  Goal: LTG - patient will maintain standing balance to allow for completion of daily activities  Outcome: Ongoing     Problem: Nutrition  Goal: Optimal nutrition therapy  Outcome: Ongoing    Electronically signed by Neil Red RN on 9/20/2021 at 10:18 AM

## 2021-09-21 LAB
BACTERIA: ABNORMAL /HPF
BILIRUBIN URINE: NEGATIVE
BLOOD, URINE: ABNORMAL
CLARITY: ABNORMAL
COLOR: YELLOW
EKG ATRIAL RATE: 55 BPM
EKG P AXIS: 75 DEGREES
EKG P-R INTERVAL: 168 MS
EKG Q-T INTERVAL: 440 MS
EKG QRS DURATION: 92 MS
EKG QTC CALCULATION (BAZETT): 420 MS
EKG R AXIS: 69 DEGREES
EKG T AXIS: 66 DEGREES
EKG VENTRICULAR RATE: 55 BPM
EPITHELIAL CELLS, UA: ABNORMAL /HPF
GLUCOSE URINE: NEGATIVE MG/DL
KETONES, URINE: ABNORMAL MG/DL
LEUKOCYTE ESTERASE, URINE: ABNORMAL
MUCUS: PRESENT /LPF
NITRITE, URINE: NEGATIVE
PH UA: 5.5 (ref 5–9)
PROTEIN UA: ABNORMAL MG/DL
RBC UA: ABNORMAL /HPF (ref 0–2)
SPECIFIC GRAVITY UA: 1.02 (ref 1–1.03)
UROBILINOGEN, URINE: 0.2 E.U./DL
WBC UA: ABNORMAL /HPF (ref 0–5)

## 2021-09-21 PROCEDURE — 97110 THERAPEUTIC EXERCISES: CPT

## 2021-09-21 PROCEDURE — 99232 SBSQ HOSP IP/OBS MODERATE 35: CPT | Performed by: INTERNAL MEDICINE

## 2021-09-21 PROCEDURE — 6360000002 HC RX W HCPCS: Performed by: INTERNAL MEDICINE

## 2021-09-21 PROCEDURE — 6370000000 HC RX 637 (ALT 250 FOR IP): Performed by: INTERNAL MEDICINE

## 2021-09-21 PROCEDURE — 92507 TX SP LANG VOICE COMM INDIV: CPT

## 2021-09-21 PROCEDURE — 97116 GAIT TRAINING THERAPY: CPT

## 2021-09-21 PROCEDURE — 81001 URINALYSIS AUTO W/SCOPE: CPT

## 2021-09-21 PROCEDURE — 92526 ORAL FUNCTION THERAPY: CPT

## 2021-09-21 PROCEDURE — 99233 SBSQ HOSP IP/OBS HIGH 50: CPT | Performed by: PSYCHIATRY & NEUROLOGY

## 2021-09-21 PROCEDURE — 93010 ELECTROCARDIOGRAM REPORT: CPT | Performed by: INTERNAL MEDICINE

## 2021-09-21 PROCEDURE — 99232 SBSQ HOSP IP/OBS MODERATE 35: CPT | Performed by: PHYSICAL MEDICINE & REHABILITATION

## 2021-09-21 PROCEDURE — 97130 THER IVNTJ EA ADDL 15 MIN: CPT

## 2021-09-21 PROCEDURE — 97032 APPL MODALITY 1+ESTIM EA 15: CPT

## 2021-09-21 PROCEDURE — 97535 SELF CARE MNGMENT TRAINING: CPT

## 2021-09-21 PROCEDURE — 1180000000 HC REHAB R&B

## 2021-09-21 PROCEDURE — 6370000000 HC RX 637 (ALT 250 FOR IP): Performed by: PHYSICAL MEDICINE & REHABILITATION

## 2021-09-21 PROCEDURE — 97129 THER IVNTJ 1ST 15 MIN: CPT

## 2021-09-21 PROCEDURE — 6360000002 HC RX W HCPCS: Performed by: PHYSICAL MEDICINE & REHABILITATION

## 2021-09-21 PROCEDURE — 97530 THERAPEUTIC ACTIVITIES: CPT

## 2021-09-21 PROCEDURE — 87086 URINE CULTURE/COLONY COUNT: CPT

## 2021-09-21 PROCEDURE — APPSS45 APP SPLIT SHARED TIME 31-45 MINUTES: Performed by: STUDENT IN AN ORGANIZED HEALTH CARE EDUCATION/TRAINING PROGRAM

## 2021-09-21 RX ADMIN — LISINOPRIL 20 MG: 20 TABLET ORAL at 20:23

## 2021-09-21 RX ADMIN — METOPROLOL TARTRATE 25 MG: 25 TABLET, FILM COATED ORAL at 20:23

## 2021-09-21 RX ADMIN — ENOXAPARIN SODIUM 40 MG: 100 INJECTION SUBCUTANEOUS at 09:16

## 2021-09-21 RX ADMIN — ACETAMINOPHEN 650 MG: 325 TABLET ORAL at 20:22

## 2021-09-21 RX ADMIN — Medication 2000 UNITS: at 16:34

## 2021-09-21 RX ADMIN — METOPROLOL TARTRATE 25 MG: 25 TABLET, FILM COATED ORAL at 09:15

## 2021-09-21 RX ADMIN — CYANOCOBALAMIN 1000 MCG: 1000 INJECTION, SOLUTION INTRAMUSCULAR; SUBCUTANEOUS at 09:16

## 2021-09-21 RX ADMIN — ASPIRIN 81 MG: 81 TABLET, CHEWABLE ORAL at 09:15

## 2021-09-21 RX ADMIN — AMLODIPINE BESYLATE 10 MG: 10 TABLET ORAL at 09:15

## 2021-09-21 RX ADMIN — LISINOPRIL 20 MG: 20 TABLET ORAL at 09:15

## 2021-09-21 RX ADMIN — Medication 100 MG: at 09:15

## 2021-09-21 RX ADMIN — ATORVASTATIN CALCIUM 80 MG: 80 TABLET, FILM COATED ORAL at 20:23

## 2021-09-21 ASSESSMENT — ENCOUNTER SYMPTOMS
COUGH: 0
GASTROINTESTINAL NEGATIVE: 1
CHOKING: 0
RESPIRATORY NEGATIVE: 1
COLOR CHANGE: 0
EYES NEGATIVE: 1
VOMITING: 0
SHORTNESS OF BREATH: 0
TROUBLE SWALLOWING: 0
WHEEZING: 0
NAUSEA: 0
BLOOD IN STOOL: 0
STRIDOR: 0
CHEST TIGHTNESS: 0
BACK PAIN: 1

## 2021-09-21 ASSESSMENT — PAIN SCALES - GENERAL
PAINLEVEL_OUTOF10: 0
PAINLEVEL_OUTOF10: 1
PAINLEVEL_OUTOF10: 0

## 2021-09-21 NOTE — CARE COORDINATION
LSW spoke with Dank Bro (patient's sister) and discussed bringing in clothing. Kay plans to look through patient's belongings and will bring in clothing tomorrow. LSW scheduled family training for 9/22 at [place] [address] so that Dank Bro can observe patient's progress. LSW explained that projected discharge date is currently 9/28 and that team is recommending SNF placement. Dank Bro verbalized understanding and explained that she believes that will be hard to convince patient as he is \"stubborn\". LSW will meet with patient to discuss option again.  Electronically signed by DIONISIO Loredo, SANJAY on 9/21/2021 at 12:13 PM

## 2021-09-21 NOTE — PROGRESS NOTES
Occupational Therapy  Facility/Department: [facility]  Daily Treatment Note  NAME: Rafat Denton  : 1945  MRN: [id number]    Date of Service: 2021    Discharge Recommendations:  Continue to assess pending progress       Assessment      REQUIRES OT FOLLOW UP: Yes  Activity Tolerance  Activity Tolerance: Patient Tolerated treatment well  Safety Devices  Safety Devices in place: Yes  Type of devices: All fall risk precautions in place         Patient Diagnosis(es): There were no encounter diagnoses. has a past medical history of Cellulitis of left hand and Hypertension. has a past surgical history that includes back surgery. Restrictions  Restrictions/Precautions  Restrictions/Precautions: Fall Risk, Swallowing - Thickened Liquids  Subjective   General  Chart Reviewed: Yes  Patient assessed for rehabilitation services?: Yes  Response to previous treatment: Patient with no complaints from previous session  Family / Caregiver Present: No  Referring Practitioner: Dr. Suzanna Langston  Diagnosis: Impaired mobility, gait, and ADL's 2º inferior cerebellar stroke  Subjective  Subjective: \"It always helps. \" - a hot shower to make you feel better  Pain Assessment  Pain Assessment: 0-10  Pain Level: 0  Pre Treatment Pain Screening  Pain at present: 0  Scale Used: Numeric Score  Intervention List: Patient able to continue with treatment  Vital Signs  Patient Currently in Pain: Denies   Orientation  Orientation  Overall Orientation Status: Within Functional Limits  Objective    Upon OT arrival, pt reports the need to use the bathroom. Pt transferred into w/c at [facility] and was transported into the bathroom at [facility]. Pt attempted to void while seated and standing but was unable to go. Pt able to transfer onto toilet at [facility]. Pt completed pants management at the level below.  Therapist rransported pt to therapy department via wheelchair at total assist.  ADL  Toileting: Contact guard assistance (pt attempted to toilet seated then standing but was unsuccessful to void, able to pull down and pull up pants)        Balance  Standing Balance: Contact guard assistance  Functional Mobility  Functional - Mobility Device: Rolling Walker  Activity: Retrieve items  Assist Level: Minimal assistance  Functional Mobility Comments: Pt engaged in kitchen mobility task and retrieved plastic cones from cabinets, appliances, and drawers using a FWW and B UEs to improve independence and safety during IADLs. Pt demonstrates decreased safety awareness with use of WW and requires verbal cues to safely ambuate. Pt had no LOB but requires Min A for safety. Pt requires verbal cues to successfully locate all 9 cones. Pt able to reach outside of his ISAIAH but with decreased safety. Toilet Transfers  Toilet - Technique: Stand pivot  Equipment Used: Grab bars  Toilet Transfer: Contact guard assistance     Transfers  Sit to stand: Contact guard assistance  Stand to sit: Contact guard assistance      While seated at tabletop, pt threaded bolts through wooden washers and assembled with a nut using B UEs to improve sequencing during ADLs and IADLs. Pt worked at a slow pace and demonstrates min difficulty to sequence. Pt able to assemble total of 28 sets with increased time. Pt tolerated task well. While seated at tabletop, pt sorted small plastic pegs into peg board using B UEs to improve problem solving during ADLs and IADLs. Pt demonstrates min difficulty to differentiate between purple and blue but otherwise pt able to sort 50 pegs without errors. Pt worked at a steady pace with good attention to task. Pt removed all pegs and returned them to the container.         Plan   Plan  Times per week: 5-7x  Plan weeks: 2 weeks  Current Treatment Recommendations: Strengthening, Functional Mobility Training, Balance Training, Neuromuscular Re-education, Cognitive/Perceptual Training, Safety Education & Training, Patient/Caregiver Education & Training, Equipment Evaluation, Education, & procurement, Self-Care / ADL, Home Management Training  Plan Comment: continue with current POC  G-Code     OutComes Score                                                  AM-PAC Score             Goals  Long term goals  Time Frame for Long term goals :  Within 2 weeks, pt to demonstrate progress in the following areas listed below to achieve specific LTG's as stated in initial evaluation  Long term goal 1: Improve independence during ADLs/IADLs  Long term goal 2: Improve strength and endurance to perform functional transfers  Long term goal 3: Improve fine motor coordination during self care  Long term goal 4: Improve cognition to demonstrate understanding of HEP  Long term goal 5: improve balance to safely perform functional mobility  Patient Goals   Patient goals : \"to get better\"       Therapy Time   Individual Concurrent Group Co-treatment   Time In 1300         Time Out 1400         Minutes 60           ADL/IADL trainin minutes  Cognitive Retrainin minutes     Ludin Maradiaga OT   Electronically signed by Ludin Maradiaga OT on 2021 at 2:37 PM

## 2021-09-21 NOTE — PROGRESS NOTES
Neurology Follow up    SUBJECTIVE: Patient seen and examined for neurology follow-up for acute left ischemic centrum semiovale infarct. Patient with some mild right-sided weakness. Patient continues to participate in therapy. Tolerating Seroquel well and more cooperative. No new or worsening deficits. No new changes, weakness.     Current Facility-Administered Medications   Medication Dose Route Frequency Provider Last Rate Last Admin    QUEtiapine (SEROQUEL) tablet 25 mg  25 mg Oral BID PRN Ulisses Castillo MD        metoprolol tartrate (LOPRESSOR) tablet 25 mg  25 mg Oral BID Smith Rossi MD   25 mg at 09/21/21 0915    lisinopril (PRINIVIL;ZESTRIL) tablet 20 mg  20 mg Oral BID Smith Rossi MD   20 mg at 09/21/21 0915    Vitamin D (CHOLECALCIFEROL) tablet 2,000 Units  2,000 Units Oral Dinner Luana Scullin, DO   2,000 Units at 09/20/21 1643    cyanocobalamin injection 1,000 mcg  1,000 mcg IntraMUSCular Weekly Luana Scullin, DO   1,000 mcg at 09/21/21 8797    coenzyme Q10 capsule 100 mg  100 mg Oral Daily Luana Scullin, DO   100 mg at 09/21/21 0915    lidocaine 4 % external patch 3 patch  3 patch TransDERmal Daily Luana Scullin, DO        acetaminophen (TYLENOL) tablet 650 mg  650 mg Oral Q4H PRN Luana Scullin, DO        bisacodyl (DULCOLAX) suppository 10 mg  10 mg Rectal Daily PRN Trista Abbott DO        fleet rectal enema 1 enema  1 enema Rectal Daily PRN Luana Scullin, DO        acetaminophen (TYLENOL) tablet 650 mg  650 mg Oral Q6H PRN Nika Michele MD        Or    acetaminophen (TYLENOL) suppository 650 mg  650 mg Rectal Q6H PRN Nika Michele MD        enoxaparin (LOVENOX) injection 40 mg  40 mg SubCUTAneous Daily Nika Michele MD   40 mg at 09/21/21 0916    polyethylene glycol (GLYCOLAX) packet 17 g  17 g Oral Daily PRN Nika Michele MD        amLODIPine (NORVASC) tablet 10 mg  10 mg Oral Daily Nika Michele MD   10 mg at 09/21/21 0915    aspirin chewable tablet 81 mg  81 mg Oral Daily Duane Yanes MD   81 mg at 09/21/21 0915    atorvastatin (LIPITOR) tablet 80 mg  80 mg Oral Nightly Duane Yanes MD   80 mg at 09/20/21 2055       PHYSICAL EXAM:    /64   Pulse 69   Temp 97.5 °F (36.4 °C) (Oral)   Resp 17   Ht 5' 8\" (1.727 m)   Wt 165 lb 4.8 oz (75 kg)   SpO2 98%   BMI 25.13 kg/m²    General Appearance:      Skin:  normal  CVS - Normal sounds, No murmurs , No carotid Bruits  RS -CTA  Abdomen Soft, BS present  Review of Systems   Constitutional: Negative for appetite change and fever. HENT: Negative for ear pain and trouble swallowing. Eyes: Negative for visual disturbance. Respiratory: Negative for choking and shortness of breath. Cardiovascular: Negative for chest pain and palpitations. Gastrointestinal: Negative for nausea and vomiting. Musculoskeletal: Positive for back pain. Negative for gait problem, joint swelling, myalgias, neck pain and neck stiffness. Skin: Negative for color change. Neurological: Positive for weakness. Negative for dizziness, tremors, seizures, syncope, facial asymmetry, speech difficulty, light-headedness, numbness and headaches. Psychiatric/Behavioral: Negative for behavioral problems, confusion, hallucinations and sleep disturbance.       Mental Status Exam:             Level of Alertness:   awake            Orientation:   person, place, time            Memory:   normal                    Language:  normal      Funduscopic Exam:     Cranial Nerves            Cranial nerve III           Pupils:  equal, round, reactive to light      Cranial nerves III, IV, VI           Extraocular Movements: intact      Cranial nerve V           Facial sensation:  intact      Cranial nerve VII           Facial strength: intact      Cranial nerve VIII           Hearing:  intact      Cranial nerve IX           Palate:  intact      Cranial nerve XI         Shoulder shrug:  intact      Cranial nerve XII          Tongue movement: normal    Motor: Right upper extremity weakness of 4 /5 with a drift  Drift:  Motor exam   Tone:  normal  Abnormal Movements:  absent            Sensory: No definite findings        Pinprick             Right Upper Extremity:  normal             Left Upper Extremity:  normal             Right Lower Extremity:  normal             Left Lower Extremity:  normal           Vibration                         Touch            Proprioception                 Coordination:           Finger/Nose   Right:  abnormal              Left:  normal          Heel-Knee-Shin                Right:  normal              Left:  normal          Rapid Alternating Movements              Right:  normal              Left:  normal          Gait:                       Casual:  normal                         Romberg:  normal            Reflexes:             Deep Tendon Reflexes:             Reflexes are 2 + including ankle reflexes. Plantar response:                Right:  downgoing               Left:  downgoing    Vascular:  Cardiac Exam:  normal         Echocardiogram complete 2D with doppler with color    Result Date: 9/11/2021  Transthoracic Echocardiography Report (TTE)  Demographics   Patient Name    Sin Peters Gender               Male   Patient Number  [id number]       Race                 Unknown                                  Ethnicity   Visit Number    [id number]      Room Number          K257   Corporate ID                   Date of Study        09/11/2021   Accession       [id number]     Referring Physician  Number   Date of Birth   1945     Sonographer          Paolo Johnson   Age             77 year(s)     Interpreting         [facility])                                 Physician            Cardiology                                                      [address] [place] [address]  Procedure Type of Study   TTE procedure:ECHOCARDIOGRAM COMPLETE WITH BUBBLE STUDY.   Procedure Date Date: 09/11/2021 Start: 08:15 AM Study Location: Portable Technical Quality: Adequate visualization Indications:Stroke. Patient Status: Routine Height: 68 inches Weight: 179 pounds BSA: 1.95 m^2 BMI: 27.22 kg/m^2 BP: 147/86 mmHg  Conclusions   Summary  Moderate annular calcification. Normal aortic valve structure and function. Trivial aortic regurgitation is  noted. Moderately dilated left atrium. Normal intact intra-atrial septum was noted with no evidence of  significant intra-atrial communications neither by colour flow Doppler  imaging nor by aggitated saline study. Left ventricular ejection fraction is visually estimated at 60%. Impaired relaxation compatible with diastolic dysfunction. ( reversed E/A  ratio)  Moderate concentric left ventricular hypertrophy. Signature   ----------------------------------------------------------------  Electronically signed by Khurram Medina(Interpreting physician)  on 09/11/2021 11:17 AM  ----------------------------------------------------------------   Findings  Left Ventricle Left ventricular ejection fraction is visually estimated at 60%. Impaired relaxation compatible with diastolic dysfunction. ( reversed E/A ratio) Moderate concentric left ventricular hypertrophy. Right Ventricle Normal right ventricle structure and function. Normal right ventricle systolic pressure. Left Atrium Moderately dilated left atrium. Normal intact intra-atrial septum was noted with no evidence of significant intra-atrial communications neither by colour flow Doppler imaging nor by aggitated saline study. Right Atrium Normal right atrium. Mitral Valve Moderate annular calcification. Tricuspid Valve Normal tricuspid valve structure and function. Aortic Valve Normal aortic valve structure and function. Trivial aortic regurgitation is noted. Pulmonic Valve Normal pulmonic valve structure and function. Pericardial Effusion No evidence of pericardial effusion. Pleural Effusion No evidence of pleural effusion.  Aorta \ Miscellaneous Miscellaneous Dimension: 4.6 cm          Systolic Dimension: 5.47 cm  Septum Diastolic: 1.14 cm            Septum Systolic: 0.71 cm  PW Diastolic: 7.63 cm                                       FS: 34.6 %  LV EDV/LV EDV Index: 97.45 ml/50 m^2 LV ESV/LV ESV Index: 35.36 ml/18 m^2  EF Calculated: 63.7 %                LV Length: 7.34 cm   LVOT Diameter: 1.72 cm   Right Atrium   RA Systolic Pressure: 10 mmHg   Right Ventricle   Diastolic Dimension: 5.68 cm                                     RV Systolic Pressure: 55.2 mmHg  Aorta/ Miscellaneous Aorta   Aortic Root: 3.04 cm  LVOT Diameter: 1.72 cm      CT HEAD WO CONTRAST    Result Date: 9/10/2021  EXAMINATION: CT HEAD WO CONTRAST CLINICAL HISTORY: generalized weakness COMPARISON:  NONE AVAILABLE An unenhanced scan is performed. FINDINGS:   There is no bleed, mass effect, or space occupying lesion. No extra-axial mass or fluid collections. Hypoattenuated White matter changes in the cerebral hemispheres noted. There is global atrophy. Findings likely reflect chronic small vessel vasculopathy. There is a vague area of low attenuation in the inferior posterior right cerebellar hemisphere with slight obliteration of adjacent sulci. An evolving nonhemorrhagic infarct in the cerebellum would have to be a consideration. Clinical correlation recommended. NO BLEED, MASS EFFECT, OR EXTRA-AXIAL FLUID COLLECTION. EVOLVING NONHEMORRHAGIC INFARCT IN THE INFERIOR RIGHT CEREBELLAR HEMISPHERE. 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 All CT scans at this facility use dose modulation, iterative reconstruction, and/or weight based dosing when appropriate to reduce radiation dose to as low as reasonably achievable. XR CHEST PORTABLE    Result Date: 9/10/2021  Exam: XR CHEST PORTABLE History: Generalized weakness Technique: AP portable view of the chest obtained. Comparison: None available Findings: Atherosclerotic calcification of the thoracic aorta.  The cardiomediastinal silhouette is within normal limits. No pneumothorax, pleural effusion, or consolidation. Question calcified pleural plaques on the left, which can be seen with asbestos related disease. No acute osseous abnormality. No radiographic evidence of acute intrathoracic process. Question left-sided pleural plaques which can be seen with asbestos related disease. MRI BRAIN WO CONTRAST    Result Date: 9/11/2021  EXAMINATION: MRI BRAIN WO CONTRAST CLINICAL HISTORY:  STROKE COMPARISONS: MR brain 4/5/2016 TECHNIQUE: Multiplanar multisequence images of the brain were obtained without contrast. Diffusion perfusion imaging was obtained. FINDINGS: There is a tiny focus of restricted diffusion in the left centrum semiovale (series 4 image 19). There are no extra-axial collections. There is no evidence of hemorrhage. The susceptibility images do not demonstrate evidence of hemosiderin deposition within the brain parenchyma or the leptomeninges. There are numerous patchy foci of T2/FLAIR hyperintensities in the subcortical and periventricular white matter in a symmetric distribution throughout both hemispheres. Chronic bilateral cerebellar infarcts. There is prominence of the sulci and ventricles consistent with moderate global cerebral atrophy and chronic involutional changes. No midline shift. The visualized portions of the orbits are within normal limits, the globes are intact. The visualized portions of the paranasal sinuses are within normal limits. Right mastoid effusion. Small foci of restricted diffusion in the left centrum semiovale, consistent with acute ischemia. Chronic microvascular changes and bilateral chronic cerebellar infarcts. CRITICAL RESULTS: Communicated with Scotty Michael RN on 9/11/2021 at 12:51 PM.    US CAROTID ARTERY BILATERAL    Result Date: 9/11/2021  EXAMINATION: US CAROTID ARTERY BILATERAL HISTORY:   stroke .  R26.9 Gait abnormality ICD10 COMPARISON:None TECHNIQUE: Carotid duplex sonograms which include gray scale and color flow evaluation are complimented with spectral waveform analysis. Please refer to chart below for specific carotid velocity measurements. The degree of stenosis recorded on this exam uses the same method of stratification used in the NASCET trials. This complies with ACR practice guidelines and the Society of radiology in ultrasound consensus statement and provides adequate information for clinical decision making. Society of Radiologists in Ultrasound (SRU) consensus statement was used to estimate internal carotid artery stenosis. RESULT: Mild to moderate plaque in the carotid arteries bilaterally. No significant increase in systolic flow or turbulence to indicate any hemodynamically significant narrowing in the right or left internal carotid arteries. There is antegrade flow identified in both vertebral arteries. ARTERIAL BLOOD FLOW VELOCITY RIGHT PEAK SYSTOLIC VELOCITIES (PSV)                                               Prox CCA    129 cm/s             Mid CCA     114 cm/s              Dist CCA    104 cm/s             Prox ICA    169 cm/s               Mid ICA     106 cm/s            Dist ICA    63 cm/s             Prox ECA     170   cm/s             Prox VERT   51 cm/s              ICA/CCA     1.48                        LEFT PEAK SYSTOLIC VELOCITIES (PSV)  Prox CCA    135 cm/s Mid CCA     166 cm/s Dist CCA    121 cm/s Prox ICA    119 cm/s Mid ICA     72 cm/s Dist ICA    61 cm/s Prox ECA    131 cm/s Prox VERT   56 cm/s ICA/CCA     0.72      50-69 % STENOSIS IN THE RIGHT ICA  <50 % STENOSIS IN THE LEFT ICA ANTEGRADE FLOW IN THE BILATERAL VERTEBRAL ARTERIES. No results for input(s): WBC, HGB, PLT in the last 72 hours. No results for input(s): NA, K, CL, CO2, BUN, CREATININE, GLUCOSE in the last 72 hours. No results for input(s): BILITOT, ALKPHOS, AST, ALT in the last 72 hours.   Lab Results   Component Value Date    PROTIME 10.3 03/26/2019    INR 1.0 03/26/2019     No results found for: LITHIUM, DILFRTOT, VALPROATE    ASSESSMENT AND PLAN  Left centrum semiovale acute ischemic infarct  Carotid duplex with 50 to 69% stenosis in the right internal carotid artery and less than 50% in the left internal carotid artery  hemoGlobin A1c 5.3  Total cholesterol 145, triglycerides 67, HDL 38,LDL 94  Echocardiogram with ejection fraction of 60% with negative bubble study  Continue aspirin and high intensity statin  Dysphasia- nectar thick liquids  MRI of the cervical, thoracic and lumbar spine completed with multiple areas of severe spinal canal stenosis in the cervical spine. Neurosurgery has been consulted with plans for follow-up once medically stable after stroke for possible surgical intervention. Patient able to ambulate with assistance of walker. Circumduction and right lower extremity noted. Patient has weakness in flexion of right hip and dorsiflexion of right foot and unclear at this is related to left central semiovale infarct versus severe lumbar stenosis at L2-L5. Patient also has gait ataxia which could be due to severe cervical canal stenosis and old cerebellar infarcts. Azotemia which is new noted today. Patient overall improving in strength. Continue on aspirin and high intensity statin. Continue PT/OT/SLP. Azotemia improving. Continues to participate in physical therapy be. Has had some behavioral issues and agitation throughout the day and has been started on Seroquel. Overall improving in strength. Continue PT/OT/SLP. nonfocal, gait ataxia, encephalopathy    Azotemia continues to improve. Tolerating Seroquel well and cooperative. Overall, doing well. Continue PT/OT/SLP    I have personally performed a face to face diagnostic evaluation on this patient, reviewed all data and investigations, and am the sole provider of all clinical decisions on the neurological status of this patient. no new changes, azotemia improving      Jordan Kaba MD, [place] Oliva, American Board of Psychiatry & Neurology  Board Certified in Vascular Neurology  Board Certified in Neuromuscular Medicine  Certified in Neurorehabilitation

## 2021-09-21 NOTE — PROGRESS NOTES
Occupational Therapy  Occupational Therapy  Facility/Department: [facility]    Date: 2021  Patient Name: Nonnie Kocher        MRN: [id number]  Account: [de-identified]   : 1945  (77 y.o.)      Pt. Current Self Care Status:    ADL  Feeding: Setup, Thickened liquids  Grooming: Maximum assistance (shaving)  UE Bathing: Stand by assistance, Verbal cueing, Increased time to complete  LE Bathing: Stand by assistance, Verbal cueing, Increased time to complete  UE Dressing: Supervision, Increased time to complete  LE Dressing: Minimal assistance, Verbal cueing, Increased time to complete (A to tie pants)  Toileting: Maximum assistance (For hygiene and pants up only.)  Additional Comments: Pt on toilet upon therapist arrival.  Toilet Transfers  Toilet - Technique: Stand pivot  Equipment Used: Grab bars  Toilet Transfer: Contact guard assistance  Toilet Transfers Comments: Toilet>w/c only     Shower Transfers  Shower - Transfer From: Julissa Yang - Transfer Type: To and From  Shower - Transfer To: Shower seat with back  Shower - Technique: Ambulating  Shower Transfers: Minimal assistance, Verbal cues  Shower Transfers Comments: grab bars - assist to maneuver ww x 1    Pt's LTG's downgraded secondary to pt demonstrates decreased cognition including difficulty sequencing, problem solving, and initiating tasks. Pt would not be safe to go home alone at this time. Feeding:  Mod I  Grooming: Supervision  Bathing: Supervision  UE Dressing: Supervision  LE Dressing: Supervision  Toileting: Supervision  Toilet Transfers: Supervision  Tub Transfers: Supervision    Electronically signed by:    Deon Quiles OT, OTR/L  2021, 9:38 AM

## 2021-09-21 NOTE — PROGRESS NOTES
Occupational Therapy  Facility/Department: [facility]  Daily Treatment Note  NAME: Hubert Tabor  : 1945  MRN: [id number]    Date of Service: 2021    Discharge Recommendations:  Continue to assess pending progress       Assessment      Activity Tolerance  Activity Tolerance: Patient Tolerated treatment well  Safety Devices  Safety Devices in place: Yes  Type of devices: All fall risk precautions in place         Patient Diagnosis(es): There were no encounter diagnoses. has a past medical history of Cellulitis of left hand and Hypertension. has a past surgical history that includes back surgery. Restrictions  Restrictions/Precautions  Restrictions/Precautions: Fall Risk, Swallowing - Thickened Liquids  Subjective   General  Chart Reviewed: Yes  Patient assessed for rehabilitation services?: Yes  Response to previous treatment: Patient with no complaints from previous session  Family / Caregiver Present: No  Referring Practitioner: Dr. Niyah Rodríguez  Diagnosis: Impaired mobility, gait, and ADL's 2º inferior cerebellar stroke  Subjective  Subjective: \"It always helps. \" - a hot shower to make you feel better  Vital Signs  Patient Currently in Pain: Denies   Orientation     Objective      Pt completed FM task including min resistance theraputty with focus on hand strengthening for IND with dressing and feeding, use of various grasping patterns including fine pincer with baljeet hands, placing beads in putty and removing, in hand manipulation and good carryover overall. Pt engaged in standing activity to support IND in stands, transfers, mobility, ADLs including oral care, IADLs and kitchen tasks. Pt tolerated 5+ minutes sustained stand with CGA provided for safety, SBA to ascend into standing. Pt engaging in FM task placing small pegs in peg board with no particular order and UE support table top and multiple trials.     Functional transfers WC<>EOM to complete mat work and promote core engagement and

## 2021-09-21 NOTE — FLOWSHEET NOTE
Pt returned from therapy. Up to chair for lunch. Alert and cooperative. Took AM meds without problem. VSS. Mild right sided weakness noted.

## 2021-09-21 NOTE — PROGRESS NOTES
[facility]    Acute  Rehabilitation  MUSIC THERAPY      Date:  9/21/2021        Patient Name: Rafat Denton       MRN: [id number]        YOB: 1945 (72 y.o.)       Gender: male  Diagnosis: Impaired mobility, gait, and ADL's 2º inferior cerebellar stroke  Referring Practitioner: Dr. Suzanna Langston    RESTRICTIONS/PRECAUTIONS:  Restrictions/Precautions: Fall Risk, Swallowing - Thickened Liquids  Vision: Impaired  Hearing: Within functional limits  Hearing Exceptions: Hard of hearing/hearing concerns    Subjective/Observation:   Patient declined participating in individual music therapy session at 4:33pm stating he is just not interested. Assessment/Plan:      [] Patient would like to have music therapy, just not today. [facility] will try to see pt again, if time allows. [] Patient would like to have music therapy another time, however their D/C is before [facility] will be back on unit. *If patient is still on rehab unit, or comes back to rehab unit, [facility] will attempt to see patient then if time allows. [x] Patient does not want music therapy. [facility] will not attempt to see pt again unless pt specifically requests.        JAZMYNE Beaver    9/21/2021  Electronically signed by Ese Cueto on 9/21/2021 at 4:46 PM
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
## 2021-09-21 NOTE — PROGRESS NOTES
Physical Therapy Rehab Treatment Note  [facility]/Department[facility]  [facility]       NAME: Baljinder Mcgraw  : 1945 (59 y.o.)  MRN: [id number]  CODE STATUS: Full Code    Date of Service: 2021  General Comment  Comments: \"you can call me whatever you want\"    Restrictions:          SUBJECTIVE: Subjective: \"i'm okay\"  Pain Screening  Patient Currently in Pain: Denies       Post Treatment Pain Screenin  Pain Assessment  Pain Assessment: 0-10    OBJECTIVE:   Overall Orientation Status: Within Functional Limits           Neuromuscular Education  Neuromuscular Comments: static standing at ww. pt with increased wb through upper extremities. no lob. fatigues easily. Transfers  Sit to Stand: Stand by assistance  Stand to sit: Stand by assistance;Contact guard assistance    Ambulation  Ambulation?: Yes  Ambulation 1  Surface: carpet  Device: Rolling Walker  Assistance: Contact guard assistance;Minimal assistance  Quality of Gait: shuffling pattern, increased external rotation on right with decreased heel strike  Gait Deviations: Slow Princess; Shuffles;Decreased step length;Decreased step height;Decreased head and trunk rotation  Distance: 60 feet x 2 with numerous changes in direction  Comments: right foot clearance decreases with fatigue. pt with decreased safety awareness with reverse to chair    Stairs/Curb  Stairs?: No              Exercises  Physio/Swiss Ball: longseated position. lateral motions and knee flexion  Manual therapy  Other: gastroc stretches bilaterally to decreased tightness and rigidity.      ASSESSMENT/PROGRESS TOWARDS GOALS: progressing daily       Goals:  Long term goals  Long term goal 1: Bed mobility with indep  Long term goal 2: sit to stand and bed transfers SBA with cues for wafety 50 % of trials  Long term goal 3: Amb 150ft with 2ww  feet  Long term goal 4: CGA 4 stairs with rails  Long term goal 5: [place] balance testing at 25/56 or greater  Long term goal 6: SBA 4 stairs with rails    PLAN OF CARE/Safety:   Plan Comment: Cont.  POC      Therapy Time:   Individual   Time In 1430   Time Out 1500   Minutes 30     Minutes:30      Transfer/Bed mobility trainin      Gait training:10      Neuro re education:5     Therapeutic ex:10      Clive Watkins PTA, 21 at 2:51 PM

## 2021-09-21 NOTE — PROGRESS NOTES
Physical Therapy  Physical Therapy Rehab Treatment Note  [facility]/Department[facility]  [facility]       NAME: Itz Reza  : [birth year] (16 y.o.)  MRN: [id number]  CODE STATUS: Full Code    Date of Service: 2021  Chart Reviewed: Yes  Family / Caregiver Present: No    Restrictions:  Restrictions/Precautions: Fall Risk, Swallowing - Thickened Liquids       SUBJECTIVE: Subjective: \"I'm alright. \"  Pain Screening  Patient Currently in Pain: Denies  Pre Treatment Pain Screening  Pain at present: 0  Intervention List: Patient able to continue with treatment    Post Treatment Pain Screening:  Pain Assessment  Pain Level: 0    OBJECTIVE:     Neuromuscular Education  Neuromuscular Comments: dynamic stepping drills with 4\" box with targets and alternating BLE to improve motor planning and step height. Pt needing increased time to process commands. [employer] utilized, no LOB. Fwd stepping over objects of different heights with [employer], vc's needed for sequencing    Transfers  Sit to Stand: Stand by assistance  Stand to sit: Stand by assistance  Comment: Mild instability with STS. Decreased safety with approach to chair, max cues needed for [employer] management and sequencing BLE    Ambulation  Ambulation?: Yes  More Ambulation?: No  Ambulation 1  Surface: carpet  Device: Rolling Walker  Assistance: Contact guard assistance  Quality of Gait: B toe out, decreased foot clearance with absent heel strike, lateral sway  Gait Deviations: Slow Princess; Shuffles;Decreased step length;Decreased step height;Decreased head and trunk rotation  Distance: 45' x 2    Exercises  Hip Flexion: x15  Hip Abduction: x15 side kicks  Knee Long Arc Quad: x15     ASSESSMENT/PROGRESS TOWARDS GOALS:  Assessment: Increased time needed during fwd stepping activities d/t increased time needed for motor planning and cues for sequencing.  Multiple trsfs performed during tx to improve technique secondary to decreased safety with approach to chair requiring multiple cues for sequencing. Goals:  Long term goals  Long term goal 1: Bed mobility with indep  Long term goal 2: sit to stand and bed transfers SBA with cues for wafety 50 % of trials  Long term goal 3: Amb 150ft with 2ww  feet  Long term goal 4: CGA 4 stairs with rails  Long term goal 5: [place] balance testing at 25/56 or greater  Long term goal 6: SBA 4 stairs with rails    PLAN OF CARE/Safety:   Plan Comment: Cont.  POC      Therapy Time:   Individual   Time In 1100   Time Out 1130   Minutes 30     Minutes:      Transfer/Bed mobility training: 10      Gait trainin      Neuro re education: 7     Therapeutic ex: 189 Sandra Leone, PTA, 21 at 12:33 PM

## 2021-09-21 NOTE — PROGRESS NOTES
Progress Note  Patient: Lyssa Velez  Unit/Bed: X765/U115-17  YOB: 1945  MRN: [id number]  Acct: [de-identified]   Admitting Diagnosis: Abnormality of gait following cerebrovascular accident (CVA) [F45.292, R26.9]  Abnormality of gait and mobility [R26.9]  Admit Date:  9/13/2021  Hospital Day: 8    Chief Complaint: NSVT HTN [place]    Histories:  Past Medical History:   Diagnosis Date    Cellulitis of left hand 3/26/2019    Hypertension      Past Surgical History:   Procedure Laterality Date    BACK SURGERY       History reviewed. No pertinent family history. Social History     Socioeconomic History    Marital status: Single     Spouse name: None    Number of children: None    Years of education: None    Highest education level: None   Occupational History    None   Tobacco Use    Smoking status: Never Smoker    Smokeless tobacco: Never Used   Vaping Use    Vaping Use: Never used   Substance and Sexual Activity    Alcohol use: Never     Alcohol/week: 0.0 standard drinks    Drug use: Never    Sexual activity: None   Other Topics Concern    None   Social History Narrative    Lives With: Alone, sister lives in the area    Type of Home: [address]: One level    Home Access: Stairs to enter with rails- Number of Steps: 3- Rails: Both    Bathroom Shower/Tub: Tub/Shower unit    Home Equipment: Cane, Rolling walker (rollator)    ADL Assistance: [address]: Independent    Homemaking Responsibilities: Yes    Ambulation Assistance: Independent (2ww)    Transfer Assistance: Independent    Active : No    He is a retired  for [address] [place] [address]. He learned how to be a  in [employer] was stationed in [place] during the [facility]. Never went overseas. Never  no kids graduated [employer].          Social Determinants of Health     Financial Resource Strain:     Difficulty of Paying Living Expenses:    Food Insecurity:     Worried About Running Out of Food in the Last Year:     [address] in the Last Year:    Transportation Needs:     Lack of Transportation (Medical):  Lack of Transportation (Non-Medical):    Physical Activity:     Days of Exercise per Week:     Minutes of Exercise per Session:    Stress:     Feeling of Stress :    Social Connections:     Frequency of Communication with Friends and Family:     Frequency of Social Gatherings with Friends and Family:     Attends [employer] Services:     Active Member of Clubs or Organizations:     Attends Club or Organization Meetings:     Marital Status:    Intimate Partner Violence:     Fear of Current or Ex-Partner:     Emotionally Abused:     Physically Abused:     Sexually Abused:        Subjective/HPI no acute events overnight. participating with Therapy. No cp no sob. Feels well. Walking well. Eating well. EKG: SR 79        Review of Systems:   Review of Systems   Constitutional: Negative. Negative for diaphoresis and fatigue. HENT: Negative. Eyes: Negative. Respiratory: Negative. Negative for cough, chest tightness, shortness of breath, wheezing and stridor. Cardiovascular: Negative. Negative for chest pain, palpitations and leg swelling. Gastrointestinal: Negative. Negative for blood in stool and nausea. Genitourinary: Negative. Musculoskeletal: Positive for gait problem. Skin: Negative. Neurological: Positive for weakness. Negative for dizziness, syncope and light-headedness. Hematological: Negative. Psychiatric/Behavioral: Negative. Physical Examination:    /64   Pulse 69   Temp 97.5 °F (36.4 °C) (Oral)   Resp 17   Ht 5' 8\" (1.727 m)   Wt 165 lb 4.8 oz (75 kg)   SpO2 98%   BMI 25.13 kg/m²    Physical Exam   Constitutional: He appears healthy. No distress. HENT:   Normal cephalic and Atraumatic   Eyes: Pupils are equal, round, and reactive to light.    Neck: Thyroid normal. No JVD present. No neck adenopathy. No thyromegaly present. Cardiovascular: Normal rate, regular rhythm, intact distal pulses and normal pulses. Murmur heard. Pulmonary/Chest: Effort normal and breath sounds normal. He has no wheezes. He has no rales. He exhibits no tenderness. Abdominal: Soft. Bowel sounds are normal. There is no abdominal tenderness. Musculoskeletal:         General: No tenderness or edema. Normal range of motion. Cervical back: Normal range of motion and neck supple. Neurological: He is alert and oriented to person, place, and time. Skin: Skin is warm. No cyanosis. Nails show no clubbing.        LABS:  CBC:   Lab Results   Component Value Date    WBC 5.4 09/13/2021    RBC 5.19 09/13/2021    HGB 16.6 09/13/2021    HCT 47.3 09/13/2021    MCV 91.1 09/13/2021    MCH 31.9 09/13/2021    MCHC 35.0 09/13/2021    RDW 13.0 09/13/2021     09/13/2021     CBC with Differential:    Lab Results   Component Value Date    WBC 5.4 09/13/2021    RBC 5.19 09/13/2021    HGB 16.6 09/13/2021    HCT 47.3 09/13/2021     09/13/2021    MCV 91.1 09/13/2021    MCH 31.9 09/13/2021    MCHC 35.0 09/13/2021    RDW 13.0 09/13/2021    LYMPHOPCT 29.9 09/13/2021    MONOPCT 10.8 09/13/2021    BASOPCT 0.7 09/13/2021    MONOSABS 0.6 09/13/2021    LYMPHSABS 1.6 09/13/2021    EOSABS 0.2 09/13/2021    BASOSABS 0.0 09/13/2021     CMP:    Lab Results   Component Value Date     09/14/2021    K 4.4 09/14/2021     09/14/2021    CO2 21 09/14/2021    BUN 35 09/14/2021    CREATININE 0.98 09/14/2021    GFRAA >60.0 09/14/2021    LABGLOM >60.0 09/14/2021    GLUCOSE 106 09/14/2021    PROT 7.2 09/10/2021    LABALBU 4.2 09/14/2021    CALCIUM 9.0 09/14/2021    BILITOT 1.0 09/10/2021    ALKPHOS 79 09/10/2021    AST 36 09/10/2021    ALT 52 09/10/2021     BMP:    Lab Results   Component Value Date     09/14/2021    K 4.4 09/14/2021     09/14/2021    CO2 21 09/14/2021    BUN 35 09/14/2021    LABALBU 4.2 09/14/2021    CREATININE 0.98 09/14/2021    CALCIUM 9.0 09/14/2021    GFRAA >60.0 09/14/2021    LABGLOM >60.0 09/14/2021    GLUCOSE 106 09/14/2021     Magnesium:    Lab Results   Component Value Date    MG 1.9 09/10/2021     Troponin:    Lab Results   Component Value Date    TROPONINI <0.010 09/10/2021        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 ([facility] [I63.441] 09/13/2021     Priority: Low    Myelopathy concurrent with and due to spinal stenosis of cervical region ([facility] [address] [M48.02, G99.2] 09/13/2021     Priority: Low    Spinal stenosis of lumbar region with neurogenic claudication [M48.062] 09/13/2021     Priority: Low    Renal disease [N28.9] 09/13/2021     Priority: Low    Stroke determined by clinical assessment ([facility] [I63.9] 09/10/2021     Priority: Low    Shoulder dislocation [S43.006A] 03/08/2016     Priority: Low        Assessment/Plan:  1. CVA - Rehab  2. Cervical and lumbar stenosis  3. NSVT-   Telemetry shows no further NSVT on BB but now [place]. Will lower BB to 25 bid. - stable now. continue low dose BB. 4. LVEF 60%  5. Mag and TSH WNL  6. HTN - stable   7.  HPL- resume Statin       Electronically signed by Lenon Brunner, MD on 9/21/2021 at 9:00 AM

## 2021-09-21 NOTE — PROGRESS NOTES
liquids via large sip immediate coughing was noted. Goal 5: Pt will demonstrate swallow strategies for safe and efficient swallow of soft/nectar consistencies in all given opportunities. Patient required reminders X2 out of 8 trials to consume small-single sips. Goal 6: Pt will tolerate 30 minutes of LANIE to suprahyoidal musculature and right facial musculature to improve hylolaryngeal elevation/management of secretions/increase UES elongation and laryngeal exursion to reduce pyriform sinus retention and decrease the risk of penetration and aspiration during oral intake. NMES/E-stim ordered by Dr. Kanchan Bean on 09/15/21. Patient received LANIE to the suprahyoidal and facial musculature x 11 minutes, 30 Hz, 50 microseconds, 5 sec on/25 sec off duty cycle with the intensity of 8 while performing Effortful swallows X22. Patient was unable to complete a tongue hold swallow with 3/3 attempts. Compensatory Swallowing Strategies: Alternate solids and liquids, No straws, Upright as possible for all oral intake, Swallow 2 times per bite/sip, Eat/Feed slowly      Treatment/Activity Tolerance:  Patient tolerated treatment well    Plan:  Continue per POC    Pain Assessment:  Pre-Treatment  Pain assessment: 0-10  Pain level: 0  Intervention:  Patient denies pain. Post-Treatment  Pain assessment: 0-10  Pain level: 0  Intervention:  Patient denies pain. Patient/Caregiver Education:  Patient educated on session and progression towards goals.     Safety Devices:  Chair alarm in place      [id number] Alex Miller (NOMS):    SWALLOWING  Ratin    SPOKEN LANGUAGE COMPREHENSION  Rating:  DNT    SPOKEN LANGUAGE EXPRESSION  Ratin    MEMORY  Ratin          Therapy Time  SLP Individual Minutes  Time In: 1400  Time Out: 1430  Minutes: 30   estim 15  Language/cognition 15           Signature: Electronically signed by SIVA Martins on 2021 at 2:35 PM

## 2021-09-22 ENCOUNTER — APPOINTMENT (OUTPATIENT)
Dept: GENERAL RADIOLOGY | Age: 76
DRG: 057 | End: 2021-09-22
Attending: PHYSICAL MEDICINE & REHABILITATION
Payer: MEDICARE

## 2021-09-22 PROCEDURE — 2500000003 HC RX 250 WO HCPCS: Performed by: PHYSICAL MEDICINE & REHABILITATION

## 2021-09-22 PROCEDURE — 97130 THER IVNTJ EA ADDL 15 MIN: CPT

## 2021-09-22 PROCEDURE — 6360000002 HC RX W HCPCS: Performed by: INTERNAL MEDICINE

## 2021-09-22 PROCEDURE — 97530 THERAPEUTIC ACTIVITIES: CPT

## 2021-09-22 PROCEDURE — 97116 GAIT TRAINING THERAPY: CPT

## 2021-09-22 PROCEDURE — 6370000000 HC RX 637 (ALT 250 FOR IP): Performed by: INTERNAL MEDICINE

## 2021-09-22 PROCEDURE — 1180000000 HC REHAB R&B

## 2021-09-22 PROCEDURE — 92611 MOTION FLUOROSCOPY/SWALLOW: CPT

## 2021-09-22 PROCEDURE — 97535 SELF CARE MNGMENT TRAINING: CPT

## 2021-09-22 PROCEDURE — 99232 SBSQ HOSP IP/OBS MODERATE 35: CPT | Performed by: PHYSICAL MEDICINE & REHABILITATION

## 2021-09-22 PROCEDURE — 6370000000 HC RX 637 (ALT 250 FOR IP): Performed by: PHYSICAL MEDICINE & REHABILITATION

## 2021-09-22 PROCEDURE — 97129 THER IVNTJ 1ST 15 MIN: CPT

## 2021-09-22 PROCEDURE — 74230 X-RAY XM SWLNG FUNCJ C+: CPT

## 2021-09-22 RX ADMIN — Medication 100 MG: at 09:39

## 2021-09-22 RX ADMIN — Medication 2000 UNITS: at 17:31

## 2021-09-22 RX ADMIN — LISINOPRIL 20 MG: 20 TABLET ORAL at 09:39

## 2021-09-22 RX ADMIN — METOPROLOL TARTRATE 25 MG: 25 TABLET, FILM COATED ORAL at 23:24

## 2021-09-22 RX ADMIN — ASPIRIN 81 MG: 81 TABLET, CHEWABLE ORAL at 09:39

## 2021-09-22 RX ADMIN — AMLODIPINE BESYLATE 10 MG: 10 TABLET ORAL at 09:39

## 2021-09-22 RX ADMIN — ATORVASTATIN CALCIUM 80 MG: 80 TABLET, FILM COATED ORAL at 23:25

## 2021-09-22 RX ADMIN — BARIUM SULFATE 50 G: 0.81 POWDER, FOR SUSPENSION ORAL at 13:53

## 2021-09-22 RX ADMIN — ENOXAPARIN SODIUM 40 MG: 100 INJECTION SUBCUTANEOUS at 09:40

## 2021-09-22 RX ADMIN — LISINOPRIL 20 MG: 20 TABLET ORAL at 23:25

## 2021-09-22 RX ADMIN — METOPROLOL TARTRATE 25 MG: 25 TABLET, FILM COATED ORAL at 09:39

## 2021-09-22 ASSESSMENT — PAIN SCALES - GENERAL
PAINLEVEL_OUTOF10: 0

## 2021-09-22 NOTE — PLAN OF CARE
Nutrition Problem #1: Inadequate oral intake  Intervention: Food and/or Nutrient Delivery: Continue Current Diet, Continue Oral Nutrition Supplement  Nutritional Goals: Intake >75% of meals/supplement. stable weight.

## 2021-09-22 NOTE — PROGRESS NOTES
Occupational Therapy  Facility/Department: [facility]  Daily Treatment Note  NAME: Marylee Liter  : 1945  MRN: [id number]    Date of Service: 2021    Discharge Recommendations:  Continue to assess pending progress       Assessment   Assessment: Pt continues to demonstrate decreased initiation, problem solving, and safety awareness during ADLs. Pt continues to benefit from OT services to maximize independence and safety during ADLs and IADLs. REQUIRES OT FOLLOW UP: Yes  Activity Tolerance  Activity Tolerance: Patient Tolerated treatment well  Safety Devices  Safety Devices in place: Yes  Type of devices: All fall risk precautions in place         Patient Diagnosis(es): There were no encounter diagnoses. has a past medical history of Cellulitis of left hand and Hypertension. has a past surgical history that includes back surgery. Restrictions  Restrictions/Precautions  Restrictions/Precautions: Fall Risk, Swallowing - Thickened Liquids  Subjective   General  Chart Reviewed: Yes  Patient assessed for rehabilitation services?: Yes  Response to previous treatment: Patient with no complaints from previous session  Family / Caregiver Present: No  Referring Practitioner: Dr. Jean Carlos Thorpe  Diagnosis: Impaired mobility, gait, and ADL's 2º inferior cerebellar stroke  Subjective  Subjective: \"I think I can go home\"  Pain Assessment  Pain Assessment: 0-10  Pain Level: 0  Pre Treatment Pain Screening  Pain at present: 0  Scale Used: Numeric Score  Intervention List: Patient able to continue with treatment  Vital Signs  Patient Currently in Pain: Denies   Orientation  Orientation  Overall Orientation Status: Within Functional Limits  Objective      While seated at tabletop, pt sorted ROBY cards based on color using B UEs to improve problem solving during ADLs and IADLs. Therapist set up initial piles for each color then pt sorted cards one at a time.  Pt initially with one error and requires a verbal cue to recognize. Pt able to correct error independently. Pt then sorted all cards without errors. Pt completed with good attention to task. While seated at tabletop, pt sorted and folded socks together using B UEs to continue working on problem solving during IADLs. Pt demonstrates mod difficulty to locate matching socks and requires increased time to perform task. Pt with min errors and requires verbal cues to recognize errors. Pt able to match and fold total of 8 pairs of socks out of 23. Pt unable to finish task secondary to pt's session ended for scheduled modified barium swallow. Plan   Plan  Times per week: 5-7x  Plan weeks: 2 weeks  Current Treatment Recommendations: Strengthening, Functional Mobility Training, Balance Training, Neuromuscular Re-education, Cognitive/Perceptual Training, Safety Education & Training, Patient/Caregiver Education & Training, Equipment Evaluation, Education, & procurement, Self-Care / ADL, Home Management Training  Plan Comment: continue with current POC  G-Code     OutComes Score                                                  AM-PAC Score             Goals  Long term goals  Time Frame for Long term goals :  Within 2 weeks, pt to demonstrate progress in the following areas listed below to achieve specific LTG's as stated in initial evaluation  Long term goal 1: Improve independence during ADLs/IADLs  Long term goal 2: Improve strength and endurance to perform functional transfers  Long term goal 3: Improve fine motor coordination during self care  Long term goal 4: Improve cognition to demonstrate understanding of HEP  Long term goal 5: improve balance to safely perform functional mobility  Patient Goals   Patient goals : \"to get better\"       Therapy Time   Individual Concurrent Group Co-treatment   Time In 1300         Time Out 1335         Minutes 35           Cognitive Retrainin minutes  Missed 25 minutes due to Modified Barium Swallow, will attempt makeup as able Flaco Busch OT   Electronically signed by Flaco Busch OT on 9/22/2021 at 1:53 PM

## 2021-09-22 NOTE — PROGRESS NOTES
Assessment completed. A&O x3. Denies pain at the time. Took meds with nectar thick liquids without difficulty. PT scheduled for MBS this afternoon. Avasys in room for safety d/t pt being impulsive at times. In chair with alarm activated. Call light in reach.  Electronically signed by Keanu Avila LPN on 4/05/0363 at 33:80 AM

## 2021-09-22 NOTE — PROGRESS NOTES
Physical Therapy  Physical Therapy Rehab Treatment Note  Facility/Department: [facility]  Room: [place] [address]       NAME: Jack Ballard  :  (13 y.o.)  MRN: [id number]  CODE STATUS: Full Code    Date of Service: 2021  Chart Reviewed: Yes  Family / Caregiver Present: Yes (sister arriving later)    Restrictions:  Restrictions/Precautions: Fall Risk, Swallowing - Thickened Liquids       SUBJECTIVE: Subjective: \"I'm doing okay. \"  Pain Screening  Patient Currently in Pain: No  Pre Treatment Pain Screening  Pain at present: 0  Intervention List: Patient able to continue with treatment    Post Treatment Pain Screening:  Pain Assessment  Pain Level: 0    OBJECTIVE:     Transfers  Sit to Stand: Stand by assistance  Stand to sit: Stand by assistance;Contact guard assistance  Car Transfer: Contact guard assistance (CGA for safety and assist with hand placement.)  Comment: Decreased safety with approach to chair, step by step cues for sequencing. Pt with good follow through by last approach to chair. Trialed 5x. Ambulation  Ambulation?: Yes  More Ambulation?: No  Ambulation 1  Surface: level tile;carpet;uneven  Device: Rolling Walker  Assistance: Contact guard assistance;Minimal assistance  Quality of Gait: shuffling pattern, decreased baljeet heel strike, occ instability  Gait Deviations: Slow Princess; Shuffles;Decreased step length;Decreased step height;Decreased head and trunk rotation  Distance: 72' , 25' x 2  Comments: vc's for RLE foot clearance with fatigue    Stairs/Curb  Stairs?: Yes  Stairs  # Steps : 4  Stairs Height: 6\"  Rails: Bilateral  Assistance: Minimal assistance  Comment: reciprocal ascending, non reciprocal descending. VC's for technique and sequencing throughout.     Exercises  Hip Flexion: x20  Hip Abduction: x15 side kicks  Knee Long Arc Quad: x20  Ankle Pumps: x20     ASSESSMENT/PROGRESS TOWARDS GOALS:  Assessment: Pt with improved approach to chair by end of tx this AM. Multiple trsfs

## 2021-09-22 NOTE — PROGRESS NOTES
Comprehensive Nutrition Assessment    Type and Reason for Visit:  Reassess    Nutrition Recommendations/Plan: Continue Current Diet, Continue Oral Nutrition Supplement    Nutrition Assessment:  Pt remains at nutritional risk due to reported 'fair' appetite/ intake, with noted weight loss. To continue thick supplement tid/monitor intake. Malnutrition Assessment:  Malnutrition Status: At risk for malnutrition (Comment)    Context:  Acute Illness       Estimated Daily Nutrient Needs:  Energy (kcal):  7894-0770 (kg x 24-26); Weight Used for Energy Requirements:  Admission     Protein (g):  84-96 (kg x 1.2-1.3); Weight Used for Protein Requirements:  Ideal        Fluid (ml/day):  ~1950; Method Used for Fluid Requirements:  1 ml/kcal      Nutrition Related Findings:  PMH-htn, adm s/p CVA. Pt reports decreased appetite/intake currently and pta (?time frame). Trace BLE edema noted. MBS (9/13) Dys 3 with MT liquids. Pt stated that he is taking the supplements. Wounds:  None       Current Nutrition Therapies:    ADULT DIET; Dysphagia - Soft and Bite Sized; Mildly Thick (Nectar); (25-75%)  Adult Oral Nutrition Supplement; Frozen Oral Supplement bid  Adult Oral Nutrition Supplement; Fortified Pudding Oral Supplement x1/day    Anthropometric Measures:  · Height: 5' 8\" (172.7 cm)  · Current Body Weight: 165 lb 4.8 oz (75 kg) (9/21)   · Admission Body Weight: 165 lb (74.8 kg) ([facility])    · Usual Body Weight: 170 lb (77.1 kg) (4/2019)     · Ideal Body Weight: 154 lbs; % Ideal Body Weight  >100%  · BMI: 25.1  · BMI Categories: Overweight (BMI 25.0-29. 9)       Nutrition Diagnosis:   · Inadequate oral intake related to  (reported decreased appetite) as evidenced by poor intake prior to admission (intake < 50%)    · Swallowing difficulty related to cognitive or neurological impairment as evidenced by swallow study results    Nutrition Interventions:   Food and/or Nutrient Delivery:  Continue Current Diet, Continue Oral Nutrition Supplement  Nutrition Education/Counseling:  Education not indicated   Coordination of Nutrition Care:  Continue to monitor while inpatient    Goals:  Intake >75% of meals/supplement. stable weight.        Nutrition Monitoring and Evaluation:   Food/Nutrient Intake Outcomes:  Food and Nutrient Intake, Supplement Intake  Physical Signs/Symptoms Outcomes:  Weight, Chewing or Swallowing     Electronically signed by Leonila Michael RD, GWEN on 9/22/21 at 12:20 PM EDT

## 2021-09-22 NOTE — PLAN OF CARE
Problem: Falls - Risk of:  Goal: Will remain free from falls  Description: Will remain free from falls  9/22/2021 0047 by Margarito Weldon. Jaret Valentin RN  Outcome: Ongoing  9/21/2021 1139 by Thomas Alegria RN  Outcome: Ongoing  Goal: Absence of physical injury  Description: Absence of physical injury  9/22/2021 0047 by Margarito Weldon. Mercedes Arthur RN  Outcome: Ongoing  9/21/2021 1139 by Thomas Alegria RN  Outcome: Ongoing     Problem: Mobility - Impaired:  Goal: Mobility will improve  Description: Mobility will improve  9/22/2021 0047 by Margarito Weldon. Mercedes Arthur RN  Outcome: Ongoing  9/21/2021 1139 by Thomas Alegria RN  Outcome: Ongoing     Problem: IP COMMUNICATION/DYSARTHRIA  Goal: LTG - patient will improve expressive language skills to allow for communication of wants and needs in daily activities  9/22/2021 0047 by Margarito Weldon. Mercedes Arthur RN  Outcome: Ongoing  9/21/2021 1139 by Thomas Alegria RN  Outcome: Ongoing     Problem: IP SWALLOWING  Goal: LTG - patient will tolerate the least restrictive diet consistency to allow for safe consumption of daily meals  9/22/2021 0047 by Margarito Weldon. Mercedes Arthur RN  Outcome: Ongoing  9/21/2021 1139 by Thomas Alegria RN  Outcome: Ongoing     Problem: Skin Integrity:  Goal: Will show no infection signs and symptoms  Description: Will show no infection signs and symptoms  9/22/2021 0047 by Margarito Weldon. Mercedes Arthur RN  Outcome: Ongoing  9/21/2021 1139 by Thomas Alegria RN  Outcome: Ongoing  Goal: Absence of new skin breakdown  Description: Absence of new skin breakdown  9/22/2021 0047 by Margarito Weldon. Mercedes Arthur RN  Outcome: Ongoing  9/21/2021 1139 by Thomas Alegria RN  Outcome: Ongoing     Problem: IP BALANCE  Goal: LTG - patient will maintain standing balance to allow for completion of daily activities  9/22/2021 0047 by Margarito Weldon.  Mercedes Arthur RN  Outcome: Ongoing  9/21/2021 1139 by Thomas Alegria RN  Outcome: Ongoing     Problem: Nutrition  Goal: Optimal nutrition therapy  9/22/2021 0047 by Margarito Weldon. Leora Cortés RN  Outcome: Ongoing  9/21/2021 1139 by Sharyn Smith RN  Outcome: Ongoing

## 2021-09-22 NOTE — CARE COORDINATION
LSW met with patient and discussed SNF placement. Patient adamantly refused and stated that he was not interested. LSW explained that it is recommended by IDT due to patient needing 24/7 supervision and care at this time. Patient stated \"I have 5 acres of land and no one is going to take it! \" LSW explained that patient's land was not being taken away. LSW then discussed how his sister stated that she would be willing to help in any way and is currently taking care of his cats. Patient responded \"She has her own cats and so does my brother, that is 12 cats. That is too many! \" LSW recommended for patient to reconsider SNF placement and that LSW will discuss with him again tomorrow. Patient stated \"there is no need, I'm not going. \" LSW will discuss with patient's sister and will talk to patient again.  Electronically signed by DIONISIO Valdes, SANJAY on 9/22/2021 at 5:42 PM

## 2021-09-22 NOTE — PROGRESS NOTES
Physical Therapy Rehab Treatment Note  [facility]/Department[facility]  [facility]       NAME: Jim Benoit  : [birth year] (44 y.o.)  MRN: [id number]  CODE STATUS: Full Code    Date of Service: 2021       Restrictions:  Restrictions/Precautions: Fall Risk, Swallowing - Thickened Liquids (nectar thick)       SUBJECTIVE:       Pre Treatment Pain Screening  Pain at present: 0    Pre and post Treatment Pain Screenin/10  Pain Assessment  Pain Level: 0    OBJECTIVE:               Neuromuscular Education  PNF: standing and ambulatory balance facilitation. Challenges included varying LE ISAIAH with UE movement tasks, LE placing tasks in varying planes of motion  Neuromuscular Comments: pt demonstrates poor cognitive carry over of techniques within session. Improved gait quality noted following intervention however poor integration across sessions noted         Transfers  Sit to Stand: Stand by assistance  Stand to sit: Stand by assistance;Contact guard assistance (poor approach to chair and poor descent to chair)   - poor carry over of technique from am session    Ambulation  Ambulation?: Yes  More Ambulation?: No  Ambulation 1  Surface: level tile;carpet;uneven  Device: Rolling Walker  Assistance: Contact guard assistance;Minimal assistance  Quality of Gait: variable step length,wide ISAIAH, forward flexed trunk, variable left foot clearance  Distance: 50 feet x 2 in addition to multiple short distance trials   - poor motor planning of approach to chair and scanning of environment to locate destination    Stairs  # Steps : 4  Stairs Height: 6\"  Rails: Bilateral  Assistance: Minimal assistance  Comment: reciprocal ascending, non reciprocal descending. VC's for technique and sequencing throughout. - mild post lean with descent                    ASSESSMENT/PROGRESS TOWARDS GOALS:  Assessment: Pt demonstrates poor cognitive carry over of techniques.  Physical motor follow trhu noted during session however poor carry over across sessions noted. Therapist discussed consern with pt for safety. Pt reported he felt able to care for self without assistance however    Goals:  Long term goals  Long term goal 1: Bed mobility with indep  Long term goal 2: sit to stand and bed transfers SBA with cues for safety 50 % of trials  Long term goal 3: Amb 150ft with 2ww  feet  Long term goal 4: CGA 4 stairs with rails  Long term goal 5: [place] balance testing at 25/56 or greater  Long term goal 6: SBA 4 stairs with rails    PLAN OF CARE/Safety:   Plan Comment: Cont. POC  Safety Devices  Type of devices: Chair alarm in place; Left in chair      Therapy Time:   Individual   Time In 1435   Time Out 1500   Minutes 25     Tx time missed due to pt at [facility]    Minutes:      Transfer/Bed mobility training: 10      Gait trainin      Neuro re education:7          Minus Saul, PT, 21 at 4:53 PM

## 2021-09-22 NOTE — PROGRESS NOTES
Pt resting quietly tonight. Pt took 2 tylenol 325 mg po for comfort tonight to \" help me sleep\". Pt remains on nectar thick liquids which were offered tonight at hs with meds. Call light in reach and bed alarm on. Avasys in room. Electronically signed by Koki Garcia.  Tavia Zambrano on 9/22/2021 at 12:43 AM

## 2021-09-22 NOTE — PROGRESS NOTES
Occupational Therapy  Facility/Department: [facility]  Daily Treatment Note  NAME: Kate Griffin  : 1945  MRN: [id number]    Date of Service: 2021    Discharge Recommendations:  Continue to assess pending progress       Assessment   Assessment: Pt continues to demonstrate decreased initiation, problem solving, and safety awareness during ADLs. Pt continues to benefit from OT services to maximize independence and safety during ADLs and IADLs. REQUIRES OT FOLLOW UP: Yes  Activity Tolerance  Activity Tolerance: Treatment limited secondary to decreased cognition  Safety Devices  Safety Devices in place: Yes  Type of devices: All fall risk precautions in place         Patient Diagnosis(es): There were no encounter diagnoses. has a past medical history of Cellulitis of left hand and Hypertension. has a past surgical history that includes back surgery. Restrictions  Restrictions/Precautions  Restrictions/Precautions: Fall Risk, Swallowing - Thickened Liquids  Subjective   General  Chart Reviewed: Yes  Patient assessed for rehabilitation services?: Yes  Response to previous treatment: Patient with no complaints from previous session  Family / Caregiver Present: No  Referring Practitioner: Dr. Parmar Guardian  Diagnosis: Impaired mobility, gait, and ADL's 2º inferior cerebellar stroke  Subjective  Subjective: \"I think I can go home\"  Pain Assessment  Pain Assessment: 0-10 (Simultaneous filing. User may not have seen previous data.)  Pain Level: 0 (Simultaneous filing. User may not have seen previous data.)  Pre Treatment Pain Screening  Pain at present: 0  Scale Used: Numeric Score  Intervention List: Patient able to continue with treatment  Vital Signs  Patient Currently in Pain: Denies   Orientation  Orientation  Overall Orientation Status: Within Functional Limits  Objective    Pt completed shower ADL at the levels below. ADL  Feeding: Setup; Thickened liquids  Grooming: Supervision  UE Bathing: Stand by assistance;Verbal cueing  LE Bathing: Verbal cueing;Stand by assistance  UE Dressing: Increased time to complete;Setup  LE Dressing: Contact guard assistance;Verbal cueing  Toileting: Unable to assess(comment) (pt did not need to go)        Balance  Sitting Balance: Supervision  Standing Balance: Contact guard assistance  Functional Mobility  Functional - Mobility Device: Rolling Walker  Activity: To/from bathroom  Assist Level: Contact guard assistance  Toilet Transfers  Toilet Transfer: Unable to assess  Toilet Transfers Comments: Pt did not need to go  Shower Transfers  Shower - Transfer From: [facility] - Transfer Type: To and From  Shower - Transfer To: Shower seat with back  Shower - Technique: Ambulating  Shower Transfers: Contact Guard     Transfers  Sit to stand: Contact guard assistance  Stand to sit: Contact guard assistance  Cognition  Cognition Comment: Comp; Supervision Express: Independent Social: Supervision Prob: Min A Mem: Supervision      Pt with 15 minutes of therapy time left after ADL. Pt completed B UE exercises as stated below. Type of ROM/Therapeutic Exercise  Type of ROM/Therapeutic Exercise: Free weights  Comment: Pt completed B UE exercises using can of soda to improve strength and endurance during ADLs. Pt requires verbal and tactile cues for correct form. Pt completed 1x10 reps of each exercise without requiring rest breaks. Pt tolerated well.   Exercises  Scapular Protraction: 1x10 reps  Scapular Retraction: 1x10 reps  Shoulder Flexion: 1x10 reps  Shoulder Extension: 1x10 reps  Shoulder ABduction: 1x10 reps  Shoulder ADduction: 1x10 reps  Horizontal ABduction: 1x10 reps  Horizontal ADduction: 1x10 reps  Elbow Flexion: 1x10 reps  Elbow Extension: 1x10 reps  Supination: 1x10 reps  Pronation: 1x10 reps  Wrist Flexion: 1x10 reps  Wrist Extension: 1x10 reps     Plan   Plan  Times per week: 5-7x  Plan weeks: 2 weeks  Current Treatment Recommendations: Strengthening, Functional Mobility Training, Balance Training, Neuromuscular Re-education, Cognitive/Perceptual Training, Safety Education & Training, Patient/Caregiver Education & Training, Equipment Evaluation, Education, & procurement, Self-Care / ADL, Home Management Training  Plan Comment: continue with current POC  G-Code     OutComes Score                                                  AM-PAC Score             Goals  Long term goals  Time Frame for Long term goals :  Within 2 weeks, pt to demonstrate progress in the following areas listed below to achieve specific LTG's as stated in initial evaluation  Long term goal 1: Improve independence during ADLs/IADLs  Long term goal 2: Improve strength and endurance to perform functional transfers  Long term goal 3: Improve fine motor coordination during self care  Long term goal 4: Improve cognition to demonstrate understanding of HEP  Long term goal 5: improve balance to safely perform functional mobility  Patient Goals   Patient goals : \"to get better\"       Therapy Time   Individual Concurrent Group Co-treatment   Time In 0830         Time Out 0930         Minutes 60           ADL/IADL trainin minutes  Therapeutic activities: 15 minutes     Manny Muro OT   Electronically signed by Manny Muro OT on 2021 at 10:21 AM

## 2021-09-22 NOTE — PROGRESS NOTES
Subjective: The patient complains of severe acute on chronic progressive fatigue and balance and cognitive deficits due to cerebellar CVA partially relieved by rest,medications, PT,  OT,   SLP and rest and exacerbated by recent illness. I am concerned about patients medical complexities including risk for arrhythmia as cause for embolic CVA and severe aphasia. He is to start E stim today for swallow. I have ordered a modified barium swallow recheck in hopes of upgrading his diet from nectar thick's to thins. I reviewed current care and plans for further care with other rehab providers including nursing and case management. According to recent nursing note, \"Pt took 2 tylenol 325 mg po for comfort tonight to \" help me sleep\". Pt remains on nectar thick liquids which were offered tonight at hs with meds. Call light in reach and bed alarm on. Avasys in room \". ROS x10: The patient also complains of severely impaired mobility and activities of daily living. Otherwise no new problems with vision, hearing, nose, mouth, throat, dermal, cardiovascular, GI, , pulmonary, musculoskeletal, psychiatric or neurological. See Rehab H&P on Rehab chart dated . Vital signs:  /73   Pulse 56   Temp 98.6 °F (37 °C)   Resp 16   Ht 5' 8\" (1.727 m)   Wt 165 lb 4.8 oz (75 kg)   SpO2 100%   BMI 25.13 kg/m²   I/O:   PO/Intake:  fair PO intake, dysphagia soft bite-size with nectar thick liquids    Bowel/Bladder:   occasional loose stools- Continent. LBM 9/17/21. General:  Patient is well developed, adequately nourished, non-obese and     well kempt. HEENT:    PERRLA, hearing intact to loud voice, external inspection of ear     and nose benign. Inspection of lips, tongue and gums benign  Musculoskeletal: No significant change in strength or tone. All joints stable. Inspection and palpation of digits and nails show no clubbing,       cyanosis or inflammatory conditions. Neuro/Psychiatric: Affect: flat but pleasant. Alert and oriented to person, place and     Situation with  min cues. No significant change in deep tendon reflexes or     Sensation-poor judgment reasoning and insight. Lungs:  Diminished, CTA-B. Respiration effort is normal at rest.     Heart:   S1 = S2, RRR. No loud murmurs. Abdomen:  Soft, non-tender, no enlargement of liver or spleen. Extremities:  No significant lower extremity edema or tenderness. Skin:   Intact to general survey, no visualized or palpated problems. Rehabilitation:  Physical therapy:   Bed Mobility: Scooting: Stand by assistance    Transfers: Sit to Stand: Stand by assistance  Stand to sit: Stand by assistance, Contact guard assistance  Bed to Chair: Stand by assistance, Ambulation 1  Surface: carpet  Device: Rolling Walker  Assistance: Contact guard assistance, Minimal assistance  Quality of Gait: shuffling pattern, increased external rotation on right with decreased heel strike  Gait Deviations: Slow Princess, Shuffles, Decreased step length, Decreased step height, Decreased head and trunk rotation  Distance: 60 feet x 2 with numerous changes in direction  Comments: right foot clearance decreases with fatigue. pt with decreased safety awareness with reverse to chair, Stairs  # Steps : 4  Stairs Height: 6\"  Rails: Bilateral  Assistance: Minimal assistance    FIMS:  ,  , Assessment: Increased time needed during fwd stepping activities d/t increased time needed for motor planning and cues for sequencing. Multiple trsfs performed during tx to improve technique secondary to decreased safety with approach to chair requiring multiple cues for sequencing. Occupational therapy:    ,  , Assessment: Pt is  a 77 y.o. admitted for CVA. Pt with above deficits impairing ability to safety complete ADL's and IADLs. Pt would benefit from OT services to address deficits and maximize level of safety and function during ADL's.     Speech therapy: Lab/X-ray studies reviewed, analyzed and discussed with patient and staff:      telemetry and monitor tech stated that pt was SB-NSR Rate 55-65. Recent Results (from the past 24 hour(s))   Urinalysis    Collection Time: 09/21/21 12:30 PM   Result Value Ref Range    Color, UA Yellow Straw/Yellow    Clarity, UA TURBID (A) Clear    Glucose, Ur Negative Negative mg/dL    Bilirubin Urine Negative Negative    Ketones, Urine TRACE (A) Negative mg/dL    Specific Gravity, UA 1.021 1.005 - 1.030    Blood, Urine LARGE (A) Negative    pH, UA 5.5 5.0 - 9.0    Protein, UA TRACE (A) Negative mg/dL    Urobilinogen, Urine 0.2 <2.0 E.U./dL    Nitrite, Urine Negative Negative    Leukocyte Esterase, Urine TRACE (A) Negative   Microscopic Urinalysis    Collection Time: 09/21/21 12:30 PM   Result Value Ref Range    Mucus, UA Present None Seen /LPF    WBC, UA 0-2 0 - 5 /HPF    RBC, UA  (A) 0 - 2 /HPF    Epithelial Cells, UA 0-2 /HPF    Bacteria, UA RARE (A) Negative /HPF       Echocardiogram  9/11/2021  Transthoracic Echocardiography  Left Ventricle Left ventricular ejection fraction is visually estimated at 60%. Impaired relaxation compatible with diastolic dysfunction. ( reversed E/A ratio) Moderate concentric left ventricular hypertrophy. Right Ventricle Normal right ventricle structure and function. Normal right ventricle systolic pressure. Left Atrium Moderately dilated left atrium. Normal intact intra-atrial septum was noted with no evidence of significant intra-atrial communications neither by colour flow Doppler imaging nor by aggitated saline study. Right Atrium Normal right atrium. Mitral Valve Moderate annular calcification. Tricuspid Valve Normal tricuspid valve structure and function. Aortic Valve Normal aortic valve structure and function. Trivial aortic regurgitation is noted. Pulmonic Valve Normal pulmonic valve structure and function. Pericardial Effusion No evidence of pericardial effusion.  Pleural Effusion No evidence of pleural effusion. Aorta \ Miscellaneous Miscellaneous normal findings were found. CT HEAD  : 9/10/2021  There is no bleed, mass effect, or space occupying lesion. No extra-axial mass or fluid collections. Hypoattenuated White matter changes in the cerebral hemispheres noted. There is global atrophy. Findings likely reflect chronic small vessel vasculopathy. There is a vague area of low attenuation in the inferior posterior right cerebellar hemisphere with slight obliteration of adjacent sulci. An evolving nonhemorrhagic infarct in the cerebellum would have to be a consideration. Clinical correlation recommended. NO BLEED, MASS EFFECT, OR EXTRA-AXIAL FLUID COLLECTION. EVOLVING NONHEMORRHAGIC INFARCT IN THE INFERIOR RIGHT CEREBELLAR HEMISPHERE. 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 XR CHEST   9/10/2021 Atherosclerotic calcification of the thoracic aorta. The cardiomediastinal silhouette is within normal limits. No pneumothorax, pleural effusion, or consolidation. Question calcified pleural plaques on the left, which can be seen with asbestos related disease. No acute osseous abnormality. No radiographic evidence of acute intrathoracic process. Question left-sided pleural plaques which can be seen with asbestos related disease. MRI BRAIN   9/11/2021 There is a tiny focus of restricted diffusion in the left centrum semiovale (series 4 image 19). There are no extra-axial collections. There is no evidence of hemorrhage. The susceptibility images do not demonstrate evidence of hemosiderin deposition within the brain parenchyma or the leptomeninges. There are numerous patchy foci of T2/FLAIR hyperintensities in the subcortical and periventricular white matter in a symmetric distribution throughout both hemispheres. Chronic bilateral cerebellar infarcts.  There is prominence of the sulci and ventricles consistent with moderate global cerebral atrophy and chronic involutional changes. No midline shift. The visualized portions of the orbits are within normal limits, the globes are intact. The visualized portions of the paranasal sinuses are within normal limits. Right mastoid effusion. Small foci of restricted diffusion in the left centrum semiovale, consistent with acute ischemia. Chronic microvascular changes and bilateral chronic cerebellar infarcts. FL MODIFIED BARIUM  9/13/2021: In cooperation with speech pathology, a modified barium swallow using various consistencies of both solids and liquids with dynamic imaging was performed. Patient's oral stage characterized by mild oral dysphagia. There is premature entry to the level of vallecula. Patient's pharyngeal stage was characterized by moderate pharyngeal dysphagia. There is decreased hyolaryngeal excursion with moderate residuals in the vallecula. There is deep penetration on thin liquids from straw. No aspiration. MILD ORAL AND MODERATE PHARYNGEAL DYSPHAGIA. RECOMMEND BY SPEECH PATHOLOGY TO FOLLOW. US CAROTID ARTERY BILATERAL  9/11/2021 Mild to moderate plaque in the carotid arteries bilaterally. No significant increase in systolic flow or turbulence to indicate any hemodynamically significant narrowing in the right or left internal carotid arteries. There is antegrade flow identified in both vertebral arteries.  ARTERIAL BLOOD FLOW VELOCITY RIGHT PEAK SYSTOLIC VELOCITIES (PSV)                                               Prox CCA    129 cm/s             Mid CCA     114 cm/s              Dist CCA    104 cm/s             Prox ICA    169 cm/s               Mid ICA     106 cm/s            Dist ICA    63 cm/s             Prox ECA     170   cm/s             Prox VERT   51 cm/s              ICA/CCA     1.48                        LEFT PEAK SYSTOLIC VELOCITIES (PSV)  Prox CCA    135 cm/s Mid CCA     166 cm/s Dist CCA    121 cm/s Prox ICA    119 cm/s Mid ICA     72 cm/s Dist ICA    61 cm/s Prox ECA    131 cm/s Prox VERT   56 cm/s ICA/CCA     0.72      50-69 % STENOSIS IN THE RIGHT ICA  <50 % STENOSIS IN THE LEFT ICA ANTEGRADE FLOW IN THE BILATERAL VERTEBRAL ARTERIES. MRI CERVICAL THORACIC LUMBAR SPINE   9/12/2021     CERVICAL REGION: Multilevel degenerative changes with severe canal stenosis at C2-3, C3-4, and C6-7. Moderate canal stenosis at C4-5. THORACIC REGION: No spinal canal stenosis    LUMBAR REGION: Multilevel degenerative changes. Endplate irregularity with joint space narrowing and slight anterolisthesis at L3-4 Severe canal   stenosis at L2-3 and L4-5. Moderate to severe canal stenosis at L3-4. No acute compression fracture. No epidural or paraspinal soft tissue   mass. No abnormal cord signal intensity on the scans. Multiple areas of severe spinal canal stenosis in the cervical region. Severe canal stenosis at L2-3, L3-4 and L4-5. Previous extensive, complex labs, notes and diagnostics reviewed and analyzed. ALLERGIES:    Allergies as of 09/13/2021    (No Known Allergies)      (please also verify by checking MAR)       I reviewed her [facility] prescription monitoring service data sheets in hopes of eliminating polypharmacy and weaning to the lowest effective dose of pain medications and eliminating the concomitant use of benzodiazepines. I see no medications of concern. I see no habits of combining sedatives and narcotics. Complex Physical Medicine & Rehab Issues Assess & Plan:   1. 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 Functional and medical status reassessed regarding patients ability to participate in therapies and patient found to be able to participate in acute intensive comprehensive inpatient rehabilitation program including PT/OT to improve balance, ambulation, ADLs, and to improve the P/AROM.   Therapeutic modifications regarding activities in therapies, place, amount of time per day and intensity of therapy made daily. In bed therapies or bedside therapies prn.   2. Bowel and Bladder dysfunction neurogenic bowel and bladder due to CVA:  frequent toileting, ambulate to bathroom with assistance, check post void residuals. Check for C.difficile x1 if >2 loose stools in 24 hours, continue bowel & bladder program.  Monitor bowel and bladder function. Lactinex 2 PO every AC. MOM prn, Brown Bomb prn, Glycerin suppository prn, enema prn.  3. Severe neck mid back and low back pain as well as generalized OA pain Multiple areas of severe spinal canal stenosis in the cervical region. Severe canal stenosis at L2-3, L3-4 and L4-5. : reassess pain every shift and prior to and after each therapy session, give prn Tylenol and scheduled Tylenol, modalities prn in therapy, masage, Lidoderm, K-pad prn. Consider scheduled AM pain meds. 4. Skin healing and breakdown risk:  continue pressure relief program.  Daily skin exams and reports from nursing. 5. Severe fatigue due to nutritional and hydration deficiency: Add and titrate vitamin B12 vitamin D and CoQ10 continue to monitor I&Os, calorie counts prn, dietary consult prn.  6. Acute episodic insomnia with situational adjustment disorder:  prn Ambien, monitor for day time sedation. 7. Falls risk elevated:  patient to use call light to get nursing assistance to get up, bed and chair alarm. 8. Elevated DVT risk: progressive activities in PT, continue prophylaxis FITZ hose, elevation and Lovenox. 9. Complex discharge planning:  [place] home 9/28/21--to SNF vs home with family and [facility]. Weekly team meeting every  Thursday to assess progress towards goals, discuss and address social, psychological and medical comorbidities and to address difficulties they may be having progressing in therapy. Patient and family education is in progress. The patient is to follow-up with their family physician after discharge.         Complex Active General Medical Issues that complicate care Assess & Plan:    1. History of bilateral shoulder pain with history of shoulder dislocation generalized osteoarthritis  2. Stroke determined by clinical assessment and confirmed on MRI as above-consult neurology control blood pressure monitor for diabetes add aspirin  3. Spinal stenosis of lumbar region with neurogenic claudication, Myelopathy concurrent with and due to spinal stenosis of cervical region  4. Ataxic gait due to cerebellar CVA-focus on balance and therapy  5. Significant oral pharyngeal dysphagia-consult speech-language pathology  6. Anxiety and depression with occasional violent outburst-emotional support provided daily, vitamin B12, encourage participation in rehabilitation support group and recreational therapy, adjust/add medications (add vitamin B12 consider SSRI) consult rehabilitation psychology add spiritual care limit toxic stimuli. Keep room quiet, restful with low light or lighting per patient preference preferably natural light. 7. Hypertension, hypercholesterolemia, [place] cerebrovascular disease, coronary artery disease, cardiac arrhythmia-continue telemetry-continue blood signs every shift focusing on heart rate and blood pressure checks, consult hospitalist for backup medical and adjust/add medications (aspirin, Norvasc, Lipitor, lisinopril, Lopressor) consult cardiology regarding V. tach titrate beta-blocker  8.  Oral pharyngeal dysphagia with cognitive deficits-dysphagia soft with bite-size nectar thick liquid consult speech-language pathology--oral motor exercises       Electronically signed by Kunal Naik DO on 9/15/21 at 8:03 AM RUPA Ohara D.O., PM&R     Attending    [facility] Raisa Lassiter

## 2021-09-23 LAB — URINE CULTURE, ROUTINE: NORMAL

## 2021-09-23 PROCEDURE — 97535 SELF CARE MNGMENT TRAINING: CPT

## 2021-09-23 PROCEDURE — 99233 SBSQ HOSP IP/OBS HIGH 50: CPT | Performed by: PSYCHIATRY & NEUROLOGY

## 2021-09-23 PROCEDURE — 97129 THER IVNTJ 1ST 15 MIN: CPT

## 2021-09-23 PROCEDURE — 97116 GAIT TRAINING THERAPY: CPT

## 2021-09-23 PROCEDURE — 99233 SBSQ HOSP IP/OBS HIGH 50: CPT | Performed by: PHYSICAL MEDICINE & REHABILITATION

## 2021-09-23 PROCEDURE — 97112 NEUROMUSCULAR REEDUCATION: CPT

## 2021-09-23 PROCEDURE — 6370000000 HC RX 637 (ALT 250 FOR IP): Performed by: PHYSICAL MEDICINE & REHABILITATION

## 2021-09-23 PROCEDURE — 92507 TX SP LANG VOICE COMM INDIV: CPT

## 2021-09-23 PROCEDURE — 6370000000 HC RX 637 (ALT 250 FOR IP): Performed by: INTERNAL MEDICINE

## 2021-09-23 PROCEDURE — 97530 THERAPEUTIC ACTIVITIES: CPT

## 2021-09-23 PROCEDURE — 97130 THER IVNTJ EA ADDL 15 MIN: CPT

## 2021-09-23 PROCEDURE — 90792 PSYCH DIAG EVAL W/MED SRVCS: CPT | Performed by: PSYCHIATRY & NEUROLOGY

## 2021-09-23 PROCEDURE — APPSS30 APP SPLIT SHARED TIME 16-30 MINUTES: Performed by: STUDENT IN AN ORGANIZED HEALTH CARE EDUCATION/TRAINING PROGRAM

## 2021-09-23 PROCEDURE — 1180000000 HC REHAB R&B

## 2021-09-23 RX ADMIN — LISINOPRIL 20 MG: 20 TABLET ORAL at 10:43

## 2021-09-23 RX ADMIN — AMLODIPINE BESYLATE 10 MG: 10 TABLET ORAL at 10:43

## 2021-09-23 RX ADMIN — ASPIRIN 81 MG: 81 TABLET, CHEWABLE ORAL at 10:44

## 2021-09-23 RX ADMIN — METOPROLOL TARTRATE 25 MG: 25 TABLET, FILM COATED ORAL at 20:51

## 2021-09-23 RX ADMIN — METOPROLOL TARTRATE 25 MG: 25 TABLET, FILM COATED ORAL at 10:44

## 2021-09-23 RX ADMIN — Medication 2000 UNITS: at 16:37

## 2021-09-23 RX ADMIN — Medication 100 MG: at 10:43

## 2021-09-23 RX ADMIN — LISINOPRIL 20 MG: 20 TABLET ORAL at 20:51

## 2021-09-23 RX ADMIN — ATORVASTATIN CALCIUM 80 MG: 80 TABLET, FILM COATED ORAL at 20:51

## 2021-09-23 ASSESSMENT — ENCOUNTER SYMPTOMS
COLOR CHANGE: 0
CHOKING: 0
NAUSEA: 0
VOMITING: 0
SHORTNESS OF BREATH: 0
TROUBLE SWALLOWING: 0
BACK PAIN: 1

## 2021-09-23 ASSESSMENT — PAIN SCALES - GENERAL: PAINLEVEL_OUTOF10: 0

## 2021-09-23 NOTE — PROGRESS NOTES
goals  Long term goal 1: Bed mobility with indep  Long term goal 2: sit to stand and bed transfers SBA with cues for safety 50 % of trials  Long term goal 3: Amb 150ft with 2ww  feet  Long term goal 4: CGA 4 stairs with rails  Long term goal 5: [place] balance testing at 25/56 or greater  Long term goal 6: SBA 4 stairs with rails    PLAN OF CARE/Safety:   Plan Comment: Cont. POC  Safety Devices  Type of devices: Chair alarm in place; Left in chair      Therapy Time:   Individual   Time In 1430   Time Out 1500   Minutes 30     Minutes:      Transfer/Bed mobility training: 10      Gait training:10      Neuro re education:10        Niesha Mercedes PT, 09/23/21 at 4:00 PM

## 2021-09-23 NOTE — PROGRESS NOTES
INDIVIDUALIZED OVERALL REHAB PLAN OF CARE  ADDENDUM TO REHAB PROGRESS NOTE-for audit purposes must also refer to this day's clinical note and combine the information      Date: 2021  Patient Name: Leela Valdez   Room: S900/P907-60    MRN: [id number]    : 1945  (77 y.o.)  Gender: male       Today 2021 during weekly team meeting, I reviewed the patient Leela Valdez in detail with the therapists and nurses involved in patient's care gathering complex physiatric data regarding current medical issues, progress in therapies, factors limiting progress, social issues, psychological issues, ongoing therapeutic plans and discharge planning. Legend:  I= independent Im =Modified independent  S=Supervised SB=stand by MCKEON=set up CG=contact kristen Min= minimal Mod=Moderate Max=maximal Max of 2 =maximal assist of 2 people      CURRENT FUNCTIONAL STATUS:    NURSING ISSUES:    LSW met with patient and discussed SNF placement. Patient adamantly refused and stated that he was not interested. LSW explained that it is recommended by IDT due to patient needing / supervision and care at this time. Patient stated \"I have 5 acres of land and no one is going to take it! \" LSW explained that patient's land was not being taken away. LSW then discussed how his sister stated that she would be willing to help in any way and is currently taking care of his cats. Patient responded \"She has her own cats and so does my brother, that is 12 cats. That is too many! \" LSW recommended for patient to reconsider SNF placement and that LSW will discuss with him again tomorrow. Patient stated \"there is no need, I'm not going. \" LSW will discuss with patient's sister and will talk to patient again. Severe Aphasia. Continent. LBM 21. Cooperative. Nursing will continue to focus on bowel and bladder continence transitioning toward independence by time of discharge.   Monitoring post void residuals monitoring for severe constipation and bowel obstruction. Focus on achieving ADL goals with co-treating with OT when possible. PHYSICAL THERAPY  Bed mobility:  Supine to Sit: Modified independent (09/20/21 1456)  Sit to Supine: Modified independent (09/20/21 1456)  Transfers:  Sit to Stand: Stand by assistance (09/23/21 1139)  Bed to Chair: Stand by assistance (09/20/21 1455)  Gait:   Device: Rolling Walker (09/23/21 1142)  Assistance: Contact guard assistance;Minimal assistance (09/23/21 1142)  Distance: 75' (09/23/21 1142)  Quality of Gait: variable step length, ff trunk, variable LLE clearance, vc's for proximity to [employer], R toe out (09/23/21 1142)  Comments: VC's for object avoidance to R (09/23/21 1142)  Stairs:  # Steps : 4 (09/22/21 1447)  Rails: Bilateral (09/22/21 1447)  Assistance: Minimal assistance (09/22/21 1447)  Comment: reciprocal ascending, non reciprocal descending. VC's for technique and sequencing throughout. - mild post lean with descent (09/22/21 1447)  W/C mobility:         OCCUPATIONAL THERAPY  Hand Dominance: Right  ADL  Feeding: Setup; Thickened liquids (09/22/21 1012)  Grooming: Supervision (09/22/21 1012)  UE Bathing: Stand by assistance;Verbal cueing (09/22/21 1012)  LE Bathing: Verbal cueing;Stand by assistance (09/22/21 1012)  UE Dressing: Increased time to complete;Setup (09/22/21 1012)  LE Dressing: Contact guard assistance;Verbal cueing (09/22/21 1012)  Toileting: Unable to assess(comment) (pt did not need to go) (09/22/21 1012)  Additional Comments: Pt on toilet upon therapist arrival. (09/18/21 1222)  Toilet Transfers  Toilet - Technique: Stand pivot (09/21/21 1332)  Equipment Used: Grab bars (09/21/21 1332)  Toilet Transfer: Unable to assess (09/22/21 1013)  Toilet Transfers Comments: Pt did not need to go (09/22/21 1013)     Shower Transfers  Shower - Transfer From: Jay Thompson (09/22/21 1013)  Shower - Transfer Type: To and From (09/22/21 1013)  Shower - Transfer To:  Shower seat with back (09/22/21 1013)  Shower - Technique: Ambulating (09/22/21 1013)  Shower Transfers: Contact Guard (09/22/21 1013)  Shower Transfers Comments: grab bars - assist to maneuver ww x 1 (09/15/21 1210)      SPEECH THERAPY  Motor Speech: Exceptions to [facility] (mild reduced articulation at sentence to conversation level)  Comprehension:  (Moderate auditory comprehension deficits continue to be noted with yes/no questions and muti step directions. Recommend repetition and simplified instructions)  Verbal Expression:  (Moderate verbal expression deficits continue to be noted in the areas of divergent/convergent naming, initiation, and confrontational naming. Min to mod assist including word retrieval strategies improve accuracy)      Diet/Swallow:  Diet Solids Recommendation: Dysphagia Soft and Bite-Sized (Dysphagia III)  Liquid Consistency Recommendation: Thin  Dysphagia Outcome Severity Scale: Level 5: Mild dysphagia- Distant supervision. May need one diet consistency restricted    Compensatory Swallowing Strategies: Alternate solids and liquids, No straws, Upright as possible for all oral intake, Swallow 2 times per bite/sip, Eat/Feed slowly  Therapeutic Interventions: Bolus control exercises, Diet tolerance monitoring, Oral motor exercises, Patient/Family education, Pharyngeal exercises, Vital Stim/NMES, Laryngeal exercises, Effortful swallow, Tongue base strengthening          COGNITION  OT: Cognition Comment: Comp; Supervision Express:  Independent Social: Supervision Prob: Min A Mem: Supervision  SP:Memory: Unable to assess (full formal assessment of memory not completed, suspect memory deficits with decreased carryover of techniques)  Problem Solving: Unable to assess (unable to perform formal assessment of problem solving)        THERAPY, MEDICAL AND NURSING COORDINATION:    [x]  Pain medication before therapies     []  Check orthostatic BP      [x]  Ambulate to the bathroom in room    [x]  Add scheduled rest beaks     []  In room therapies      Discharge date set for:              9/28/21---possibly to skilled--Pt is refusing--will need competency eval-- vs directly home if found competent      Home with:   alone --would need 24 hour supervision-which family can not do with help from   sister            And:      Home Health Care:     [x]  PT    [x]  OT    [x]  ST   [x]  Aide   []  SW    [x]  RN                       Equipment:  WW      At D/C their function is goaled at:   PT:Long term goal 1: Bed mobility with indep  Long term goal 2: sit to stand and bed transfers SBA with cues for safety 50 % of trials  Long term goal 3: Amb 150ft with 2ww  feet  Long term goal 4: CGA 4 stairs with rails  Long term goal 5: [place] balance testing at 25/56 or greater  OT:Eating  Assistance Needed: Setup or clean-up assistance  CARE Score: 5  Discharge Goal: Independent, Oral Hygiene  Assistance Needed: Supervision or touching assistance  CARE Score: 4  Discharge Goal: Independent, [address]  Reason if not Attempted: Not applicable (pt did not need to go)  CARE Score: 9  Discharge Goal: Independent, Shower/Bathe Self  Assistance Needed: Supervision or touching assistance  CARE Score: 4  Discharge Goal: Independent  Upper Body Dressing  Reason if not Attempted: Not applicable (gown)  CARE Score: 9  Discharge Goal: Independent, Lower Body Dressing  Assistance Needed: Supervision or touching assistance  CARE Score: 4  Discharge Goal: Independent, Putting On/Taking Off Footwear  Assistance Needed: Supervision or touching assistance  CARE Score: 4  Discharge Goal: Independent, Toilet Transfer  Assistance Needed: Supervision or touching assistance  Reason if not Attempted: Not applicable (pt did not need to go)  CARE Score: 9  Discharge Goal: Independent  SP:Long-term Goals  Timeframe for Long-term Goals: 2-3 weeks  Goal 1: Pt will improve his/her Expressive Language abilities to a modfied I level for expression of complex wants, needs, feelings/ideas, and medical/safety information. Goal 2: Pt will improve her/his Receptive Language abilities to a modfied I level for comprehension of conversation and safety directions with familiar and unfamiliar communication partners. Long-term Goals  Timeframe for Long-term Goals: 2-3 weeks  Goal 1: Pt will tolerate least restrictive diet with no overt s/s of aspiration. From a cognitive standpoint they will need:        24 hr supervision  --progress to occasional           Significant problems/ barriers to functional progress include: Pt is at a high risk for functional loss,    [x]  Acute infection/UTI    []  Low BP's     []  COPD flare-up   []  Uncontrolled blood sugar     []  Progressive anemia         []  Severe pain exacerbation     [x]  Impaired mental status-competency eval P    []  Urinary incontinence    []  Bowel incontinence         Add E stim in SLPPlan to correct barriers to functional progress: Add scheduled rest breaks, control pain by using ice Lidoderm rest and massage as well as pain medications prior to therapy. Based on a comprehensive evaluation of the above, the individualized therapy and Discharge plan will be:    -Times stated are an average that will be varied based on the patient's daily need. PT  1 1/2  hrs/day 5-7 days per week           OT  1 1/2hrs per day 5-7 days per week ST ____1/2__hrs /day 3-5 days per week       Estimated LOS 2 week(s)    - Overall functional prognosis:     [x]  Good    []  Fair    []  Poor -Medical Prognosis:   [x]  Good    []  Fair    []  Poor    This patient was made aware of the discussion of Plan of Care, their projected dicharge date and their projected function at discharge.        Elizabeth Nieves DO

## 2021-09-23 NOTE — PROGRESS NOTES
[facility]  Facility/Department: [facility]  Speech Language Pathology   Treatment Note          Nonnie Kocher  1945  [id number]        Rehab Dx/Hx: Abnormality of gait following cerebrovascular accident (CVA) [P08.630, R26.9]  Abnormality of gait and mobility [R26.9]    Precautions: falls    Medical Dx: Abnormality of gait following cerebrovascular accident (CVA) [Q28.150, R26.9]  Abnormality of gait and mobility [R26.9]  Speech Dx: Aphasia    Date: 9/23/2021    Subjective:  Alert, Cooperative and Pleasant        Interventions used this date:  Expressive Language and Receptive Language    Objective/Assessment:  Patient progressing towards goals:  Short-term Goals  Timeframe for Short-term Goals: 1-2 weeks  Goal 1: Pt will complete descriptive naming tasks with 80% accuracy with min verbal cues to promote use of circumlocution strategy and help the patient express his/her basic personal, safety, and medical wants and needs in the presence of communication deficits. Patient completed descriptive naming task I with 69% accuracy, with slow rate cues 100% accuracy. Goal 3: Pt will follow 3-multi step written directions with 90% accuracy with min cues to improve the pt's reading comprehension and ability to utilize compensatory strategy of environmental referencing for maximum independence. Patient completed written multi step directions I with 0% accuracy, with descriptive cues 33% accuracy. Patient required verbal cues to improve comprehension of task. Goal 5: Pt will describe an object by two attributes with 80% acc with mild cues to promote use of circumlocution strategy and help the patient express his/her basic personal, safety, and medical wants and needs. Patient described objects naming 2 attributes I with 20% accuracy, with [facility]- questions 60% accuracy, with sentence completion 80% accuracy.     Long-term Goals  Timeframe for Long-term Goals: 2-3 weeks  Goal 1: Pt will improve his/her Expressive Language abilities to a modfied I level for expression of complex wants, needs, feelings/ideas, and medical/safety information. Goal 2: Pt will improve her/his Receptive Language abilities to a modfied I level for comprehension of conversation and safety directions with familiar and unfamiliar communication partners. Patient answered [facility]- questions I with 80% accuracy, with descriptive cueing 85% accuracy. Compensatory Swallowing Strategies: Alternate solids and liquids, No straws, Upright as possible for all oral intake, Swallow 2 times per bite/sip, Eat/Feed slowly      Treatment/Activity Tolerance:  Patient tolerated treatment well    Plan:  Continue per POC    Pain Assessment:  Pre-Treatment  Pain assessment: 0-10  Pain level: 0  Intervention:  Patient denies pain. Post-Treatment  Pain assessment: 0-10  Pain level: 0  Intervention:  Patient denies pain. Patient/Caregiver Education:  Patient educated on session and progression towards goals.     Safety Devices:  Chair alarm in place      [id number] [address] ([facility]):    SWALLOWING  Rating:  DNT    SPOKEN LANGUAGE COMPREHENSION  Rating:  3    SPOKEN LANGUAGE EXPRESSION  Rating:  3    MOTOR SPEECH  Ratin    PROBLEM SOLVING  Rating:  DNT    MEMORY  Rating:  DNT          Therapy Time  SLP Individual Minutes  Time In: 1000  Time Out: 6309  Minutes: 30              Signature: Electronically signed by Brent Root on 2021 at 11:21 AM

## 2021-09-23 NOTE — CARE COORDINATION
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)    CO MORBIDITIES:      Past Medical History:   Diagnosis Date    Cellulitis of left hand 3/26/2019    Hypertension      Past Surgical History:   Procedure Laterality Date    BACK SURGERY          Restrictions  Restrictions/Precautions: Fall Risk, Swallowing - Thickened Liquids  CASE MANAGEMENT    Social/Functional History  Social/Functional History  Lives With: Alone  Type of Home: House  Home Layout: One level  Home Access: Stairs to enter with rails  Entrance Stairs - Number of Steps: 3  Entrance Stairs - Rails: Both  Bathroom Shower/Tub: Tub/Shower unit  Bathroom Equipment: Hand-held shower  Home Equipment: Cane, Rolling walker (rollator)  ADL Assistance: Independent  Homemaking Assistance: Independent  Homemaking Responsibilities: Yes  Ambulation Assistance: Independent (2ww)  Transfer Assistance: Independent  Active : Yes  Mode of Transportation: [email or web address]  Occupation: Retired  Type of occupation:   Leisure & Hobbies: working on cars  IADL Comments: completes own medicaions/finances  Additional Comments: sister local       Pts personal preferences: n/a    Pts assets/resources/support system: sister and brother local    COVERAGE INFORMATION:Payor: MEDICARE / Plan: MEDICARE PART A AND B / Product Type: *No Product type* /       NURSING  Weight: 165 lb 4.8 oz (75 kg) / Body mass index is 25.13 kg/m². Adult Oral Nutrition Supplement; Frozen Oral Supplement  Adult Oral Nutrition Supplement; Fortified Pudding Oral Supplement  ADULT DIET;  Dysphagia - Soft and Bite Sized    SpO2: 100 % (21 0759)  No active isolations    Skin Issues: Yes and scattered abrasions Pain Managed: Yes    Bladder continence: Yes    Bowel continence: Yes      Other: d/c lovenox , confused- competency eval ordered       PHYSICAL THERAPY  Bed mobility:  Supine to Sit: Modified independent (09/20/21 1456)  Sit to Supine: Modified independent (09/20/21 1456)  Transfers:  Sit to Stand: Stand by assistance (09/22/21 1446)  Bed to Chair: Stand by assistance (09/20/21 1455)  Gait:   Device: Rolling Walker (09/22/21 1447)  Assistance: Contact guard assistance;Minimal assistance (09/22/21 1447)  Distance: 50 feet x 2 in addition to multiple short distance trials (09/22/21 1447)  Quality of Gait: variable step length,wide ISAIAH, forward flexed trunk, variable left foot clearance (09/22/21 1447)  Comments: vc's for RLE foot clearance with fatigue (09/22/21 1208)  Stairs:  # Steps : 4 (09/22/21 1447)  Rails: Bilateral (09/22/21 1447)  Assistance: Minimal assistance (09/22/21 1447)  Comment: reciprocal ascending, non reciprocal descending. VC's for technique and sequencing throughout. - mild post lean with descent (09/22/21 1447)  W/C mobility:     LTG:  Long term goal 1: Bed mobility with indep  Long term goal 2: sit to stand and bed transfers SBA with cues for safety 50 % of trials  Long term goal 3: Amb 150ft with 2ww  feet  Long term goal 4: CGA 4 stairs with rails  Long term goal 5: [place] balance testing at 25/56 or greater  PT Treatment Time:  1.0 hrs      OCCUPATIONAL THERAPY  Hand Dominance: Right  ADL  Feeding: Setup; Thickened liquids (09/22/21 1012)  Grooming: Supervision (09/22/21 1012)  UE Bathing: Stand by assistance;Verbal cueing (09/22/21 1012)  LE Bathing: Verbal cueing;Stand by assistance (09/22/21 1012)  UE Dressing: Increased time to complete;Setup (09/22/21 1012)  LE Dressing: Contact guard assistance;Verbal cueing (09/22/21 1012)  Toileting: Unable to assess(comment) (pt did not need to go) (09/22/21 1012)  Additional Comments: Pt on toilet upon therapist arrival. (09/18/21 1222)  Toilet Transfers  Toilet - Technique: Stand pivot (09/21/21 1332)  Equipment Used: Grab bars (09/21/21 1332)  Toilet Transfer: Unable to assess (09/22/21 1013)  Toilet Transfers Comments: Pt did not need to go (09/22/21 1013)     Shower Transfers  Shower - Transfer From: [facility] (09/22/21 1013)  Shower - Transfer Type: To and From (09/22/21 1013)  Shower - Transfer To: Shower seat with back (09/22/21 1013)  Shower - Technique: Ambulating (09/22/21 1013)  Shower Transfers: Contact Guard (09/22/21 1013)  Shower Transfers Comments: grab bars - assist to maneuver ww x 1 (09/15/21 1210)  LTG:  Eating  Assistance Needed: Setup or clean-up assistance  CARE Score: 5  Discharge Goal: Independent, Oral Hygiene  Assistance Needed: Supervision or touching assistance  CARE Score: 4  Discharge Goal: Independent, Toileting Hygiene  Reason if not Attempted: Not applicable (pt did not need to go)  CARE Score: 9  Discharge Goal: Independent, Shower/Bathe Self  Assistance Needed: Supervision or touching assistance  CARE Score: 4  Discharge Goal: Independent  Upper Body Dressing  Reason if not Attempted: Not applicable (gown)  CARE Score: 9  Discharge Goal: Independent, Lower Body Dressing  Assistance Needed: Supervision or touching assistance  CARE Score: 4  Discharge Goal: Independent, Putting On/Taking Off Footwear  Assistance Needed: Supervision or touching assistance  CARE Score: 4  Discharge Goal: Independent, Toilet Transfer  Assistance Needed: Supervision or touching assistance  Reason if not Attempted: Not applicable (pt did not need to go)  CARE Score: 9  Discharge Goal: Independent  OT Treatment Time: 2.0 hrs      SPEECH THERAPY    Motor Speech: Exceptions to [facility] (mild reduced articulation at sentence to conversation level)  Comprehension:  (Moderate auditory comprehension deficits continue to be noted with yes/no questions and muti step directions.  Recommend repetition and simplified instructions)  Verbal Expression: Recreation Therapy Group [] Support Group           Patient social interaction (mood, participation): decreased motivation      Patient strengths: was independent prior     Patients goal: to go home    Problems/Barriers: 24 hour supervision         1. Safety:          - Intervention / Plan:    [x]  falls protocol     [x]  PT/OT    []  SP        - Results:         2. Potential DME needs:         - Intervention / Plan:  [x]  PT/OT     [x]  Assess equipment needs/access       - Results:         3. Weakness:          - Intervention / Plan:  [x]  PT/OT      []  Other:         - Results:         4. Discharge planning needs:          - Intervention / Plan:  [x]  Weekly team conference      [x]  family training        - Results:         5.            - Intervention / Plan:          - Results:         6.            - Intervention / Plan:         - Results:         7.            - Intervention / Plan:         - Results:           Discharge Plan   Estimated Length of Stay: 14 days     Tentative Discharge date: 9/28/21      Anticipated Discharge Destination:  Home vs SNF      Team recommendations:    1. Follow up Therapy :    PT  OT  SLP  RN  Social Work  [facility]    2. SNF    Other:     Equipment needed at Discharge:  Other: TBD      Team Members Present at Conference:    Physician: Dr. Sheldon Stout  : Lyla Blake, RN  : Kevin Monteiro, MSW, LSW  RN: Khoa Natarajan RN  Physical Therapist: Gilmer Stacy PT  Occupational [place] [address]  Speech Therapist: Rox Saucedo, SLP     Electronically signed by Evans Bravo RN on 9/23/2021 at 8:42 AM

## 2021-09-23 NOTE — PROGRESS NOTES
Subjective: The patient complains of severe acute on chronic progressive fatigue and balance and cognitive deficits due to cerebellar CVA partially relieved by rest,medications, PT,  OT,   SLP and rest and exacerbated by recent illness. I am concerned about patients medical complexities including risk for arrhythmia as cause for embolic CVA and severe aphasia. He is to start E stim today for swallow. I have ordered a modified barium swallow recheck  --upgraded to dysphagia soft with thins. I reviewed current care and plans for further care with other rehab providers including nursing and case management. According to recent  note, \" LSW met with patient and discussed SNF placement. Patient adamantly refused and stated that he was not interested. LSW explained that it is recommended by IDT due to patient needing 24/7 supervision and care at this time. Patient stated \"I have 5 acres of land and no one is going to take it! \" LSW explained that patient's land was not being taken away. LSW then discussed how his sister stated that she would be willing to help in any way and is currently taking care of his cats. Patient responded \"She has her own cats and so does my brother, that is 12 cats. That is too many! \" LSW recommended for patient to reconsider SNF placement and that LSW will discuss with him again tomorrow. Patient stated \"there is no need, I'm not going. \" LSW will discuss with patient's sister and will talk to patient again. \".    ROS x10: The patient also complains of severely impaired mobility and activities of daily living. Otherwise no new problems with vision, hearing, nose, mouth, throat, dermal, cardiovascular, GI, , pulmonary, musculoskeletal, psychiatric or neurological. See Rehab H&P on Rehab chart dated .        Vital signs:  /85   Pulse 61   Temp 98.4 °F (36.9 °C) (Oral)   Resp 16   Ht 5' 8\" (1.727 m)   Wt 165 lb 4.8 oz (75 kg)   SpO2 100%   BMI 25.13 kg/m² I/O:   PO/Intake:  fair PO intake,  Thins and soft-dysphagia    Bowel/Bladder:   occasional loose stools- Continent. General:  Patient is well developed, adequately nourished, non-obese and     well kempt. HEENT:    PERRLA, hearing intact to loud voice, external inspection of ear     and nose benign. Inspection of lips, tongue and gums benign  Musculoskeletal: No significant change in strength or tone. All joints stable. Inspection and palpation of digits and nails show no clubbing,       cyanosis or inflammatory conditions. Neuro/Psychiatric: Affect: flat but pleasant. Alert and oriented to person, place and     Situation with  min cues. No significant change in deep tendon reflexes or     Sensation-poor judgment reasoning and insight. Lungs:  Diminished, CTA-B. Respiration effort is normal at rest.     Heart:   S1 = S2, RRR. No loud murmurs. Abdomen:  Soft, non-tender, no enlargement of liver or spleen. Extremities:  No significant lower extremity edema or tenderness. Skin:   Intact to general survey, no visualized or palpated problems. Rehabilitation:  Physical therapy:   Bed Mobility: Scooting: Stand by assistance    Transfers: Sit to Stand: Stand by assistance  Stand to sit: Stand by assistance, Contact guard assistance  Bed to Chair: Stand by assistance, Ambulation 1  Surface: carpet  Device: Rolling Walker  Assistance: Contact guard assistance, Minimal assistance  Quality of Gait: variable step length, ff trunk, variable LLE clearance, vc's for proximity to [employer], R toe out  Gait Deviations: Slow Princess, Shuffles, Decreased step length, Decreased step height, Decreased head and trunk rotation  Distance: 76'  Comments: VC's for object avoidance to R, Stairs  # Steps : 4  Stairs Height: 6\"  Rails: Bilateral  Assistance: Minimal assistance  Comment: reciprocal ascending, non reciprocal descending.  VC's for technique and sequencing throughout. - mild post lean with descent    FIMS:  , , Assessment: Pt continues to exhibit poor cognitive carry over of techniques. Vc's needed throughout gait training for avoiding objects when turning to R. Multiple STS performed to help improve technique and carryover. Pt continues to demonstrate poor carryover. Occupational therapy:    ,  , Assessment: Pt continues to demonstrate decreased initiation, problem solving, and safety awareness during ADLs. Pt continues to benefit from OT services to maximize independence and safety during ADLs and IADLs. Speech therapy:        Lab/X-ray studies reviewed, analyzed and discussed with patient and staff:      telemetry and monitor tech stated that pt was SB-NSR Rate 55-65. No results found for this or any previous visit (from the past 24 hour(s)). Echocardiogram  9/11/2021  Transthoracic Echocardiography  Left Ventricle Left ventricular ejection fraction is visually estimated at 60%. Impaired relaxation compatible with diastolic dysfunction. ( reversed E/A ratio) Moderate concentric left ventricular hypertrophy. Right Ventricle Normal right ventricle structure and function. Normal right ventricle systolic pressure. Left Atrium Moderately dilated left atrium. Normal intact intra-atrial septum was noted with no evidence of significant intra-atrial communications neither by colour flow Doppler imaging nor by aggitated saline study. Right Atrium Normal right atrium. Mitral Valve Moderate annular calcification. Tricuspid Valve Normal tricuspid valve structure and function. Aortic Valve Normal aortic valve structure and function. Trivial aortic regurgitation is noted. Pulmonic Valve Normal pulmonic valve structure and function. Pericardial Effusion No evidence of pericardial effusion. Pleural Effusion No evidence of pleural effusion. Aorta \ Miscellaneous Miscellaneous normal findings were found. CT HEAD  : 9/10/2021  There is no bleed, mass effect, or space occupying lesion.  No extra-axial mass or fluid collections. Hypoattenuated White matter changes in the cerebral hemispheres noted. There is global atrophy. Findings likely reflect chronic small vessel vasculopathy. There is a vague area of low attenuation in the inferior posterior right cerebellar hemisphere with slight obliteration of adjacent sulci. An evolving nonhemorrhagic infarct in the cerebellum would have to be a consideration. Clinical correlation recommended. NO BLEED, MASS EFFECT, OR EXTRA-AXIAL FLUID COLLECTION. EVOLVING NONHEMORRHAGIC INFARCT IN THE INFERIOR RIGHT CEREBELLAR HEMISPHERE. 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 XR CHEST   9/10/2021 Atherosclerotic calcification of the thoracic aorta. The cardiomediastinal silhouette is within normal limits. No pneumothorax, pleural effusion, or consolidation. Question calcified pleural plaques on the left, which can be seen with asbestos related disease. No acute osseous abnormality. No radiographic evidence of acute intrathoracic process. Question left-sided pleural plaques which can be seen with asbestos related disease. MRI BRAIN   9/11/2021 There is a tiny focus of restricted diffusion in the left centrum semiovale (series 4 image 19). There are no extra-axial collections. There is no evidence of hemorrhage. The susceptibility images do not demonstrate evidence of hemosiderin deposition within the brain parenchyma or the leptomeninges. There are numerous patchy foci of T2/FLAIR hyperintensities in the subcortical and periventricular white matter in a symmetric distribution throughout both hemispheres. Chronic bilateral cerebellar infarcts. There is prominence of the sulci and ventricles consistent with moderate global cerebral atrophy and chronic involutional changes. No midline shift. The visualized portions of the orbits are within normal limits, the globes are intact. The visualized portions of the paranasal sinuses are within normal limits.  Right mastoid effusion. Small foci of restricted diffusion in the left centrum semiovale, consistent with acute ischemia. Chronic microvascular changes and bilateral chronic cerebellar infarcts. FL MODIFIED BARIUM  9/13/2021: In cooperation with speech pathology, a modified barium swallow using various consistencies of both solids and liquids with dynamic imaging was performed. Patient's oral stage characterized by mild oral dysphagia. There is premature entry to the level of vallecula. Patient's pharyngeal stage was characterized by moderate pharyngeal dysphagia. There is decreased hyolaryngeal excursion with moderate residuals in the vallecula. There is deep penetration on thin liquids from straw. No aspiration. MILD ORAL AND MODERATE PHARYNGEAL DYSPHAGIA. RECOMMEND BY SPEECH PATHOLOGY TO FOLLOW. US CAROTID ARTERY BILATERAL  9/11/2021 Mild to moderate plaque in the carotid arteries bilaterally. No significant increase in systolic flow or turbulence to indicate any hemodynamically significant narrowing in the right or left internal carotid arteries. There is antegrade flow identified in both vertebral arteries. ARTERIAL BLOOD FLOW VELOCITY RIGHT PEAK SYSTOLIC VELOCITIES (PSV)                                               Prox CCA    129 cm/s             Mid CCA     114 cm/s              Dist CCA    104 cm/s             Prox ICA    169 cm/s               Mid ICA     106 cm/s            Dist ICA    63 cm/s             Prox ECA     170   cm/s             Prox VERT   51 cm/s              ICA/CCA     1.48                        LEFT PEAK SYSTOLIC VELOCITIES (PSV)  Prox CCA    135 cm/s Mid CCA     166 cm/s Dist CCA    121 cm/s Prox ICA    119 cm/s Mid ICA     72 cm/s Dist ICA    61 cm/s Prox ECA    131 cm/s Prox VERT   56 cm/s ICA/CCA     0.72      50-69 % STENOSIS IN THE RIGHT ICA  <50 % STENOSIS IN THE LEFT ICA ANTEGRADE FLOW IN THE BILATERAL VERTEBRAL ARTERIES.      MRI CERVICAL THORACIC LUMBAR SPINE 9/12/2021     CERVICAL REGION: Multilevel degenerative changes with severe canal stenosis at C2-3, C3-4, and C6-7. Moderate canal stenosis at C4-5. THORACIC REGION: No spinal canal stenosis    LUMBAR REGION: Multilevel degenerative changes. Endplate irregularity with joint space narrowing and slight anterolisthesis at L3-4 Severe canal   stenosis at L2-3 and L4-5. Moderate to severe canal stenosis at L3-4. No acute compression fracture. No epidural or paraspinal soft tissue   mass. No abnormal cord signal intensity on the scans. Multiple areas of severe spinal canal stenosis in the cervical region. Severe canal stenosis at L2-3, L3-4 and L4-5. Previous extensive, complex labs, notes and diagnostics reviewed and analyzed. ALLERGIES:    Allergies as of 09/13/2021    (No Known Allergies)      (please also verify by checking MAR)     Today I evaluated this patient for periodic reassessment of medical and functional status. The patient was discussed in detail at the treatment team meeting focusing on current medical issues, progress in therapies, social issues, psychological issues, barriers to progress and strategies to address these barriers, and discharge planning. See the addendum to rehab progress note-as a second progress note in the chart. The patient continues to be high risk for future disability and their medical and rehabilitation prognosis continue to be good and therefore, we will continue the patient's rehabilitation course as planned. The patient's tentative discharge date was set. Patient and family education was discussed. The patient was made aware of the team discussion regarding their progress. Complex Physical Medicine & Rehab Issues Assess & Plan:   1. 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   Functional and medical status reassessed regarding patients ability to participate in therapies and psychological and medical comorbidities and to address difficulties they may be having progressing in therapy. Patient and family education is in progress. The patient is to follow-up with their family physician after discharge. Complex Active General Medical Issues that complicate care Assess & Plan:    1. History of bilateral shoulder pain with history of shoulder dislocation generalized osteoarthritis  2. Stroke determined by clinical assessment and confirmed on MRI as above-consult neurology control blood pressure monitor for diabetes add aspirin  3. Spinal stenosis of lumbar region with neurogenic claudication, Myelopathy concurrent with and due to spinal stenosis of cervical region  4. Ataxic gait due to cerebellar CVA-focus on balance and therapy  5. Significant oral pharyngeal dysphagia-consult speech-language pathology  6. Anxiety and depression with occasional violent outburst-emotional support provided daily, vitamin B12, encourage participation in rehabilitation support group and recreational therapy, adjust/add medications (add vitamin B12 consider SSRI) consult rehabilitation psychology add spiritual care limit toxic stimuli. Keep room quiet, restful with low light or lighting per patient preference preferably natural light. 7. Hypertension, hypercholesterolemia, [place] cerebrovascular disease, coronary artery disease, cardiac arrhythmia-continue telemetry-continue blood signs every shift focusing on heart rate and blood pressure checks, consult hospitalist for backup medical and adjust/add medications (aspirin, Norvasc, Lipitor, lisinopril, Lopressor) consult cardiology regarding V. tach titrate beta-blocker  8.  Oral pharyngeal dysphagia with cognitive deficits-dysphagia soft with bite-size   Thins liquid consult speech-language pathology--oral motor exercises       Electronically signed by Santana Barber DO on 9/15/21 at 8:03 AM RUPA Echeverria D.O., PM&R     Attending [address]

## 2021-09-23 NOTE — PROGRESS NOTES
Physical Therapy  Physical Therapy Rehab Treatment Note  [facility]/Department[facility]  [facility]       NAME: Earlene Mohamud  :  (29 y.o.)  MRN: [id number]  CODE STATUS: Full Code    Date of Service: 2021  Chart Reviewed: Yes  Family / Caregiver Present: No    Restrictions:  Restrictions/Precautions: Fall Risk, Swallowing - Thickened Liquids       SUBJECTIVE: Subjective: \"I'm good. \"  Pain Screening  Patient Currently in Pain: Denies  Pre Treatment Pain Screening  Pain at present: 0  Intervention List: Patient able to continue with treatment    Post Treatment Pain Screening:  Pain Assessment  Pain Level: 0    OBJECTIVE:     Neuromuscular Education  Neuromuscular Comments: Manuevering aroud objects, requires min A d/t placing WW far fwd and side stepping v fwd stepping. Pt with decreased step length on R, vc's for avoiding objects, very close to objects on R    Transfers  Sit to Stand: Stand by assistance  Stand to sit: Stand by assistance;Contact guard assistance  Comment: STS x5 to improve safety and technique with hand placement. Poor carryover. Decreased safety with approach to chair. VC's for sequencing    Ambulation  Ambulation?: Yes  More Ambulation?: No  Ambulation 1  Surface: carpet  Device: Rolling Walker  Assistance: Contact guard assistance;Minimal assistance  Quality of Gait: variable step length, ff trunk, variable LLE clearance, vc's for proximity to [employer], R toe out  Gait Deviations: Slow Princess; Shuffles;Decreased step length;Decreased step height;Decreased head and trunk rotation  Distance: 76'  Comments: VC's for object avoidance to R    Exercises  Hamstring Sets: curls with RTB x 15  Hip Flexion: x20  Hip Abduction: x20 side kicks x20 hip add ball squeezes  Knee Long Arc Quad: x20  Ankle Pumps: x20     ASSESSMENT/PROGRESS TOWARDS GOALS:  Assessment: Pt continues to exhibit poor cognitive carry over of techniques.  Vc's needed throughout gait training for avoiding objects when turning to R. Multiple STS performed to help improve technique and carryover. Pt continues to demonstrate poor carryover. Goals:  Long term goals  Long term goal 1: Bed mobility with indep  Long term goal 2: sit to stand and bed transfers SBA with cues for safety 50 % of trials  Long term goal 3: Amb 150ft with 2ww  feet  Long term goal 4: CGA 4 stairs with rails  Long term goal 5: [place] balance testing at 25/56 or greater  Long term goal 6: SBA 4 stairs with rails    PLAN OF CARE/Safety:   Plan Comment: Cont.  POC      Therapy Time:   Individual   Time In 1130   Time Out 1200   Minutes 30     Minutes:      Transfer/Bed mobility training: 10      Gait trainin      Neuro re education: 5     Therapeutic ex:  [address], PTA, 21 at 12:05 PM

## 2021-09-23 NOTE — PROGRESS NOTES
Occupational Therapy  Facility/Department: [facility]  Daily Treatment Note  NAME: Sylvester Oropeza  : 1945  MRN: [id number]    Date of Service: 2021    Discharge Recommendations:  Continue to assess pending progress       Assessment      REQUIRES OT FOLLOW UP: Yes  Activity Tolerance  Activity Tolerance: Treatment limited secondary to decreased cognition  Safety Devices  Safety Devices in place: Yes  Type of devices: All fall risk precautions in place         Patient Diagnosis(es): There were no encounter diagnoses. has a past medical history of Cellulitis of left hand and Hypertension. has a past surgical history that includes back surgery. Restrictions  Restrictions/Precautions  Restrictions/Precautions: Fall Risk  Position Activity Restriction  Other position/activity restrictions: thin liquid diet 21  Subjective   General  Chart Reviewed: Yes  Patient assessed for rehabilitation services?: Yes  Response to previous treatment: Patient with no complaints from previous session  Family / Caregiver Present: No  Referring Practitioner: Dr. Betsy Mcneill  Diagnosis: Impaired mobility, gait, and ADL's 2º inferior cerebellar stroke  Subjective  Subjective: \"I think I can go home\"  Pre Treatment Pain Screening  Pain at present: 0  Scale Used: Numeric Score  Intervention List: Patient able to continue with treatment   Orientation  Orientation  Overall Orientation Status: Within Functional Limits  Objective        While seated at tabletop, pt sorted playing cards onto velcro card board using B UEs to improve problem solving during ADLs. Pt completed activity last week and had max difficulty to sort cards. Pt was able to begin the activity and sort 6 cards quickly without errors but then demonstrates max difficulty to locate 3 more cards. Pt was provided with verbal cues but continues to have significant difficulty. Pt requires increased time to sort 9 cards.  Pt states \"I don't think I can do this anymore\". Task was ended. Balance  Standing Balance: Stand by assistance  Standing Balance  Time: 12 minutes  Activity: sort wooden blocks  Comment: Pt completed sit<>stand transfers at SBA and stood with SBA to sort wooden blocks onto vertical graded dowels using B UEs to improve standing balance during ADLs. Pt reached outside of his ISAIAH to retrieve wooden blocks from a container with fair+ balance. Pt sorted blocks with min difficulty. Pt with min errors but was able to correct errors independently. Pt toleranted well. Pt then sat to remove blocks one at a time using B UEs and 1lb wrist weights to improve strength and endurance during ADLs. Pt worked at a steady pace and requires no rest breaks. Pt requires 2 verbal cues to remove blocks one at a time. Pt reports min shoulder fatigue after task. Transfers  Sit to stand: Stand by assistance  Stand to sit: Stand by assistance      While seated at tabletop, pt assembled one piece cut out puzzles of familiar objects such as a cat and house using B UEs to improve problem solving during ADLs. Pt demonstrates min difficulty to initiate task but after provided with verbal cues pt able to sort all 12 puzzles without errors. Pt completed with good attention to task. Pt was then instructed to return puzzles to bag and zip up. Pt able to follow first step but demonstrates difficulty zipping bag. Therapist provided verbal demonstration and pt was able to zip the bag.       Plan   Plan  Times per week: 5-7x  Plan weeks: 2 weeks  Current Treatment Recommendations: Strengthening, Functional Mobility Training, Balance Training, Neuromuscular Re-education, Cognitive/Perceptual Training, Safety Education & Training, Patient/Caregiver Education & Training, Equipment Evaluation, Education, & procurement, Self-Care / ADL, Home Management Training  Plan Comment: continue with current POC  G-Code     OutComes Score                                                  AM-PAC Score Goals  Long term goals  Time Frame for Long term goals :  Within 2 weeks, pt to demonstrate progress in the following areas listed below to achieve specific LTG's as stated in initial evaluation  Long term goal 1: Improve independence during ADLs/IADLs  Long term goal 2: Improve strength and endurance to perform functional transfers  Long term goal 3: Improve fine motor coordination during self care  Long term goal 4: Improve cognition to demonstrate understanding of HEP  Long term goal 5: improve balance to safely perform functional mobility  Patient Goals   Patient goals : \"to get better\"       Therapy Time   Individual Concurrent Group Co-treatment   Time In 1300         Time Out 1400         Minutes 60           Neuromuscular reeducation: 15 minutes  Cognitive Retrainin minutes     Edouard Murphy OT   Electronically signed by Edouard Murphy OT on 2021 at 2:19 PM

## 2021-09-23 NOTE — PROGRESS NOTES
Occupational Therapy  Facility/Department: [facility]  Daily Treatment Note  NAME: Rae Hillman  : 1945  MRN: [id number]    Date of Service: 2021    Discharge Recommendations:  Continue to assess pending progress       Assessment      Activity Tolerance  Activity Tolerance: Patient Tolerated treatment well  Safety Devices  Safety Devices in place: Yes  Type of devices: All fall risk precautions in place         Patient Diagnosis(es): There were no encounter diagnoses. has a past medical history of Cellulitis of left hand and Hypertension. has a past surgical history that includes back surgery. Restrictions  Restrictions/Precautions  Restrictions/Precautions: Fall Risk, Swallowing - Thickened Liquids  Subjective   General  Chart Reviewed: Yes  Patient assessed for rehabilitation services?: Yes  Response to previous treatment: Patient with no complaints from previous session  Family / Caregiver Present: No  Referring Practitioner: Dr. Isidra Parrish  Diagnosis: Impaired mobility, gait, and ADL's 2º inferior cerebellar stroke  Subjective  Subjective: \"I think I can go home\"  Vital Signs  Patient Currently in Pain: Denies   Orientation     Objective    Pt completed pil sorting task to promote problem solving for IND in ADLs and IADLs, this JASKARAN gave patient instructions one pill at a time for specific time of day, pt was required to locate the proper color \"pill\", opening containers to improve [address] for IND with oral care, dressing and required mod vcs with multiple errors, however able to correct with just verbal cuing. Pt completed STS to promote strength and endurance for IND with mobility and increased safety when sitting and standing. x5 reps 3 sets with vcs for safe tech, hand placement and good carryover overall with no LOB this date.       Plan   Plan  Times per week: 5-7x  Plan weeks: 2 weeks  Current Treatment Recommendations: Strengthening, Functional Mobility Training, Balance Training, Neuromuscular Re-education, Cognitive/Perceptual Training, Safety Education & Training, Patient/Caregiver Education & Training, Equipment Evaluation, Education, & procurement, Self-Care / ADL, Home Management Training  Plan Comment: continue with current POC       Goals  Long term goals  Time Frame for Long term goals :  Within 2 weeks, pt to demonstrate progress in the following areas listed below to achieve specific LTG's as stated in initial evaluation  Long term goal 1: Improve independence during ADLs/IADLs  Long term goal 2: Improve strength and endurance to perform functional transfers  Long term goal 3: Improve fine motor coordination during self care  Long term goal 4: Improve cognition to demonstrate understanding of HEP  Long term goal 5: improve balance to safely perform functional mobility  Patient Goals   Patient goals : \"to get better\"       Therapy Time   Individual Concurrent Group Co-treatment   Time In 0930         Time Out 1000         Minutes 30              Therapeutic activities: 30 minutes      JASKARAN Delgado Electronically signed by JASKARAN Delgado on 9/23/2021 at 10:20 AM

## 2021-09-23 NOTE — PLAN OF CARE
Problem: Falls - Risk of:  Goal: Will remain free from falls  Description: Will remain free from falls  Outcome: Ongoing  Goal: Absence of physical injury  Description: Absence of physical injury  Outcome: Ongoing     Problem: Mobility - Impaired:  Goal: Mobility will improve  Description: Mobility will improve  Outcome: Ongoing     Problem: IP SWALLOWING  Goal: LTG - patient will tolerate the least restrictive diet consistency to allow for safe consumption of daily meals  Outcome: Ongoing     Problem: Skin Integrity:  Goal: Will show no infection signs and symptoms  Description: Will show no infection signs and symptoms  Outcome: Ongoing  Goal: Absence of new skin breakdown  Description: Absence of new skin breakdown  Outcome: Ongoing     Problem: IP BALANCE  Goal: LTG - patient will maintain standing balance to allow for completion of daily activities  Outcome: Ongoing

## 2021-09-23 NOTE — CONSULTS
[address] [place] [address] Department of Psychiatry  Behavioral Health Consult    REASON FOR CONSULT: Capacity eval    CONSULTING PHYSICIAN: Dr Mercedes Gruber    History obtained from: patient    HISTORY OF PRESENT ILLNESS:      The patient is a 77 y.o. male with significant medical issues including CVA and weakness  Pt is currently getting rehab. Pt believe that he can go home and handle his ADL which he clearly can't based on his current physical condition    Regarding capacity eval:    1. Able to understand the nature and purpose of the proposed treatment  2. Unable to understand the risk benefit alternative options to the proposed treatment  3. Decision making process is not affected by depression, psychosis  4. Alert and oriented x 1  5. Unable to retain information given to him  6. Unable to arrive at the decision based on the information available to him        The patient is not currently receiving care for the above psychiatric illness. Psychiatric Review of Systems       Depression: denies     Mily or Hypomania:  no     Panic Attacks:  no     Phobias:  no     Obsessions and Compulsions:  no     PTSD : no     Hallucinations:  no     Delusions:  no      Substance Abuse History:  ETOH: no  Marijuana: no  Opiates: no  Other Drugs: no    Past Medical History:        Diagnosis Date    Cellulitis of left hand 3/26/2019    Hypertension        Past Surgical History:        Procedure Laterality Date    BACK SURGERY         Medications Prior to Admission:   Medications Prior to Admission: naproxen sodium (ALEVE) 220 MG tablet, Take 220 mg by mouth 2 times daily (with meals)  cloNIDine (CATAPRES) 0.1 MG tablet, TAKE 1 TABLET BY MOUTH TWICE A DAY  amLODIPine (NORVASC) 10 MG tablet, Take 1 tablet by mouth daily  neomycin-bacitracin-polymyxin (NEOSPORIN) 400-5-5000 ointment, Apply topically 2 times daily. Allergies:  Patient has no known allergies. FAMILY/SOCIAL HISTORY:  History reviewed.  No pertinent family history. Social History     Socioeconomic History    Marital status: Single     Spouse name: Not on file    Number of children: Not on file    Years of education: Not on file    Highest education level: Not on file   Occupational History    Not on file   Tobacco Use    Smoking status: Never Smoker    Smokeless tobacco: Never Used   Vaping Use    Vaping Use: Never used   Substance and Sexual Activity    Alcohol use: Never     Alcohol/week: 0.0 standard drinks    Drug use: Never    Sexual activity: Not on file   Other Topics Concern    Not on file   Social History Narrative    Lives With: Alone, sister lives in the area    Type of Home: [address]: One level    Home Access: Stairs to enter with rails- Number of Steps: 3- Rails: Both    Bathroom Shower/Tub: Tub/Shower unit    Home Equipment: Cane, Rolling walker (rollator)    ADL Assistance: [address]: Independent    Homemaking Responsibilities: Yes    Ambulation Assistance: Independent (2ww)    Transfer Assistance: Independent    Active : No    He is a retired  for [employer]. He learned how to be a  in [place] was stationed in [place] during the [address] [facility]. Never went overseas. Never  no kids graduated [employer]. Social Determinants of Health     Financial Resource Strain:     Difficulty of Paying Living Expenses:    Food Insecurity:     Worried About Running Out of Food in the Last Year:     [address] in the Last Year:    Transportation Needs:     Lack of Transportation (Medical):      Lack of Transportation (Non-Medical):    Physical Activity:     Days of Exercise per Week:     Minutes of Exercise per Session:    Stress:     Feeling of Stress :    Social Connections:     Frequency of Communication with Friends and Family:     Frequency of Social Gatherings with Friends and Family:     Attends Yazidism Services:     Active Member of Clubs or Organizations:     Attends Club or Organization Meetings:     Marital Status:    Intimate Partner Violence:     Fear of Current or Ex-Partner:     Emotionally Abused:     Physically Abused:     Sexually Abused:        REVIEW OF SYSTEMS    Constitutional: [] fever  [] chills  [] weight loss  []weakness [] Other:  Eyes:  [] photophobia  [] discharge [] acuity change   [] Diplopia   [] Other:  HENT:  [] sore throat  [] ear pain [] Tinnitus   [] Other  Respiratory:  [] Cough  [] Shortness of breath   [] Sputum   [] Other:   Cardiac: []Chest pain   []Palpitations []Edema  []PND  [] Other:  GI:  []Abdominal pain   []Nausea  []Vomiting  []Diarrhea  [] Other:  :  [] Dysuria   []Frequency  []Hematuria  []Discharge  [] Other:  Possible Pregnancy: []Yes   []No   LMP:   Musculoskeletal:  []Back pain  []Neck pain  []Recent Injury   Skin:  []Rash  [] Itching  [] Other:  Neurologic:  [] Headache  [] Focal weakness  [] Sensory changes []Other:  Endocrine:  [] Polyuria  [] Polydipsia  [] Hair Loss  [] Other:  Lymphatic:   [] Swollen glands   Psychiatric:  As per HPI      All other systems negative except as marked or mentioned/indicated in the HPI. [place] Donaldson      PHYSICAL EXAM:  Vitals:  /85   Pulse 61   Temp 98.4 °F (36.9 °C) (Oral)   Resp 16   Ht 5' 8\" (1.727 m)   Wt 165 lb 4.8 oz (75 kg)   SpO2 100%   BMI 25.13 kg/m²      Neuro Exam:   Muscle Strength & Tone: full ROM  Gait: a shuffling gait      Involuntary Movements: No    Mental Status Examination:    Level of consciousness:  within normal limits   Appearance:  ill-appearing  Behavior/Motor:  no abnormalities noted  Attitude toward examiner:  withdrawn  Speech:  spontaneous   Mood: anxious  Affect:  blunted  Thought processes:  coherent   Thought content:  Suicidal Ideation:  denies suicidal ideation  Cognition:     Concentration distractible  Memory   Mini Mental Status not completed  Insight poor   Judgement poor   Fund of Knowledge limited      DIAGNOSIS:    Anxiety unspecified        LABS: REVIEWED TODAY:  No results for input(s): WBC, HGB, PLT in the last 72 hours. No results for input(s): NA, K, CL, CO2, BUN, CREATININE, GLUCOSE in the last 72 hours. No results for input(s): BILITOT, ALKPHOS, AST, ALT in the last 72 hours. No results found for: [address] [facility], [facility] [address], [facility]ENZ, [address], [address] [place] [address] 70, [facility] [place], PPXUR, ETOH  No results found for: TSH, FREET4  No results found for: LITHIUM  No results found for: VALPROATE, CBMZ  No results found for: LITHIUM, VALPROATE    FURTHER LABS ORDERED :      Radiology   Echocardiogram complete 2D with doppler with color    Result Date: 9/11/2021  Transthoracic Echocardiography Report (TTE)  Demographics   Patient Name    Yamil Dan Gender               Male   Patient Number  [id number]       Race                 Unknown                                  Ethnicity   Visit Number    [id number]      Room Number          N175   Corporate ID                   Date of Study        09/11/2021   Accession       [id number]     Referring Physician  Number   Date of Birth   1945     Sonographer          Nasim Lucero   Age             77 year(s)     Interpreting         [facility])                                 Physician            Cardiology                                                      [address] [place] [address]  Procedure Type of Study   TTE procedure:ECHOCARDIOGRAM COMPLETE WITH BUBBLE STUDY. Procedure Date Date: 09/11/2021 Start: 08:15 AM Study Location: Portable Technical Quality: Adequate visualization Indications:Stroke. Patient Status: Routine Height: 68 inches Weight: 179 pounds BSA: 1.95 m^2 BMI: 27.22 kg/m^2 BP: 147/86 mmHg  Conclusions   Summary  Moderate annular calcification. Normal aortic valve structure and function. Trivial aortic regurgitation is  noted. Moderately dilated left atrium.   Normal intact intra-atrial septum was noted with no evidence of  significant intra-atrial communications neither by colour flow Doppler  imaging nor by aggitated saline study. Left ventricular ejection fraction is visually estimated at 60%. Impaired relaxation compatible with diastolic dysfunction. ( reversed E/A  ratio)  Moderate concentric left ventricular hypertrophy. Signature   ----------------------------------------------------------------  Electronically signed by Alyssa Medina(Interpreting physician)  on 09/11/2021 11:17 AM  ----------------------------------------------------------------   Findings  Left Ventricle Left ventricular ejection fraction is visually estimated at 60%. Impaired relaxation compatible with diastolic dysfunction. ( reversed E/A ratio) Moderate concentric left ventricular hypertrophy. Right Ventricle Normal right ventricle structure and function. Normal right ventricle systolic pressure. Left Atrium Moderately dilated left atrium. Normal intact intra-atrial septum was noted with no evidence of significant intra-atrial communications neither by colour flow Doppler imaging nor by aggitated saline study. Right Atrium Normal right atrium. Mitral Valve Moderate annular calcification. Tricuspid Valve Normal tricuspid valve structure and function. Aortic Valve Normal aortic valve structure and function. Trivial aortic regurgitation is noted. Pulmonic Valve Normal pulmonic valve structure and function. Pericardial Effusion No evidence of pericardial effusion. Pleural Effusion No evidence of pleural effusion. Aorta \ Miscellaneous Miscellaneous normal findings were found. M-Mode Measurements (cm)   LVIDd: 4.6 cm                          LVIDs: 3.01 cm  IVSd: 1.98 cm                          IVSs: 2.14 cm  LVPWd: 1.08 cm                         AO Root Dimension: 3.04 cm  Rt. Vent.  Dimension: 3.05 cm                                         LVOT: 1.72 cm  Doppler Measurements:   AV Velocity:0.02 m/s                   MV Peak E-Wave: 1.25 m/s AV Peak Gradient: 15.73 mmHg           MV Peak A-Wave: 1.54 m/s  AV Mean Gradient: 8.65 mmHg  AV Area (Continuity):1.66 cm^2         MV P1/2t: 103 msec  TR Velocity:2.47 m/s                   MVA by PHT2.14 cm^2  TR Gradient:24.4 mmHg                  Estimated RAP:10 mmHg                                         RVSP:34.4 mmHg  Valves  Mitral Valve   Peak E-Wave: 1.25 m/s                 Peak A-Wave: 1.54 m/s  P1/2t: 103 msec                       E/A Ratio: 0.81  Mean Velocity: 1.01 m/s               Peak Gradient: 6.28 mmHg  Mean Gradient: 4.78 mmHg              Deceleration Time: 407.7 msec  Area (PHT): 2.14 cm^2                 Area (continuity): 1.44 cm^2   Tissue Doppler   E' Septal Velocity: 0.07 m/s  E' Lateral Velocity: 0.06 m/s   Aortic Valve   Peak Velocity: 1.98 m/s                Mean Velocity: 1.39 m/s  Peak Gradient: 15.73 mmHg              Mean Gradient: 8.65 mmHg  Area (continuity): 1.66 cm^2  AV VTI: 46.12 cm   Tricuspid Valve   Estimated RVSP: 34.4 mmHg               Estimated RAP: 10 mmHg  TR Velocity: 2.47 m/s                   TR Gradient: 24.4 mmHg   Pulmonic Valve   Peak Velocity: 0.95 m/s            Peak Gradient: 3.64 mmHg                                     Estimated PASP: 34.4 mmHg   LVOT   Peak Velocity: 1.51 m/s              Mean Velocity: 1.09 m/s  Peak Gradient: 9.17 mmHg             Mean Gradient: 5.32 mmHg  LVOT Diameter: 1.72 cm               LVOT VTI: 32.89 cm  Structures  Left Atrium   LA Volume/Index: 47.11 ml /24 m^2             LA Area: 16.59 cm^2   Left Ventricle   Diastolic Dimension: 4.6 cm          Systolic Dimension: 6.19 cm  Septum Diastolic: 0.70 cm            Septum Systolic: 0.74 cm  PW Diastolic: 1.48 cm                                       FS: 34.6 %  LV EDV/LV EDV Index: 97.45 ml/50 m^2 LV ESV/LV ESV Index: 35.36 ml/18 m^2  EF Calculated: 63.7 %                LV Length: 7.34 cm   LVOT Diameter: 1.72 cm   Right Atrium   RA Systolic Pressure: 10 mmHg   Right Ventricle   Diastolic Dimension: 6.05 cm                                     RV Systolic Pressure: 92.2 mmHg  Aorta/ Miscellaneous Aorta   Aortic Root: 3.04 cm  LVOT Diameter: 1.72 cm      CT HEAD WO CONTRAST    Result Date: 9/10/2021  EXAMINATION: CT HEAD WO CONTRAST CLINICAL HISTORY: generalized weakness COMPARISON:  NONE AVAILABLE An unenhanced scan is performed. FINDINGS:   There is no bleed, mass effect, or space occupying lesion. No extra-axial mass or fluid collections. Hypoattenuated White matter changes in the cerebral hemispheres noted. There is global atrophy. Findings likely reflect chronic small vessel vasculopathy. There is a vague area of low attenuation in the inferior posterior right cerebellar hemisphere with slight obliteration of adjacent sulci. An evolving nonhemorrhagic infarct in the cerebellum would have to be a consideration. Clinical correlation recommended. NO BLEED, MASS EFFECT, OR EXTRA-AXIAL FLUID COLLECTION. EVOLVING NONHEMORRHAGIC INFARCT IN THE INFERIOR RIGHT CEREBELLAR HEMISPHERE. 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 All CT scans at this facility use dose modulation, iterative reconstruction, and/or weight based dosing when appropriate to reduce radiation dose to as low as reasonably achievable. XR CHEST PORTABLE    Result Date: 9/10/2021  Exam: XR CHEST PORTABLE History: Generalized weakness Technique: AP portable view of the chest obtained. Comparison: None available Findings: Atherosclerotic calcification of the thoracic aorta. The cardiomediastinal silhouette is within normal limits. No pneumothorax, pleural effusion, or consolidation. Question calcified pleural plaques on the left, which can be seen with asbestos related disease. No acute osseous abnormality. No radiographic evidence of acute intrathoracic process. Question left-sided pleural plaques which can be seen with asbestos related disease.     MRI BRAIN WO CONTRAST    Result Date: 9/11/2021  EXAMINATION: MRI BRAIN WO CONTRAST CLINICAL HISTORY:  STROKE COMPARISONS: MR brain 4/5/2016 TECHNIQUE: Multiplanar multisequence images of the brain were obtained without contrast. Diffusion perfusion imaging was obtained. FINDINGS: There is a tiny focus of restricted diffusion in the left centrum semiovale (series 4 image 19). There are no extra-axial collections. There is no evidence of hemorrhage. The susceptibility images do not demonstrate evidence of hemosiderin deposition within the brain parenchyma or the leptomeninges. There are numerous patchy foci of T2/FLAIR hyperintensities in the subcortical and periventricular white matter in a symmetric distribution throughout both hemispheres. Chronic bilateral cerebellar infarcts. There is prominence of the sulci and ventricles consistent with moderate global cerebral atrophy and chronic involutional changes. No midline shift. The visualized portions of the orbits are within normal limits, the globes are intact. The visualized portions of the paranasal sinuses are within normal limits. Right mastoid effusion. Small foci of restricted diffusion in the left centrum semiovale, consistent with acute ischemia. Chronic microvascular changes and bilateral chronic cerebellar infarcts. CRITICAL RESULTS: Communicated with Luana Quiros RN on 9/11/2021 at 12:51 PM.    FL MODIFIED BARIUM SWALLOW W VIDEO    Result Date: 9/13/2021  EXAMINATION: MODIFIED BARIUM SWALLOW CLINICAL DATA: DYSPHAGIA. COMPARISON: NONE. TECHNIQUE: A total of 15 dynamic image series over the course of 3.5 minutes were obtained. FINDINGS: In cooperation with speech pathology, a modified barium swallow using various consistencies of both solids and liquids with dynamic imaging was performed. Patient's oral stage characterized by mild oral dysphagia. There is premature entry to the level of vallecula. Patient's pharyngeal stage was characterized by moderate pharyngeal dysphagia.  There is decreased hyolaryngeal excursion with moderate residuals in the vallecula. There is deep penetration on thin liquids from straw. No aspiration. MILD ORAL AND MODERATE PHARYNGEAL DYSPHAGIA. RECOMMEND BY SPEECH PATHOLOGY TO FOLLOW. US CAROTID ARTERY BILATERAL    Result Date: 9/11/2021  EXAMINATION: US CAROTID ARTERY BILATERAL HISTORY:   stroke . R26.9 Gait abnormality ICD10 COMPARISON:None TECHNIQUE: Carotid duplex sonograms which include gray scale and color flow evaluation are complimented with spectral waveform analysis. Please refer to chart below for specific carotid velocity measurements. The degree of stenosis recorded on this exam uses the same method of stratification used in the NASCET trials. This complies with ACR practice guidelines and the Society of radiology in ultrasound consensus statement and provides adequate information for clinical decision making. Society of Radiologists in Ultrasound (SRU) consensus statement was used to estimate internal carotid artery stenosis. RESULT: Mild to moderate plaque in the carotid arteries bilaterally. No significant increase in systolic flow or turbulence to indicate any hemodynamically significant narrowing in the right or left internal carotid arteries. There is antegrade flow identified in both vertebral arteries.  ARTERIAL BLOOD FLOW VELOCITY RIGHT PEAK SYSTOLIC VELOCITIES (PSV)                                               Prox CCA    129 cm/s             Mid CCA     114 cm/s              Dist CCA    104 cm/s             Prox ICA    169 cm/s               Mid ICA     106 cm/s            Dist ICA    63 cm/s             Prox ECA     170   cm/s             Prox VERT   51 cm/s              ICA/CCA     1.48                        LEFT PEAK SYSTOLIC VELOCITIES (PSV)  Prox CCA    135 cm/s Mid CCA     166 cm/s Dist CCA    121 cm/s Prox ICA    119 cm/s Mid ICA     72 cm/s Dist ICA    61 cm/s Prox ECA    131 cm/s Prox VERT   56 cm/s ICA/CCA     0.72      50-69 % STENOSIS IN THE RIGHT ICA  <50 % STENOSIS IN THE LEFT ICA ANTEGRADE FLOW IN THE BILATERAL VERTEBRAL ARTERIES. MRI CERVICAL THORACIC LUMBAR SPINE WO CONTRAST LIMITED    Result Date: 9/12/2021  EXAMINATION: MRI CERVICAL THORACIC LUMBAR SPINE WO CONTRAST LIMITED COMPARISON:None CLINICAL HISTORY:  spinal stenosis  FINDINGS:Tailored MRI scans to the cervical thoracic and lumbar spine. Scan quality limited due to patient motion  CERVICAL REGION: Multilevel degenerative changes with severe canal stenosis at C2-3, C3-4, and C6-7. Moderate canal stenosis at C4-5. THORACIC REGION: No spinal canal stenosis LUMBAR REGION: Multilevel degenerative changes. Endplate irregularity with joint space narrowing and slight anterolisthesis at L3-4 Severe canal stenosis at L2-3 and L4-5. Moderate to severe canal stenosis at L3-4. No acute compression fracture. No epidural or paraspinal soft tissue mass. No abnormal cord signal intensity on the scans. Multiple areas of severe spinal canal stenosis in the cervical region. Severe canal stenosis at L2-3, L3-4 and L4-5. EKG: TRACING REVIEWED    RECOMMENDATIONS    Risk Management:  routine:  no special precautions necessary      It is my opinion, with reasonable degree of medical certainty, that Mr. Lizette Posada mental capacity to make informed decisions about his care is currently impaired, and that he is thus unable to make informed decisions in this regard. Thanks for the consult. Please call me if needed.     Electronically signed by Joaquín Cancino MD on 9/23/2021 at 12:09 PM

## 2021-09-24 PROCEDURE — 97116 GAIT TRAINING THERAPY: CPT

## 2021-09-24 PROCEDURE — 99232 SBSQ HOSP IP/OBS MODERATE 35: CPT | Performed by: INTERNAL MEDICINE

## 2021-09-24 PROCEDURE — 97535 SELF CARE MNGMENT TRAINING: CPT

## 2021-09-24 PROCEDURE — 6370000000 HC RX 637 (ALT 250 FOR IP): Performed by: INTERNAL MEDICINE

## 2021-09-24 PROCEDURE — 99232 SBSQ HOSP IP/OBS MODERATE 35: CPT | Performed by: PHYSICAL MEDICINE & REHABILITATION

## 2021-09-24 PROCEDURE — 97530 THERAPEUTIC ACTIVITIES: CPT

## 2021-09-24 PROCEDURE — 1180000000 HC REHAB R&B

## 2021-09-24 PROCEDURE — 6370000000 HC RX 637 (ALT 250 FOR IP): Performed by: PHYSICAL MEDICINE & REHABILITATION

## 2021-09-24 PROCEDURE — 97112 NEUROMUSCULAR REEDUCATION: CPT

## 2021-09-24 RX ADMIN — LISINOPRIL 20 MG: 20 TABLET ORAL at 08:19

## 2021-09-24 RX ADMIN — Medication 2000 UNITS: at 17:38

## 2021-09-24 RX ADMIN — METOPROLOL TARTRATE 25 MG: 25 TABLET, FILM COATED ORAL at 08:19

## 2021-09-24 RX ADMIN — LISINOPRIL 20 MG: 20 TABLET ORAL at 20:23

## 2021-09-24 RX ADMIN — ASPIRIN 81 MG: 81 TABLET, CHEWABLE ORAL at 08:19

## 2021-09-24 RX ADMIN — AMLODIPINE BESYLATE 10 MG: 10 TABLET ORAL at 08:18

## 2021-09-24 RX ADMIN — Medication 100 MG: at 08:19

## 2021-09-24 RX ADMIN — ATORVASTATIN CALCIUM 80 MG: 80 TABLET, FILM COATED ORAL at 20:23

## 2021-09-24 RX ADMIN — METOPROLOL TARTRATE 25 MG: 25 TABLET, FILM COATED ORAL at 20:23

## 2021-09-24 ASSESSMENT — PAIN SCALES - GENERAL
PAINLEVEL_OUTOF10: 0

## 2021-09-24 ASSESSMENT — ENCOUNTER SYMPTOMS
STRIDOR: 0
BLOOD IN STOOL: 0
EYES NEGATIVE: 1
SHORTNESS OF BREATH: 0
COUGH: 0
GASTROINTESTINAL NEGATIVE: 1
CHEST TIGHTNESS: 0
RESPIRATORY NEGATIVE: 1
WHEEZING: 0
NAUSEA: 0

## 2021-09-24 NOTE — PROGRESS NOTES
Physical Therapy Rehab Treatment Note  Facility/Department: [facility]  Room: R233/R233-01       NAME: Jack Ballard  :  (88 y.o.)  MRN: [id number]  CODE STATUS: Full Code    Date of Service: 2021       Restrictions:  Restrictions/Precautions: Fall Risk, Swallowing - Thickened Liquids (nectar thick)       SUBJECTIVE:       Pre Treatment Pain Screening  Pain at present: 0    Post Treatment Pain Screening:  Pain Assessment  Pain Level: 0    OBJECTIVE:               Neuromuscular Education  PNF: standin and ambulatory balance facilitation. Challenges included maneuvering thru room with obstacles for maneuvering thru and targeted LE movement patterns during gait with ww  Neuromuscular Comments: pts uality f maneuvering improves in a non- distracting environment. Pt does present with difficulty in recall of task to complete without verbal reminders    Bed mobility  Bridging: Independent  Rolling to Left: Independent  Rolling to Right: Independent  Supine to Sit: Modified independent  Sit to Supine: Modified independent  Comment: HOB flat with increased effort required    Transfers  Sit to Stand: Stand by assistance  Stand to sit: Stand by assistance  Bed to Chair: Stand by assistance  Stand Pivot Transfers: Stand by assistance  Car Transfer: Stand by assistance  Comment: quality of techniuqe used for approach to chair varies - variable safety and control of descent noted.  Multiple trials with varying level surfaces included in this session    Ambulation  Ambulation?: Yes  More Ambulation?: Yes  Ambulation 1  Surface: level tile;uneven;carpet  Device: Rolling Walker  Assistance: Stand by assistance;Contact guard assistance  Quality of Gait: wide ISAIAH, variable step length, variable ability to maneuver around objects without cues for safety, bilat LE ext rotation, decreased smooth pattern in change of direction  Distance: 150 feet  Comments: in addition  multiple short distance paths taken with emphasis on maneuvering safely - intermittent cues required  Ambulation 2  Surface - 2: ramp  Device 2: Rolling Walker  Assistance 2: Minimal assistance  Distance: 50 feet    Stairs/Curb  Stairs?: Yes  Stairs  # Steps : 4  Stairs Height: 6\"  Rails: Bilateral  Assistance: Stand by assistance  Comment: cues for safe technique required         Activity Tolerance  Activity Tolerance: Patient Tolerated treatment well          ASSESSMENT/PROGRESS TOWARDS GOALS:  Assessment: Pt with ability to perform tasks with improved quality however consistency is not yet attained. He is better able to maneuver around objects however intermittent cueing for safety is still needed. Concern remains for pt to be alone at discharge as a result    Goals:  Long term goals  Long term goal 1: Bed mobility with indep  Long term goal 2: sit to stand and bed transfers SBA with cues for safety 50 % of trials  Long term goal 3: Amb 150ft with 2ww  feet  Long term goal 4: CGA 4 stairs with rails  Long term goal 5: [place] balance testing at 25/56 or greater    PLAN OF CARE/Safety:   Safety Devices  Type of devices: Chair alarm in place; Left in chair      Therapy Time:   Individual   Time In 1430   Time Out 1455   Minutes 25     Minutes:      Transfer/Bed mobility trainin      Gait training:10      Neuro re education:10        Meaghan Mendez PT, 21 at 3:49 PM

## 2021-09-24 NOTE — PROGRESS NOTES
Progress Note  Patient: Markos Felipe  Unit/Bed: U848/M989-43  YOB: 1945  MRN: [id number]  Acct: [de-identified]   Admitting Diagnosis: Abnormality of gait following cerebrovascular accident (CVA) [T45.564, R26.9]  Abnormality of gait and mobility [R26.9]  Admit Date:  9/13/2021  Hospital Day: 11    Chief Complaint: NSVT HTN Zada Byes    Histories:  Past Medical History:   Diagnosis Date    Cellulitis of left hand 3/26/2019    Hypertension      Past Surgical History:   Procedure Laterality Date    BACK SURGERY       History reviewed. No pertinent family history. Social History     Socioeconomic History    Marital status: Single     Spouse name: None    Number of children: None    Years of education: None    Highest education level: None   Occupational History    None   Tobacco Use    Smoking status: Never Smoker    Smokeless tobacco: Never Used   Vaping Use    Vaping Use: Never used   Substance and Sexual Activity    Alcohol use: Never     Alcohol/week: 0.0 standard drinks    Drug use: Never    Sexual activity: None   Other Topics Concern    None   Social History Narrative    Lives With: Alone, sister lives in the area    Type of Home: [address]: One level    Home Access: Stairs to enter with rails- Number of Steps: 3- Rails: Both    Bathroom Shower/Tub: Tub/Shower unit    Home Equipment: Cane, Rolling walker (rollator)    ADL Assistance: [facility]: Independent    Homemaking Responsibilities: Yes    Ambulation Assistance: Independent (2ww)    Transfer Assistance: Independent    Active : No    He is a retired  for [address]. He learned how to be a  in [employer] was stationed in [place] during the [employer] [facility] [employer]. Never went overseas. Never  no kids graduated [employer].          Social Determinants of Health     Financial Resource Strain:     Difficulty of Paying Living and reactive to light. Neck: Thyroid normal. No JVD present. No neck adenopathy. No thyromegaly present. Cardiovascular: Normal rate, regular rhythm, intact distal pulses and normal pulses. Murmur heard. Pulmonary/Chest: Effort normal and breath sounds normal. He has no wheezes. He has no rales. He exhibits no tenderness. Abdominal: Soft. Bowel sounds are normal. There is no abdominal tenderness. Musculoskeletal:         General: No tenderness or edema. Normal range of motion. Cervical back: Normal range of motion and neck supple. Neurological: He is alert and oriented to person, place, and time. Skin: Skin is warm. No cyanosis. Nails show no clubbing.        LABS:  CBC:   Lab Results   Component Value Date    WBC 5.4 09/13/2021    RBC 5.19 09/13/2021    HGB 16.6 09/13/2021    HCT 47.3 09/13/2021    MCV 91.1 09/13/2021    MCH 31.9 09/13/2021    MCHC 35.0 09/13/2021    RDW 13.0 09/13/2021     09/13/2021     CBC with Differential:    Lab Results   Component Value Date    WBC 5.4 09/13/2021    RBC 5.19 09/13/2021    HGB 16.6 09/13/2021    HCT 47.3 09/13/2021     09/13/2021    MCV 91.1 09/13/2021    MCH 31.9 09/13/2021    MCHC 35.0 09/13/2021    RDW 13.0 09/13/2021    LYMPHOPCT 29.9 09/13/2021    MONOPCT 10.8 09/13/2021    BASOPCT 0.7 09/13/2021    MONOSABS 0.6 09/13/2021    LYMPHSABS 1.6 09/13/2021    EOSABS 0.2 09/13/2021    BASOSABS 0.0 09/13/2021     CMP:    Lab Results   Component Value Date     09/14/2021    K 4.4 09/14/2021     09/14/2021    CO2 21 09/14/2021    BUN 35 09/14/2021    CREATININE 0.98 09/14/2021    GFRAA >60.0 09/14/2021    LABGLOM >60.0 09/14/2021    GLUCOSE 106 09/14/2021    PROT 7.2 09/10/2021    LABALBU 4.2 09/14/2021    CALCIUM 9.0 09/14/2021    BILITOT 1.0 09/10/2021    ALKPHOS 79 09/10/2021    AST 36 09/10/2021    ALT 52 09/10/2021     BMP:    Lab Results   Component Value Date     09/14/2021    K 4.4 09/14/2021     09/14/2021    CO2 21 09/14/2021    BUN 35 09/14/2021    LABALBU 4.2 09/14/2021    CREATININE 0.98 09/14/2021    CALCIUM 9.0 09/14/2021    GFRAA >60.0 09/14/2021    LABGLOM >60.0 09/14/2021    GLUCOSE 106 09/14/2021     Magnesium:    Lab Results   Component Value Date    MG 1.9 09/10/2021     Troponin:    Lab Results   Component Value Date    TROPONINI <0.010 09/10/2021        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 CVA - Rehab  2. Cervical and lumbar stenosis  3. NSVT-   Telemetry shows no further NSVT on BB but now [place]. Will lower BB to 25 bid. - stable now. continue low dose BB. 4. LVEF 60%  5. Mag and TSH WNL  6. HTN - stable   7. HPL- resume Statin  8.  Competency evaluation pending       Electronically signed by Xavi David MD on 9/24/2021 at 10:27 AM

## 2021-09-24 NOTE — PROGRESS NOTES
Neurology Follow up    SUBJECTIVE: Patient seen and examined for neurology follow-up for acute left ischemic centrum semiovale infarct. Patient with some mild right-sided weakness. Patient continues to participate in therapy. Tolerating Seroquel well and more cooperative. No new or worsening deficits. No new changes, weakness.     Events noted patient continues to do well on therapy    Current Facility-Administered Medications   Medication Dose Route Frequency Provider Last Rate Last Admin    QUEtiapine (SEROQUEL) tablet 25 mg  25 mg Oral BID PRN Anthony Gonzalez MD        metoprolol tartrate (LOPRESSOR) tablet 25 mg  25 mg Oral BID Ricki Otero MD   25 mg at 09/23/21 1044    lisinopril (PRINIVIL;ZESTRIL) tablet 20 mg  20 mg Oral BID Ricki Otero MD   20 mg at 09/23/21 1043    Vitamin D (CHOLECALCIFEROL) tablet 2,000 Units  2,000 Units Oral Dinner Luana Scullin, DO   2,000 Units at 09/23/21 1637    cyanocobalamin injection 1,000 mcg  1,000 mcg IntraMUSCular Weekly Luana Scullin, DO   1,000 mcg at 09/21/21 0514    coenzyme Q10 capsule 100 mg  100 mg Oral Daily Luana Scullin, DO   100 mg at 09/23/21 1043    lidocaine 4 % external patch 3 patch  3 patch TransDERmal Daily Luana Scullin, DO        acetaminophen (TYLENOL) tablet 650 mg  650 mg Oral Q4H PRN Luana Scullin, DO        bisacodyl (DULCOLAX) suppository 10 mg  10 mg Rectal Daily PRN Milady Weiss DO        fleet rectal enema 1 enema  1 enema Rectal Daily PRN Luana Scullin, DO        acetaminophen (TYLENOL) tablet 650 mg  650 mg Oral Q6H PRN Patricia Kim MD   650 mg at 09/21/21 2022    Or    acetaminophen (TYLENOL) suppository 650 mg  650 mg Rectal Q6H PRN Patriica Kim MD        polyethylene glycol (GLYCOLAX) packet 17 g  17 g Oral Daily PRN Patricia Kim MD        amLODIPine (NORVASC) tablet 10 mg  10 mg Oral Daily Patricia Kim MD   10 mg at 09/23/21 1043    aspirin chewable tablet 81 mg  81 mg Oral Daily Patricia Kim MD   81 mg at 09/23/21 1044    atorvastatin (LIPITOR) tablet 80 mg  80 mg Oral Nightly Winifred Quinteros MD   80 mg at 09/22/21 7693       PHYSICAL EXAM:    /65   Pulse 56   Temp 98.6 °F (37 °C) (Oral)   Resp 18   Ht 5' 8\" (1.727 m)   Wt 165 lb 4.8 oz (75 kg)   SpO2 98%   BMI 25.13 kg/m²    General Appearance:      Skin:  normal  CVS - Normal sounds, No murmurs , No carotid Bruits  RS -CTA  Abdomen Soft, BS present  Review of Systems   Constitutional: Negative for appetite change and fever. HENT: Negative for ear pain and trouble swallowing. Eyes: Negative for visual disturbance. Respiratory: Negative for choking and shortness of breath. Cardiovascular: Negative for chest pain and palpitations. Gastrointestinal: Negative for nausea and vomiting. Musculoskeletal: Positive for back pain. Negative for gait problem, joint swelling, myalgias, neck pain and neck stiffness. Skin: Negative for color change. Neurological: Positive for weakness. Negative for dizziness, tremors, seizures, syncope, facial asymmetry, speech difficulty, light-headedness, numbness and headaches. Psychiatric/Behavioral: Negative for behavioral problems, confusion, hallucinations and sleep disturbance.       Mental Status Exam:             Level of Alertness:   awake            Orientation:   person, place, time            Memory:   normal                    Language:  normal      Funduscopic Exam:     Cranial Nerves            Cranial nerve III           Pupils:  equal, round, reactive to light      Cranial nerves III, IV, VI           Extraocular Movements: intact      Cranial nerve V           Facial sensation:  intact      Cranial nerve VII           Facial strength: intact      Cranial nerve VIII           Hearing:  intact      Cranial nerve IX           Palate:  intact      Cranial nerve XI         Shoulder shrug:  intact      Cranial nerve XII          Tongue movement:  normal    Motor: Right upper extremity weakness of 4 /5 with a drift  Drift:  Motor exam   Tone:  normal  Abnormal Movements:  absent            Sensory: No definite findings        Pinprick             Right Upper Extremity:  normal             Left Upper Extremity:  normal             Right Lower Extremity:  normal             Left Lower Extremity:  normal           Vibration                         Touch            Proprioception                 Coordination:           Finger/Nose   Right:  abnormal              Left:  normal          Heel-Knee-Shin                Right:  normal              Left:  normal          Rapid Alternating Movements              Right:  normal              Left:  normal          Gait:                       Casual:  normal                         Romberg:  normal            Reflexes:             Deep Tendon Reflexes:             Reflexes are 2 + including ankle reflexes. Plantar response:                Right:  downgoing               Left:  downgoing    Vascular:  Cardiac Exam:  normal         Echocardiogram complete 2D with doppler with color    Result Date: 9/11/2021  Transthoracic Echocardiography Report (TTE)  Demographics   Patient Name    Valeri Haro Gender               Male   Patient Number  [id number]       Race                 Unknown                                  Ethnicity   Visit Number    [id number]      Room Number          J375   Corporate ID                   Date of Study        09/11/2021   Accession       [id number]     Referring Physician  Number   Date of Birth   1945     Sonographer          Jacqueline Alvarez   Age             77 year(s)     Interpreting         [facility])                                 Physician            Cardiology                                                      [address] [place] [address]  Procedure Type of Study   TTE procedure:ECHOCARDIOGRAM COMPLETE WITH BUBBLE STUDY.   Procedure Date Date: 09/11/2021 Start: 08:15 AM Study Location: Portable Technical Quality: Adequate visualization Indications:Stroke. Patient Status: Routine Height: 68 inches Weight: 179 pounds BSA: 1.95 m^2 BMI: 27.22 kg/m^2 BP: 147/86 mmHg  Conclusions   Summary  Moderate annular calcification. Normal aortic valve structure and function. Trivial aortic regurgitation is  noted. Moderately dilated left atrium. Normal intact intra-atrial septum was noted with no evidence of  significant intra-atrial communications neither by colour flow Doppler  imaging nor by aggitated saline study. Left ventricular ejection fraction is visually estimated at 60%. Impaired relaxation compatible with diastolic dysfunction. ( reversed E/A  ratio)  Moderate concentric left ventricular hypertrophy. Signature   ----------------------------------------------------------------  Electronically signed by Cara Medina(Interpreting physician)  on 09/11/2021 11:17 AM  ----------------------------------------------------------------   Findings  Left Ventricle Left ventricular ejection fraction is visually estimated at 60%. Impaired relaxation compatible with diastolic dysfunction. ( reversed E/A ratio) Moderate concentric left ventricular hypertrophy. Right Ventricle Normal right ventricle structure and function. Normal right ventricle systolic pressure. Left Atrium Moderately dilated left atrium. Normal intact intra-atrial septum was noted with no evidence of significant intra-atrial communications neither by colour flow Doppler imaging nor by aggitated saline study. Right Atrium Normal right atrium. Mitral Valve Moderate annular calcification. Tricuspid Valve Normal tricuspid valve structure and function. Aortic Valve Normal aortic valve structure and function. Trivial aortic regurgitation is noted. Pulmonic Valve Normal pulmonic valve structure and function. Pericardial Effusion No evidence of pericardial effusion. Pleural Effusion No evidence of pleural effusion. Aorta \ Miscellaneous Miscellaneous normal findings were found.  M-Mode Measurements (cm)   LVIDd: 4.6 cm                          LVIDs: 3.01 cm  IVSd: 1.98 cm                          IVSs: 2.14 cm  LVPWd: 1.08 cm                         AO Root Dimension: 3.04 cm  Rt. Vent.  Dimension: 3.05 cm                                         LVOT: 1.72 cm  Doppler Measurements:   AV Velocity:0.02 m/s                   MV Peak E-Wave: 1.25 m/s  AV Peak Gradient: 15.73 mmHg           MV Peak A-Wave: 1.54 m/s  AV Mean Gradient: 8.65 mmHg  AV Area (Continuity):1.66 cm^2         MV P1/2t: 103 msec  TR Velocity:2.47 m/s                   MVA by PHT2.14 cm^2  TR Gradient:24.4 mmHg                  Estimated RAP:10 mmHg                                         RVSP:34.4 mmHg  Valves  Mitral Valve   Peak E-Wave: 1.25 m/s                 Peak A-Wave: 1.54 m/s  P1/2t: 103 msec                       E/A Ratio: 0.81  Mean Velocity: 1.01 m/s               Peak Gradient: 6.28 mmHg  Mean Gradient: 4.78 mmHg              Deceleration Time: 407.7 msec  Area (PHT): 2.14 cm^2                 Area (continuity): 1.44 cm^2   Tissue Doppler   E' Septal Velocity: 0.07 m/s  E' Lateral Velocity: 0.06 m/s   Aortic Valve   Peak Velocity: 1.98 m/s                Mean Velocity: 1.39 m/s  Peak Gradient: 15.73 mmHg              Mean Gradient: 8.65 mmHg  Area (continuity): 1.66 cm^2  AV VTI: 46.12 cm   Tricuspid Valve   Estimated RVSP: 34.4 mmHg               Estimated RAP: 10 mmHg  TR Velocity: 2.47 m/s                   TR Gradient: 24.4 mmHg   Pulmonic Valve   Peak Velocity: 0.95 m/s            Peak Gradient: 3.64 mmHg                                     Estimated PASP: 34.4 mmHg   LVOT   Peak Velocity: 1.51 m/s              Mean Velocity: 1.09 m/s  Peak Gradient: 9.17 mmHg             Mean Gradient: 5.32 mmHg  LVOT Diameter: 1.72 cm               LVOT VTI: 32.89 cm  Structures  Left Atrium   LA Volume/Index: 47.11 ml /24 m^2             LA Area: 16.59 cm^2   Left Ventricle   Diastolic Dimension: 4.6 cm          Systolic Dimension: 3.01 cm  Septum Diastolic: 8.09 cm            Septum Systolic: 5.53 cm  PW Diastolic: 9.01 cm                                       FS: 34.6 %  LV EDV/LV EDV Index: 97.45 ml/50 m^2 LV ESV/LV ESV Index: 35.36 ml/18 m^2  EF Calculated: 63.7 %                LV Length: 7.34 cm   LVOT Diameter: 1.72 cm   Right Atrium   RA Systolic Pressure: 10 mmHg   Right Ventricle   Diastolic Dimension: 8.33 cm                                     RV Systolic Pressure: 01.9 mmHg  Aorta/ Miscellaneous Aorta   Aortic Root: 3.04 cm  LVOT Diameter: 1.72 cm      CT HEAD WO CONTRAST    Result Date: 9/10/2021  EXAMINATION: CT HEAD WO CONTRAST CLINICAL HISTORY: generalized weakness COMPARISON:  NONE AVAILABLE An unenhanced scan is performed. FINDINGS:   There is no bleed, mass effect, or space occupying lesion. No extra-axial mass or fluid collections. Hypoattenuated White matter changes in the cerebral hemispheres noted. There is global atrophy. Findings likely reflect chronic small vessel vasculopathy. There is a vague area of low attenuation in the inferior posterior right cerebellar hemisphere with slight obliteration of adjacent sulci. An evolving nonhemorrhagic infarct in the cerebellum would have to be a consideration. Clinical correlation recommended. NO BLEED, MASS EFFECT, OR EXTRA-AXIAL FLUID COLLECTION. EVOLVING NONHEMORRHAGIC INFARCT IN THE INFERIOR RIGHT CEREBELLAR HEMISPHERE. 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 All CT scans at this facility use dose modulation, iterative reconstruction, and/or weight based dosing when appropriate to reduce radiation dose to as low as reasonably achievable. XR CHEST PORTABLE    Result Date: 9/10/2021  Exam: XR CHEST PORTABLE History: Generalized weakness Technique: AP portable view of the chest obtained. Comparison: None available Findings: Atherosclerotic calcification of the thoracic aorta. The cardiomediastinal silhouette is within normal limits.   No pneumothorax, pleural effusion, or consolidation. Question calcified pleural plaques on the left, which can be seen with asbestos related disease. No acute osseous abnormality. No radiographic evidence of acute intrathoracic process. Question left-sided pleural plaques which can be seen with asbestos related disease. MRI BRAIN WO CONTRAST    Result Date: 9/11/2021  EXAMINATION: MRI BRAIN WO CONTRAST CLINICAL HISTORY:  STROKE COMPARISONS: MR brain 4/5/2016 TECHNIQUE: Multiplanar multisequence images of the brain were obtained without contrast. Diffusion perfusion imaging was obtained. FINDINGS: There is a tiny focus of restricted diffusion in the left centrum semiovale (series 4 image 19). There are no extra-axial collections. There is no evidence of hemorrhage. The susceptibility images do not demonstrate evidence of hemosiderin deposition within the brain parenchyma or the leptomeninges. There are numerous patchy foci of T2/FLAIR hyperintensities in the subcortical and periventricular white matter in a symmetric distribution throughout both hemispheres. Chronic bilateral cerebellar infarcts. There is prominence of the sulci and ventricles consistent with moderate global cerebral atrophy and chronic involutional changes. No midline shift. The visualized portions of the orbits are within normal limits, the globes are intact. The visualized portions of the paranasal sinuses are within normal limits. Right mastoid effusion. Small foci of restricted diffusion in the left centrum semiovale, consistent with acute ischemia. Chronic microvascular changes and bilateral chronic cerebellar infarcts. CRITICAL RESULTS: Communicated with Yari Lee RN on 9/11/2021 at 12:51 PM.    US CAROTID ARTERY BILATERAL    Result Date: 9/11/2021  EXAMINATION: US CAROTID ARTERY BILATERAL HISTORY:   stroke .  R26.9 Gait abnormality ICD10 COMPARISON:None TECHNIQUE: Carotid duplex sonograms which include gray scale and color flow evaluation are complimented with spectral waveform analysis. Please refer to chart below for specific carotid velocity measurements. The degree of stenosis recorded on this exam uses the same method of stratification used in the NASCET trials. This complies with ACR practice guidelines and the Society of radiology in ultrasound consensus statement and provides adequate information for clinical decision making. Society of Radiologists in Ultrasound (SRU) consensus statement was used to estimate internal carotid artery stenosis. RESULT: Mild to moderate plaque in the carotid arteries bilaterally. No significant increase in systolic flow or turbulence to indicate any hemodynamically significant narrowing in the right or left internal carotid arteries. There is antegrade flow identified in both vertebral arteries. ARTERIAL BLOOD FLOW VELOCITY RIGHT PEAK SYSTOLIC VELOCITIES (PSV)                                               Prox CCA    129 cm/s             Mid CCA     114 cm/s              Dist CCA    104 cm/s             Prox ICA    169 cm/s               Mid ICA     106 cm/s            Dist ICA    63 cm/s             Prox ECA     170   cm/s             Prox VERT   51 cm/s              ICA/CCA     1.48                        LEFT PEAK SYSTOLIC VELOCITIES (PSV)  Prox CCA    135 cm/s Mid CCA     166 cm/s Dist CCA    121 cm/s Prox ICA    119 cm/s Mid ICA     72 cm/s Dist ICA    61 cm/s Prox ECA    131 cm/s Prox VERT   56 cm/s ICA/CCA     0.72      50-69 % STENOSIS IN THE RIGHT ICA  <50 % STENOSIS IN THE LEFT ICA ANTEGRADE FLOW IN THE BILATERAL VERTEBRAL ARTERIES. No results for input(s): WBC, HGB, PLT in the last 72 hours. No results for input(s): NA, K, CL, CO2, BUN, CREATININE, GLUCOSE in the last 72 hours. No results for input(s): BILITOT, ALKPHOS, AST, ALT in the last 72 hours.   Lab Results   Component Value Date    PROTIME 10.3 03/26/2019    INR 1.0 03/26/2019     No results found for: LITHIUM, DILFRTOT, VALPROATE    ASSESSMENT AND PLAN  Left centrum semiovale acute ischemic infarct  Carotid duplex with 50 to 69% stenosis in the right internal carotid artery and less than 50% in the left internal carotid artery  hemoGlobin A1c 5.3  Total cholesterol 145, triglycerides 67, HDL 38,LDL 94  Echocardiogram with ejection fraction of 60% with negative bubble study  Continue aspirin and high intensity statin  Dysphasia- nectar thick liquids  MRI of the cervical, thoracic and lumbar spine completed with multiple areas of severe spinal canal stenosis in the cervical spine. Neurosurgery has been consulted with plans for follow-up once medically stable after stroke for possible surgical intervention. Patient able to ambulate with assistance of walker. Circumduction and right lower extremity noted. Patient has weakness in flexion of right hip and dorsiflexion of right foot and unclear at this is related to left central semiovale infarct versus severe lumbar stenosis at L2-L5. Patient also has gait ataxia which could be due to severe cervical canal stenosis and old cerebellar infarcts. Azotemia which is new noted today. Patient overall improving in strength. Continue on aspirin and high intensity statin. Continue PT/OT/SLP. Azotemia improving. Continues to participate in physical therapy be. Has had some behavioral issues and agitation throughout the day and has been started on Seroquel. Overall improving in strength. Continue PT/OT/SLP. nonfocal, gait ataxia, encephalopathy    Azotemia continues to improve. Tolerating Seroquel well and cooperative. Overall, doing well. Continue PT/OT/SLP      I have personally performed a face to face diagnostic evaluation on this patient, reviewed all data and investigations, and am the sole provider of all clinical decisions on the neurological status of this patient. Events noted. Patient continues to do well on therapy.   Is tolerating the Seroquel well for the night      Jordan Varela MD, Jose Cowart, American Board of Psychiatry & Neurology  Board Certified in Vascular Neurology  Board Certified in Neuromuscular Medicine  Certified in [address]     o new changes, azotemia improving    Doing well continue PT/OT/SLP

## 2021-09-24 NOTE — PROGRESS NOTES
Occupational Therapy  Facility/Department: [facility]  Daily Treatment Note  NAME: Jaclyn March  : 1945  MRN: [id number]    Date of Service: 2021    Discharge Recommendations:  Continue to assess pending progress       Assessment    Patient tolerated treatment session well this afternoon. Activity Tolerance  Activity Tolerance: Patient Tolerated treatment well  Safety Devices  Safety Devices in place: Yes  Type of devices: All fall risk precautions in place         Patient Diagnosis(es): There were no encounter diagnoses. has a past medical history of Cellulitis of left hand and Hypertension. has a past surgical history that includes back surgery. Restrictions  Restrictions/Precautions  Restrictions/Precautions: Fall Risk  Position Activity Restriction  Other position/activity restrictions: thin liquid diet 21     Subjective   General  Chart Reviewed: Yes  Patient assessed for rehabilitation services?: Yes  Response to previous treatment: Patient with no complaints from previous session  Family / Caregiver Present: No  Referring Practitioner: Dr. Viki Syed  Diagnosis: Impaired mobility, gait, and ADL's 2º inferior cerebellar stroke  Subjective  Subjective: \"I think I can go home\"  Pain Assessment  Pain Assessment: 0-10  Pain Level: 0  Pre Treatment Pain Screening  Pain at present: 0  Scale Used: Numeric Score  Intervention List: Patient able to continue with treatment  Vital Signs  Patient Currently in Pain: Denies     Objective    Patient in bed upon therapist's arrival, stating he has not eaten lunch yet despite tray having partially eaten food on it. Patient transfers from bed to wheelchair at SBA level to finish eating with the following assistance:  ADL  Feeding: Setup; Verbal Cues    Patient engages in multiple therapeutic activities to increase bilateral UE strength and coordination for ADL tasks. Patient tolerates all activities well this afternoon.      Patient dons understanding of HEP  Long term goal 5: improve balance to safely perform functional mobility  Patient Goals   Patient goals : \"to get better\"       Therapy Time   Individual Concurrent Group Co-treatment   Time In 1300         Time Out 1400         Minutes 60                 ADL/IADL training: 10 minutes  Therapeutic activities: 50 minutes    Electronically signed by KAMALA Lugo/L on 9/24/2021 at 2:02 PM  Jazmín Kramer OTR/KATI

## 2021-09-24 NOTE — PROGRESS NOTES
Physical Therapy Rehab Treatment Note  [facility]       NAME: Charlie Drew  : [birth year] (70 y.o.)  MRN: [id number]  CODE STATUS: Full Code    Date of Service: 2021       Restrictions:  Restrictions/Precautions: Fall Risk, Swallowing - Thickened Liquids (nectar thick)       SUBJECTIVE:       Pre Treatment Pain Screening  Pain at present: 0    Post Treatment Pain Screening:  Pain Assessment  Pain Level: 0    OBJECTIVE:               Neuromuscular Education  PNF: standing and ambulatory balance facilitation. Challenges included: obstacle maneuvering with ww for support - emphasis on narrowing ISAIAH for effeciency, gait with ww with theraband resistance at pelvis, targeted LE movement patterns thru environent, and multiple trials of short distance gait for approach to surface carry over  Neuromuscular Comments: Pt demonstrates tendency for wide ISAIAH - improves with cueing. Short RLE noted in gait - improved following interventions.  Intermittent cues for approach to surface remain to be needed         Transfers  Sit to Stand: Stand by assistance  Stand to sit: Stand by assistance  Bed to Chair: Stand by assistance  Stand Pivot Transfers: Stand by assistance  Car Transfer: Contact guard assistance (technique cues required for safest performance)  Comment: approach to chair requires cues forsafety due to difficulty planning task for appropriate proximity to surface    Ambulation  Ambulation?: Yes  More Ambulation?: Yes  Ambulation 1  Surface: carpet;uneven;level tile  Device: Rolling Walker  Assistance: Stand by assistance;Contact guard assistance  Quality of Gait: wide ISAIAH, variable step length, variable ability to maneuver around objects without cues for safety, bilat LE ext rotation  Distance: 100 feet; 75 feet  Comments: improved ability to maneuver around objects in environment    Ambulation 2  Surface - 2: ramp  Device 2: Rolling Walker  Assistance 2: Minimal assistance - mild LOB with initiation of descent - improved with cueing and physical assistance  Distance: 50 feet    Stairs/Curb  Stairs?: Yes  Stairs  # Steps : 12  Stairs Height: 6\"  Rails: Bilateral  Assistance: Stand by assistance  Comment: improved ISAIAH with cues         Activity Tolerance  Activity Tolerance: Patient Tolerated treatment well          ASSESSMENT/PROGRESS TOWARDS GOALS:  Assessment: Pt demonstrates continued improvement towards goals however concern for consistency of follow thru noted. This is a concern then for home alone safety. Pt reports he is unconcerned however    Goals:  Long term goals  Long term goal 1: Bed mobility with indep  Long term goal 2: sit to stand and bed transfers SBA with cues for safety 50 % of trials  Long term goal 3: Amb 150ft with 2ww  feet  Long term goal 4: CGA 4 stairs with rails  Long term goal 5: [place] balance testing at 25/56 or greater    PLAN OF CARE/Safety:   Safety Devices  Type of devices: Chair alarm in place; Left in chair      Therapy Time:   Individual   Time In 0925   Time Out 1000   Minutes 35     Minutes:      Transfer/Bed mobility training: 10      Gait training:10      Neuro re education:15         Alejandro Mcgrath, PT, 09/24/21 at 11:58 AM

## 2021-09-24 NOTE — PROGRESS NOTES
or     Sensation-poor judgment reasoning and insight. Lungs:  Diminished, CTA-B. Respiration effort is normal at rest.     Heart:   S1 = S2, RRR. No loud murmurs. Abdomen:  Soft, non-tender, no enlargement of liver or spleen. Extremities:  No significant lower extremity edema or tenderness. Skin:   Intact to general survey, no visualized or palpated problems. Rehabilitation:  Physical therapy:   Bed Mobility: Scooting: Stand by assistance    Transfers: Sit to Stand: Stand by assistance  Stand to sit: Stand by assistance  Bed to Chair: Stand by assistance, Ambulation 1  Surface: carpet, uneven, level tile  Device: Rolling Walker  Assistance: Stand by assistance, Contact guard assistance  Quality of Gait: wide ISAIAH, variable step length, variable ability to maneuver around objects without cues for safety, bilat LE ext rotation  Gait Deviations: Slow Princess, Shuffles, Decreased step length, Decreased step height, Decreased head and trunk rotation  Distance: 75 ft plus multiple short distance trials between seats  Comments: VC's for object avoidance to R, Stairs  # Steps : 4  Stairs Height: 6\"  Rails: Bilateral  Assistance: Stand by assistance  Comment: improved balance in descent, wide ISAIAH    FIMS:  ,  , Assessment: Pt is demonstrating improvement in postural control in some circumstances. He is unable to carry thru with tasks without cueing with concerns for environmental safety also noted. Occupational therapy:    ,  , Assessment: Pt continues to demonstrate decreased initiation, problem solving, and safety awareness during ADLs. Pt continues to benefit from OT services to maximize independence and safety during ADLs and IADLs. Speech therapy:        Lab/X-ray studies reviewed, analyzed and discussed with patient and staff:      telemetry and monitor tech stated that pt was SB-NSR Rate 55-65. No results found for this or any previous visit (from the past 24 hour(s)).     Echocardiogram  9/11/2021 Transthoracic Echocardiography  Left Ventricle Left ventricular ejection fraction is visually estimated at 60%. Impaired relaxation compatible with diastolic dysfunction. ( reversed E/A ratio) Moderate concentric left ventricular hypertrophy. Right Ventricle Normal right ventricle structure and function. Normal right ventricle systolic pressure. Left Atrium Moderately dilated left atrium. Normal intact intra-atrial septum was noted with no evidence of significant intra-atrial communications neither by colour flow Doppler imaging nor by aggitated saline study. Right Atrium Normal right atrium. Mitral Valve Moderate annular calcification. Tricuspid Valve Normal tricuspid valve structure and function. Aortic Valve Normal aortic valve structure and function. Trivial aortic regurgitation is noted. Pulmonic Valve Normal pulmonic valve structure and function. Pericardial Effusion No evidence of pericardial effusion. Pleural Effusion No evidence of pleural effusion. Aorta \ Miscellaneous Miscellaneous normal findings were found. CT HEAD  : 9/10/2021  There is no bleed, mass effect, or space occupying lesion. No extra-axial mass or fluid collections. Hypoattenuated White matter changes in the cerebral hemispheres noted. There is global atrophy. Findings likely reflect chronic small vessel vasculopathy. There is a vague area of low attenuation in the inferior posterior right cerebellar hemisphere with slight obliteration of adjacent sulci. An evolving nonhemorrhagic infarct in the cerebellum would have to be a consideration. Clinical correlation recommended. NO BLEED, MASS EFFECT, OR EXTRA-AXIAL FLUID COLLECTION. EVOLVING NONHEMORRHAGIC INFARCT IN THE INFERIOR RIGHT CEREBELLAR HEMISPHERE. 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 XR CHEST   9/10/2021 Atherosclerotic calcification of the thoracic aorta. The cardiomediastinal silhouette is within normal limits.   No pneumothorax, pleural effusion, or consolidation. Question calcified pleural plaques on the left, which can be seen with asbestos related disease. No acute osseous abnormality. No radiographic evidence of acute intrathoracic process. Question left-sided pleural plaques which can be seen with asbestos related disease. MRI BRAIN   9/11/2021 There is a tiny focus of restricted diffusion in the left centrum semiovale (series 4 image 19). There are no extra-axial collections. There is no evidence of hemorrhage. The susceptibility images do not demonstrate evidence of hemosiderin deposition within the brain parenchyma or the leptomeninges. There are numerous patchy foci of T2/FLAIR hyperintensities in the subcortical and periventricular white matter in a symmetric distribution throughout both hemispheres. Chronic bilateral cerebellar infarcts. There is prominence of the sulci and ventricles consistent with moderate global cerebral atrophy and chronic involutional changes. No midline shift. The visualized portions of the orbits are within normal limits, the globes are intact. The visualized portions of the paranasal sinuses are within normal limits. Right mastoid effusion. Small foci of restricted diffusion in the left centrum semiovale, consistent with acute ischemia. Chronic microvascular changes and bilateral chronic cerebellar infarcts. FL MODIFIED BARIUM  9/13/2021: In cooperation with speech pathology, a modified barium swallow using various consistencies of both solids and liquids with dynamic imaging was performed. Patient's oral stage characterized by mild oral dysphagia. There is premature entry to the level of vallecula. Patient's pharyngeal stage was characterized by moderate pharyngeal dysphagia. There is decreased hyolaryngeal excursion with moderate residuals in the vallecula. There is deep penetration on thin liquids from straw. No aspiration. MILD ORAL AND MODERATE PHARYNGEAL DYSPHAGIA.  RECOMMEND BY SPEECH PATHOLOGY TO FOLLOW. US CAROTID ARTERY BILATERAL  9/11/2021 Mild to moderate plaque in the carotid arteries bilaterally. No significant increase in systolic flow or turbulence to indicate any hemodynamically significant narrowing in the right or left internal carotid arteries. There is antegrade flow identified in both vertebral arteries. ARTERIAL BLOOD FLOW VELOCITY RIGHT PEAK SYSTOLIC VELOCITIES (PSV)                                               Prox CCA    129 cm/s             Mid CCA     114 cm/s              Dist CCA    104 cm/s             Prox ICA    169 cm/s               Mid ICA     106 cm/s            Dist ICA    63 cm/s             Prox ECA     170   cm/s             Prox VERT   51 cm/s              ICA/CCA     1.48                        LEFT PEAK SYSTOLIC VELOCITIES (PSV)  Prox CCA    135 cm/s Mid CCA     166 cm/s Dist CCA    121 cm/s Prox ICA    119 cm/s Mid ICA     72 cm/s Dist ICA    61 cm/s Prox ECA    131 cm/s Prox VERT   56 cm/s ICA/CCA     0.72      50-69 % STENOSIS IN THE RIGHT ICA  <50 % STENOSIS IN THE LEFT ICA ANTEGRADE FLOW IN THE BILATERAL VERTEBRAL ARTERIES. MRI CERVICAL THORACIC LUMBAR SPINE   9/12/2021     CERVICAL REGION: Multilevel degenerative changes with severe canal stenosis at C2-3, C3-4, and C6-7. Moderate canal stenosis at C4-5. THORACIC REGION: No spinal canal stenosis    LUMBAR REGION: Multilevel degenerative changes. Endplate irregularity with joint space narrowing and slight anterolisthesis at L3-4 Severe canal   stenosis at L2-3 and L4-5. Moderate to severe canal stenosis at L3-4. No acute compression fracture. No epidural or paraspinal soft tissue   mass. No abnormal cord signal intensity on the scans. Multiple areas of severe spinal canal stenosis in the cervical region. Severe canal stenosis at L2-3, L3-4 and L4-5. Previous extensive, complex labs, notes and diagnostics reviewed and analyzed.      ALLERGIES:    Allergies as of 09/13/2021    (No Known Allergies)      (please also verify by checking MAR)     Yesterday I evaluated this patient for periodic reassessment of medical and functional status. The patient was discussed in detail at the treatment team meeting focusing on current medical issues, progress in therapies, social issues, psychological issues, barriers to progress and strategies to address these barriers, and discharge planning. See the hand written addendum to rehab progress note. The patient continues to be high risk for future disability and their medical and rehabilitation prognosis continue to be good and therefore, we will continue the patient's rehabilitation course as planned. The patient's tentative discharge date was set. Patient and family education was discussed. The patient was made aware of the team discussion regarding their progress. Discharge plans were discussed along with barriers to progress and strategies to address these barriers, patient encouraged to continue to discuss discharge plans with . Complex Physical Medicine & Rehab Issues Assess & Plan:   1. 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 Functional and medical status reassessed regarding patients ability to participate in therapies and patient found to be able to participate in acute intensive comprehensive inpatient rehabilitation program including PT/OT to improve balance, ambulation, ADLs, and to improve the P/AROM. Therapeutic modifications regarding activities in therapies, place, amount of time per day and intensity of therapy made daily. In bed therapies or bedside therapies prn.   2. Bowel and Bladder dysfunction neurogenic bowel and bladder due to CVA:  frequent toileting, ambulate to bathroom with assistance, check post void residuals.   Check for C.difficile x1 if >2 loose stools in 24 hours, continue bowel & bladder program.  Monitor bowel and bladder function. Lactinex 2 PO every AC. MOM prn, Brown Bomb prn, Glycerin suppository prn, enema prn.  3. Severe neck mid back and low back pain as well as generalized OA pain Multiple areas of severe spinal canal stenosis in the cervical region. Severe canal stenosis at L2-3, L3-4 and L4-5. : reassess pain every shift and prior to and after each therapy session, give prn Tylenol and scheduled Tylenol, modalities prn in therapy, masage, Lidoderm, K-pad prn. Consider scheduled AM pain meds. 4. Skin healing and breakdown risk:  continue pressure relief program.  Daily skin exams and reports from nursing. 5. Severe fatigue due to nutritional and hydration deficiency: Add and titrate vitamin B12 vitamin D and CoQ10 continue to monitor I&Os, calorie counts prn, dietary consult prn.  6. Acute episodic insomnia with situational adjustment disorder:  prn Ambien, monitor for day time sedation. 7. Falls risk elevated:  patient to use call light to get nursing assistance to get up, bed and chair alarm. 8. Elevated DVT risk: progressive activities in PT, continue prophylaxis FITZ hose, elevation and Lovenox. 9. Complex discharge planning:  DC home 9/28/21--to SNF vs home with family and Christofernayelilynne Laureano. SP weekly team meeting every  Thursday to assess progress towards goals, discuss and address social, psychological and medical comorbidities and to address difficulties they may be having progressing in therapy. Patient and family education is in progress. The patient is to follow-up with their family physician after discharge. Complex Active General Medical Issues that complicate care Assess & Plan:    1. History of bilateral shoulder pain with history of shoulder dislocation generalized osteoarthritis  2. Stroke determined by clinical assessment and confirmed on MRI as above-consult neurology control blood pressure monitor for diabetes add aspirin  3.    Spinal stenosis of lumbar region with neurogenic claudication, Myelopathy concurrent with and due to spinal stenosis of cervical region  4. Ataxic gait due to cerebellar CVA-focus on balance and therapy  5. Significant oral pharyngeal dysphagia-consult speech-language pathology  6. Anxiety and depression with occasional violent outburst-emotional support provided daily, vitamin B12, encourage participation in rehabilitation support group and recreational therapy, adjust/add medications (add vitamin B12 consider SSRI) consult rehabilitation psychology add spiritual care limit toxic stimuli. Keep room quiet, restful with low light or lighting per patient preference preferably natural light. 7. Hypertension, hypercholesterolemia, SC cerebrovascular disease, coronary artery disease, cardiac arrhythmia-continue telemetry-continue blood signs every shift focusing on heart rate and blood pressure checks, consult hospitalist for backup medical and adjust/add medications (aspirin, Norvasc, Lipitor, lisinopril, Lopressor) consult cardiology regarding V. tach titrate beta-blocker  8.  Oral pharyngeal dysphagia with cognitive deficits-dysphagia soft with bite-size   Thins liquid consult speech-language pathology--oral motor exercises       Electronically signed by Mireille Stokes DO on 9/15/21 at 8:03 AM RUPA Medina D.O., PM&R     Attending    [address]

## 2021-09-25 PROCEDURE — APPSS45 APP SPLIT SHARED TIME 31-45 MINUTES: Performed by: STUDENT IN AN ORGANIZED HEALTH CARE EDUCATION/TRAINING PROGRAM

## 2021-09-25 PROCEDURE — 97110 THERAPEUTIC EXERCISES: CPT

## 2021-09-25 PROCEDURE — 6370000000 HC RX 637 (ALT 250 FOR IP): Performed by: PHYSICAL MEDICINE & REHABILITATION

## 2021-09-25 PROCEDURE — 6370000000 HC RX 637 (ALT 250 FOR IP): Performed by: INTERNAL MEDICINE

## 2021-09-25 PROCEDURE — 1180000000 HC REHAB R&B

## 2021-09-25 PROCEDURE — 99233 SBSQ HOSP IP/OBS HIGH 50: CPT | Performed by: PSYCHIATRY & NEUROLOGY

## 2021-09-25 PROCEDURE — 97116 GAIT TRAINING THERAPY: CPT

## 2021-09-25 PROCEDURE — 97535 SELF CARE MNGMENT TRAINING: CPT

## 2021-09-25 RX ADMIN — ASPIRIN 81 MG: 81 TABLET, CHEWABLE ORAL at 07:04

## 2021-09-25 RX ADMIN — AMLODIPINE BESYLATE 10 MG: 10 TABLET ORAL at 07:04

## 2021-09-25 RX ADMIN — METOPROLOL TARTRATE 25 MG: 25 TABLET, FILM COATED ORAL at 19:55

## 2021-09-25 RX ADMIN — Medication 2000 UNITS: at 18:15

## 2021-09-25 RX ADMIN — Medication 100 MG: at 07:04

## 2021-09-25 RX ADMIN — LISINOPRIL 20 MG: 20 TABLET ORAL at 07:04

## 2021-09-25 RX ADMIN — METOPROLOL TARTRATE 25 MG: 25 TABLET, FILM COATED ORAL at 07:04

## 2021-09-25 RX ADMIN — ATORVASTATIN CALCIUM 80 MG: 80 TABLET, FILM COATED ORAL at 19:55

## 2021-09-25 RX ADMIN — LISINOPRIL 20 MG: 20 TABLET ORAL at 19:54

## 2021-09-25 ASSESSMENT — ENCOUNTER SYMPTOMS
BACK PAIN: 1
SHORTNESS OF BREATH: 0
CHOKING: 0
COLOR CHANGE: 0
NAUSEA: 0
TROUBLE SWALLOWING: 0
VOMITING: 0

## 2021-09-25 ASSESSMENT — PAIN SCALES - GENERAL
PAINLEVEL_OUTOF10: 0
PAINLEVEL_OUTOF10: 0

## 2021-09-25 NOTE — FLOWSHEET NOTE
Patient assessment is complete. Patient got out of his wheelchair several times today to get back into bed. The [employer] sys did call and sound the alarm.

## 2021-09-25 NOTE — PROGRESS NOTES
Subjective: The patient complains of severe acute on chronic progressive fatigue and balance and cognitive deficits due to cerebellar CVA partially relieved by rest,medications, PT,  OT,   SLP and rest and exacerbated by recent illness. ROS x10: The patient also complains of severely impaired mobility and activities of daily living. Otherwise no new problems with vision, hearing, nose, mouth, throat, dermal, cardiovascular, GI, , pulmonary, musculoskeletal, psychiatric or neurological. See Rehab H&P on Rehab chart dated . Vital signs:  /78   Pulse 61   Temp 98.2 °F (36.8 °C) (Oral)   Resp 18   Ht 5' 8\" (1.727 m)   Wt 165 lb 4.8 oz (75 kg)   SpO2 100%   BMI 25.13 kg/m²   I/O:   PO/Intake:  fair PO intake,  Thins and soft-dysphagia    Bowel/Bladder:   occasional loose stools- Continent. General:  Patient is well developed, adequately nourished, non-obese and     well kempt. HEENT:    PERRLA, hearing intact to loud voice, external inspection of ear     and nose benign. Inspection of lips, tongue and gums benign  Musculoskeletal: No significant change in strength or tone. All joints stable. Inspection and palpation of digits and nails show no clubbing,       cyanosis or inflammatory conditions. Neuro/Psychiatric: Affect: flat but pleasant. Alert and oriented to person, place and     Situation with  min cues. No significant change in deep tendon reflexes or     Sensation-poor judgment reasoning and insight. Lungs:  Diminished, CTA-B. Respiration effort is normal at rest.     Heart:   S1 = S2, RRR. No loud murmurs. Abdomen:  Soft, non-tender, no enlargement of liver or spleen. Extremities:  No significant lower extremity edema or tenderness. Skin:   Intact to general survey, no visualized or palpated problems.     Rehabilitation:  Physical therapy:   Bed Mobility: Scooting: Stand by assistance    Transfers: Sit to Stand: Stand by assistance  Stand to sit: Stand by assistance  Bed to Chair: Stand by assistance, Ambulation 1  Surface: carpet  Device: Rolling Walker  Assistance: Stand by assistance, Contact guard assistance  Quality of Gait: short step and er rle, cues for increased step length with fair carryover, cues for safety in ad with turns and approach to seat  Gait Deviations: Slow Princess, Shuffles, Decreased step length, Decreased step height, Decreased head and trunk rotation  Distance: 150 feet  Comments: in addition  multiple short distance paths taken with emphasis on maneuvering safely - intermittent cues required, Stairs  # Steps : 8  Stairs Height: 6\"  Rails: Bilateral  Assistance: Stand by assistance  Comment: cues for safety at top and bottom stair    FIMS:  ,  , Assessment: Patient demonstrated need for cues with regard to safety in gait, especially turns and with approaching a chair to sit. Visual, verbal and tactile cues provided to support carryover. Occupational therapy:    ,  , Assessment: Pt continues to demonstrate decreased initiation, problem solving, and safety awareness during ADLs. Pt continues to benefit from OT services to maximize independence and safety during ADLs and IADLs. Speech therapy:        Lab/X-ray studies reviewed, analyzed and discussed with patient and staff:      telemetry and monitor tech stated that pt was SB-NSR Rate 55-65. No results found for this or any previous visit (from the past 24 hour(s)). Echocardiogram  9/11/2021  Transthoracic Echocardiography  Left Ventricle Left ventricular ejection fraction is visually estimated at 60%. Impaired relaxation compatible with diastolic dysfunction. ( reversed E/A ratio) Moderate concentric left ventricular hypertrophy. Right Ventricle Normal right ventricle structure and function. Normal right ventricle systolic pressure. Left Atrium Moderately dilated left atrium.  Normal intact intra-atrial septum was noted with no evidence of significant intra-atrial communications susceptibility images do not demonstrate evidence of hemosiderin deposition within the brain parenchyma or the leptomeninges. There are numerous patchy foci of T2/FLAIR hyperintensities in the subcortical and periventricular white matter in a symmetric distribution throughout both hemispheres. Chronic bilateral cerebellar infarcts. There is prominence of the sulci and ventricles consistent with moderate global cerebral atrophy and chronic involutional changes. No midline shift. The visualized portions of the orbits are within normal limits, the globes are intact. The visualized portions of the paranasal sinuses are within normal limits. Right mastoid effusion. Small foci of restricted diffusion in the left centrum semiovale, consistent with acute ischemia. Chronic microvascular changes and bilateral chronic cerebellar infarcts. FL MODIFIED BARIUM  9/13/2021: In cooperation with speech pathology, a modified barium swallow using various consistencies of both solids and liquids with dynamic imaging was performed. Patient's oral stage characterized by mild oral dysphagia. There is premature entry to the level of vallecula. Patient's pharyngeal stage was characterized by moderate pharyngeal dysphagia. There is decreased hyolaryngeal excursion with moderate residuals in the vallecula. There is deep penetration on thin liquids from straw. No aspiration. MILD ORAL AND MODERATE PHARYNGEAL DYSPHAGIA. RECOMMEND BY SPEECH PATHOLOGY TO FOLLOW. US CAROTID ARTERY BILATERAL  9/11/2021 Mild to moderate plaque in the carotid arteries bilaterally. No significant increase in systolic flow or turbulence to indicate any hemodynamically significant narrowing in the right or left internal carotid arteries. There is antegrade flow identified in both vertebral arteries.  ARTERIAL BLOOD FLOW VELOCITY RIGHT PEAK SYSTOLIC VELOCITIES (PSV)                                               Prox CCA    129 cm/s             Mid risk for future disability and their medical and rehabilitation prognosis continue to be good and therefore, we will continue the patient's rehabilitation course as planned. The patient's tentative discharge date was set. Patient and family education was discussed. The patient was made aware of the team discussion regarding their progress. Discharge plans were discussed along with barriers to progress and strategies to address these barriers, patient encouraged to continue to discuss discharge plans with . Complex Physical Medicine & Rehab Issues Assess & Plan:   1. 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 Functional and medical status reassessed regarding patients ability to participate in therapies and patient found to be able to participate in acute intensive comprehensive inpatient rehabilitation program including PT/OT to improve balance, ambulation, ADLs, and to improve the P/AROM. Therapeutic modifications regarding activities in therapies, place, amount of time per day and intensity of therapy made daily. In bed therapies or bedside therapies prn.   2. Bowel and Bladder dysfunction neurogenic bowel and bladder due to CVA:  frequent toileting, ambulate to bathroom with assistance, check post void residuals. Check for C.difficile x1 if >2 loose stools in 24 hours, continue bowel & bladder program.  Monitor bowel and bladder function. Lactinex 2 PO every AC. MOM prn, Brown Bomb prn, Glycerin suppository prn, enema prn.  3. Severe neck mid back and low back pain as well as generalized OA pain Multiple areas of severe spinal canal stenosis in the cervical region. Severe canal stenosis at L2-3, L3-4 and L4-5. : reassess pain every shift and prior to and after each therapy session, give prn Tylenol and scheduled Tylenol, modalities prn in therapy, masage, Lidoderm, K-pad prn.  Consider scheduled AM pain meds.  4. Skin healing and breakdown risk:  continue pressure relief program.  Daily skin exams and reports from nursing. 5. Severe fatigue due to nutritional and hydration deficiency: Add and titrate vitamin B12 vitamin D and CoQ10 continue to monitor I&Os, calorie counts prn, dietary consult prn.  6. Acute episodic insomnia with situational adjustment disorder:  prn Ambien, monitor for day time sedation. 7. Falls risk elevated:  patient to use call light to get nursing assistance to get up, bed and chair alarm. 8. Elevated DVT risk: progressive activities in PT, continue prophylaxis FITZ hose, elevation and Lovenox. 9. Complex discharge planning:  DC home 9/28/21--to SNF vs home with family and Bairon Laureano. SP weekly team meeting every  Thursday to assess progress towards goals, discuss and address social, psychological and medical comorbidities and to address difficulties they may be having progressing in therapy. Patient and family education is in progress. The patient is to follow-up with their family physician after discharge. Complex Active General Medical Issues that complicate care Assess & Plan:    1. History of bilateral shoulder pain with history of shoulder dislocation generalized osteoarthritis  2. Stroke determined by clinical assessment and confirmed on MRI as above-consult neurology control blood pressure monitor for diabetes add aspirin  3. Spinal stenosis of lumbar region with neurogenic claudication, Myelopathy concurrent with and due to spinal stenosis of cervical region  4. Ataxic gait due to cerebellar CVA-focus on balance and therapy  5. Significant oral pharyngeal dysphagia-consult speech-language pathology  6.  Anxiety and depression with occasional violent outburst-emotional support provided daily, vitamin B12, encourage participation in rehabilitation support group and recreational therapy, adjust/add medications (add vitamin B12 consider SSRI) consult rehabilitation psychology add spiritual care limit toxic stimuli. Keep room quiet, restful with low light or lighting per patient preference preferably natural light. 7. Hypertension, hypercholesterolemia, SC cerebrovascular disease, coronary artery disease, cardiac arrhythmia-continue telemetry-continue blood signs every shift focusing on heart rate and blood pressure checks, consult hospitalist for backup medical and adjust/add medications (aspirin, Norvasc, Lipitor, lisinopril, Lopressor) consult cardiology regarding V. tach titrate beta-blocker  8.  Oral pharyngeal dysphagia with cognitive deficits-dysphagia soft with bite-size   Thins liquid consult speech-language pathology--oral motor exercises    Ge Feldman D.O., PM&R     Attending    [facility]

## 2021-09-25 NOTE — PROGRESS NOTES
Neurology Follow up    SUBJECTIVE: Patient seen and examined for neurology follow-up for acute left ischemic centrum semiovale infarct. Patient with some mild right-sided weakness. Patient's diet upgraded to soft and bite sized. Is overall, doing better and do for discharge this week.     No new changes,    Current Facility-Administered Medications   Medication Dose Route Frequency Provider Last Rate Last Admin    QUEtiapine (SEROQUEL) tablet 25 mg  25 mg Oral BID PRN Jayme Greenfield MD        metoprolol tartrate (LOPRESSOR) tablet 25 mg  25 mg Oral BID Rosalina Cyr MD   25 mg at 09/25/21 0704    lisinopril (PRINIVIL;ZESTRIL) tablet 20 mg  20 mg Oral BID Rosalina Cyr MD   20 mg at 09/25/21 1799    Vitamin D (CHOLECALCIFEROL) tablet 2,000 Units  2,000 Units Oral Dinner Luana Scullin, DO   2,000 Units at 09/24/21 1738    cyanocobalamin injection 1,000 mcg  1,000 mcg IntraMUSCular Weekly Luana Scullin, DO   1,000 mcg at 09/21/21 8519    coenzyme Q10 capsule 100 mg  100 mg Oral Daily Luana Scullin, DO   100 mg at 09/25/21 0704    lidocaine 4 % external patch 3 patch  3 patch TransDERmal Daily Luana Scullin, DO        acetaminophen (TYLENOL) tablet 650 mg  650 mg Oral Q4H PRN Luana Scullin, DO        bisacodyl (DULCOLAX) suppository 10 mg  10 mg Rectal Daily PRN Trevin Cardenas DO        fleet rectal enema 1 enema  1 enema Rectal Daily PRN Luana Scullin, DO        acetaminophen (TYLENOL) tablet 650 mg  650 mg Oral Q6H PRN Renee Gonzalez MD   650 mg at 09/21/21 2022    Or    acetaminophen (TYLENOL) suppository 650 mg  650 mg Rectal Q6H PRN Renee Gonzalez MD        polyethylene glycol (GLYCOLAX) packet 17 g  17 g Oral Daily PRN Renee Gonzalez MD        amLODIPine (NORVASC) tablet 10 mg  10 mg Oral Daily Renee Gonzalez MD   10 mg at 09/25/21 0704    aspirin chewable tablet 81 mg  81 mg Oral Daily Renee Gonzalez MD   81 mg at 09/25/21 0704    atorvastatin (LIPITOR) tablet 80 mg  80 mg Oral Nightly Alphonse Gitelman, MD   80 mg at 09/24/21 2023       PHYSICAL EXAM:    /78   Pulse 61   Temp 98.2 °F (36.8 °C) (Oral)   Resp 18   Ht 5' 8\" (1.727 m)   Wt 165 lb 4.8 oz (75 kg)   SpO2 100%   BMI 25.13 kg/m²    General Appearance:      Skin:  normal  CVS - Normal sounds, No murmurs , No carotid Bruits  RS -CTA  Abdomen Soft, BS present  Review of Systems   Constitutional: Negative for appetite change and fever. HENT: Negative for ear pain and trouble swallowing. Eyes: Negative for visual disturbance. Respiratory: Negative for choking and shortness of breath. Cardiovascular: Negative for chest pain and palpitations. Gastrointestinal: Negative for nausea and vomiting. Musculoskeletal: Positive for back pain. Negative for gait problem, joint swelling, myalgias, neck pain and neck stiffness. Skin: Negative for color change. Neurological: Positive for weakness. Negative for dizziness, tremors, seizures, syncope, facial asymmetry, speech difficulty, light-headedness, numbness and headaches. Psychiatric/Behavioral: Negative for behavioral problems, confusion, hallucinations and sleep disturbance.       Mental Status Exam:             Level of Alertness:   awake            Orientation:   person, place, time            Memory:   normal                    Language:  normal      Funduscopic Exam:     Cranial Nerves            Cranial nerve III           Pupils:  equal, round, reactive to light      Cranial nerves III, IV, VI           Extraocular Movements: intact      Cranial nerve V           Facial sensation:  intact      Cranial nerve VII           Facial strength: intact      Cranial nerve VIII           Hearing:  intact      Cranial nerve IX           Palate:  intact      Cranial nerve XI         Shoulder shrug:  intact      Cranial nerve XII          Tongue movement:  normal    Motor: Right upper extremity weakness of 4 /5 with a drift  Drift:  Motor exam   Tone:  normal  Abnormal Movements:  absent            Sensory: No definite findings        Pinprick             Right Upper Extremity:  normal             Left Upper Extremity:  normal             Right Lower Extremity:  normal             Left Lower Extremity:  normal           Vibration                         Touch            Proprioception                 Coordination:           Finger/Nose   Right:  abnormal              Left:  normal          Heel-Knee-Shin                Right:  normal              Left:  normal          Rapid Alternating Movements              Right:  normal              Left:  normal          Gait:                       Casual:  normal                         Romberg:  normal            Reflexes:             Deep Tendon Reflexes:             Reflexes are 2 + including ankle reflexes. Plantar response:                Right:  downgoing               Left:  downgoing    Vascular:  Cardiac Exam:  normal         Echocardiogram complete 2D with doppler with color    Result Date: 9/11/2021  Transthoracic Echocardiography Report (TTE)  Demographics   Patient Name    Yennifer Dean Gender               Male   Patient Number  [id number]       Race                 Unknown                                  Ethnicity   Visit Number    [id number]      Room Number          R758   Corporate ID                   Date of Study        09/11/2021   Accession       [id number]     Referring Physician  Number   Date of Birth   1945     Sonographer          Cachorro Ocampo   Age             77 year(s)     Interpreting         [facility])                                 Physician            Cardiology                                                      [address] [place] [address]  Procedure Type of Study   TTE procedure:ECHOCARDIOGRAM COMPLETE WITH BUBBLE STUDY. Procedure Date Date: 09/11/2021 Start: 08:15 AM Study Location: Portable Technical Quality: Adequate visualization Indications:Stroke.  Patient Status: Routine Height: 68 inches Weight: 179 pounds BSA: 1.95 m^2 BMI: 27.22 kg/m^2 BP: 147/86 mmHg  Conclusions   Summary  Moderate annular calcification. Normal aortic valve structure and function. Trivial aortic regurgitation is  noted. Moderately dilated left atrium. Normal intact intra-atrial septum was noted with no evidence of  significant intra-atrial communications neither by colour flow Doppler  imaging nor by aggitated saline study. Left ventricular ejection fraction is visually estimated at 60%. Impaired relaxation compatible with diastolic dysfunction. ( reversed E/A  ratio)  Moderate concentric left ventricular hypertrophy. Signature   ----------------------------------------------------------------  Electronically signed by Deidra MoranInterpreting physician)  on 09/11/2021 11:17 AM  ----------------------------------------------------------------   Findings  Left Ventricle Left ventricular ejection fraction is visually estimated at 60%. Impaired relaxation compatible with diastolic dysfunction. ( reversed E/A ratio) Moderate concentric left ventricular hypertrophy. Right Ventricle Normal right ventricle structure and function. Normal right ventricle systolic pressure. Left Atrium Moderately dilated left atrium. Normal intact intra-atrial septum was noted with no evidence of significant intra-atrial communications neither by colour flow Doppler imaging nor by aggitated saline study. Right Atrium Normal right atrium. Mitral Valve Moderate annular calcification. Tricuspid Valve Normal tricuspid valve structure and function. Aortic Valve Normal aortic valve structure and function. Trivial aortic regurgitation is noted. Pulmonic Valve Normal pulmonic valve structure and function. Pericardial Effusion No evidence of pericardial effusion. Pleural Effusion No evidence of pleural effusion. Aorta \ Miscellaneous Miscellaneous normal findings were found.  M-Mode Measurements (cm)   LVIDd: 4.6 cm                          LVIDs: 3.01 cm IVSd: 1.98 cm                          IVSs: 2.14 cm  LVPWd: 1.08 cm                         AO Root Dimension: 3.04 cm  Rt. Vent.  Dimension: 3.05 cm                                         LVOT: 1.72 cm  Doppler Measurements:   AV Velocity:0.02 m/s                   MV Peak E-Wave: 1.25 m/s  AV Peak Gradient: 15.73 mmHg           MV Peak A-Wave: 1.54 m/s  AV Mean Gradient: 8.65 mmHg  AV Area (Continuity):1.66 cm^2         MV P1/2t: 103 msec  TR Velocity:2.47 m/s                   MVA by PHT2.14 cm^2  TR Gradient:24.4 mmHg                  Estimated RAP:10 mmHg                                         RVSP:34.4 mmHg  Valves  Mitral Valve   Peak E-Wave: 1.25 m/s                 Peak A-Wave: 1.54 m/s  P1/2t: 103 msec                       E/A Ratio: 0.81  Mean Velocity: 1.01 m/s               Peak Gradient: 6.28 mmHg  Mean Gradient: 4.78 mmHg              Deceleration Time: 407.7 msec  Area (PHT): 2.14 cm^2                 Area (continuity): 1.44 cm^2   Tissue Doppler   E' Septal Velocity: 0.07 m/s  E' Lateral Velocity: 0.06 m/s   Aortic Valve   Peak Velocity: 1.98 m/s                Mean Velocity: 1.39 m/s  Peak Gradient: 15.73 mmHg              Mean Gradient: 8.65 mmHg  Area (continuity): 1.66 cm^2  AV VTI: 46.12 cm   Tricuspid Valve   Estimated RVSP: 34.4 mmHg               Estimated RAP: 10 mmHg  TR Velocity: 2.47 m/s                   TR Gradient: 24.4 mmHg   Pulmonic Valve   Peak Velocity: 0.95 m/s            Peak Gradient: 3.64 mmHg                                     Estimated PASP: 34.4 mmHg   LVOT   Peak Velocity: 1.51 m/s              Mean Velocity: 1.09 m/s  Peak Gradient: 9.17 mmHg             Mean Gradient: 5.32 mmHg  LVOT Diameter: 1.72 cm               LVOT VTI: 32.89 cm  Structures  Left Atrium   LA Volume/Index: 47.11 ml /24 m^2             LA Area: 16.59 cm^2   Left Ventricle   Diastolic Dimension: 4.6 cm          Systolic Dimension: 6.06 cm  Septum Diastolic: 9.46 cm            Septum Systolic: 1.86 cm  PW Diastolic: 9.51 cm                                       FS: 34.6 %  LV EDV/LV EDV Index: 97.45 ml/50 m^2 LV ESV/LV ESV Index: 35.36 ml/18 m^2  EF Calculated: 63.7 %                LV Length: 7.34 cm   LVOT Diameter: 1.72 cm   Right Atrium   RA Systolic Pressure: 10 mmHg   Right Ventricle   Diastolic Dimension: 0.45 cm                                     RV Systolic Pressure: 64.6 mmHg  Aorta/ Miscellaneous Aorta   Aortic Root: 3.04 cm  LVOT Diameter: 1.72 cm      CT HEAD WO CONTRAST    Result Date: 9/10/2021  EXAMINATION: CT HEAD WO CONTRAST CLINICAL HISTORY: generalized weakness COMPARISON:  NONE AVAILABLE An unenhanced scan is performed. FINDINGS:   There is no bleed, mass effect, or space occupying lesion. No extra-axial mass or fluid collections. Hypoattenuated White matter changes in the cerebral hemispheres noted. There is global atrophy. Findings likely reflect chronic small vessel vasculopathy. There is a vague area of low attenuation in the inferior posterior right cerebellar hemisphere with slight obliteration of adjacent sulci. An evolving nonhemorrhagic infarct in the cerebellum would have to be a consideration. Clinical correlation recommended. NO BLEED, MASS EFFECT, OR EXTRA-AXIAL FLUID COLLECTION. EVOLVING NONHEMORRHAGIC INFARCT IN THE INFERIOR RIGHT CEREBELLAR HEMISPHERE. 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 All CT scans at this facility use dose modulation, iterative reconstruction, and/or weight based dosing when appropriate to reduce radiation dose to as low as reasonably achievable. XR CHEST PORTABLE    Result Date: 9/10/2021  Exam: XR CHEST PORTABLE History: Generalized weakness Technique: AP portable view of the chest obtained. Comparison: None available Findings: Atherosclerotic calcification of the thoracic aorta. The cardiomediastinal silhouette is within normal limits. No pneumothorax, pleural effusion, or consolidation.   Question calcified pleural plaques on the left, which can be seen with asbestos related disease. No acute osseous abnormality. No radiographic evidence of acute intrathoracic process. Question left-sided pleural plaques which can be seen with asbestos related disease. MRI BRAIN WO CONTRAST    Result Date: 9/11/2021  EXAMINATION: MRI BRAIN WO CONTRAST CLINICAL HISTORY:  STROKE COMPARISONS: MR brain 4/5/2016 TECHNIQUE: Multiplanar multisequence images of the brain were obtained without contrast. Diffusion perfusion imaging was obtained. FINDINGS: There is a tiny focus of restricted diffusion in the left centrum semiovale (series 4 image 19). There are no extra-axial collections. There is no evidence of hemorrhage. The susceptibility images do not demonstrate evidence of hemosiderin deposition within the brain parenchyma or the leptomeninges. There are numerous patchy foci of T2/FLAIR hyperintensities in the subcortical and periventricular white matter in a symmetric distribution throughout both hemispheres. Chronic bilateral cerebellar infarcts. There is prominence of the sulci and ventricles consistent with moderate global cerebral atrophy and chronic involutional changes. No midline shift. The visualized portions of the orbits are within normal limits, the globes are intact. The visualized portions of the paranasal sinuses are within normal limits. Right mastoid effusion. Small foci of restricted diffusion in the left centrum semiovale, consistent with acute ischemia. Chronic microvascular changes and bilateral chronic cerebellar infarcts. CRITICAL RESULTS: Communicated with Ariela Patel RN on 9/11/2021 at 12:51 PM.    US CAROTID ARTERY BILATERAL    Result Date: 9/11/2021  EXAMINATION: US CAROTID ARTERY BILATERAL HISTORY:   stroke . R26.9 Gait abnormality ICD10 COMPARISON:None TECHNIQUE: Carotid duplex sonograms which include gray scale and color flow evaluation are complimented with spectral waveform analysis.  Please refer to chart below for specific carotid velocity measurements. The degree of stenosis recorded on this exam uses the same method of stratification used in the NASCET trials. This complies with ACR practice guidelines and the Society of radiology in ultrasound consensus statement and provides adequate information for clinical decision making. Society of Radiologists in Ultrasound (SRU) consensus statement was used to estimate internal carotid artery stenosis. RESULT: Mild to moderate plaque in the carotid arteries bilaterally. No significant increase in systolic flow or turbulence to indicate any hemodynamically significant narrowing in the right or left internal carotid arteries. There is antegrade flow identified in both vertebral arteries. ARTERIAL BLOOD FLOW VELOCITY RIGHT PEAK SYSTOLIC VELOCITIES (PSV)                                               Prox CCA    129 cm/s             Mid CCA     114 cm/s              Dist CCA    104 cm/s             Prox ICA    169 cm/s               Mid ICA     106 cm/s            Dist ICA    63 cm/s             Prox ECA     170   cm/s             Prox VERT   51 cm/s              ICA/CCA     1.48                        LEFT PEAK SYSTOLIC VELOCITIES (PSV)  Prox CCA    135 cm/s Mid CCA     166 cm/s Dist CCA    121 cm/s Prox ICA    119 cm/s Mid ICA     72 cm/s Dist ICA    61 cm/s Prox ECA    131 cm/s Prox VERT   56 cm/s ICA/CCA     0.72      50-69 % STENOSIS IN THE RIGHT ICA  <50 % STENOSIS IN THE LEFT ICA ANTEGRADE FLOW IN THE BILATERAL VERTEBRAL ARTERIES. No results for input(s): WBC, HGB, PLT in the last 72 hours. No results for input(s): NA, K, CL, CO2, BUN, CREATININE, GLUCOSE in the last 72 hours. No results for input(s): BILITOT, ALKPHOS, AST, ALT in the last 72 hours.   Lab Results   Component Value Date    PROTIME 10.3 03/26/2019    INR 1.0 03/26/2019     No results found for: LITHIUM, DILFRTOT, VALPROATE    ASSESSMENT AND PLAN  Left centrum semiovale acute ischemic infarct  Carotid duplex with 50 to 69% stenosis in the right internal carotid artery and less than 50% in the left internal carotid artery  hemoGlobin A1c 5.3  Total cholesterol 145, triglycerides 67, HDL 38,LDL 94  Echocardiogram with ejection fraction of 60% with negative bubble study  Continue aspirin and high intensity statin  Dysphasia- nectar thick liquids  MRI of the cervical, thoracic and lumbar spine completed with multiple areas of severe spinal canal stenosis in the cervical spine. Neurosurgery has been consulted with plans for follow-up once medically stable after stroke for possible surgical intervention. Patient able to ambulate with assistance of walker. Circumduction and right lower extremity noted. Patient has weakness in flexion of right hip and dorsiflexion of right foot and unclear at this is related to left central semiovale infarct versus severe lumbar stenosis at L2-L5. Patient also has gait ataxia which could be due to severe cervical canal stenosis and old cerebellar infarcts. Azotemia which is new noted today. Patient overall improving in strength. Continue on aspirin and high intensity statin. Continue PT/OT/SLP. Azotemia improving. Continues to participate in physical therapy be. Has had some behavioral issues and agitation throughout the day and has been started on Seroquel. Overall improving in strength. Continue PT/OT/SLP. nonfocal, gait ataxia, encephalopathy    Azotemia continues to improve. Tolerating Seroquel well and cooperative. Overall, doing well. Continue PT/OT/SLP   no new dunlap    Patient's diet upgraded to soft and bite sized. Patient is due for discharge home 9/28/21- to SNF vs home with family and [place]. To follow up with neurology in 6-8 weeks.      I have personally performed a face to face diagnostic evaluation on this patient, reviewed all data and investigations, and am the sole provider of all clinical decisions on the neurological status of this patient. no new changes,      Jordan Hoffmann MD, [place] [place], American Board of Psychiatry & Neurology  Board Certified in Vascular Neurology  Board Certified in Neuromuscular Medicine  Certified in Abhijeet Tiwari

## 2021-09-25 NOTE — PLAN OF CARE
Problem: Falls - Risk of:  Goal: Will remain free from falls  Description: Will remain free from falls  Outcome: Ongoing  Goal: Absence of physical injury  Description: Absence of physical injury  Outcome: Ongoing     Problem: Mobility - Impaired:  Goal: Mobility will improve  Description: Mobility will improve  Outcome: Ongoing     Problem: IP COMMUNICATION/DYSARTHRIA  Goal: LTG - patient will improve expressive language skills to allow for communication of wants and needs in daily activities  Outcome: Ongoing     Problem: IP SWALLOWING  Goal: LTG - patient will tolerate the least restrictive diet consistency to allow for safe consumption of daily meals  Outcome: Ongoing

## 2021-09-25 NOTE — PROGRESS NOTES
Education, & procurement, Self-Care / ADL, Home Management Training    Plan Comment: continue with current POC     Goals  Long term goals  Time Frame for Long term goals :  Within 2 weeks, pt to demonstrate progress in the following areas listed below to achieve specific LTG's as stated in initial evaluation  Long term goal 1: Improve independence during ADLs/IADLs  Long term goal 2: Improve strength and endurance to perform functional transfers  Long term goal 3: Improve fine motor coordination during self care  Long term goal 4: Improve cognition to demonstrate understanding of HEP  Long term goal 5: improve balance to safely perform functional mobility  Patient Goals   Patient goals : \"to get better\"       Therapy Time   Individual Concurrent Group Co-treatment   Time In 0827         Time Out 0835         Minutes 8             ADL/IADL trainin minutes     Electronically signed by JASKARAN Gaspar on 21 at 11:49 AM EDT    JASKARAN Gaspar

## 2021-09-25 NOTE — PROGRESS NOTES
Hospitalist Progress Note  9/25/2021 3:51 PM    Assessment and Plan:     1. Acute left centrum semiovale ischemic infarct: Neurology following. Continue ASA and high intensity statin. Concern for competency. 2. Dysphagia: s/p MBS on 9/13. Diet per ST recommendations. Diet upgraded to soft, bite size. 3. Spinal canal stenosis with neurogenic claudication: MRI of the spine showed  severe canal stenosis at L2-3, L3-4 and L4-5. Evaluated by NSx during medical admit, conservative management for now. Outpatient f/u with NSx   4. HTN: On Amlodipine and lisinopril. Cardiology following. Metoprolol added to regimen. Trend Bps  5. NSVT: Episode of NSVT 9/13, asymptomatic. Cardiology following. BB added to regimen. 9/20: BB lowered d/t bradycardia. Continue to monitor. 6. Renal disease: improved. Scr, 1.25 Avoid new nephrotoxic agents  7. R sided weakness, Gait instability and Decreased Functional Status 2/2 CVA: Fall precautions. PT OT to evaluate. Maximize nutrition status. Assessing if needs DME at home. SW on board. 8. Bowel Regimen and GI PPx: stool softners PRN ordered with hold parameters for loose stools or diarrhea. On antiacid  Diet: Adult Oral Nutrition Supplement; Frozen Oral Supplement  Adult Oral Nutrition Supplement; Fortified Pudding Oral Supplement  9. ADULT DIET; Dysphagia - Soft and Bite Sized  10. Advance Directive: Full Code   11. Nutrition status: Supplemental Vitamins ordered. Dietitian assessment  12. DVT prophylaxis: Chemical prophylaxis SQ enoxaparin  13. Discharge planning: [place] on board. Patient is due for discharge home 9/28/21- to SNF vs home with family and [place] [id number]. To follow up with neurology in 6-8 weeks. 14. High Risk Readmission Screening Tool Score Noted.      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 *      ** Total time spent reviewing medical records, evaluating patient, speaking with RN's and consultants where I was focused exclusively on this patient:  minutes. This time is excluding time spent performing procedures or significant events occurring earlier or later in the day requiring my attention and focus. Subjective:   Admit Date: 9/13/2021  PCP: Marietta Gr MD     No acute events overnight. participating with Therapy. Feels well. Walking well. On exam, denies any headache or dizziness. No chest pain, palpitations, shortness of breath. No abdominal pain or nausea. He does continue to have some residual right-sided weakness. Objective:     Vitals:    09/23/21 1829 09/24/21 0758 09/24/21 1815 09/25/21 0731   BP: 133/65 121/64 102/71 134/78   Pulse: 56 65 61 61   Resp: 18 17 16 18   Temp: 98.6 °F (37 °C) 98.2 °F (36.8 °C) 97.7 °F (36.5 °C) 98.2 °F (36.8 °C)   TempSrc: Oral Oral Oral Oral   SpO2: 98% 100% 97% 100%   Weight:       Height:         General appearance: Patient is awake, alert, answering questions appropriately. Lungs: Clear to auscultation bilaterally, no rhonchi, rales, wheezes  Heart:  S1, S2 normal, RRR, murmur noted, no gallop or rub  Abdomen: Soft, nontender, nondistended, no guarding or rigidity, bowel sounds present in all 4 quadrants  Extremities:  no cyanosis, no edema bilat lower exts, no calf tenderness bilaterally.  Dry skin noted       Medications:      metoprolol tartrate  25 mg Oral BID    lisinopril  20 mg Oral BID    Vitamin D  2,000 Units Oral Dinner    cyanocobalamin  1,000 mcg IntraMUSCular Weekly    coenzyme Q10  100 mg Oral Daily    lidocaine  3 patch TransDERmal Daily    amLODIPine  10 mg Oral Daily    aspirin  81 mg Oral Daily    atorvastatin  80 mg Oral Nightly       LABS Reviewed    IMAGING Reviewed    Lita Vega APRN - CNP  Rounding Hospitalist

## 2021-09-25 NOTE — PROGRESS NOTES
Physical Therapy Rehab Treatment Note  Facility/Department: [facility]  Room: [place] [address]       NAME: Rae Hillman  :  (88 y.o.)  MRN: [id number]  CODE STATUS: Full Code    Date of Service: 2021  Chart Reviewed: Yes  Family / Caregiver Present: No    Restrictions:  Restrictions/Precautions: Fall Risk  Position Activity Restriction  Other position/activity restrictions: thin liquid diet 21    SUBJECTIVE: Subjective: Patient states he hopes to get out of here and go home. Response To Previous Treatment: Patient with no complaints from previous session. Pain Screening  Patient Currently in Pain: Denies  Pre Treatment Pain Screening  Pain at present: 0  Scale Used: Numeric Score  Intervention List: Patient able to continue with treatment    Post Treatment Pain Screenin/10  Pain Assessment  Pain Assessment: 0-10  Pain Level: 0    OBJECTIVE:           Neuromuscular Education  PNF: maneuvering around obsticles while keeping safe distance within ad        Ambulation  Ambulation?: Yes  More Ambulation?: No  Ambulation 1  Surface: carpet  Device: Rolling Walker  Assistance: Stand by assistance;Contact guard assistance  Quality of Gait: short step and er rle, cues for increased step length with fair carryover, cues for safety in ad with turns and approach to seat  Gait Deviations: Slow Princess; Shuffles;Decreased step length;Decreased step height;Decreased head and trunk rotation  Distance: 150 feet    Stairs/Curb  Stairs?: Yes  Stairs  # Steps : 8  Stairs Height: 6\"  Rails: Bilateral  Assistance: Stand by assistance  Comment: cues for safety at top and bottom stair        Exercises  Hip Flexion: x20  Hip Abduction: x20 side kicks x20 hip add ball squeezes  Knee Long Arc Quad: x20  Ankle Pumps: x20  Comments: hr x20     ASSESSMENT/PROGRESS TOWARDS GOALS:  Body structures, Functions, Activity limitations: Decreased functional mobility ; Decreased safe awareness;Decreased balance;Decreased strength;Decreased cognition;Decreased coordination  Assessment: Patient demonstrated need for cues with regard to safety in gait, especially turns and with approaching a chair to sit. Visual, verbal and tactile cues provided to support carryover. Patient worked toward safety in transfers and change direction in gait this session. Goals:  Long term goals  Long term goal 1: Bed mobility with indep  Long term goal 2: sit to stand and bed transfers SBA with cues for safety 50 % of trials  Long term goal 3: Amb 150ft with 2ww  feet  Long term goal 4: CGA 4 stairs with rails  Long term goal 5: [place] balance testing at 25/56 or greater  Long term goal 6: SBA 4 stairs with rails    PLAN OF CARE/Safety:   Safety Devices  Type of devices: Chair alarm in place; Left in chair      Therapy Time:   Individual   Time In 1400   Time Out 1430   Minutes 30     Minutes:30      Transfer/Bed mobility training:      Gait training:      Neuro re education:     Therapeutic ex:      Judge Solange PTA, 09/25/21 at 4:14 PM

## 2021-09-26 PROCEDURE — 1180000000 HC REHAB R&B

## 2021-09-26 PROCEDURE — 6370000000 HC RX 637 (ALT 250 FOR IP): Performed by: INTERNAL MEDICINE

## 2021-09-26 PROCEDURE — 6370000000 HC RX 637 (ALT 250 FOR IP): Performed by: PHYSICAL MEDICINE & REHABILITATION

## 2021-09-26 RX ADMIN — METOPROLOL TARTRATE 25 MG: 25 TABLET, FILM COATED ORAL at 21:29

## 2021-09-26 RX ADMIN — LISINOPRIL 20 MG: 20 TABLET ORAL at 08:33

## 2021-09-26 RX ADMIN — Medication 2000 UNITS: at 16:43

## 2021-09-26 RX ADMIN — AMLODIPINE BESYLATE 10 MG: 10 TABLET ORAL at 08:33

## 2021-09-26 RX ADMIN — LISINOPRIL 20 MG: 20 TABLET ORAL at 21:29

## 2021-09-26 RX ADMIN — METOPROLOL TARTRATE 25 MG: 25 TABLET, FILM COATED ORAL at 08:33

## 2021-09-26 RX ADMIN — ASPIRIN 81 MG: 81 TABLET, CHEWABLE ORAL at 08:33

## 2021-09-26 RX ADMIN — Medication 100 MG: at 08:33

## 2021-09-26 RX ADMIN — ATORVASTATIN CALCIUM 80 MG: 80 TABLET, FILM COATED ORAL at 21:29

## 2021-09-26 ASSESSMENT — PAIN SCALES - GENERAL: PAINLEVEL_OUTOF10: 0

## 2021-09-26 NOTE — PROGRESS NOTES
Assessment completed. A&O x2-3. Denies pain at the time. BM today. Avasys in room for safety. In bed with alarm activated. Call light in reach.  Electronically signed by Osa Harada, LPN on 0/86/9134 at 38:14 AM

## 2021-09-26 NOTE — PROGRESS NOTES
Subjective: The patient complains of severe acute on chronic progressive fatigue and balance and cognitive deficits due to cerebellar CVA partially relieved by rest,medications, PT,  OT,   SLP and rest and exacerbated by recent illness. ROS x10: The patient also complains of severely impaired mobility and activities of daily living. Otherwise no new problems with vision, hearing, nose, mouth, throat, dermal, cardiovascular, GI, , pulmonary, musculoskeletal, psychiatric or neurological. See Rehab H&P on Rehab chart dated . Vital signs:  /85   Pulse 70   Temp 97.9 °F (36.6 °C)   Resp 16   Ht 5' 8\" (1.727 m)   Wt 165 lb 4.8 oz (75 kg)   SpO2 98%   BMI 25.13 kg/m²   I/O:   PO/Intake:  fair PO intake,  Thins and soft-dysphagia    Bowel/Bladder:   occasional loose stools- Continent. General:  Patient is well developed, adequately nourished, non-obese and     well kempt. HEENT:    PERRLA, hearing intact to loud voice, external inspection of ear     and nose benign. Inspection of lips, tongue and gums benign  Musculoskeletal: No significant change in strength or tone. All joints stable. Inspection and palpation of digits and nails show no clubbing,       cyanosis or inflammatory conditions. Neuro/Psychiatric: Affect: flat but pleasant. Alert and oriented to person, place and     Situation with  min cues. No significant change in deep tendon reflexes or     Sensation-poor judgment reasoning and insight. Lungs:  Diminished, CTA-B. Respiration effort is normal at rest.     Heart:   S1 = S2, RRR. No loud murmurs. Abdomen:  Soft, non-tender, no enlargement of liver or spleen. Extremities:  No significant lower extremity edema or tenderness. Skin:   Intact to general survey, no visualized or palpated problems.     Rehabilitation:  Physical therapy:   Bed Mobility: Scooting: Stand by assistance    Transfers: Sit to Stand: Stand by assistance  Stand to sit: Stand by susceptibility images do not demonstrate evidence of hemosiderin deposition within the brain parenchyma or the leptomeninges. There are numerous patchy foci of T2/FLAIR hyperintensities in the subcortical and periventricular white matter in a symmetric distribution throughout both hemispheres. Chronic bilateral cerebellar infarcts. There is prominence of the sulci and ventricles consistent with moderate global cerebral atrophy and chronic involutional changes. No midline shift. The visualized portions of the orbits are within normal limits, the globes are intact. The visualized portions of the paranasal sinuses are within normal limits. Right mastoid effusion. Small foci of restricted diffusion in the left centrum semiovale, consistent with acute ischemia. Chronic microvascular changes and bilateral chronic cerebellar infarcts. FL MODIFIED BARIUM  9/13/2021: In cooperation with speech pathology, a modified barium swallow using various consistencies of both solids and liquids with dynamic imaging was performed. Patient's oral stage characterized by mild oral dysphagia. There is premature entry to the level of vallecula. Patient's pharyngeal stage was characterized by moderate pharyngeal dysphagia. There is decreased hyolaryngeal excursion with moderate residuals in the vallecula. There is deep penetration on thin liquids from straw. No aspiration. MILD ORAL AND MODERATE PHARYNGEAL DYSPHAGIA. RECOMMEND BY SPEECH PATHOLOGY TO FOLLOW. US CAROTID ARTERY BILATERAL  9/11/2021 Mild to moderate plaque in the carotid arteries bilaterally. No significant increase in systolic flow or turbulence to indicate any hemodynamically significant narrowing in the right or left internal carotid arteries. There is antegrade flow identified in both vertebral arteries.  ARTERIAL BLOOD FLOW VELOCITY RIGHT PEAK SYSTOLIC VELOCITIES (PSV)                                               Prox CCA    129 cm/s             Mid CCA     114 cm/s              Dist CCA    104 cm/s             Prox ICA    169 cm/s               Mid ICA     106 cm/s            Dist ICA    63 cm/s             Prox ECA     170   cm/s             Prox VERT   51 cm/s              ICA/CCA     1.48                        LEFT PEAK SYSTOLIC VELOCITIES (PSV)  Prox CCA    135 cm/s Mid CCA     166 cm/s Dist CCA    121 cm/s Prox ICA    119 cm/s Mid ICA     72 cm/s Dist ICA    61 cm/s Prox ECA    131 cm/s Prox VERT   56 cm/s ICA/CCA     0.72      50-69 % STENOSIS IN THE RIGHT ICA  <50 % STENOSIS IN THE LEFT ICA ANTEGRADE FLOW IN THE BILATERAL VERTEBRAL ARTERIES. MRI CERVICAL THORACIC LUMBAR SPINE   9/12/2021     CERVICAL REGION: Multilevel degenerative changes with severe canal stenosis at C2-3, C3-4, and C6-7. Moderate canal stenosis at C4-5. THORACIC REGION: No spinal canal stenosis    LUMBAR REGION: Multilevel degenerative changes. Endplate irregularity with joint space narrowing and slight anterolisthesis at L3-4 Severe canal   stenosis at L2-3 and L4-5. Moderate to severe canal stenosis at L3-4. No acute compression fracture. No epidural or paraspinal soft tissue   mass. No abnormal cord signal intensity on the scans. Multiple areas of severe spinal canal stenosis in the cervical region. Severe canal stenosis at L2-3, L3-4 and L4-5. Previous extensive, complex labs, notes and diagnostics reviewed and analyzed. ALLERGIES:    Allergies as of 09/13/2021    (No Known Allergies)      (please also verify by checking MAR)     Yesterday I evaluated this patient for periodic reassessment of medical and functional status. The patient was discussed in detail at the treatment team meeting focusing on current medical issues, progress in therapies, social issues, psychological issues, barriers to progress and strategies to address these barriers, and discharge planning. See the hand written addendum to rehab progress note.   The patient continues to be high meds.  4. Skin healing and breakdown risk:  continue pressure relief program.  Daily skin exams and reports from nursing. 5. Severe fatigue due to nutritional and hydration deficiency: Add and titrate vitamin B12 vitamin D and CoQ10 continue to monitor I&Os, calorie counts prn, dietary consult prn.  6. Acute episodic insomnia with situational adjustment disorder:  prn Ambien, monitor for day time sedation. 7. Falls risk elevated:  patient to use call light to get nursing assistance to get up, bed and chair alarm. 8. Elevated DVT risk: progressive activities in PT, continue prophylaxis FITZ hose, elevation and Lovenox. 9. Complex discharge planning:  DC home 9/28/21--to SNF vs home with family and [facility]. SP weekly team meeting every  Thursday to assess progress towards goals, discuss and address social, psychological and medical comorbidities and to address difficulties they may be having progressing in therapy. Patient and family education is in progress. The patient is to follow-up with their family physician after discharge. Complex Active General Medical Issues that complicate care Assess & Plan:    1. History of bilateral shoulder pain with history of shoulder dislocation generalized osteoarthritis  2. Stroke determined by clinical assessment and confirmed on MRI as above-consult neurology control blood pressure monitor for diabetes add aspirin  3. Spinal stenosis of lumbar region with neurogenic claudication, Myelopathy concurrent with and due to spinal stenosis of cervical region  4. Ataxic gait due to cerebellar CVA-focus on balance and therapy  5. Significant oral pharyngeal dysphagia-consult speech-language pathology  6.  Anxiety and depression with occasional violent outburst-emotional support provided daily, vitamin B12, encourage participation in rehabilitation support group and recreational therapy, adjust/add medications (add vitamin B12 consider SSRI) consult rehabilitation psychology add spiritual care limit toxic stimuli. Keep room quiet, restful with low light or lighting per patient preference preferably natural light. 7. Hypertension, hypercholesterolemia, [place] cerebrovascular disease, coronary artery disease, cardiac arrhythmia-continue telemetry-continue blood signs every shift focusing on heart rate and blood pressure checks, consult hospitalist for backup medical and adjust/add medications (aspirin, Norvasc, Lipitor, lisinopril, Lopressor) consult cardiology regarding V. tach titrate beta-blocker  8.  Oral pharyngeal dysphagia with cognitive deficits-dysphagia soft with bite-size   Thins liquid consult speech-language pathology--oral motor exercises    Irma Arellano D.O., PM&R     Attending    [facility] Raisa Lassiter

## 2021-09-27 PROCEDURE — 97530 THERAPEUTIC ACTIVITIES: CPT

## 2021-09-27 PROCEDURE — 97535 SELF CARE MNGMENT TRAINING: CPT

## 2021-09-27 PROCEDURE — 97129 THER IVNTJ 1ST 15 MIN: CPT

## 2021-09-27 PROCEDURE — 92507 TX SP LANG VOICE COMM INDIV: CPT

## 2021-09-27 PROCEDURE — 6370000000 HC RX 637 (ALT 250 FOR IP): Performed by: PHYSICAL MEDICINE & REHABILITATION

## 2021-09-27 PROCEDURE — 6370000000 HC RX 637 (ALT 250 FOR IP): Performed by: INTERNAL MEDICINE

## 2021-09-27 PROCEDURE — 1180000000 HC REHAB R&B

## 2021-09-27 PROCEDURE — 97116 GAIT TRAINING THERAPY: CPT

## 2021-09-27 PROCEDURE — 97110 THERAPEUTIC EXERCISES: CPT

## 2021-09-27 PROCEDURE — 97130 THER IVNTJ EA ADDL 15 MIN: CPT

## 2021-09-27 PROCEDURE — 97112 NEUROMUSCULAR REEDUCATION: CPT

## 2021-09-27 PROCEDURE — 99233 SBSQ HOSP IP/OBS HIGH 50: CPT | Performed by: PHYSICAL MEDICINE & REHABILITATION

## 2021-09-27 PROCEDURE — 99232 SBSQ HOSP IP/OBS MODERATE 35: CPT | Performed by: INTERNAL MEDICINE

## 2021-09-27 RX ORDER — LIDOCAINE 4 G/G
3 PATCH TOPICAL DAILY PRN
Status: DISCONTINUED | OUTPATIENT
Start: 2021-09-27 | End: 2021-09-28 | Stop reason: HOSPADM

## 2021-09-27 RX ORDER — QUETIAPINE FUMARATE 25 MG/1
25 TABLET, FILM COATED ORAL 2 TIMES DAILY PRN
Qty: 60 TABLET | Refills: 0 | DISCHARGE
Start: 2021-09-27

## 2021-09-27 RX ORDER — ATORVASTATIN CALCIUM 80 MG/1
80 TABLET, FILM COATED ORAL NIGHTLY
Qty: 30 TABLET | Refills: 3 | Status: SHIPPED | OUTPATIENT
Start: 2021-09-27

## 2021-09-27 RX ORDER — ASPIRIN 81 MG/1
81 TABLET, CHEWABLE ORAL DAILY
Qty: 30 TABLET | Refills: 3 | Status: SHIPPED | OUTPATIENT
Start: 2021-09-27

## 2021-09-27 RX ORDER — CHOLECALCIFEROL (VITAMIN D3) 50 MCG
2000 TABLET ORAL
Qty: 30 TABLET | Refills: 0 | DISCHARGE
Start: 2021-09-27

## 2021-09-27 RX ORDER — LISINOPRIL 20 MG/1
20 TABLET ORAL 2 TIMES DAILY
Qty: 30 TABLET | Refills: 3 | Status: SHIPPED | OUTPATIENT
Start: 2021-09-27

## 2021-09-27 RX ADMIN — Medication 100 MG: at 08:34

## 2021-09-27 RX ADMIN — METOPROLOL TARTRATE 25 MG: 25 TABLET, FILM COATED ORAL at 08:34

## 2021-09-27 RX ADMIN — ATORVASTATIN CALCIUM 80 MG: 80 TABLET, FILM COATED ORAL at 21:38

## 2021-09-27 RX ADMIN — METOPROLOL TARTRATE 25 MG: 25 TABLET, FILM COATED ORAL at 21:38

## 2021-09-27 RX ADMIN — AMLODIPINE BESYLATE 10 MG: 10 TABLET ORAL at 08:34

## 2021-09-27 RX ADMIN — Medication 2000 UNITS: at 17:25

## 2021-09-27 RX ADMIN — LISINOPRIL 20 MG: 20 TABLET ORAL at 08:34

## 2021-09-27 RX ADMIN — ASPIRIN 81 MG: 81 TABLET, CHEWABLE ORAL at 08:34

## 2021-09-27 RX ADMIN — LISINOPRIL 20 MG: 20 TABLET ORAL at 21:38

## 2021-09-27 ASSESSMENT — ENCOUNTER SYMPTOMS
BLOOD IN STOOL: 0
WHEEZING: 0
STRIDOR: 0
SHORTNESS OF BREATH: 0
CHEST TIGHTNESS: 0
EYES NEGATIVE: 1
COUGH: 0
RESPIRATORY NEGATIVE: 1
NAUSEA: 0
GASTROINTESTINAL NEGATIVE: 1

## 2021-09-27 ASSESSMENT — PAIN SCALES - GENERAL
PAINLEVEL_OUTOF10: 0

## 2021-09-27 NOTE — PROGRESS NOTES
Subjective: The patient complains of severe acute on chronic progressive fatigue and balance and cognitive deficits due to cerebellar CVA partially relieved by rest,medications, PT,  OT,   SLP and rest and exacerbated by recent illness. I am concerned about patients medical complexities including risk for arrhythmia as cause for embolic CVA and severe aphasia. He is to start E stim today for swallow. Psych--deemed not competent. I have ordered a modified barium swallow recheck  --upgraded to dysphagia soft with thins. I reviewed current care and plans for further care with other rehab providers including nursing and case management. According to recent  note, \". A&O x2-3. Denies pain at the time. BM today. Avasys in room for safety. In bed with alarm activated. Call light in reach. \".    ROS x10: The patient also complains of severely impaired mobility and activities of daily living. Otherwise no new problems with vision, hearing, nose, mouth, throat, dermal, cardiovascular, GI, , pulmonary, musculoskeletal, psychiatric or neurological. See Rehab H&P on Rehab chart dated . Vital signs:  /79   Pulse 58   Temp 98.6 °F (37 °C)   Resp 16   Ht 5' 8\" (1.727 m)   Wt 165 lb 4.8 oz (75 kg)   SpO2 100%   BMI 25.13 kg/m²   I/O:   PO/Intake:  fair PO intake,  Thins and soft-dysphagia thins    Bowel/Bladder:   occasional loose stools- Continent. General:  Patient is well developed, adequately nourished, non-obese and     well kempt. HEENT:    PERRLA, hearing intact to loud voice, external inspection of ear     and nose benign. Inspection of lips, tongue and gums benign  Musculoskeletal: No significant change in strength or tone. All joints stable. Inspection and palpation of digits and nails show no clubbing,       cyanosis or inflammatory conditions. Neuro/Psychiatric: Affect: flat but pleasant.   Alert and oriented to person, place and     Situation with  min previous visit (from the past 24 hour(s)). Echocardiogram  9/11/2021  Transthoracic Echocardiography  Left Ventricle Left ventricular ejection fraction is visually estimated at 60%. Impaired relaxation compatible with diastolic dysfunction. ( reversed E/A ratio) Moderate concentric left ventricular hypertrophy. Right Ventricle Normal right ventricle structure and function. Normal right ventricle systolic pressure. Left Atrium Moderately dilated left atrium. Normal intact intra-atrial septum was noted with no evidence of significant intra-atrial communications neither by colour flow Doppler imaging nor by aggitated saline study. Right Atrium Normal right atrium. Mitral Valve Moderate annular calcification. Tricuspid Valve Normal tricuspid valve structure and function. Aortic Valve Normal aortic valve structure and function. Trivial aortic regurgitation is noted. Pulmonic Valve Normal pulmonic valve structure and function. Pericardial Effusion No evidence of pericardial effusion. Pleural Effusion No evidence of pleural effusion. Aorta \ Miscellaneous Miscellaneous normal findings were found. CT HEAD  : 9/10/2021  There is no bleed, mass effect, or space occupying lesion. No extra-axial mass or fluid collections. Hypoattenuated White matter changes in the cerebral hemispheres noted. There is global atrophy. Findings likely reflect chronic small vessel vasculopathy. There is a vague area of low attenuation in the inferior posterior right cerebellar hemisphere with slight obliteration of adjacent sulci. An evolving nonhemorrhagic infarct in the cerebellum would have to be a consideration. Clinical correlation recommended. NO BLEED, MASS EFFECT, OR EXTRA-AXIAL FLUID COLLECTION. EVOLVING NONHEMORRHAGIC INFARCT IN THE INFERIOR RIGHT CEREBELLAR HEMISPHERE. 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 XR CHEST   9/10/2021 Atherosclerotic calcification of the thoracic aorta.  The cardiomediastinal silhouette is within normal limits. No pneumothorax, pleural effusion, or consolidation. Question calcified pleural plaques on the left, which can be seen with asbestos related disease. No acute osseous abnormality. No radiographic evidence of acute intrathoracic process. Question left-sided pleural plaques which can be seen with asbestos related disease. MRI BRAIN   9/11/2021 There is a tiny focus of restricted diffusion in the left centrum semiovale (series 4 image 19). There are no extra-axial collections. There is no evidence of hemorrhage. The susceptibility images do not demonstrate evidence of hemosiderin deposition within the brain parenchyma or the leptomeninges. There are numerous patchy foci of T2/FLAIR hyperintensities in the subcortical and periventricular white matter in a symmetric distribution throughout both hemispheres. Chronic bilateral cerebellar infarcts. There is prominence of the sulci and ventricles consistent with moderate global cerebral atrophy and chronic involutional changes. No midline shift. The visualized portions of the orbits are within normal limits, the globes are intact. The visualized portions of the paranasal sinuses are within normal limits. Right mastoid effusion. Small foci of restricted diffusion in the left centrum semiovale, consistent with acute ischemia. Chronic microvascular changes and bilateral chronic cerebellar infarcts. FL MODIFIED BARIUM  9/13/2021: In cooperation with speech pathology, a modified barium swallow using various consistencies of both solids and liquids with dynamic imaging was performed. Patient's oral stage characterized by mild oral dysphagia. There is premature entry to the level of vallecula. Patient's pharyngeal stage was characterized by moderate pharyngeal dysphagia. There is decreased hyolaryngeal excursion with moderate residuals in the vallecula. There is deep penetration on thin liquids from straw. No aspiration. MILD ORAL AND MODERATE PHARYNGEAL DYSPHAGIA. RECOMMEND BY SPEECH PATHOLOGY TO FOLLOW. US CAROTID ARTERY BILATERAL  9/11/2021 Mild to moderate plaque in the carotid arteries bilaterally. No significant increase in systolic flow or turbulence to indicate any hemodynamically significant narrowing in the right or left internal carotid arteries. There is antegrade flow identified in both vertebral arteries. ARTERIAL BLOOD FLOW VELOCITY RIGHT PEAK SYSTOLIC VELOCITIES (PSV)                                               Prox CCA    129 cm/s             Mid CCA     114 cm/s              Dist CCA    104 cm/s             Prox ICA    169 cm/s               Mid ICA     106 cm/s            Dist ICA    63 cm/s             Prox ECA     170   cm/s             Prox VERT   51 cm/s              ICA/CCA     1.48                        LEFT PEAK SYSTOLIC VELOCITIES (PSV)  Prox CCA    135 cm/s Mid CCA     166 cm/s Dist CCA    121 cm/s Prox ICA    119 cm/s Mid ICA     72 cm/s Dist ICA    61 cm/s Prox ECA    131 cm/s Prox VERT   56 cm/s ICA/CCA     0.72      50-69 % STENOSIS IN THE RIGHT ICA  <50 % STENOSIS IN THE LEFT ICA ANTEGRADE FLOW IN THE BILATERAL VERTEBRAL ARTERIES. MRI CERVICAL THORACIC LUMBAR SPINE   9/12/2021     CERVICAL REGION: Multilevel degenerative changes with severe canal stenosis at C2-3, C3-4, and C6-7. Moderate canal stenosis at C4-5. THORACIC REGION: No spinal canal stenosis    LUMBAR REGION: Multilevel degenerative changes. Endplate irregularity with joint space narrowing and slight anterolisthesis at L3-4 Severe canal   stenosis at L2-3 and L4-5. Moderate to severe canal stenosis at L3-4. No acute compression fracture. No epidural or paraspinal soft tissue   mass. No abnormal cord signal intensity on the scans. Multiple areas of severe spinal canal stenosis in the cervical region. Severe canal stenosis at L2-3, L3-4 and L4-5.         Previous extensive, complex labs, notes and diagnostics reviewed and analyzed. ALLERGIES:    Allergies as of 09/13/2021    (No Known Allergies)      (please also verify by checking MAR)      Today I evaluated this patient for periodic reassessment of medical and functional status. The patient was discussed in detail at the treatment team meeting focusing on current medical issues, progress in therapies, social issues, psychological issues, barriers to progress and strategies to address these barriers, and discharge planning. See the addendum to rehab progress note-as a second progress note in the chart. The patient continues to be high risk for future disability and their medical and rehabilitation prognosis continue to be good and therefore, we will continue the patient's rehabilitation course as planned. The patient's tentative discharge date was set. Patient and family education was discussed. The patient was made aware of the team discussion regarding their progress. Complex Physical Medicine & Rehab Issues Assess & Plan:   1. 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 Functional and medical status reassessed regarding patients ability to participate in therapies and patient found to be able to participate in acute intensive comprehensive inpatient rehabilitation program including PT/OT to improve balance, ambulation, ADLs, and to improve the P/AROM. Therapeutic modifications regarding activities in therapies, place, amount of time per day and intensity of therapy made daily. In bed therapies or bedside therapies prn.   2. Bowel and Bladder dysfunction neurogenic bowel and bladder due to CVA:  frequent toileting, ambulate to bathroom with assistance, check post void residuals. Check for C.difficile x1 if >2 loose stools in 24 hours, continue bowel & bladder program.  Monitor bowel and bladder function. Lactinex 2 PO every AC.   MOM prn, Brown Bomb prn, Glycerin suppository prn, enema prn.  3. Severe neck mid back and low back pain as well as generalized OA pain Multiple areas of severe spinal canal stenosis in the cervical region. Severe canal stenosis at L2-3, L3-4 and L4-5. : reassess pain every shift and prior to and after each therapy session, give prn Tylenol and scheduled Tylenol, modalities prn in therapy, masage, Lidoderm, K-pad prn. Consider scheduled AM pain meds. 4. Skin healing and breakdown risk:  continue pressure relief program.  Daily skin exams and reports from nursing. 5. Severe fatigue due to nutritional and hydration deficiency: Add and titrate vitamin B12 vitamin D and CoQ10 continue to monitor I&Os, calorie counts prn, dietary consult prn.  6. Acute episodic insomnia with situational adjustment disorder:  prn Ambien, monitor for day time sedation. 7. Falls risk elevated:  patient to use call light to get nursing assistance to get up, bed and chair alarm. 8. Elevated DVT risk: progressive activities in PT, continue prophylaxis FITZ hose, elevation and Lovenox. 9. Complex discharge planning:  DC home 9/28/21--to SNF vs home with family and [place] [id number]. SP weekly team meeting every  Thursday to assess progress towards goals, discuss and address social, psychological and medical comorbidities and to address difficulties they may be having progressing in therapy. Patient and family education is in progress. The patient is to follow-up with their family physician after discharge. Complex Active General Medical Issues that complicate care Assess & Plan:    1. History of bilateral shoulder pain with history of shoulder dislocation generalized osteoarthritis  2. Stroke determined by clinical assessment and confirmed on MRI as above-consult neurology control blood pressure monitor for diabetes add aspirin  3. Spinal stenosis of lumbar region with neurogenic claudication, Myelopathy concurrent with and due to spinal stenosis of cervical region  4. Ataxic gait due to cerebellar CVA-focus on balance and therapy  5. Significant oral pharyngeal dysphagia-consult speech-language pathology  6. Anxiety and depression with occasional violent outburst-emotional support provided daily, vitamin B12, encourage participation in rehabilitation support group and recreational therapy, adjust/add medications (add vitamin B12 consider SSRI) consult rehabilitation psychology add spiritual care limit toxic stimuli. Keep room quiet, restful with low light or lighting per patient preference preferably natural light. 7. Hypertension, hypercholesterolemia, SC cerebrovascular disease, coronary artery disease, cardiac arrhythmia-continue telemetry-continue blood signs every shift focusing on heart rate and blood pressure checks, consult hospitalist for backup medical and adjust/add medications (aspirin, Norvasc, Lipitor, lisinopril, Lopressor) consult cardiology regarding V. tach titrate beta-blocker  8.  Oral pharyngeal dysphagia with cognitive deficits-dysphagia soft with bite-size   Thins liquid consult speech-language pathology--oral motor exercises       Electronically signed by Desmond Romero DO on 9/15/21 at 8:03 AM EDT       Sanna Camp D.O., PM&R     Attending    [address]

## 2021-09-27 NOTE — PROGRESS NOTES
[facility]  Facility/Department: [facility]  Speech Language Pathology   Treatment Note          Rae Hillman  1945  [id number]        Rehab Dx/Hx: Abnormality of gait following cerebrovascular accident (CVA) [F86.854, R26.9]  Abnormality of gait and mobility [R26.9]    Precautions: falls and aspirations    Medical Dx: Abnormality of gait following cerebrovascular accident (CVA) [U87.823, R26.9]  Abnormality of gait and mobility [R26.9]  Speech Dx: Dysphagia, Aphasia and Cognitive Linguistic Impairment    Date: 9/27/2021    Subjective:  Alert and Cooperative        Interventions used this date:  Expressive Language, Receptive Language and Cognitive Skill Development    Objective/Assessment:  Patient progressing towards goals:  Short-term Goals  Timeframe for Short-term Goals: 1-2 weeks  Goal 1: Pt will complete descriptive naming tasks with 80% accuracy with min verbal cues to promote use of circumlocution strategy and help the patient express his/her basic personal, safety, and medical wants and needs in the presence of communication deficits. Completed descriptive naming task with 60% acc Ind and increased to 90% acc with min cues  Goal 2: Pt will complete convergent and divergent naming tasks with 80% accuracy with mild cues to promote semantic organization and the overall effectiveness and efficiency for expression of his/her wants, needs, feelings, and ideas. Goal 3: Pt will follow 3-multi step written directions with 90% accuracy with min cues to improve the pt's reading comprehension and ability to utilize compensatory strategy of environmental referencing for maximum independence. Goal 4: Pt will answer high level yes/no questions with 90% accuracy with min cues to assist the caregiver in obtaining important information regarding the patient's personal, medical, and safety needs.   Complex yes/no questions with 80% acc min cues  Goal 5: Pt will describe an object by two attributes with 80% acc with mild cues to promote use of circumlocution strategy and help the patient express his/her basic personal, safety, and medical wants and needs. Goal 6: Within 1-5 days of implementing the ST POC, pt's functional reading/writing skills will be assessed in relation to executive functioning (e.g. bill paying, reading rx labels, completing personal information), with additional goals added to pt's POC as deemed necesary by treating SLP. SLP initiated mid level functional reading task related to time with 1/3 acc and mod-max cues      Treatment/Activity Tolerance:  Patient tolerated treatment well    Plan:  Continue per POC    Pain Assessment:  Pre-Treatment  Pain assessment: 0-10  Pain level: 0  Intervention:  Patient denies pain. Post-Treatment  Pain assessment: 0-10  Pain level: 0  Intervention:  Patient denies pain. Patient/Caregiver Education:  Patient educated on session and progression towards goals. Education needs reinforcement.     Safety Devices:  Chair alarm in place      [id number] [address] ([facility]):      SPOKEN LANGUAGE COMPREHENSION  Ratin-3    SPOKEN LANGUAGE EXPRESSION  Ratin    PROBLEM SOLVING  Rating:  3          Therapy Time   Time in: 1100  Time out: 1130  Minutes: 30              Signature: Electronically signed by SIVA Dickson on 2021 at 12:45 PM

## 2021-09-27 NOTE — PLAN OF CARE
Problem: Falls - Risk of:  Goal: Will remain free from falls  Description: Will remain free from falls  Outcome: Ongoing  Goal: Absence of physical injury  Description: Absence of physical injury  Outcome: Ongoing     Problem: Mobility - Impaired:  Goal: Mobility will improve  Description: Mobility will improve  Outcome: Ongoing cueing to proper sequencing leg movement. Uses  a walker. Problem: Skin Integrity:  Goal: Will show no infection signs and symptoms  Description: Will show no infection signs and symptoms  Outcome: Met This Shift  Goal: Absence of new skin breakdown  Description: Absence of new skin breakdown  Outcome: Met This Shift     Problem: Nutrition  Goal: Optimal nutrition therapy  Outcome: Ongoing ordered nutrional supplements. Pudding consumes 50%.

## 2021-09-27 NOTE — PROGRESS NOTES
Occupational Therapy  Facility/Department: [facility]  Daily Treatment Note  NAME: Sergio Floyd  : 1945  MRN: [id number]    Date of Service: 2021    Discharge Recommendations:  Continue to assess pending progress       Assessment      REQUIRES OT FOLLOW UP: Yes  Activity Tolerance  Activity Tolerance: Patient Tolerated treatment well  Safety Devices  Safety Devices in place: Yes  Type of devices: All fall risk precautions in place         Patient Diagnosis(es): There were no encounter diagnoses. has a past medical history of Cellulitis of left hand and Hypertension. has a past surgical history that includes back surgery. Restrictions  Restrictions/Precautions  Restrictions/Precautions: Fall Risk  Position Activity Restriction  Other position/activity restrictions: thin liquid diet 21  Subjective   General  Chart Reviewed: Yes  Patient assessed for rehabilitation services?: Yes  Response to previous treatment: Patient with no complaints from previous session  Family / Caregiver Present: No  Referring Practitioner: Dr. Kevin Kim  Diagnosis: Impaired mobility, gait, and ADL's 2º inferior cerebellar stroke  Subjective  Subjective:  Pain Assessment  Pain Assessment: 0-10  Pain Level: 0  Pre Treatment Pain Screening  Pain at present: 0  Scale Used: Numeric Score  Intervention List: Patient able to continue with treatment  Vital Signs  Patient Currently in Pain: Denies   Orientation  Orientation  Overall Orientation Status: Within Functional Limits  Objective       Tub Transfers  Tub - Transfer From: Walker  Tub - Transfer Type: To and From  Tub - Transfer To: Shower seat with back  Tub - Technique: Ambulating  Tub Transfers: Minimal assistance  Tub Transfers Comments: Pt engaged in tub transfer training to prepare for discharge to home to improve independence and safety during functional transfers. Therapist and pt discussed pt's bathroom setup prior to transfer training.  Pt reports he does not have grab bars or a shower chair. Therapist demonstrated safe tub transfer with the use of a shower chair secondary to pt continues to demonstrate decreased balance during mobility and believes it would be safe for pt to obtain. Pt then performed 2 tub transfers and demonstrates significant max difficulty following directions. Pt requires min assist to direct pt as well as for safety during mobility secondary to decreased balance. Pt attempted to step over edge of tub 3 times and requires verbal cues to correct. Pt also utilizes grab bars during transfers after instructed to not use grab bars. At this time it would be unsafe for pt to transfer into shower alone. While seated at tabletop, pt replicated mildly complex graphic design of colored wooden blocks, nuts, and bolts, using B UEs to improve problem solving and sequencing during ADLs. Pt demonstrates mod difficulty to correctly assemble design and requires verbal and tactile cues to complete design. Pt requires increased time to assemble. Pt demonstrates min difficulty manipulating wing nuts. Pt then disassembled all items and returned them to their designated containers. Pt dropped 2 items while disassembling. While seated at tabletop, pt placed pennies into container with slit in lid using B UEs to improve fine motor coordination during self care. Pt demonstrates mod difficulty to follow directions and requires verbal cues 50% of the time to only place pennies one at a time. Pt alternated hands during activity. Pt then was instructed to  pennies in groups of 3 using a translation technique from finger tips to palm and back to fingertips to place into container. Pt demonstrates max difficulty to follow directions and min difficulty to manipulate pennies. Pt mildly frustrated during activity. Pt unable to return all pennies to the container d/t end of therapy time.        Plan   Plan  Times per week: 5-7x  Plan weeks: 2 weeks  Current Treatment Recommendations: Strengthening, Functional Mobility Training, Balance Training, Neuromuscular Re-education, Cognitive/Perceptual Training, Safety Education & Training, Patient/Caregiver Education & Training, Equipment Evaluation, Education, & procurement, Self-Care / ADL, Home Management Training  Plan Comment: continue with current POC  G-Code     OutComes Score                                                  AM-PAC Score             Goals  Long term goals  Time Frame for Long term goals :  Within 2 weeks, pt to demonstrate progress in the following areas listed below to achieve specific LTG's as stated in initial evaluation  Long term goal 1: Improve independence during ADLs/IADLs  Long term goal 2: Improve strength and endurance to perform functional transfers  Long term goal 3: Improve fine motor coordination during self care  Long term goal 4: Improve cognition to demonstrate understanding of HEP  Long term goal 5: improve balance to safely perform functional mobility  Patient Goals   Patient goals : \"to get better\"       Therapy Time   Individual Concurrent Group Co-treatment   Time In 1300         Time Out 1400         Minutes 60           ADL/IADL trainin minutes  Therapeutic activities: 10 minutes  Cognitive Retrainin minutes     Duane Sharps, OT   Electronically signed by Duane Sharps, OT on 2021 at 4:23 PM

## 2021-09-27 NOTE — PROGRESS NOTES
Hospitalist Progress Note  9/27/2021 2:41 PM    Assessment and Plan:     1. Acute left centrum semiovale ischemic infarct: Neurology following. Continue ASA and high intensity statin. Concern for competency. 2. Dysphagia: s/p MBS on 9/13. Diet per ST recommendations. Diet upgraded to soft, bite size. 3. Spinal canal stenosis with neurogenic claudication: MRI of the spine showed  severe canal stenosis at L2-3, L3-4 and L4-5. Evaluated by NSx during medical admit, conservative management for now. Outpatient f/u with NSx   4. HTN: On Amlodipine and lisinopril. Cardiology following. BP improved with addition of metoprolol. 5. NSVT: Episode of NSVT 9/13, asymptomatic. Cardiology following. BB added to regimen. 9/20: BB lowered d/t bradycardia. Continue to monitor. 6. Renal disease: improved. Scr, 1.25 Avoid new nephrotoxic agents  7. R sided weakness, Gait instability and Decreased Functional Status 2/2 CVA: Fall precautions. PT OT to evaluate. Maximize nutrition status. Assessing if needs DME at home. SW on board. 8. Bowel Regimen and GI PPx: stool softners PRN ordered with hold parameters for loose stools or diarrhea. On antiacid  Diet: Adult Oral Nutrition Supplement; Frozen Oral Supplement  Adult Oral Nutrition Supplement; Fortified Pudding Oral Supplement  9. ADULT DIET; Dysphagia - Soft and Bite Sized  10. Advance Directive: Full Code   11. Nutrition status: Supplemental Vitamins ordered. Dietitian assessment  12. DVT prophylaxis: Chemical prophylaxis SQ enoxaparin  13. Discharge planning: [place] on board. Patient is due for discharge home 9/28/21- to SNF vs home with family and Bairon Tinsley. To follow up with neurology in 6-8 weeks. Med rec updated. 14. High Risk Readmission Screening Tool Score Noted.      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 *      ** Total time spent reviewing medical records, evaluating patient, speaking with RN's and consultants where I was focused exclusively on this patient:  minutes. This time is excluding time spent performing procedures or significant events occurring earlier or later in the day requiring my attention and focus. Subjective:   Admit Date: 9/13/2021  PCP: Anisha Pedroza MD     No acute events overnight. participating with Therapy. Feels well. Walking well. On exam, denies any headache or dizziness. No chest pain, palpitations, shortness of breath. No abdominal pain or nausea. He does continue to have some residual right-sided weakness. Discharge scheduled for tomorrow. No new complaints. Objective:     Vitals:    09/26/21 0833 09/26/21 1839 09/26/21 2129 09/27/21 0606   BP: 129/85 (!) 155/77 121/70 130/79   Pulse: 70 71 62 58   Resp:  18  16   Temp:  97.7 °F (36.5 °C)  98.6 °F (37 °C)   TempSrc:  Oral     SpO2:  99%  100%   Weight:       Height:         General appearance: Patient is awake, alert, answering questions appropriately. Lungs: Clear to auscultation bilaterally, no rhonchi, rales, wheezes  Heart:  S1, S2 normal, RRR, murmur noted, no gallop or rub  Abdomen: Soft, nontender, nondistended, no guarding or rigidity, bowel sounds present in all 4 quadrants  Extremities:  no cyanosis, no edema bilat lower exts, no calf tenderness bilaterally.  Dry skin noted       Medications:      metoprolol tartrate  25 mg Oral BID    lisinopril  20 mg Oral BID    Vitamin D  2,000 Units Oral Dinner    cyanocobalamin  1,000 mcg IntraMUSCular Weekly    coenzyme Q10  100 mg Oral Daily    amLODIPine  10 mg Oral Daily    aspirin  81 mg Oral Daily    atorvastatin  80 mg Oral Nightly       LABS Reviewed    IMAGING Reviewed    HOWARD Neri - SHIRLEY  Rounding Hospitalist

## 2021-09-27 NOTE — CARE COORDINATION
LSW called Kay and discussed discharge for tomorrow. Originally Lexi Perry wanted patient to discharge to [facility] but LSW informed her that they currently are not allowing visitors. Lexi Perry stated that either [facility], or [facility] would be fine. She said that [facility] is near one of their brothers which would make it eaiser for visiting. LSW will contact.  Electronically signed by DIONISIO Simmons, SANJAY on 9/27/2021 at 12:39 PM

## 2021-09-27 NOTE — DISCHARGE INSTR - COC
Continuity of Care Form    Patient Name: Jaclyn March   :    MRN:  [id number]    [facility] date:  2021  Discharge date:  21    Code Status Order: Full Code   Advance Directives:     Admitting Physician:  Sid Jacome DO  PCP: Pratima Kauffman MD    Discharging Nurse: [address] [place] [address] [address] Unit/Room#: M807/O977-98  Discharging Unit Phone Number:     Emergency Contact:   Extended Emergency Contact Information  Primary Emergency Contact: Trav [address] [place] [address] Phone: [phone]  Relation: Brother/Sister    Past Surgical History:  Past Surgical History:   Procedure Laterality Date    BACK SURGERY         Immunization History:   Immunization History   Administered Date(s) Administered    Tdap (Boostrix, Adacel) 2019       Active Problems:  Patient Active Problem List   Diagnosis Code    Shoulder dislocation S43.006A    Cellulitis L03.90    Cellulitis of left hand L03.114    Stroke determined by clinical assessment ([facility] [address] I63.9    Gait abnormality R26.9    Renal disease N28.9    Hematuria R31.9    Spinal stenosis of lumbar region with neurogenic claudication M48.062    Myelopathy concurrent with and due to spinal stenosis of cervical region ([address] M48.02, G99.2    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 ([address] I63.441    Ataxic gait R26.0    Numbness R20.0    Abnormality of gait and mobility R26.9       Isolation/Infection:   Isolation          No Isolation        Patient Infection Status     Infection Onset Added Last Indicated Last Indicated By Review Planned Expiration Resolved Resolved By    None active    Resolved    COVID-19 Rule Out 21 COVID-19 (Ordered)   21 Rule-Out Test Resulted    COVID-19 Rule Out 09/10/21 09/10/21 09/10/21 COVID-19, Rapid (Ordered)   09/10/21 Rule-Out Test Resulted          Nurse Assessment:  Last Vital Signs: /79   Pulse 58   Temp 98.6 °F (37 °C)   Resp 16   Ht 5' 8\" (1.727 m)   Wt 165 lb 4.8 oz (75 kg)   SpO2 100%   BMI 25.13 kg/m²     Last documented pain score (0-10 scale): Pain Level: 0  Last Weight:   Wt Readings from Last 1 Encounters:   09/21/21 165 lb 4.8 oz (75 kg)     Mental Status:  oriented, alert and poor safety awareness. poor insight into deficits. IV Access:  - None    Nursing Mobility/ADLs:  Walking   Assisted  Transfer  Assisted  Bathing  Assisted  Dressing  Assisted  Toileting  Assisted  Feeding  Independent  Med Admin  Dependent  Med Delivery   whole and one at a time. sometimes applesauce. Wound Care Documentation and Therapy:        Elimination:  Continence:   · Bowel: Yes  · Bladder: Yes  Urinary Catheter: None   Colostomy/Ileostomy/Ileal Conduit: No       Date of Last BM: 09/27/21    Intake/Output Summary (Last 24 hours) at 9/27/2021 1841  Last data filed at 9/27/2021 1324  Gross per 24 hour   Intake 360 ml   Output    Net 360 ml     I/O last 3 completed shifts: In: 360 [P.O.:360]  Out: -     Safety Concerns:     [address] [facility] Safety Concerns:[id number]}    Impairments/Disabilities:      Speech, Vision and Hearing    Nutrition Therapy:  Current Nutrition Therapy:   - Oral Diet:  Dysphagia 2 mechanically altered    Routes of Feeding: Oral  Liquids: Thin Liquids  Daily Fluid Restriction: no  Last Modified Barium Swallow with Video (Video Swallowing Test): done on 09/21/21    Treatments at the Time of Hospital Discharge:   Respiratory Treatments:   Oxygen Therapy:  is not on home oxygen therapy. Ventilator:    - No ventilator support    Rehab Therapies: Physical Therapy, Occupational Therapy and Speech/Language Therapy  Weight Bearing Status/Restrictions: No weight bearing restirctions  Other Medical Equipment (for information only, NOT a DME order):  walker  Other Treatments: TEDS    Patient's personal belongings (please select all that are sent with patient):  all personal belongings sent home with pt.      RN SIGNATURE: Electronically signed by Maninder Tobin RN on 9/27/21 at 6:46 PM EDT    CASE MANAGEMENT/SOCIAL WORK SECTION    Inpatient Status Date: Patient admitted to acute inpatient rehab on 9/13/21    Readmission Risk Assessment Score:  Readmission Risk              Risk of Unplanned Readmission:  13           Discharging to Facility/ Agency   · Name:   · Address:  · Phone:  · Fax:    Dialysis Facility (if applicable)   · Name:  · Address:  · Dialysis Schedule:  · Phone:  · Fax:    / signature: Electronically signed by Tony Langford RN on 9/28/21 at 10:07 AM EDT    PHYSICIAN SECTION    Prognosis: Good    Condition at Discharge: Stable    Rehab Potential (if transferring to Rehab): Good    Recommended Labs or Other Treatments After Discharge:     Physician Certification: I certify the above information and transfer of Esme Pretty  is necessary for the continuing treatment of the diagnosis listed and that he requires Arron Liban for  30 days.      Update Admission H&P: No change in H&P    PHYSICIAN SIGNATURE:Dr Geneva Narvaez DO    Electronically signed by Tony Langford RN on 9/28/21 at 10:08 AM EDT

## 2021-09-27 NOTE — CARE COORDINATION
Face sheet sent secure e-mail to elise for possible SNF admission on 9/28.  Electronically signed by Loly Palacios RN on 9/27/2021 at 5:02 PM

## 2021-09-27 NOTE — PROGRESS NOTES
Physical Therapy Rehab Treatment Note  Facility/Department: [facility]  Room: [place] [address]       NAME: Jack Ballard  :  (85 y.o.)  MRN: [id number]  CODE STATUS: Full Code    Date of Service: 2021  Chart Reviewed: Yes  Family / Caregiver Present: No    Restrictions:  Restrictions/Precautions: Fall Risk       SUBJECTIVE:  Pt with no new reports. States everything is ok. Pain Screening  Patient Currently in Pain: No       Post Treatment Pain Screenin/10     OBJECTIVE:           Balance  Comments: [place]         Exercises  Hip Flexion: x20  Hip Abduction: x20 side kicks x20 hip add ball squeezes  Knee Long Arc Quad: x20  Ankle Pumps: x20  Comments: reviewed for HEP     ASSESSMENT/PROGRESS TOWARDS GOALS:  Assessment: Pt is medium risk for fall per [place] score however improved by four points since last measure and has met goal.  PT Education: Home Exercise Program  Patient Education: Reviewed and issued hEP for seated LE ex. Completed SBA with VCs for quality and control. Goals:  Long term goals  Long term goal 1: Bed mobility with indep - met  Long term goal 2: sit to stand and bed transfers SBA with cues for safety 50 % of trials - met  Long term goal 3: Amb 150ft with 2ww  feet - met  Long term goal 4: SBA 4 stairs with rails - met  Long term goal 5: [place] balance testing at 25/56 or greater -   Long term goal 6: SBA 4 stairs with rails    PLAN OF CARE/Safety:   Plan Comment: Pt scheduled to D/C tomorrow. Review seated LE HEP issued on  again for quailty. Safety Devices  Type of devices: Chair alarm in place; Left in chair      Therapy Time:   Individual   Time In 1400   Time Out 1430   Minutes 30     Minutes:      Transfer/Bed mobility trainin      Gait trainin      Neuro re education:15     Therapeutic ex:10    Purvi Connors PTA, 21 at 4:55 PM

## 2021-09-27 NOTE — PROGRESS NOTES
Progress Note  Patient: Earlene Ferrer  Unit/Bed: M556/T028-64  YOB: 1945  MRN: [id number]  Acct: [de-identified]   Admitting Diagnosis: Abnormality of gait following cerebrovascular accident (CVA) [P99.426, R26.9]  Abnormality of gait and mobility [R26.9]  Admit Date:  9/13/2021  Hospital Day: 14    Chief Complaint: NSVT HTN [place]    Histories:  Past Medical History:   Diagnosis Date    Cellulitis of left hand 3/26/2019    Hypertension      Past Surgical History:   Procedure Laterality Date    BACK SURGERY       History reviewed. No pertinent family history. Social History     Socioeconomic History    Marital status: Single     Spouse name: None    Number of children: None    Years of education: None    Highest education level: None   Occupational History    None   Tobacco Use    Smoking status: Never Smoker    Smokeless tobacco: Never Used   Vaping Use    Vaping Use: Never used   Substance and Sexual Activity    Alcohol use: Never     Alcohol/week: 0.0 standard drinks    Drug use: Never    Sexual activity: None   Other Topics Concern    None   Social History Narrative    Lives With: Alone, sister lives in the area    Type of Home: [address]: One level    Home Access: Stairs to enter with rails- Number of Steps: 3- Rails: Both    Bathroom Shower/Tub: Tub/Shower unit    Home Equipment: Cane, Rolling walker (rollator)    ADL Assistance: [address]: Independent    Homemaking Responsibilities: Yes    Ambulation Assistance: Independent (2ww)    Transfer Assistance: Independent    Active : No    He is a retired  for [employer]. He learned how to be a  in [place] was stationed in [place] during the [email or web address]]. Never went overseas. Never  no kids graduated [employer].          Social Determinants of Health     Financial Resource Strain:     Difficulty of Paying Living Expenses:    Food Insecurity:     Worried About Running Out of Food in the Last Year:     [address] in the Last Year:    Transportation Needs:     Lack of Transportation (Medical):  Lack of Transportation (Non-Medical):    Physical Activity:     Days of Exercise per Week:     Minutes of Exercise per Session:    Stress:     Feeling of Stress :    Social Connections:     Frequency of Communication with Friends and Family:     Frequency of Social Gatherings with Friends and Family:     Attends Quaker Services:     Active Member of Clubs or Organizations:     Attends Club or Organization Meetings:     Marital Status:    Intimate Partner Violence:     Fear of Current or Ex-Partner:     Emotionally Abused:     Physically Abused:     Sexually Abused:        Subjective/HPI no acute events overnight. participating with Therapy. No cp no sob. Feels well. Walking well. Eating well. There is a concern for competency. For transfer to NH    EKG:         Review of Systems:   Review of Systems   Constitutional: Negative. Negative for diaphoresis and fatigue. HENT: Negative. Eyes: Negative. Respiratory: Negative. Negative for cough, chest tightness, shortness of breath, wheezing and stridor. Cardiovascular: Negative. Negative for chest pain, palpitations and leg swelling. Gastrointestinal: Negative. Negative for blood in stool and nausea. Genitourinary: Negative. Musculoskeletal: Positive for gait problem. Skin: Negative. Neurological: Positive for weakness. Negative for dizziness, syncope and light-headedness. Hematological: Negative. Psychiatric/Behavioral: Negative. Physical Examination:    /79   Pulse 58   Temp 98.6 °F (37 °C)   Resp 16   Ht 5' 8\" (1.727 m)   Wt 165 lb 4.8 oz (75 kg)   SpO2 100%   BMI 25.13 kg/m²    Physical Exam   Constitutional: He appears healthy. No distress.    HENT:   Normal cephalic and Atraumatic   Eyes: Pupils are equal, round, and reactive to light. Neck: Thyroid normal. No JVD present. No neck adenopathy. No thyromegaly present. Cardiovascular: Normal rate, regular rhythm, intact distal pulses and normal pulses. Murmur heard. Pulmonary/Chest: Effort normal and breath sounds normal. He has no wheezes. He has no rales. He exhibits no tenderness. Abdominal: Soft. Bowel sounds are normal. There is no abdominal tenderness. Musculoskeletal:         General: No tenderness or edema. Normal range of motion. Cervical back: Normal range of motion and neck supple. Neurological: He is alert and oriented to person, place, and time. Skin: Skin is warm. No cyanosis. Nails show no clubbing.        LABS:  CBC:   Lab Results   Component Value Date    WBC 5.4 09/13/2021    RBC 5.19 09/13/2021    HGB 16.6 09/13/2021    HCT 47.3 09/13/2021    MCV 91.1 09/13/2021    MCH 31.9 09/13/2021    MCHC 35.0 09/13/2021    RDW 13.0 09/13/2021     09/13/2021     CBC with Differential:    Lab Results   Component Value Date    WBC 5.4 09/13/2021    RBC 5.19 09/13/2021    HGB 16.6 09/13/2021    HCT 47.3 09/13/2021     09/13/2021    MCV 91.1 09/13/2021    MCH 31.9 09/13/2021    MCHC 35.0 09/13/2021    RDW 13.0 09/13/2021    LYMPHOPCT 29.9 09/13/2021    MONOPCT 10.8 09/13/2021    BASOPCT 0.7 09/13/2021    MONOSABS 0.6 09/13/2021    LYMPHSABS 1.6 09/13/2021    EOSABS 0.2 09/13/2021    BASOSABS 0.0 09/13/2021     CMP:    Lab Results   Component Value Date     09/14/2021    K 4.4 09/14/2021     09/14/2021    CO2 21 09/14/2021    BUN 35 09/14/2021    CREATININE 0.98 09/14/2021    GFRAA >60.0 09/14/2021    LABGLOM >60.0 09/14/2021    GLUCOSE 106 09/14/2021    PROT 7.2 09/10/2021    LABALBU 4.2 09/14/2021    CALCIUM 9.0 09/14/2021    BILITOT 1.0 09/10/2021    ALKPHOS 79 09/10/2021    AST 36 09/10/2021    ALT 52 09/10/2021     BMP:    Lab Results   Component Value Date     09/14/2021    K 4.4 09/14/2021     09/14/2021 CO2 21 09/14/2021    BUN 35 09/14/2021    LABALBU 4.2 09/14/2021    CREATININE 0.98 09/14/2021    CALCIUM 9.0 09/14/2021    GFRAA >60.0 09/14/2021    LABGLOM >60.0 09/14/2021    GLUCOSE 106 09/14/2021     Magnesium:    Lab Results   Component Value Date    MG 1.9 09/10/2021     Troponin:    Lab Results   Component Value Date    TROPONINI <0.010 09/10/2021        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 ([facility] [address] [I63.441] 09/13/2021     Priority: Low    Myelopathy concurrent with and due to spinal stenosis of cervical region ([facility] [address] [M48.02, G99.2] 09/13/2021     Priority: Low    Spinal stenosis of lumbar region with neurogenic claudication [M48.062] 09/13/2021     Priority: Low    Renal disease [N28.9] 09/13/2021     Priority: Low    Stroke determined by clinical assessment ([facility] [address] [I63.9] 09/10/2021     Priority: Low    Shoulder dislocation [S43.006A] 03/08/2016     Priority: Low        Assessment/Plan:  1. CVA - Rehab  2. Cervical and lumbar stenosis  3. NSVT-   Telemetry shows no further NSVT on BB but now Sera Garcia. Will lower BB to 25 bid. - stable now. continue low dose BB. 4. LVEF 60%  5. Mag and TSH WNL  6. HTN - stable   7. HPL- resume Statin  8. Competency evaluation - noted pt to be incompetent. 9. For NH tomorrow. 10. F/u 3weeks.        Electronically signed by Kaitlin Jerez MD on 9/27/2021 at 8:33 AM

## 2021-09-27 NOTE — PROGRESS NOTES
INDIVIDUALIZED OVERALL REHAB PLAN OF CARE  ADDENDUM TO REHAB PROGRESS NOTE-for audit purposes must also refer to this day's clinical note and combine the information      Date: 2021  Patient Name: Jack Ballard   Room: T751/U360-09    MRN: [id number]    : 1945  (77 y.o.)  Gender: male       Today 2021 during weekly team meeting, I reviewed the patient Jack Ballard in detail with the therapists and nurses involved in patient's care gathering complex physiatric data regarding current medical issues, progress in therapies, factors limiting progress, social issues, psychological issues, ongoing therapeutic plans and discharge planning. Legend:  I= independent Im =Modified independent  S=Supervised SB=stand by MCKEON=set up CG=contact kristen Min= minimal Mod=Moderate Max=maximal Max of 2 =maximal assist of 2 people      CURRENT FUNCTIONAL STATUS:    NURSING ISSUES:     . A&O x2-3. Denies pain at the time. BM today. Avasys in room for safety. In bed with alarm activated. Call light in reach. Nursing will continue to focus on bowel and bladder continence transitioning toward independence by time of discharge. Monitoring post void residuals monitoring for severe constipation and bowel obstruction. Focus on achieving ADL goals with co-treating with OT when possible.     PHYSICAL THERAPY  Bed mobility:  Supine to Sit: Modified independent (21)  Sit to Supine: Modified independent (21)  Transfers:  Sit to Stand: Supervision;Stand by assistance (21 104)  Bed to Chair: Supervision;Stand by assistance (21)  Gait:   Device: Rolling Walker (21)  Assistance: Stand by assistance (21)  Distance: 250 feet (21)  Quality of Gait: side ISAIAH, inconsistent RLE step length, decreased RLE stance time, variable safety with environmental maneuvering including approach to chair/bed, decreased scanning of environment (21)  Comments: pt presents with decreased ability to plan tasks, often requiring cues from therapist to continue towards goal (09/27/21 1041)  Stairs:  # Steps : 12 (09/27/21 1041)  Rails: Bilateral (09/27/21 1041)  Assistance: Stand by assistance (09/27/21 1041)  Comment: non - reciprocal, mild increased sway with pt utilizing rails to correct balance. Pt unable to bring walker up and down the stairs (09/27/21 1041)  W/C mobility:         OCCUPATIONAL THERAPY  Hand Dominance: Right  ADL  Feeding: Supervision;Setup (09/27/21 1231)  Grooming: Supervision (09/27/21 1231)  UE Bathing: Verbal cueing;Supervision;Setup (09/27/21 1231)  LE Bathing: Verbal cueing;Setup;Supervision (09/27/21 1231)  UE Dressing: Setup;Supervision (09/27/21 1231)  LE Dressing: Stand by assistance (09/27/21 1231)  Toileting: Unable to assess(comment) (did not need to go) (09/27/21 1231)  Additional Comments: pt completed shower ADL at the above level. Pt requries cues for sequencing and initiation. (09/27/21 1231)  Toilet Transfers  Toilet - Technique: Ambulating (09/27/21 1234)  Equipment Used: Grab bars (09/27/21 1234)  Toilet Transfer: Stand by assistance (09/27/21 1234)  Toilet Transfers Comments: ww (09/25/21 1128)  Tub Transfers  Tub - Transfer From: Denia Ferrer (09/27/21 1348)  Tub - Transfer Type: To and From (09/27/21 1348)  Tub - Transfer To: Shower seat with back (09/27/21 1348)  Tub - Technique: Ambulating (09/27/21 1348)  Tub Transfers: Minimal assistance (09/27/21 1348)  Tub Transfers Comments: Pt engaged in tub transfer training to prepare for discharge to home to improve independence and safety during functional transfers. Therapist and pt discussed pt's bathroom setup prior to transfer training. Pt reports he does not have grab bars or a shower chair.  Therapist demonstrated safe tub transfer with the use of a shower chair secondary to pt continues to demonstrate decreased balance during mobility and believes it would be (09/27/21 9037)  Shower Transfers  Shower - Transfer From: David Love (09/27/21 1234)  Shower - Transfer Type: To and From (09/27/21 1234)  Shower - Transfer To: Shower seat with back (09/27/21 1234)  Shower - Technique: Ambulating (09/27/21 1234)  Shower Transfers: Stand by assistance (09/27/21 1234)  Shower Transfers Comments: grab bars - assist to maneuver ww x 1 (09/15/21 1210)      SPEECH THERAPY  Motor Speech: Exceptions to [facility] (mild reduced articulation at sentence to conversation level)  Comprehension:  (Moderate auditory comprehension deficits continue to be noted with yes/no questions and muti step directions. Recommend repetition and simplified instructions)  Verbal Expression:  (Moderate verbal expression deficits continue to be noted in the areas of divergent/convergent naming, initiation, and confrontational naming. Min to mod assist including word retrieval strategies improve accuracy)      Diet/Swallow:  Diet Solids Recommendation: Dysphagia Soft and Bite-Sized (Dysphagia III)  Liquid Consistency Recommendation: Thin  Dysphagia Outcome Severity Scale: Level 5: Mild dysphagia- Distant supervision. May need one diet consistency restricted    Compensatory Swallowing Strategies: Alternate solids and liquids, No straws, Upright as possible for all oral intake, Swallow 2 times per bite/sip, Eat/Feed slowly  Therapeutic Interventions: Bolus control exercises, Diet tolerance monitoring, Oral motor exercises, Patient/Family education, Pharyngeal exercises, Vital Stim/NMES, Laryngeal exercises, Effortful swallow, Tongue base strengthening          COGNITION  OT: Cognition Comment: Comp; Supervision Express:  Independent Social: Supervision Prob: Min A Mem: Supervision  SP:Memory: Unable to assess (full formal assessment of memory not completed, suspect memory deficits with decreased carryover of techniques)  Problem Solving: Unable to assess (unable to perform formal assessment of problem solving)        THERAPY, MEDICAL AND NURSING COORDINATION:    [x]  Pain medication before therapies     []  Check orthostatic BP      [x]  Ambulate to the bathroom in room    [x]  Add scheduled rest beaks     []  In room therapies      Discharge date set for:              9/28/21---possibly to skilled--Pt is refusing--will need competency eval-- vs directly home if found competent      Home with:   alone --would need 24 hour supervision-which family can not do with help from   sister            And:      Home Health Care:     [x]  PT    [x]  OT    [x]  ST   [x]  Aide   []  SW    [x]  RN                       Equipment:  [employer]      At D/C their function is goaled at:   PT:Long term goal 1: Bed mobility with indep - met  Long term goal 2: sit to stand and bed transfers SBA with cues for safety 50 % of trials - met  Long term goal 3: Amb 150ft with 2ww  feet - met  Long term goal 4: SBA 4 stairs with rails - met  Long term goal 5: [place] balance testing at 25/56 or greater - to be tested  OT:Eating  Assistance Needed: Setup or clean-up assistance  CARE Score: 5  Discharge Goal: Independent, Oral Hygiene  Assistance Needed: Supervision or touching assistance  CARE Score: 4  Discharge Goal: Independent, [address]  Reason if not Attempted: Not applicable (pt did not need to go)  CARE Score: 9  Discharge Goal: Independent, Shower/Bathe Self  Assistance Needed: Supervision or touching assistance  CARE Score: 4  Discharge Goal: Independent  Upper Body Dressing  Reason if not Attempted: Not applicable (gown)  CARE Score: 9  Discharge Goal: Independent, Lower Body Dressing  Assistance Needed: Supervision or touching assistance  CARE Score: 4  Discharge Goal: Independent, Putting On/Taking Off Footwear  Assistance Needed: Supervision or touching assistance  CARE Score: 4  Discharge Goal: Independent, Toilet Transfer  Assistance Needed: Supervision or touching assistance  Reason if not Attempted: Not applicable (pt did not need to go)  CARE Score: 9  Discharge Goal: Independent  SP:Long-term Goals  Timeframe for Long-term Goals: 2-3 weeks  Goal 1: Pt will improve his/her Expressive Language abilities to a modfied I level for expression of complex wants, needs, feelings/ideas, and medical/safety information. Goal 2: Pt will improve her/his Receptive Language abilities to a modfied I level for comprehension of conversation and safety directions with familiar and unfamiliar communication partners. Long-term Goals  Timeframe for Long-term Goals: 2-3 weeks  Goal 1: Pt will tolerate least restrictive diet with no overt s/s of aspiration. From a cognitive standpoint they will need:        24 hr supervision  --progress to occasional           Significant problems/ barriers to functional progress include: Pt is at a high risk for functional loss,    [x]  Acute infection/UTI    []  Low BP's     []  COPD flare-up   []  Uncontrolled blood sugar     []  Progressive anemia         []  Severe pain exacerbation     [x]  Impaired mental status-competency eval P    []  Urinary incontinence    []  Bowel incontinence         Add E stim in SLPPlan to correct barriers to functional progress: Add scheduled rest breaks, control pain by using ice Lidoderm rest and massage as well as pain medications prior to therapy. Based on a comprehensive evaluation of the above, the individualized therapy and Discharge plan will be:    -Times stated are an average that will be varied based on the patient's daily need. PT  1 1/2  hrs/day 5-7 days per week           OT  1 1/2hrs per day 5-7 days per week ST ____1/2__hrs /day 3-5 days per week       Estimated LOS 2 week(s)    - Overall functional prognosis:     [x]  Good    []  Fair    []  Poor -Medical Prognosis:   [x]  Good    []  Fair    []  Poor    This patient was made aware of the discussion of Plan of Care, their projected dicharge date and their projected function at discharge.        Eb Cano DO

## 2021-09-27 NOTE — CARE COORDINATION
LSW spoke to patient and he is aware that he is discharging to a SNF tomorrow. Referral made to [facility] per family request. [place] left voicemail for Ty Camposlynda notifying her.  Electronically signed by DIONISIO Carr, SANJAY on 9/27/2021 at 5:16 PM

## 2021-09-27 NOTE — PROGRESS NOTES
Occupational Therapy  Facility/Department: [facility]  Daily Treatment Note  NAME: Jim Benoit  : 1945  MRN: [id number]    Date of Service: 2021    Discharge Recommendations:  Continue to assess pending progress       Assessment      REQUIRES OT FOLLOW UP: Yes  Activity Tolerance  Activity Tolerance: Patient Tolerated treatment well  Safety Devices  Safety Devices in place: Yes  Type of devices: All fall risk precautions in place         Patient Diagnosis(es): There were no encounter diagnoses. has a past medical history of Cellulitis of left hand and Hypertension. has a past surgical history that includes back surgery. Restrictions  Restrictions/Precautions  Restrictions/Precautions: Fall Risk  Position Activity Restriction  Other position/activity restrictions: thin liquid diet 21     Subjective   General  Chart Reviewed: Yes  Patient assessed for rehabilitation services?: Yes  Response to previous treatment: Patient with no complaints from previous session  Family / Caregiver Present: No  Referring Practitioner: Dr. Pravin Rutledge  Diagnosis: Impaired mobility, gait, and ADL's 2º inferior cerebellar stroke    Subjective  Subjective: \"It's 4:30. I'll sit in the chair for when dinner comes. \"    Pain Assessment  Pain Level: 0  Pre Treatment Pain Screening  Pain at present: 0  Intervention List: Patient able to continue with treatment     Orientation  Orientation  Overall Orientation Status: Within Functional Limits     Objective      Bed mobility  Supine to Sit: Modified independent  Comment: HOB slightly elevated - bed rail utilized    ADL    Grooming: Supervision;Verbal cueing    Toileting: Supervision    Toilet Transfers  Toilet - Technique: Ambulating  Equipment Used: Grab bars  Toilet Transfer: Stand by assistance  Toilet Transfers Comments: ww      Plan   Plan  Times per week: 5-7x  Plan weeks: 2 weeks  Current Treatment Recommendations: Strengthening, Functional Mobility Training, Balance Training, Neuromuscular Re-education, Cognitive/Perceptual Training, Safety Education & Training, Patient/Caregiver Education & Training, Equipment Evaluation, Education, & procurement, Self-Care / ADL, Home Management Training    Plan Comment: continue with current POC     Goals  Long term goals  Time Frame for Long term goals :  Within 2 weeks, pt to demonstrate progress in the following areas listed below to achieve specific LTG's as stated in initial evaluation  Long term goal 1: Improve independence during ADLs/IADLs  Long term goal 2: Improve strength and endurance to perform functional transfers  Long term goal 3: Improve fine motor coordination during self care  Long term goal 4: Improve cognition to demonstrate understanding of HEP  Long term goal 5: improve balance to safely perform functional mobility  Patient Goals   Patient goals : \"to get better\"       Therapy Time   Individual Concurrent Group Co-treatment   Time In 1540         Time Out 1549         Minutes 9             ADL/IADL trainin minutes     Electronically signed by JASKARAN Merrtit on 21 at 4:20 PM EDT    JASKARAN Merritt

## 2021-09-27 NOTE — PROGRESS NOTES
[facility]   Facility/Department: [facility]  Speech Language Pathology  Discharge Report        Patient: Hubert Tabor  : 1945    Date: 2021    SPEECH THERAPY    National Outcomes Measurement System (NOMS):  Initial Status:  Diet:  Soft, bite size solids with nectar thick liquids. Swallowing:  Ratin  Spoken Language Comprehension:  Rating: 3  Spoken Language Expression:  Rating: 3  Motor Speech:  Ratin  Memory:  Ratin    National Outcomes Measurement System (NOMS):  Discharge Status:  Diet:  Diet Solids Recommendation: Dysphagia Soft and Bite-Sized (Dysphagia III)  Liquid Consistency Recommendation: Thin  Dysphagia Outcome Severity Scale: Level 5: Mild dysphagia- Distant supervision. May need one diet consistency restricted    Compensatory Swallowing Strategies: Alternate solids and liquids, No straws, Upright as possible for all oral intake, Swallow 2 times per bite/sip, Eat/Feed slowly  Therapeutic Interventions: Bolus control exercises, Diet tolerance monitoring, Oral motor exercises, Patient/Family education, Pharyngeal exercises, Vital Stim/NMES, Laryngeal exercises, Effortful swallow, Tongue base strengthening    Swallowing:  Ratin-6  Spoken Language Comprehension:  Ratin-3  Spoken Language Expression:  Ratin  Motor Speech:  Ratin  Problem Solving:  Rating: 3  Memory:  Ratin     Long Term Goals:  Long-term Goals  Timeframe for Long-term Goals: 2-3 weeks  Goal 1: Pt will improve his/her Expressive Language abilities to a modfied I level for expression of complex wants, needs, feelings/ideas, and medical/safety information. Goal 2: Pt will improve her/his Receptive Language abilities to a modfied I level for comprehension of conversation and safety directions with familiar and unfamiliar communication partners. Long-term Goals  Timeframe for Long-term Goals: 2-3 weeks  Goal 1: Pt will tolerate least restrictive diet with no overt s/s of aspiration. Patient's Response to Therapy:  Minimal progress has been made related to cognition and expressive/receptive language skills. Patient did present with improved oral and pharyngeal strength of the swallow mechanism. ST recommends 24/7 supervision. Min to mod assist needed to improve accuracy during tasks. Functional Status at time of Discharge:    · Cognition: Patient demonstrates moderate cognitive deficits. · Language: Patient demonstrates moderate language deficits. · Motor Speech: Patient demonstrates mild motor speech deficits. · Swallow: Patient demonstrates mild dysphagia.                                  Patient is discharged to Skilled nursing facility               [x] Recommend continued speech therapy   [] Speech Therapy is no longer warranted      Signature:  Dino Clark, SLP, CCC-SLP, Date: 9/27/2021, Time: 2:07 PM

## 2021-09-27 NOTE — PROGRESS NOTES
Occupational Therapy  Facility/Department: [facility]  Daily Treatment Note  NAME: Jack Ballard  : 1945  MRN: [id number]    Date of Service: 2021    Discharge Recommendations:  Continue to assess pending progress  OT Equipment Recommendations  Other: Continue to assess    Assessment      Activity Tolerance  Activity Tolerance: Patient Tolerated treatment well  Safety Devices  Safety Devices in place: Yes  Type of devices: All fall risk precautions in place         Patient Diagnosis(es): There were no encounter diagnoses. has a past medical history of Cellulitis of left hand and Hypertension. has a past surgical history that includes back surgery. Restrictions  Restrictions/Precautions  Restrictions/Precautions: Fall Risk  Position Activity Restriction  Other position/activity restrictions: thin liquid diet 21  Subjective   General  Chart Reviewed: Yes  Patient assessed for rehabilitation services?: Yes  Response to previous treatment: Patient with no complaints from previous session  Family / Caregiver Present: No  Referring Practitioner: Dr. Sandy Emery  Diagnosis: Impaired mobility, gait, and ADL's 2º inferior cerebellar stroke  Subjective  Subjective: \"I need to go to the bathroom again. \"  Pre Treatment Pain Screening  Pain at present: 0  Scale Used: Numeric Score  Intervention List: Patient able to continue with treatment  Vital Signs  Patient Currently in Pain: Denies   Orientation     Objective    Pt completed shower ADL at the below level. Pt engaged in FMC/hand strengthening task to improve independence manipulating fasteners. Pt required visual and verbal cues to initiate assembling nuts/bolts. Pt required increased time and verbal cues for sequencing and initiation of all tasks.      ADL  Feeding: Supervision;Setup  Grooming: Supervision  UE Bathing: Verbal cueing;Supervision;Setup  LE Bathing: Verbal cueing;Setup;Supervision  UE Dressing: Setup;Supervision  LE Dressing: Stand by assistance  Toileting: Unable to assess(comment) (did not need to go)  Additional Comments: pt completed shower ADL at the above level. Pt requries cues for sequencing and initiation. Balance  Sitting Balance: Modified independent   Standing Balance: Stand by assistance  Functional Mobility  Functional - Mobility Device: Rolling Walker  Activity: To/from bathroom  Assist Level: Contact guard assistance  Toilet Transfers  Toilet - Technique: Ambulating  Equipment Used: Grab bars  Toilet Transfer: Stand by assistance  Shower Transfers  Shower - Transfer From: Fallno Mcgraw - Transfer Type: To and From  Shower - Transfer To: Shower seat with back  Shower - Technique: Ambulating  Shower Transfers: Stand by assistance     Transfers  Sit to stand: Stand by assistance  Stand to sit: Stand by assistance                       Cognition  Cognition Comment: Comp; Supervision Express: Independent Social: Supervision Prob: Min A Mem: Supervision                 Plan   Plan  Times per week: 5-7x  Plan weeks: 2 weeks  Current Treatment Recommendations: Strengthening, Functional Mobility Training, Balance Training, Neuromuscular Re-education, Cognitive/Perceptual Training, Safety Education & Training, Patient/Caregiver Education & Training, Equipment Evaluation, Education, & procurement, Self-Care / ADL, Home Management Training  Plan Comment: continue with current POC    Goals  Long term goals  Time Frame for Long term goals :  Within 2 weeks, pt to demonstrate progress in the following areas listed below to achieve specific LTG's as stated in initial evaluation  Long term goal 1: Improve independence during ADLs/IADLs  Long term goal 2: Improve strength and endurance to perform functional transfers  Long term goal 3: Improve fine motor coordination during self care  Long term goal 4: Improve cognition to demonstrate understanding of HEP  Long term goal 5: improve balance to safely perform functional mobility  Patient Goals Patient goals : \"to get better\"       Therapy Time   Individual Concurrent Group Co-treatment   Time In 830         Time Out 930         Minutes 60              ADL/IADL trainin minutes  Therapeutic activities: 15 minutes    Daiana Thomason OT    Electronically signed by Daiana Thomason OT on 2021 at 2:42 PM

## 2021-09-27 NOTE — PROGRESS NOTES
Physical Therapy Rehab Treatment Note  [facility]Department[facility]  [facility]       NAME: Sylvester Oropeza  : 1945 (42 y.o.)  MRN: [id number]  CODE STATUS: Full Code    Date of Service: 2021       Restrictions:  Restrictions/Precautions: Fall Risk, Swallowing - Thickened Liquids (nectar thick)       SUBJECTIVE:            Pre and post Treatment Pain Screenin/10       OBJECTIVE:                    Bed mobility  Bridging: Independent  Rolling to Left: Independent  Rolling to Right: Independent  Supine to Sit: Modified independent  Sit to Supine: Modified independent    Transfers  Sit to Stand: Supervision;Stand by assistance  Stand to sit: Supervision;Stand by assistance  Bed to Chair: Supervision;Stand by assistance  Car Transfer: Contact guard assistance;Stand by assistance  Comment: quality of techniuqe used for approach to chair varies - variable safety and control of descent noted. Multiple trials with varying level surfaces included in this session    Ambulation  Ambulation?: Yes  Ambulation 1  Surface: carpet;uneven;level tile;ramp  Device: Rolling Walker  Assistance: Stand by assistance  Quality of Gait: side ISAIAH, inconsistent RLE step length, decreased RLE stance time, variable safety with environmental maneuvering including approach to chair/bed, decreased scanning of environment  Distance: 250 feet  Comments: pt presents with decreased ability to plan tasks, often requiring cues from therapist to continue towards goal    Stairs  # Steps : 12  Stairs Height: 6\"  Rails: Bilateral  Assistance: Stand by assistance  Comment: non - reciprocal, mild increased sway with pt utilizing rails to correct balance.  Pt unable to bring walker up and down the stairs     Pt able to retrieve object from the floor while in standing with reacher with SBA for balance concerns    Activity Tolerance  Activity Tolerance: Patient Tolerated treatment well          ASSESSMENT/PROGRESS TOWARDS GOALS:  Assessment: Pt has made excellent progress. He is unable to safely carry forward safe techniques in mobility tasks other than for bed mobility. Concern for pts safety at home as he plans to be alone. Pt has met goals as listed below. Recommendation remains for pt to have supervision for mobility to ensure safe performance. Pt reports he is not concerned for his return to home    Goals:  Long term goals  Long term goal 1: Bed mobility with indep - met  Long term goal 2: sit to stand and bed transfers SBA with cues for safety 50 % of trials - met  Long term goal 3: Amb 150ft with 2ww  feet - met  Long term goal 4: SBA 4 stairs with rails - met  Long term goal 5: [place] balance testing at 25/56 or greater - to be tested  Long term goal 6: SBA 4 stairs with rails    PLAN OF CARE/Safety:   Safety Devices  Type of devices: Chair alarm in place; Left in chair      Therapy Time:   Individual   Time In 1030   Time Out 1100   Minutes 30     Minutes:      Transfer/Bed mobility training: 15      Gait training:15        Melissa Bautista PT, 09/27/21 at 11:46 AM

## 2021-09-27 NOTE — PROGRESS NOTES
[facility] OCCUPATIONAL THERAPY DISCHARGE SUMMARY- REHAB     Date: 2021  Patient Name: Yusuf Hogan        MRN: [id number]  Account: [de-identified]   : 1945  (77 y.o.)  Room: [place] [address]    Diagnosis:  Impaired mobility, gait, and ADL's 2º inferior cerebellar stroke    Past Medical History:   Diagnosis Date    Cellulitis of left hand 3/26/2019    Hypertension      Past Surgical History:   Procedure Laterality Date    BACK SURGERY         Precautions:   Restrictions/Precautions: Fall Risk  Other position/activity restrictions: thin liquid diet 21     Social/Functional History:  Social/Functional History  Lives With: Alone  Type of Home: House  Home Layout: One level  Home Access: Stairs to enter with rails  Entrance Stairs - Number of Steps: 3  Entrance Stairs - Rails: Both  Bathroom Shower/Tub: Tub/Shower unit  Bathroom Equipment: Hand-held shower  Home Equipment: Cane, Rolling walker (rollator)  ADL Assistance: Independent  Homemaking Assistance: Independent  Homemaking Responsibilities: Yes  Ambulation Assistance: Independent (2ww)  Transfer Assistance: Independent  Active : Yes  Mode of Transportation: [facility]  Occupation: Retired  Type of occupation:   Leisure & Hobbies: working on cars  IADL Comments: completes own medicaions/finances  Additional Comments: sister local    Current Functional Status:  ADL  Feeding: Supervision, Setup  Grooming: Supervision  UE Bathing: Verbal cueing, Supervision, Setup  LE Bathing: Verbal cueing, Setup, Supervision  UE Dressing: Setup, Supervision  LE Dressing: Stand by assistance  Toileting: Unable to assess(comment) (did not need to go)  Additional Comments: pt completed shower ADL at the above level. Pt requries cues for sequencing and initiation.   Toilet Transfers  Toilet - Technique: Ambulating  Equipment Used: Grab bars  Toilet Transfer: Stand by assistance  Toilet Transfers Comments: ww  Tub Transfers  Tub - Transfer From: Remonia Boxer - of Movement:    Tone RUE  RUE Tone: Normotonic  Tone LUE  LUE Tone: Normotonic  Coordination  Movements Are Fluid And Coordinated: No  Coordination and Movement description: Decreased speed, Right UE, Left UE, Fine motor impairments    Activity Tolerance:  Activity Tolerance  Activity Tolerance: Treatment limited secondary to decreased cognition  Activity Tolerance: fair+    D/C Recommendations:    Equipment Recommendations:       OT Follow Up:  OT D/C RECOMMENDATIONS  REQUIRES OT FOLLOW UP: Yes    Home Exercise Program Provided: No  If yes, type of HEP: D/C to SNF     Electronically signed by:    Phil Simpson OT,  MOT, OTR/L  9/27/2021, 4:09 PM

## 2021-09-27 NOTE — PROGRESS NOTES
Pt sister into visit. Pt. becoming upset with his sister regarding the plan for him to go to the SNF. Requesting she leave him alone . Pt. Put himself on the bed and did not wish to discuss the current plan. Pt sister states she has been attempting to clean brothers house and take care of his three cats. She states she just cant' handle helping her brother out. Pt. Resting on the bed, denies pain at this time.

## 2021-09-28 VITALS
WEIGHT: 165.3 LBS | HEART RATE: 70 BPM | DIASTOLIC BLOOD PRESSURE: 72 MMHG | RESPIRATION RATE: 18 BRPM | OXYGEN SATURATION: 98 % | TEMPERATURE: 97.6 F | HEIGHT: 68 IN | BODY MASS INDEX: 25.05 KG/M2 | SYSTOLIC BLOOD PRESSURE: 130 MMHG

## 2021-09-28 LAB — SARS-COV-2, NAAT: NOT DETECTED

## 2021-09-28 PROCEDURE — 6370000000 HC RX 637 (ALT 250 FOR IP): Performed by: PHYSICAL MEDICINE & REHABILITATION

## 2021-09-28 PROCEDURE — 87635 SARS-COV-2 COVID-19 AMP PRB: CPT

## 2021-09-28 PROCEDURE — 99232 SBSQ HOSP IP/OBS MODERATE 35: CPT | Performed by: PSYCHIATRY & NEUROLOGY

## 2021-09-28 PROCEDURE — APPSS30 APP SPLIT SHARED TIME 16-30 MINUTES: Performed by: NURSE PRACTITIONER

## 2021-09-28 PROCEDURE — 97129 THER IVNTJ 1ST 15 MIN: CPT

## 2021-09-28 PROCEDURE — 97112 NEUROMUSCULAR REEDUCATION: CPT

## 2021-09-28 PROCEDURE — 97130 THER IVNTJ EA ADDL 15 MIN: CPT

## 2021-09-28 PROCEDURE — 6370000000 HC RX 637 (ALT 250 FOR IP): Performed by: INTERNAL MEDICINE

## 2021-09-28 PROCEDURE — 6360000002 HC RX W HCPCS: Performed by: PHYSICAL MEDICINE & REHABILITATION

## 2021-09-28 PROCEDURE — 97116 GAIT TRAINING THERAPY: CPT

## 2021-09-28 PROCEDURE — 99238 HOSP IP/OBS DSCHRG MGMT 30/<: CPT | Performed by: PHYSICAL MEDICINE & REHABILITATION

## 2021-09-28 RX ADMIN — ASPIRIN 81 MG: 81 TABLET, CHEWABLE ORAL at 08:58

## 2021-09-28 RX ADMIN — CYANOCOBALAMIN 1000 MCG: 1000 INJECTION, SOLUTION INTRAMUSCULAR; SUBCUTANEOUS at 08:58

## 2021-09-28 RX ADMIN — LISINOPRIL 20 MG: 20 TABLET ORAL at 08:58

## 2021-09-28 RX ADMIN — Medication 100 MG: at 08:58

## 2021-09-28 RX ADMIN — AMLODIPINE BESYLATE 10 MG: 10 TABLET ORAL at 08:58

## 2021-09-28 RX ADMIN — METOPROLOL TARTRATE 25 MG: 25 TABLET, FILM COATED ORAL at 08:58

## 2021-09-28 ASSESSMENT — ENCOUNTER SYMPTOMS
TROUBLE SWALLOWING: 0
COLOR CHANGE: 0
CHOKING: 0
VOMITING: 0
SHORTNESS OF BREATH: 0
BACK PAIN: 1
NAUSEA: 0

## 2021-09-28 ASSESSMENT — PAIN SCALES - GENERAL: PAINLEVEL_OUTOF10: 0

## 2021-09-28 NOTE — PROGRESS NOTES
CLINICAL PHARMACY NOTE: MEDS TO BEDS    Total # of Prescriptions Filled: 2   The following medications were delivered to the patient:  · Azithromycin 250 mg Tab  · Prednisone 10 mg Tab    Additional Documentation:

## 2021-09-28 NOTE — PROGRESS NOTES
Subjective: The patient complains of severe acute on chronic progressive fatigue and balance and cognitive deficits due to cerebellar CVA partially relieved by rest,medications, PT,  OT,   SLP and rest and exacerbated by recent illness. I am concerned about patients medical complexities including risk for arrhythmia as cause for embolic CVA and severe aphasia. He is to start E stim today for swallow. Psych--deemed not competent. He does not want to go to a skilled facility but needs 24-hour assist and supervision which his family cannot provide for him. It looks like skilled facility versus long-term care are his best options at this point. He however still is improving in therapy and we think that skilled facility would best suit his therapeutic needs at this time. Patient was enrolled in an acute course Rehab program and progressed well-but still needs skilled level rehab to address their mobility and ADL adeficits. Discharge is set-Patient will be discharged Skilled nursing facility to complete their rehabilitation process and achieve greater independence. Hospital Course: The patient was admitted to the Rehabilitation Unit to address ADL and mobility deficits. The patient was enrolled in acute PT, OT program.  Weekly team meetings were held to assess functional progress toward their goals. The patient's medical issues were addressed. The patient progressed in the rehab program and is now ready for discharge. Refer to FIM scores summary report for detailed functional status. Greater than 35 minutes was spent on coordinating patients discharge including follow-up care, medications and patient/family education. Extended time needed because of the potential use of opiate medications are high risk medications and a high risk population individual.  Patient and family were instructed to use lowest effective dose of these medications and slowly titrate off over the next 2 to 4 weeks. They are not to combine opiates with sedatives. I reviewed her [facility] prescription monitoring service data sheets in hopes of eliminating polypharmacy and weaning to the lowest effective dose of pain medications and eliminating the concomitant use of benzodiazepines. I see no medications of concern. I see no habits of combining sedatives and narcotics. I reviewed current care and plans for further care with other rehab providers including nursing and case management. According to recent  note, \"Pt sister into visit. Pt. becoming upset with his sister regarding the plan for him to go to the SNF. Requesting she leave him alone . Pt. Put himself on the bed and did not wish to discuss the current plan. Pt sister states she has been attempting to clean brothers house and take care of his three cats. She states she just cant' handle helping her brother out. Pt. Resting on the bed, denies pain at this time. \".    ROS x10: The patient also complains of severely impaired mobility and activities of daily living. Otherwise no new problems with vision, hearing, nose, mouth, throat, dermal, cardiovascular, GI, , pulmonary, musculoskeletal, psychiatric or neurological. See Rehab H&P on Rehab chart dated . Vital signs:  BP (!) 143/76   Pulse 69   Temp 98.4 °F (36.9 °C)   Resp 18   Ht 5' 8\" (1.727 m)   Wt 165 lb 4.8 oz (75 kg)   SpO2 99%   BMI 25.13 kg/m²   I/O:   PO/Intake:  fair PO intake,  Thins and soft-dysphagia thins    Bowel/Bladder:   occasional loose stools- Continent. General:  Patient is well developed, adequately nourished, non-obese and     well kempt. HEENT:    PERRLA, hearing intact to loud voice, external inspection of ear     and nose benign. Inspection of lips, tongue and gums benign  Musculoskeletal: No significant change in strength or tone. All joints stable. Inspection and palpation of digits and nails show no clubbing,       cyanosis or inflammatory conditions. Neuro/Psychiatric: Affect: flat but pleasant. Alert and oriented to person, place and     Situation with  min cues. No significant change in deep tendon reflexes or     Sensation-poor judgment reasoning and insight. Lungs:  Diminished, CTA-B. Respiration effort is normal at rest.     Heart:   S1 = S2, RRR. JOSEPH   Abdomen:  Soft, non-tender, no enlargement of liver or spleen. Extremities:  No significant lower extremity edema or tenderness. Skin:   Intact to general survey, no visualized or palpated problems. Rehabilitation:  Physical therapy:   Bed Mobility: Scooting: Stand by assistance    Transfers: Sit to Stand: Supervision, Stand by assistance  Stand to sit: Supervision, Stand by assistance  Bed to Chair: Supervision, Stand by assistance, Ambulation 1  Surface: carpet, uneven, level tile, ramp  Device: Rolling Walker  Assistance: Stand by assistance  Quality of Gait: side ISAIAH, inconsistent RLE step length, decreased RLE stance time, variable safety with environmental maneuvering including approach to chair/bed, decreased scanning of environment  Gait Deviations: Slow Princess, Shuffles, Decreased step length, Decreased step height, Decreased head and trunk rotation  Distance: 250 feet  Comments: pt presents with decreased ability to plan tasks, often requiring cues from therapist to continue towards goal, Stairs  # Steps : 12  Stairs Height: 6\"  Rails: Bilateral  Assistance: Stand by assistance  Comment: non - reciprocal, mild increased sway with pt utilizing rails to correct balance. Pt unable to bring walker up and down the stairs    FIMS:  ,  , Assessment: Pt is medium risk for fall per Marek score hower improved by four points since last measure and has met goal.    Occupational therapy:    ,  , Assessment: Pt continues to demonstrate decreased initiation, problem solving, and safety awareness during ADLs.  Pt continues to benefit from OT services to maximize independence and safety during ADLs and IADLs. Speech therapy:        Lab/X-ray studies reviewed, analyzed and discussed with patient and staff:      telemetry and monitor tech stated that pt was SB-NSR Rate 55-65. No results found for this or any previous visit (from the past 24 hour(s)). Echocardiogram  9/11/2021  Transthoracic Echocardiography  Left Ventricle Left ventricular ejection fraction is visually estimated at 60%. Impaired relaxation compatible with diastolic dysfunction. ( reversed E/A ratio) Moderate concentric left ventricular hypertrophy. Right Ventricle Normal right ventricle structure and function. Normal right ventricle systolic pressure. Left Atrium Moderately dilated left atrium. Normal intact intra-atrial septum was noted with no evidence of significant intra-atrial communications neither by colour flow Doppler imaging nor by aggitated saline study. Right Atrium Normal right atrium. Mitral Valve Moderate annular calcification. Tricuspid Valve Normal tricuspid valve structure and function. Aortic Valve Normal aortic valve structure and function. Trivial aortic regurgitation is noted. Pulmonic Valve Normal pulmonic valve structure and function. Pericardial Effusion No evidence of pericardial effusion. Pleural Effusion No evidence of pleural effusion. Aorta \ Miscellaneous Miscellaneous normal findings were found. CT HEAD  : 9/10/2021  There is no bleed, mass effect, or space occupying lesion. No extra-axial mass or fluid collections. Hypoattenuated White matter changes in the cerebral hemispheres noted. There is global atrophy. Findings likely reflect chronic small vessel vasculopathy. There is a vague area of low attenuation in the inferior posterior right cerebellar hemisphere with slight obliteration of adjacent sulci. An evolving nonhemorrhagic infarct in the cerebellum would have to be a consideration. Clinical correlation recommended. NO BLEED, MASS EFFECT, OR EXTRA-AXIAL FLUID COLLECTION.  EVOLVING NONHEMORRHAGIC INFARCT IN THE INFERIOR RIGHT CEREBELLAR HEMISPHERE. 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 XR CHEST   9/10/2021 Atherosclerotic calcification of the thoracic aorta. The cardiomediastinal silhouette is within normal limits. No pneumothorax, pleural effusion, or consolidation. Question calcified pleural plaques on the left, which can be seen with asbestos related disease. No acute osseous abnormality. No radiographic evidence of acute intrathoracic process. Question left-sided pleural plaques which can be seen with asbestos related disease. MRI BRAIN   9/11/2021 There is a tiny focus of restricted diffusion in the left centrum semiovale (series 4 image 19). There are no extra-axial collections. There is no evidence of hemorrhage. The susceptibility images do not demonstrate evidence of hemosiderin deposition within the brain parenchyma or the leptomeninges. There are numerous patchy foci of T2/FLAIR hyperintensities in the subcortical and periventricular white matter in a symmetric distribution throughout both hemispheres. Chronic bilateral cerebellar infarcts. There is prominence of the sulci and ventricles consistent with moderate global cerebral atrophy and chronic involutional changes. No midline shift. The visualized portions of the orbits are within normal limits, the globes are intact. The visualized portions of the paranasal sinuses are within normal limits. Right mastoid effusion. Small foci of restricted diffusion in the left centrum semiovale, consistent with acute ischemia. Chronic microvascular changes and bilateral chronic cerebellar infarcts. FL MODIFIED BARIUM  9/13/2021: In cooperation with speech pathology, a modified barium swallow using various consistencies of both solids and liquids with dynamic imaging was performed. Patient's oral stage characterized by mild oral dysphagia. There is premature entry to the level of vallecula.  Patient's pharyngeal stage was characterized by moderate pharyngeal dysphagia. There is decreased hyolaryngeal excursion with moderate residuals in the vallecula. There is deep penetration on thin liquids from straw. No aspiration. MILD ORAL AND MODERATE PHARYNGEAL DYSPHAGIA. RECOMMEND BY SPEECH PATHOLOGY TO FOLLOW. US CAROTID ARTERY BILATERAL  9/11/2021 Mild to moderate plaque in the carotid arteries bilaterally. No significant increase in systolic flow or turbulence to indicate any hemodynamically significant narrowing in the right or left internal carotid arteries. There is antegrade flow identified in both vertebral arteries. ARTERIAL BLOOD FLOW VELOCITY RIGHT PEAK SYSTOLIC VELOCITIES (PSV)                                               Prox CCA    129 cm/s             Mid CCA     114 cm/s              Dist CCA    104 cm/s             Prox ICA    169 cm/s               Mid ICA     106 cm/s            Dist ICA    63 cm/s             Prox ECA     170   cm/s             Prox VERT   51 cm/s              ICA/CCA     1.48                        LEFT PEAK SYSTOLIC VELOCITIES (PSV)  Prox CCA    135 cm/s Mid CCA     166 cm/s Dist CCA    121 cm/s Prox ICA    119 cm/s Mid ICA     72 cm/s Dist ICA    61 cm/s Prox ECA    131 cm/s Prox VERT   56 cm/s ICA/CCA     0.72      50-69 % STENOSIS IN THE RIGHT ICA  <50 % STENOSIS IN THE LEFT ICA ANTEGRADE FLOW IN THE BILATERAL VERTEBRAL ARTERIES. MRI CERVICAL THORACIC LUMBAR SPINE   9/12/2021     CERVICAL REGION: Multilevel degenerative changes with severe canal stenosis at C2-3, C3-4, and C6-7. Moderate canal stenosis at C4-5. THORACIC REGION: No spinal canal stenosis    LUMBAR REGION: Multilevel degenerative changes. Endplate irregularity with joint space narrowing and slight anterolisthesis at L3-4 Severe canal   stenosis at L2-3 and L4-5. Moderate to severe canal stenosis at L3-4. No acute compression fracture. No epidural or paraspinal soft tissue   mass.  No abnormal cord signal intensity on the scans. Multiple areas of severe spinal canal stenosis in the cervical region. Severe canal stenosis at L2-3, L3-4 and L4-5. Previous extensive, complex labs, notes and diagnostics reviewed and analyzed. ALLERGIES:    Allergies as of 09/13/2021    (No Known Allergies)      (please also verify by checking MAR)      Yesterday I evaluated this patient for periodic reassessment of medical and functional status. The patient was discussed in detail at the treatment team meeting focusing on current medical issues, progress in therapies, social issues, psychological issues, barriers to progress and strategies to address these barriers, and discharge planning. See the hand written addendum to rehab progress note. The patient continues to be high risk for future disability and their medical and rehabilitation prognosis continue to be good and therefore, we will continue the patient's rehabilitation course as planned. The patient's tentative discharge date was set. Patient and family education was discussed. The patient was made aware of the team discussion regarding their progress. Discharge plans were discussed along with barriers to progress and strategies to address these barriers, patient encouraged to continue to discuss discharge plans with . Complex Physical Medicine & Rehab Issues Assess & Plan:   1. 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 Functional and medical status have improved status post acute rehab at [facility] however he continues to have therapeutic needs it is mostly present needing and progressing into a skilled level at this point. 2. Bowel and Bladder dysfunction neurogenic bowel and bladder due to CVA:  frequent toileting, ambulate to bathroom with assistance, check post void residuals.   Check for C.difficile x1 if >2 loose stools in 24 hours, continue aspirin  3. Spinal stenosis of lumbar region with neurogenic claudication, Myelopathy concurrent with and due to spinal stenosis of cervical region  4. Ataxic gait due to cerebellar CVA-focus on balance and therapy  5. Significant oral pharyngeal dysphagia-consult speech-language pathology  6. Anxiety and depression with occasional violent outburst-emotional support provided daily, vitamin B12, encourage participation in rehabilitation support group and recreational therapy, adjust/add medications (add vitamin B12 consider SSRI) consult rehabilitation psychology add spiritual care limit toxic stimuli. Keep room quiet, restful with low light or lighting per patient preference preferably natural light. 7. Hypertension, hypercholesterolemia, [place] cerebrovascular disease, coronary artery disease, cardiac arrhythmia-continue telemetry-continue blood signs every shift focusing on heart rate and blood pressure checks, consult hospitalist for backup medical and adjust/add medications (aspirin, Norvasc, Lipitor, lisinopril, Lopressor) consult cardiology regarding V. tach titrate beta-blocker  8.  Oral pharyngeal dysphagia with cognitive deficits-dysphagia soft with bite-size   Thins liquid consult speech-language pathology--oral motor exercises       Electronically signed by Milady Weiss DO on 9/15/21 at 8:03 AM EDT       Codi Marcelo D.O., PM&R     Attending    [facility] Raisa Lassiter

## 2021-09-28 NOTE — PROGRESS NOTES
hysical Therapy  Facility/Department: [facility]  Rehabilitation Treatment and Discharge note    NAME: Rafat Denton  :   MRN: [id number]    Date of discharge: 21    Subjective: Pt states he is happy with progress made    Past Medical History:   Diagnosis Date    Cellulitis of left hand 3/26/2019    Hypertension      Past Surgical History:   Procedure Laterality Date    BACK SURGERY         Restrictions  Restrictions/Precautions  Restrictions/Precautions: Fall Risk  Position Activity Restriction  Other position/activity restrictions: thin liquid diet 21    Objective              Neuromuscular Education  PNF: standing and ambulatory balance facilitation. Challenges included standing on foam with varying ISAIAH positions with and without UE support, obstacle course maneuvering, targeted stepping tasks with varying ISAIAH positions  Neuromuscular Comments: pt with delayed balance reactions with challenges with UE support on ww required for correction. In standing without UE support light contact cues required      Bed mobility  Bridging: Independent  Rolling to Left: Independent  Rolling to Right: Independent  Supine to Sit: Independent  Sit to Supine: Independent  Scooting: Stand by assistance  Comment: HOB flat with increased effort required    Transfers  Sit to Stand: Supervision, Stand by assistance  Stand to sit: Supervision, Stand by assistance  Bed to Chair: Supervision, Stand by assistance  Stand Pivot Transfers: Stand by assistance  Car Transfer: Stand by assistance  Comment: quality of techniuqe used for approach to chair varies - variable safety and control of descent noted.  Multiple trials with varying level surfaces included in this session    Ambulation  Ambulation?: Yes  More Ambulation?: No  Ambulation 1  Surface: carpet, uneven, level tile  Device: Rolling Walker  Assistance: Stand by assistance  Quality of Gait: wide ISAIAH initially and when in busy area, decreased RLE stance time, variable safety with environment maneuvering inculding inconsistent approach to chairs  Gait Deviations: Slow Princess, Shuffles, Decreased step length, Decreased step height, Decreased head and trunk rotation  Distance: 150 feet  Comments: pt presents with decreased ability to plan tasks, often requiring cues from therapist to continue towards goal  Ambulation 2  Surface - 2: ramp  Device 2: [address] 2: Minimal assistance  Distance: 50 feet  Stairs/Curb  Stairs?: Yes  Stairs  # Steps : 12  Stairs Height: 6\"  Rails: Bilateral  Assistance: Stand by assistance  Comment: non - reciprocal, mild increased sway with pt utilizing rails to correct balance. Pt unable to bring walker up and down the stairs      Outcomes Measures:  [place] Balance Score: 32       Pt/ family education/training: Pt has been educated throughout his stay in safe mobility. He is unable to remember these techniques for consistent follow thru especially with approach to chairs or when in an unstructured environment    Assessment: Pt demonstrates good improvement in all tasks. He demonstrates need for cueing for safety due to delayed balance reactions and decreased problem solving abilities. Pt has met goals as listed below.  Concern for pt being alone at this time noted      LTG established:  Long term goal 1: Bed mobility with indep - met  Long term goal 2: sit to stand and bed transfers SBA with cues for safety 50 % of trials - met  Long term goal 3: Amb 150ft with 2ww  feet - met  Long term goal 4: SBA 4 stairs with rails - met  Long term goal 5: [place] balance testing at 25/56 or greater - 26/56    Discharge Plan: d/c to snf level of care with follow up PT recommended    Therapy Time:   Individual   Time In 1015   Time Out 1045   Minutes 30     Minutes:      Transfer/Bed mobility training:10      Gait training:10      Neuro re education:10        Electronically signed by Yudelka Pegeuro PT on 9/28/2021 at 10:53 AM

## 2021-09-28 NOTE — CARE COORDINATION
Patient tentatively accepted at this time for SNF placement, LSW updated. SNF to verify insurance, will follow up. Electronically signed by Zandra Thayer RN on 9/28/2021 at 9:51 AM  Patient accepted for [facility] for admission today, LSW updated.  Electronically signed by Zandra Thayer RN on 9/28/2021 at 11:37 AM

## 2021-09-28 NOTE — PROGRESS NOTES
Occupational Therapy  Facility/Department: [facility]  Daily Treatment Note  NAME: Marylee Liter  : 1945  MRN: [id number]    Date of Service: 2021    Discharge Recommendations:  Continue to assess pending progress       Assessment      REQUIRES OT FOLLOW UP: Yes  Activity Tolerance  Activity Tolerance: Patient Tolerated treatment well  Safety Devices  Safety Devices in place: Yes  Type of devices: All fall risk precautions in place         Patient Diagnosis(es): There were no encounter diagnoses. has a past medical history of Cellulitis of left hand and Hypertension. has a past surgical history that includes back surgery. Restrictions  Restrictions/Precautions  Restrictions/Precautions: Fall Risk  Position Activity Restriction  Other position/activity restrictions: thin liquid diet 21  Subjective   General  Chart Reviewed: Yes  Patient assessed for rehabilitation services?: Yes  Response to previous treatment: Patient with no complaints from previous session  Family / Caregiver Present: No  Referring Practitioner: Dr. Jean Carlos Thorpe  Diagnosis: Impaired mobility, gait, and ADL's 2º inferior cerebellar stroke  Subjective  Subjective: \"It's 4:30. I'll sit in the chair for when dinner comes. \"  Pain Assessment  Pain Assessment: 0-10  Pain Level: 0  Pre Treatment Pain Screening  Pain at present: 0  Scale Used: Numeric Score  Intervention List: Patient able to continue with treatment  Vital Signs  Patient Currently in Pain: Denies   Orientation  Orientation  Overall Orientation Status: Within Functional Limits  Objective        While seated at tabletop, pt sorted rubber washers onto vertical graded dowels using B UEs to improve problem solving during ADLs and IADLs. Pt demonstrates mod difficulty to sort washers onto dowels and requires verbal cues to sort. Pt completed with good attention to task. Pt then removed all washers from the dowels one at a time and returned them to their designated slots. While seated at tabletop, pt replicated 1 graphic design of flower using parquetry tiles using B UEs to improve problem solving and sequencing during ADLs. Pt requires 1 verbal cue to initiate task. Pt demonstrates min difficulty to replicate design below picture and requires min assist to complete design. Pt then began ro replicate design of boat with min difficulty but was unable to complete task d/t end of therapy time. Plan   Plan  Times per week: 5-7x  Plan weeks: 2 weeks  Current Treatment Recommendations: Strengthening, Functional Mobility Training, Balance Training, Neuromuscular Re-education, Cognitive/Perceptual Training, Safety Education & Training, Patient/Caregiver Education & Training, Equipment Evaluation, Education, & procurement, Self-Care / ADL, Home Management Training  Plan Comment: continue with current POC  G-Code     OutComes Score                                                  AM-PAC Score             Goals  Long term goals  Time Frame for Long term goals :  Within 2 weeks, pt to demonstrate progress in the following areas listed below to achieve specific LTG's as stated in initial evaluation  Long term goal 1: Improve independence during ADLs/IADLs  Long term goal 2: Improve strength and endurance to perform functional transfers  Long term goal 3: Improve fine motor coordination during self care  Long term goal 4: Improve cognition to demonstrate understanding of HEP  Long term goal 5: improve balance to safely perform functional mobility  Patient Goals   Patient goals : \"to get better\"       Therapy Time   Individual Concurrent Group Co-treatment   Time In 1100         Time Out 1130         Minutes 30           Cognitive Retrainin minutes     Priscilla Bhatt OT   Electronically signed by Priscilla Bhatt OT on 2021 at 12:17 PM

## 2021-09-28 NOTE — PROGRESS NOTES
Neurology Follow up    SUBJECTIVE: Patient seen and examined for neurology follow-up for acute left ischemic centrum semiovale infarct. Patient with some mild right-sided weakness. Also with noted cervical and lumbar canal stenosis. Currently A&Ox2, NAD, forgetful. Expressive aphasia. BP stable. Gait instability.      No new changes,  gait still unstable       Current Facility-Administered Medications   Medication Dose Route Frequency Provider Last Rate Last Admin    lidocaine 4 % external patch 3 patch  3 patch TransDERmal Daily PRN Luana Scullin, DO        QUEtiapine (SEROQUEL) tablet 25 mg  25 mg Oral BID PRN Sonja Moreland MD        metoprolol tartrate (LOPRESSOR) tablet 25 mg  25 mg Oral BID Becca Moore MD   25 mg at 09/28/21 0858    lisinopril (PRINIVIL;ZESTRIL) tablet 20 mg  20 mg Oral BID Becca Moore MD   20 mg at 09/28/21 0466    Vitamin D (CHOLECALCIFEROL) tablet 2,000 Units  2,000 Units Oral Dinner Luana Scullin, DO   2,000 Units at 09/27/21 1725    cyanocobalamin injection 1,000 mcg  1,000 mcg IntraMUSCular Weekly Luana Scullin, DO   1,000 mcg at 09/28/21 0621    coenzyme Q10 capsule 100 mg  100 mg Oral Daily Luana Scullin, DO   100 mg at 09/28/21 0245    acetaminophen (TYLENOL) tablet 650 mg  650 mg Oral Q4H PRN Luana Cazaresin, DO        bisacodyl (DULCOLAX) suppository 10 mg  10 mg Rectal Daily PRN Aimee Salas DO        fleet rectal enema 1 enema  1 enema Rectal Daily PRN Luana Cazaresin, DO        acetaminophen (TYLENOL) tablet 650 mg  650 mg Oral Q6H PRN Anuja Brown MD   650 mg at 09/21/21 2022    Or    acetaminophen (TYLENOL) suppository 650 mg  650 mg Rectal Q6H PRN Anuja Brown MD        polyethylene glycol (GLYCOLAX) packet 17 g  17 g Oral Daily PRN Anuja Brown MD        amLODIPine (NORVASC) tablet 10 mg  10 mg Oral Daily Anuja Brown MD   10 mg at 09/28/21 0858    aspirin chewable tablet 81 mg  81 mg Oral Daily Anuja Brown MD   81 mg at 09/28/21 6057 atorvastatin (LIPITOR) tablet 80 mg  80 mg Oral Nightly Sahil Dai MD   80 mg at 09/27/21 8781       PHYSICAL EXAM:    BP (!) 143/76   Pulse 69   Temp 98.4 °F (36.9 °C)   Resp 18   Ht 5' 8\" (1.727 m)   Wt 165 lb 4.8 oz (75 kg)   SpO2 99%   BMI 25.13 kg/m²    General Appearance:      Skin:  normal  CVS - Normal sounds, No murmurs , No carotid Bruits  RS -CTA  Abdomen Soft, BS present  Review of Systems   Constitutional: Negative for appetite change and fever. HENT: Negative for ear pain and trouble swallowing. Eyes: Negative for visual disturbance. Respiratory: Negative for choking and shortness of breath. Cardiovascular: Negative for chest pain and palpitations. Gastrointestinal: Negative for nausea and vomiting. Musculoskeletal: Positive for back pain and gait problem. Negative for joint swelling, myalgias, neck pain and neck stiffness. Skin: Negative for color change. Neurological: Positive for speech difficulty and weakness. Negative for dizziness, tremors, seizures, syncope, facial asymmetry, light-headedness, numbness and headaches. Psychiatric/Behavioral: Negative for behavioral problems, confusion, hallucinations and sleep disturbance.       Mental Status Exam:             Level of Alertness:   awake            Orientation:   person, place, time            Memory:   normal                    Language:  normal      Funduscopic Exam:     Cranial Nerves            Cranial nerve III           Pupils:  equal, round, reactive to light      Cranial nerves III, IV, VI           Extraocular Movements: intact      Cranial nerve V           Facial sensation:  intact      Cranial nerve VII           Facial strength: intact      Cranial nerve VIII           Hearing:  intact      Cranial nerve IX           Palate:  intact      Cranial nerve XI         Shoulder shrug:  intact      Cranial nerve XII          Tongue movement:  normal    Motor: Right upper extremity weakness of 4 /5 with a drift  Drift:  Motor exam   Tone:  normal  Abnormal Movements:  absent            Sensory: No definite findings        Pinprick             Right Upper Extremity:  normal             Left Upper Extremity:  normal             Right Lower Extremity:  normal             Left Lower Extremity:  normal           Vibration                         Touch            Proprioception                 Coordination:           Finger/Nose   Right:  abnormal              Left:  normal          Heel-Knee-Shin                Right:  normal              Left:  normal          Rapid Alternating Movements              Right:  normal              Left:  normal          Gait:                       Casual:  normal                         Romberg:  normal            Reflexes:             Deep Tendon Reflexes:             Reflexes are 2 + including ankle reflexes. Plantar response:                Right:  downgoing               Left:  downgoing    Vascular:  Cardiac Exam:  normal         Echocardiogram complete 2D with doppler with color    Result Date: 9/11/2021  Transthoracic Echocardiography Report (TTE)  Demographics   Patient Name    Darryl Garcia Gender               Male   Patient Number  [id number]       Race                 Unknown                                  Ethnicity   Visit Number    [id number]      Room Number          L436   Corporate ID                   Date of Study        09/11/2021   Accession       [id number]     Referring Physician  Number   Date of Birth   1945     Sonographer          Elina Armstrong   Age             77 year(s)     Interpreting         [facility])                                 Physician            Cardiology                                                      [address] [place] [address]  Procedure Type of Study   TTE procedure:ECHOCARDIOGRAM COMPLETE WITH BUBBLE STUDY.   Procedure Date Date: 09/11/2021 Start: 08:15 AM Study Location: Portable Technical Quality: Adequate visualization Indications:Stroke. Patient Status: Routine Height: 68 inches Weight: 179 pounds BSA: 1.95 m^2 BMI: 27.22 kg/m^2 BP: 147/86 mmHg  Conclusions   Summary  Moderate annular calcification. Normal aortic valve structure and function. Trivial aortic regurgitation is  noted. Moderately dilated left atrium. Normal intact intra-atrial septum was noted with no evidence of  significant intra-atrial communications neither by colour flow Doppler  imaging nor by aggitated saline study. Left ventricular ejection fraction is visually estimated at 60%. Impaired relaxation compatible with diastolic dysfunction. ( reversed E/A  ratio)  Moderate concentric left ventricular hypertrophy. Signature   ----------------------------------------------------------------  Electronically signed by Connie Medina(Interpreting physician)  on 09/11/2021 11:17 AM  ----------------------------------------------------------------   Findings  Left Ventricle Left ventricular ejection fraction is visually estimated at 60%. Impaired relaxation compatible with diastolic dysfunction. ( reversed E/A ratio) Moderate concentric left ventricular hypertrophy. Right Ventricle Normal right ventricle structure and function. Normal right ventricle systolic pressure. Left Atrium Moderately dilated left atrium. Normal intact intra-atrial septum was noted with no evidence of significant intra-atrial communications neither by colour flow Doppler imaging nor by aggitated saline study. Right Atrium Normal right atrium. Mitral Valve Moderate annular calcification. Tricuspid Valve Normal tricuspid valve structure and function. Aortic Valve Normal aortic valve structure and function. Trivial aortic regurgitation is noted. Pulmonic Valve Normal pulmonic valve structure and function. Pericardial Effusion No evidence of pericardial effusion. Pleural Effusion No evidence of pleural effusion. Aorta \ Miscellaneous Miscellaneous normal findings were found.  M-Mode Measurements (cm)   LVIDd: 4.6 cm                          LVIDs: 3.01 cm  IVSd: 1.98 cm                          IVSs: 2.14 cm  LVPWd: 1.08 cm                         AO Root Dimension: 3.04 cm  Rt. Vent.  Dimension: 3.05 cm                                         LVOT: 1.72 cm  Doppler Measurements:   AV Velocity:0.02 m/s                   MV Peak E-Wave: 1.25 m/s  AV Peak Gradient: 15.73 mmHg           MV Peak A-Wave: 1.54 m/s  AV Mean Gradient: 8.65 mmHg  AV Area (Continuity):1.66 cm^2         MV P1/2t: 103 msec  TR Velocity:2.47 m/s                   MVA by PHT2.14 cm^2  TR Gradient:24.4 mmHg                  Estimated RAP:10 mmHg                                         RVSP:34.4 mmHg  Valves  Mitral Valve   Peak E-Wave: 1.25 m/s                 Peak A-Wave: 1.54 m/s  P1/2t: 103 msec                       E/A Ratio: 0.81  Mean Velocity: 1.01 m/s               Peak Gradient: 6.28 mmHg  Mean Gradient: 4.78 mmHg              Deceleration Time: 407.7 msec  Area (PHT): 2.14 cm^2                 Area (continuity): 1.44 cm^2   Tissue Doppler   E' Septal Velocity: 0.07 m/s  E' Lateral Velocity: 0.06 m/s   Aortic Valve   Peak Velocity: 1.98 m/s                Mean Velocity: 1.39 m/s  Peak Gradient: 15.73 mmHg              Mean Gradient: 8.65 mmHg  Area (continuity): 1.66 cm^2  AV VTI: 46.12 cm   Tricuspid Valve   Estimated RVSP: 34.4 mmHg               Estimated RAP: 10 mmHg  TR Velocity: 2.47 m/s                   TR Gradient: 24.4 mmHg   Pulmonic Valve   Peak Velocity: 0.95 m/s            Peak Gradient: 3.64 mmHg                                     Estimated PASP: 34.4 mmHg   LVOT   Peak Velocity: 1.51 m/s              Mean Velocity: 1.09 m/s  Peak Gradient: 9.17 mmHg             Mean Gradient: 5.32 mmHg  LVOT Diameter: 1.72 cm               LVOT VTI: 32.89 cm  Structures  Left Atrium   LA Volume/Index: 47.11 ml /24 m^2             LA Area: 16.59 cm^2   Left Ventricle   Diastolic Dimension: 4.6 cm          Systolic Dimension: 2.29 complimented with spectral waveform analysis. Please refer to chart below for specific carotid velocity measurements. The degree of stenosis recorded on this exam uses the same method of stratification used in the NASCET trials. This complies with ACR practice guidelines and the Society of radiology in ultrasound consensus statement and provides adequate information for clinical decision making. Society of Radiologists in Ultrasound (SRU) consensus statement was used to estimate internal carotid artery stenosis. RESULT: Mild to moderate plaque in the carotid arteries bilaterally. No significant increase in systolic flow or turbulence to indicate any hemodynamically significant narrowing in the right or left internal carotid arteries. There is antegrade flow identified in both vertebral arteries. ARTERIAL BLOOD FLOW VELOCITY RIGHT PEAK SYSTOLIC VELOCITIES (PSV)                                               Prox CCA    129 cm/s             Mid CCA     114 cm/s              Dist CCA    104 cm/s             Prox ICA    169 cm/s               Mid ICA     106 cm/s            Dist ICA    63 cm/s             Prox ECA     170   cm/s             Prox VERT   51 cm/s              ICA/CCA     1.48                        LEFT PEAK SYSTOLIC VELOCITIES (PSV)  Prox CCA    135 cm/s Mid CCA     166 cm/s Dist CCA    121 cm/s Prox ICA    119 cm/s Mid ICA     72 cm/s Dist ICA    61 cm/s Prox ECA    131 cm/s Prox VERT   56 cm/s ICA/CCA     0.72      50-69 % STENOSIS IN THE RIGHT ICA  <50 % STENOSIS IN THE LEFT ICA ANTEGRADE FLOW IN THE BILATERAL VERTEBRAL ARTERIES. No results for input(s): WBC, HGB, PLT in the last 72 hours. No results for input(s): NA, K, CL, CO2, BUN, CREATININE, GLUCOSE in the last 72 hours. No results for input(s): BILITOT, ALKPHOS, AST, ALT in the last 72 hours.   Lab Results   Component Value Date    PROTIME 10.3 03/26/2019    INR 1.0 03/26/2019     No results found for: LITHIUM, DILFRTOT, VALPROATE    ASSESSMENT AND PLAN  Left centrum semiovale acute ischemic infarct  Carotid duplex with 50 to 69% stenosis in the right internal carotid artery and less than 50% in the left internal carotid artery  hemoGlobin A1c 5.3  Total cholesterol 145, triglycerides 67, HDL 38,LDL 94  Echocardiogram with ejection fraction of 60% with negative bubble study  Continue aspirin and high intensity statin  Dysphasia- nectar thick liquids  MRI of the cervical, thoracic and lumbar spine completed with multiple areas of severe spinal canal stenosis in the cervical spine. Neurosurgery has been consulted with plans for follow-up once medically stable after stroke for possible surgical intervention. Patient able to ambulate with assistance of walker. Circumduction and right lower extremity noted. Patient has weakness in flexion of right hip and dorsiflexion of right foot and unclear at this is related to left central semiovale infarct versus severe lumbar stenosis at L2-L5. Patient also has gait ataxia which could be due to severe cervical canal stenosis and old cerebellar infarcts. Azotemia which is new noted today. Patient overall improving in strength. Continue on aspirin and high intensity statin. Continue PT/OT/SLP. Azotemia improving. Continues to participate in physical therapy be. Has had some behavioral issues and agitation throughout the day and has been started on Seroquel. Overall improving in strength. Continue PT/OT/SLP. nonfocal, gait ataxia, encephalopathy    Azotemia continues to improve. Tolerating Seroquel well and cooperative. Overall, doing well. Continue PT/OT/SLP   no new dunlap    Patient's diet upgraded to soft and bite sized. Patient is due for discharge home 9/28/21- to SNF vs home with family and [facility]. To follow up with neurology in 6-8 weeks. Left centrum semiovale acute ischemic infarct  Cont ASA/statin  LORI resolved  Multiple areas of cervical and lumbar canal stenosis.  F/u OP  Pt to DC today to SNF  Okay from neuro standpoint  Follow up 4-6 weeks    I have personally performed a face to face diagnostic evaluation on this patient, reviewed all data and investigations, and am the sole provider of all clinical decisions on the neurological status of this patient. exam shows right hemiparesis, gait ataxia      Jordan Abbott MD, Tavia Diaz, American Board of Psychiatry & Neurology  Board Certified in Vascular Neurology  Board Certified in Neuromuscular Medicine  Certified in . [place]

## 2021-09-28 NOTE — PROGRESS NOTES
Pt. Up on his wheel chair. Denies pain or discomfort. Expressed that he will be discharged today. No further complaints of going to a SNF. Pt. Able to take his pills one at a time with applesauce. Lab ordered for SNF entrance negative Covid test.  Report called to [facility]. Pt. And belongings transferred via [facility]

## 2021-09-29 LAB
BASOPHILS ABSOLUTE: NORMAL
BASOPHILS RELATIVE PERCENT: NORMAL
BUN BLDV-MCNC: 22 MG/DL
CALCIUM SERPL-MCNC: 9.4 MG/DL
CHLORIDE BLD-SCNC: 104 MMOL/L
CO2: 18 MMOL/L
CREAT SERPL-MCNC: 1.07 MG/DL
EOSINOPHILS ABSOLUTE: NORMAL
EOSINOPHILS RELATIVE PERCENT: NORMAL
GFR CALCULATED: >60
GLUCOSE BLD-MCNC: 101 MG/DL
HCT VFR BLD CALC: 45.7 % (ref 41–53)
HEMOGLOBIN: 15.7 G/DL (ref 13.5–17.5)
LYMPHOCYTES ABSOLUTE: NORMAL
LYMPHOCYTES RELATIVE PERCENT: NORMAL
MCH RBC QN AUTO: 31.2 PG
MCHC RBC AUTO-ENTMCNC: 34.4 G/DL
MCV RBC AUTO: 90.7 FL
MONOCYTES ABSOLUTE: NORMAL
MONOCYTES RELATIVE PERCENT: NORMAL
NEUTROPHILS ABSOLUTE: NORMAL
NEUTROPHILS RELATIVE PERCENT: NORMAL
PLATELET # BLD: 201 K/ΜL
PMV BLD AUTO: 11.2 FL
POTASSIUM SERPL-SCNC: 4.7 MMOL/L
RBC # BLD: 5.04 10^6/ΜL
SODIUM BLD-SCNC: 135 MMOL/L
WBC # BLD: 4.81 10^3/ML

## 2021-09-29 NOTE — DISCHARGE SUMMARY
Subjective: The patient complains of severe acute on chronic progressive fatigue and balance and cognitive deficits due to cerebellar CVA partially relieved by rest,medications, PT,  OT,   SLP and rest and exacerbated by recent illness. I am concerned about patients medical complexities including risk for arrhythmia as cause for embolic CVA and severe aphasia. He is to start E stim today for swallow. Psych--deemed not competent. He does not want to go to a skilled facility but needs 24-hour assist and supervision which his family cannot provide for him. It looks like skilled facility versus long-term care are his best options at this point. He however still is improving in therapy and we think that skilled facility would best suit his therapeutic needs at this time. Patient was enrolled in an acute course Rehab program and progressed well-but still needs skilled level rehab to address their mobility and ADL adeficits. Discharge is set-Patient will be discharged Skilled nursing facility to complete their rehabilitation process and achieve greater independence. Hospital Course: The patient was admitted to the Rehabilitation Unit to address ADL and mobility deficits. The patient was enrolled in acute PT, OT program.  Weekly team meetings were held to assess functional progress toward their goals. The patient's medical issues were addressed. The patient progressed in the rehab program and is now ready for discharge. Refer to FIM scores summary report for detailed functional status. Greater than 35 minutes was spent on coordinating patients discharge including follow-up care, medications and patient/family education. Extended time needed because of the potential use of opiate medications are high risk medications and a high risk population individual.  Patient and family were instructed to use lowest effective dose of these medications and slowly titrate off over the next 2 to 4 weeks. They are not to combine opiates with sedatives. I reviewed her [facility] prescription monitoring service data sheets in hopes of eliminating polypharmacy and weaning to the lowest effective dose of pain medications and eliminating the concomitant use of benzodiazepines. I see no medications of concern. I see no habits of combining sedatives and narcotics. I reviewed current care and plans for further care with other rehab providers including nursing and case management. According to recent  note, \"Pt sister into visit. Pt. becoming upset with his sister regarding the plan for him to go to the SNF. Requesting she leave him alone . Pt. Put himself on the bed and did not wish to discuss the current plan. Pt sister states she has been attempting to clean brothers house and take care of his three cats. She states she just cant' handle helping her brother out. Pt. Resting on the bed, denies pain at this time. \".    ROS x10: The patient also complains of severely impaired mobility and activities of daily living. Otherwise no new problems with vision, hearing, nose, mouth, throat, dermal, cardiovascular, GI, , pulmonary, musculoskeletal, psychiatric or neurological. See Rehab H&P on Rehab chart dated . Vital signs:  /72   Pulse 70   Temp 97.6 °F (36.4 °C)   Resp 18   Ht 5' 8\" (1.727 m)   Wt 165 lb 4.8 oz (75 kg)   SpO2 98%   BMI 25.13 kg/m²   I/O:   PO/Intake:  fair PO intake,  Thins and soft-dysphagia thins    Bowel/Bladder:   occasional loose stools- Continent. General:  Patient is well developed, adequately nourished, non-obese and     well kempt. HEENT:    PERRLA, hearing intact to loud voice, external inspection of ear     and nose benign. Inspection of lips, tongue and gums benign  Musculoskeletal: No significant change in strength or tone. All joints stable. Inspection and palpation of digits and nails show no clubbing,       cyanosis or inflammatory conditions. Neuro/Psychiatric: Affect: flat but pleasant. Alert and oriented to person, place and     Situation with  min cues. No significant change in deep tendon reflexes or     Sensation-poor judgment reasoning and insight. Lungs:  Diminished, CTA-B. Respiration effort is normal at rest.     Heart:   S1 = S2, RRR. JOSEPH   Abdomen:  Soft, non-tender, no enlargement of liver or spleen. Extremities:  No significant lower extremity edema or tenderness. Skin:   Intact to general survey, no visualized or palpated problems. Rehabilitation:  Physical therapy:   Bed Mobility: Scooting: Stand by assistance    Transfers: Sit to Stand: Supervision, Stand by assistance  Stand to sit: Supervision, Stand by assistance  Bed to Chair: Supervision, Stand by assistance, Ambulation 1  Surface: carpet, uneven, level tile  Device: Rolling Walker  Assistance: Stand by assistance  Quality of Gait: wide ISAIAH initially and when in busy area, decreased RLE stance time, variable safety with environment maneuvering inculding inconsistent approach to chairs  Gait Deviations: Slow Princess, Shuffles, Decreased step length, Decreased step height, Decreased head and trunk rotation  Distance: 150 feet  Comments: pt presents with decreased ability to plan tasks, often requiring cues from therapist to continue towards goal, Stairs  # Steps : 12  Stairs Height: 6\"  Rails: Bilateral  Assistance: Stand by assistance  Comment: non - reciprocal, mild increased sway with pt utilizing rails to correct balance. Pt unable to bring walker up and down the stairs    FIMS:  ,  , Assessment: Pt is medium risk for fall per Marek score hower improved by four points since last measure and has met goal.    Occupational therapy:    ,  , Assessment: Pt continues to demonstrate decreased initiation, problem solving, and safety awareness during ADLs. Pt continues to benefit from OT services to maximize independence and safety during ADLs and IADLs.     Speech therapy: Lab/X-ray studies reviewed, analyzed and discussed with patient and staff:      telemetry and monitor tech stated that pt was SB-NSR Rate 55-65. Recent Results (from the past 24 hour(s))   COVID-19, Rapid    Collection Time: 09/28/21  1:03 PM    Specimen: Nasopharyngeal Swab; Body Fluid   Result Value Ref Range    SARS-CoV-2, NAAT Not Detected Not Detected       Echocardiogram  9/11/2021  Transthoracic Echocardiography  Left Ventricle Left ventricular ejection fraction is visually estimated at 60%. Impaired relaxation compatible with diastolic dysfunction. ( reversed E/A ratio) Moderate concentric left ventricular hypertrophy. Right Ventricle Normal right ventricle structure and function. Normal right ventricle systolic pressure. Left Atrium Moderately dilated left atrium. Normal intact intra-atrial septum was noted with no evidence of significant intra-atrial communications neither by colour flow Doppler imaging nor by aggitated saline study. Right Atrium Normal right atrium. Mitral Valve Moderate annular calcification. Tricuspid Valve Normal tricuspid valve structure and function. Aortic Valve Normal aortic valve structure and function. Trivial aortic regurgitation is noted. Pulmonic Valve Normal pulmonic valve structure and function. Pericardial Effusion No evidence of pericardial effusion. Pleural Effusion No evidence of pleural effusion. Aorta \ Miscellaneous Miscellaneous normal findings were found. CT HEAD  : 9/10/2021  There is no bleed, mass effect, or space occupying lesion. No extra-axial mass or fluid collections. Hypoattenuated White matter changes in the cerebral hemispheres noted. There is global atrophy. Findings likely reflect chronic small vessel vasculopathy. There is a vague area of low attenuation in the inferior posterior right cerebellar hemisphere with slight obliteration of adjacent sulci. An evolving nonhemorrhagic infarct in the cerebellum would have to be a consideration. Clinical correlation recommended. NO BLEED, MASS EFFECT, OR EXTRA-AXIAL FLUID COLLECTION. EVOLVING NONHEMORRHAGIC INFARCT IN THE INFERIOR RIGHT CEREBELLAR HEMISPHERE. 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 XR CHEST   9/10/2021 Atherosclerotic calcification of the thoracic aorta. The cardiomediastinal silhouette is within normal limits. No pneumothorax, pleural effusion, or consolidation. Question calcified pleural plaques on the left, which can be seen with asbestos related disease. No acute osseous abnormality. No radiographic evidence of acute intrathoracic process. Question left-sided pleural plaques which can be seen with asbestos related disease. MRI BRAIN   9/11/2021 There is a tiny focus of restricted diffusion in the left centrum semiovale (series 4 image 19). There are no extra-axial collections. There is no evidence of hemorrhage. The susceptibility images do not demonstrate evidence of hemosiderin deposition within the brain parenchyma or the leptomeninges. There are numerous patchy foci of T2/FLAIR hyperintensities in the subcortical and periventricular white matter in a symmetric distribution throughout both hemispheres. Chronic bilateral cerebellar infarcts. There is prominence of the sulci and ventricles consistent with moderate global cerebral atrophy and chronic involutional changes. No midline shift. The visualized portions of the orbits are within normal limits, the globes are intact. The visualized portions of the paranasal sinuses are within normal limits. Right mastoid effusion. Small foci of restricted diffusion in the left centrum semiovale, consistent with acute ischemia. Chronic microvascular changes and bilateral chronic cerebellar infarcts. FL MODIFIED BARIUM  9/13/2021: In cooperation with speech pathology, a modified barium swallow using various consistencies of both solids and liquids with dynamic imaging was performed.  Patient's oral stage characterized by mild oral dysphagia. There is premature entry to the level of vallecula. Patient's pharyngeal stage was characterized by moderate pharyngeal dysphagia. There is decreased hyolaryngeal excursion with moderate residuals in the vallecula. There is deep penetration on thin liquids from straw. No aspiration. MILD ORAL AND MODERATE PHARYNGEAL DYSPHAGIA. RECOMMEND BY SPEECH PATHOLOGY TO FOLLOW. US CAROTID ARTERY BILATERAL  9/11/2021 Mild to moderate plaque in the carotid arteries bilaterally. No significant increase in systolic flow or turbulence to indicate any hemodynamically significant narrowing in the right or left internal carotid arteries. There is antegrade flow identified in both vertebral arteries. ARTERIAL BLOOD FLOW VELOCITY RIGHT PEAK SYSTOLIC VELOCITIES (PSV)                                               Prox CCA    129 cm/s             Mid CCA     114 cm/s              Dist CCA    104 cm/s             Prox ICA    169 cm/s               Mid ICA     106 cm/s            Dist ICA    63 cm/s             Prox ECA     170   cm/s             Prox VERT   51 cm/s              ICA/CCA     1.48                        LEFT PEAK SYSTOLIC VELOCITIES (PSV)  Prox CCA    135 cm/s Mid CCA     166 cm/s Dist CCA    121 cm/s Prox ICA    119 cm/s Mid ICA     72 cm/s Dist ICA    61 cm/s Prox ECA    131 cm/s Prox VERT   56 cm/s ICA/CCA     0.72      50-69 % STENOSIS IN THE RIGHT ICA  <50 % STENOSIS IN THE LEFT ICA ANTEGRADE FLOW IN THE BILATERAL VERTEBRAL ARTERIES. MRI CERVICAL THORACIC LUMBAR SPINE   9/12/2021     CERVICAL REGION: Multilevel degenerative changes with severe canal stenosis at C2-3, C3-4, and C6-7. Moderate canal stenosis at C4-5. THORACIC REGION: No spinal canal stenosis    LUMBAR REGION: Multilevel degenerative changes. Endplate irregularity with joint space narrowing and slight anterolisthesis at L3-4 Severe canal   stenosis at L2-3 and L4-5.  Moderate to severe canal stenosis at L3-4. No acute compression fracture. No epidural or paraspinal soft tissue   mass. No abnormal cord signal intensity on the scans. Multiple areas of severe spinal canal stenosis in the cervical region. Severe canal stenosis at L2-3, L3-4 and L4-5. Previous extensive, complex labs, notes and diagnostics reviewed and analyzed. ALLERGIES:    Allergies as of 09/13/2021    (No Known Allergies)      (please also verify by checking MAR)      Yesterday I evaluated this patient for periodic reassessment of medical and functional status. The patient was discussed in detail at the treatment team meeting focusing on current medical issues, progress in therapies, social issues, psychological issues, barriers to progress and strategies to address these barriers, and discharge planning. See the hand written addendum to rehab progress note. The patient continues to be high risk for future disability and their medical and rehabilitation prognosis continue to be good and therefore, we will continue the patient's rehabilitation course as planned. The patient's tentative discharge date was set. Patient and family education was discussed. The patient was made aware of the team discussion regarding their progress. Discharge plans were discussed along with barriers to progress and strategies to address these barriers, patient encouraged to continue to discuss discharge plans with . Complex Physical Medicine & Rehab Issues Assess & Plan:   1. 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 Functional and medical status have improved status post acute rehab at [facility] however he continues to have therapeutic needs it is mostly present needing and progressing into a skilled level at this point.   2. Bowel and Bladder dysfunction neurogenic bowel and bladder due to CVA:  frequent toileting, ambulate to bathroom with assistance, check post void residuals. Check for C.difficile x1 if >2 loose stools in 24 hours, continue bowel & bladder program.  Monitor bowel and bladder function. Lactinex 2 PO every AC. MOM prn, Brown Bomb prn, Glycerin suppository prn, enema prn.  3. Severe neck mid back and low back pain as well as generalized OA pain Multiple areas of severe spinal canal stenosis in the cervical region. Severe canal stenosis at L2-3, L3-4 and L4-5. : reassess pain every shift and prior to and after each therapy session, give prn Tylenol and scheduled Tylenol, modalities prn in therapy, masage, Lidoderm, K-pad prn. Consider scheduled AM pain meds. 4. Skin healing and breakdown risk:  continue pressure relief program.  Daily skin exams and reports from nursing. 5. Severe fatigue due to nutritional and hydration deficiency: Add and titrate vitamin B12 vitamin D and CoQ10 continue to monitor I&Os, calorie counts prn, dietary consult prn.  6. Acute episodic insomnia with situational adjustment disorder:  prn Ambien, monitor for day time sedation. 7. Falls risk elevated:  patient to use call light to get nursing assistance to get up, bed and chair alarm. 8. Elevated DVT risk: progressive activities in PT, continue prophylaxis FITZ hose, elevation and Lovenox. 9. Complex discharge planning:  DC home 9/28/21--to SNF he hopes eventually to be discharged home with family and Briseydaleticia Tinsley. SP weekly team meeting every  Thursday to assess progress towards goals, discuss and address social, psychological and medical comorbidities and to address difficulties they may be having progressing in therapy. Patient and family education is in progress. The patient is to follow-up with their family physician after discharge. Complex Active General Medical Issues that complicated care Assess & Plan:    1. History of bilateral shoulder pain with history of shoulder dislocation generalized osteoarthritis  2.    Stroke determined by clinical assessment and confirmed on MRI as above-consult neurology control blood pressure monitor for diabetes add aspirin  3. Spinal stenosis of lumbar region with neurogenic claudication, Myelopathy concurrent with and due to spinal stenosis of cervical region  4. Ataxic gait due to cerebellar CVA-focus on balance and therapy  5. Significant oral pharyngeal dysphagia-consult speech-language pathology  6. Anxiety and depression with occasional violent outburst-emotional support provided daily, vitamin B12, encourage participation in rehabilitation support group and recreational therapy, adjust/add medications (add vitamin B12 consider SSRI) consult rehabilitation psychology add spiritual care limit toxic stimuli. Keep room quiet, restful with low light or lighting per patient preference preferably natural light. 7. Hypertension, hypercholesterolemia, SC cerebrovascular disease, coronary artery disease, cardiac arrhythmia-continue telemetry-continue blood signs every shift focusing on heart rate and blood pressure checks, consult hospitalist for backup medical and adjust/add medications (aspirin, Norvasc, Lipitor, lisinopril, Lopressor) consult cardiology regarding V. tach titrate beta-blocker  8.  Oral pharyngeal dysphagia with cognitive deficits-dysphagia soft with bite-size   Thins liquid consult speech-language pathology--oral motor exercises       Electronically signed by Desmond Romero DO on 9/15/21 at 8:03 AM EDT       Sanna Camp D.O., PM&R     Attending    [facility] Raisa Lassiter

## 2021-10-01 ENCOUNTER — OFFICE VISIT (OUTPATIENT)
Dept: GERIATRIC MEDICINE | Age: 76
End: 2021-10-01
Payer: MEDICARE

## 2021-10-01 DIAGNOSIS — I63.441 CEREBRAL INFARCTION DUE TO EMBOLISM OF RIGHT CEREBELLAR ARTERY (HCC): ICD-10-CM

## 2021-10-01 DIAGNOSIS — G93.40 ENCEPHALOPATHY: Primary | ICD-10-CM

## 2021-10-01 DIAGNOSIS — M48.02 MYELOPATHY CONCURRENT WITH AND DUE TO SPINAL STENOSIS OF CERVICAL REGION (HCC): ICD-10-CM

## 2021-10-01 DIAGNOSIS — G99.2 MYELOPATHY CONCURRENT WITH AND DUE TO SPINAL STENOSIS OF CERVICAL REGION (HCC): ICD-10-CM

## 2021-10-01 PROCEDURE — 99305 1ST NF CARE MODERATE MDM 35: CPT | Performed by: INTERNAL MEDICINE

## 2021-10-01 PROCEDURE — 1123F ACP DISCUSS/DSCN MKR DOCD: CPT | Performed by: INTERNAL MEDICINE

## 2021-10-01 PROCEDURE — G8484 FLU IMMUNIZE NO ADMIN: HCPCS | Performed by: INTERNAL MEDICINE

## 2021-10-04 ENCOUNTER — OFFICE VISIT (OUTPATIENT)
Dept: GERIATRIC MEDICINE | Age: 76
End: 2021-10-04
Payer: MEDICARE

## 2021-10-04 DIAGNOSIS — E16.2 HYPOGLYCEMIA: Primary | ICD-10-CM

## 2021-10-04 DIAGNOSIS — I63.89 CEREBROVASCULAR ACCIDENT (CVA) DUE TO OTHER MECHANISM (HCC): ICD-10-CM

## 2021-10-04 LAB
BASOPHILS ABSOLUTE: NORMAL
BASOPHILS RELATIVE PERCENT: NORMAL
BUN BLDV-MCNC: 11 MG/DL
CALCIUM SERPL-MCNC: 9.4 MG/DL
CHLORIDE BLD-SCNC: 105 MMOL/L
CO2: 21 MMOL/L
CREAT SERPL-MCNC: 0.6 MG/DL
EOSINOPHILS ABSOLUTE: NORMAL
EOSINOPHILS RELATIVE PERCENT: NORMAL
GFR CALCULATED: NORMAL
GLUCOSE BLD-MCNC: 53 MG/DL
HCT VFR BLD CALC: 44.2 % (ref 41–53)
HEMOGLOBIN: 15.3 G/DL (ref 13.5–17.5)
LYMPHOCYTES ABSOLUTE: NORMAL
LYMPHOCYTES RELATIVE PERCENT: NORMAL
MCH RBC QN AUTO: 31.4 PG
MCHC RBC AUTO-ENTMCNC: 34.6 G/DL
MCV RBC AUTO: 90.8 FL
MONOCYTES ABSOLUTE: NORMAL
MONOCYTES RELATIVE PERCENT: NORMAL
NEUTROPHILS ABSOLUTE: NORMAL
NEUTROPHILS RELATIVE PERCENT: NORMAL
PLATELET # BLD: 215 K/ΜL
PMV BLD AUTO: 11.2 FL
POTASSIUM SERPL-SCNC: 3.8 MMOL/L
RBC # BLD: 4.87 10^6/ΜL
SODIUM BLD-SCNC: 140 MMOL/L
WBC # BLD: 4.88 10^3/ML

## 2021-10-04 PROCEDURE — 1123F ACP DISCUSS/DSCN MKR DOCD: CPT | Performed by: PHYSICIAN ASSISTANT

## 2021-10-04 PROCEDURE — 99308 SBSQ NF CARE LOW MDM 20: CPT | Performed by: PHYSICIAN ASSISTANT

## 2021-10-04 PROCEDURE — G8484 FLU IMMUNIZE NO ADMIN: HCPCS | Performed by: PHYSICIAN ASSISTANT

## 2021-10-05 LAB
AVERAGE GLUCOSE: 111
HBA1C MFR BLD: 5.5 %

## 2021-10-08 ENCOUNTER — OFFICE VISIT (OUTPATIENT)
Dept: GERIATRIC MEDICINE | Age: 76
End: 2021-10-08
Payer: MEDICARE

## 2021-10-08 VITALS
DIASTOLIC BLOOD PRESSURE: 85 MMHG | HEART RATE: 79 BPM | RESPIRATION RATE: 18 BRPM | OXYGEN SATURATION: 98 % | TEMPERATURE: 97.6 F | BODY MASS INDEX: 24.75 KG/M2 | SYSTOLIC BLOOD PRESSURE: 107 MMHG | WEIGHT: 162.8 LBS

## 2021-10-08 DIAGNOSIS — R00.1 BRADYCARDIA: ICD-10-CM

## 2021-10-08 DIAGNOSIS — E78.5 HYPERLIPIDEMIA, UNSPECIFIED HYPERLIPIDEMIA TYPE: ICD-10-CM

## 2021-10-08 DIAGNOSIS — I15.9 SECONDARY HYPERTENSION: ICD-10-CM

## 2021-10-08 DIAGNOSIS — I50.30 DIASTOLIC HEART FAILURE, UNSPECIFIED HF CHRONICITY (HCC): ICD-10-CM

## 2021-10-08 DIAGNOSIS — I47.29 NSVT (NONSUSTAINED VENTRICULAR TACHYCARDIA): ICD-10-CM

## 2021-10-08 PROCEDURE — 99308 SBSQ NF CARE LOW MDM 20: CPT | Performed by: NURSE PRACTITIONER

## 2021-10-08 PROCEDURE — 1123F ACP DISCUSS/DSCN MKR DOCD: CPT | Performed by: NURSE PRACTITIONER

## 2021-10-08 PROCEDURE — G8484 FLU IMMUNIZE NO ADMIN: HCPCS | Performed by: NURSE PRACTITIONER

## 2021-10-08 ASSESSMENT — ENCOUNTER SYMPTOMS
SHORTNESS OF BREATH: 0
EYES NEGATIVE: 1
COUGH: 0
WHEEZING: 0
GASTROINTESTINAL NEGATIVE: 1
RESPIRATORY NEGATIVE: 1

## 2021-10-08 NOTE — PROGRESS NOTES
[address]     Chief Complaint   Patient presents with    Hypertension    Tachycardia        HPI:    Earlene Ferrer, a male of 77 y.o.  being seen for cardiology follow up following [facility] discharge on 9/28/2021. Follows with  Dr. Yeison So. Resides at [facility].     NSVT  HTN  Bradycardia secondary to Beta Blocker  CVA-Ischemic  LVEF 60% Echo 8/1626  CHF Diastolic Dysfunction  Moderate LVH    HPI: Earlene Ferrer being seen in consultation for new admit from recent hospitalization for Acute ischemic CVA with short run of NSVT. H/O HLD. Echocardiogram while in hospital shows diastolic CHF with 86%RY. Doing well. Sitting in bed without complaints. Mild Left hemiparesis. Mildly cognitively impaired. Pt denies chest pain, dyspnea, dyspnea on exertion, change in exercise capacity, fatigue,  nausea, vomiting, diarrhea, constipation, motor weakness, weight loss, syncope, lightheadedness, palpitations, PND, orthopnea. BP and hr are good. CAD is stable. No LE discoloration or ulcers. No LE edema. No cough. No acute CHF type symptoms. Nurse reports stable. Chief Complaint   Patient presents with    Hypertension    Tachycardia       SUBJECTIVE:     No family history on file.     Past Surgical History:   Procedure Laterality Date    BACK SURGERY          Past Medical History:   Diagnosis Date    Cellulitis of left hand 3/26/2019    Hypertension        Social History     Socioeconomic History    Marital status: Single     Spouse name: Not on file    Number of children: Not on file    Years of education: Not on file    Highest education level: Not on file   Occupational History    Not on file   Tobacco Use    Smoking status: Never Smoker    Smokeless tobacco: Never Used   Vaping Use    Vaping Use: Never used   Substance and Sexual Activity    Alcohol use: Never     Alcohol/week: 0.0 standard drinks    Drug use: Never    Sexual activity: Not on file   Other Topics Concern    Not on file   Social History Narrative    Lives With: Alone, sister lives in the area    Type of Home: [address]: One level    Home Access: Stairs to enter with rails- Number of Steps: 3- Rails: Both    Bathroom Shower/Tub: Tub/Shower unit    Home Equipment: Cane, Rolling walker (rollator)    ADL Assistance: [address]: Independent    Homemaking Responsibilities: Yes    Ambulation Assistance: Independent (2ww)    Transfer Assistance: Independent    Active : No    He is a retired  for [employer]. He learned how to be a  in [place] was stationed in [place] during the [address]. Never went overseas. Never  no kids graduated [employer]. Social Determinants of Health     Financial Resource Strain:     Difficulty of Paying Living Expenses:    Food Insecurity:     Worried About Running Out of Food in the Last Year:     [address] in the Last Year:    Transportation Needs:     Lack of Transportation (Medical):      Lack of Transportation (Non-Medical):    Physical Activity:     Days of Exercise per Week:     Minutes of Exercise per Session:    Stress:     Feeling of Stress :    Social Connections:     Frequency of Communication with Friends and Family:     Frequency of Social Gatherings with Friends and Family:     Attends Latter-[employer] Services:     Active Member of Clubs or Organizations:     Attends Club or Organization Meetings:     Marital Status:    Intimate Partner Violence:     Fear of Current or Ex-Partner:     Emotionally Abused:     Physically Abused:     Sexually Abused:        No Known Allergies    Current Outpatient Medications on File Prior to Visit   Medication Sig Dispense Refill    aspirin 81 MG chewable tablet Take 1 tablet by mouth daily (Patient taking differently: Take 81 mg by mouth daily Indications: Cerebrovascular Accident or Stroke ) 30 tablet 3    atorvastatin (LIPITOR) 80 MG tablet Take 1 tablet by mouth nightly (Patient taking differently: Take 80 mg by mouth nightly Indications: High Amount of Fats in the Blood ) 30 tablet 3    lisinopril (PRINIVIL;ZESTRIL) 20 MG tablet Take 1 tablet by mouth 2 times daily (Patient taking differently: Take 20 mg by mouth 2 times daily Indications: High Blood Pressure Disorder ) 30 tablet 3    metoprolol tartrate (LOPRESSOR) 25 MG tablet Take 1 tablet by mouth 2 times daily (Patient taking differently: Take 25 mg by mouth 2 times daily Indications: High Blood Pressure Disorder ) 60 tablet 3    QUEtiapine (SEROQUEL) 25 MG tablet Take 1 tablet by mouth 2 times daily as needed for Agitation (Patient taking differently: Take 25 mg by mouth every 12 hours as needed for Agitation Indications: Psychosis ) 60 tablet 0    Vitamin D (CHOLECALCIFEROL) 50 MCG (2000 UT) TABS tablet Take 1 tablet by mouth Daily with supper (Patient taking differently: Take 2,000 Units by mouth daily (with breakfast) ) 30 tablet 0    amLODIPine (NORVASC) 10 MG tablet Take 1 tablet by mouth daily (Patient taking differently: Take 10 mg by mouth daily Indications: High Blood Pressure Disorder ) 30 tablet 5     No current facility-administered medications on file prior to visit. Review of Systems   Constitutional: Negative. HENT: Negative. Eyes: Negative. Respiratory: Negative. Negative for cough, shortness of breath and wheezing. Cardiovascular: Negative. Negative for chest pain, palpitations and leg swelling. Gastrointestinal: Negative. Endocrine: Negative. Genitourinary: Negative. Musculoskeletal: Positive for gait problem. Skin: Negative. Neurological: Negative for dizziness, light-headedness and headaches. Hematological: Negative. Psychiatric/Behavioral: Negative.         OBJECTIVE:  /85   Pulse 79   Temp 97.6 °F (36.4 °C)   Resp 18   Wt 162 lb 12.8 oz (73.8 kg)   SpO2 98%   BMI 24.75 kg/m²     Physical Exam  Constitutional:       Appearance: Normal appearance. He is not ill-appearing. HENT:      Head: Normocephalic and atraumatic. Nose: No congestion. Cardiovascular:      Rate and Rhythm: Normal rate and regular rhythm. Heart sounds: Normal heart sounds. Pulmonary:      Breath sounds: Normal breath sounds. Abdominal:      General: Bowel sounds are normal.      Palpations: Abdomen is soft. Musculoskeletal:         General: Normal range of motion. Cervical back: Normal range of motion and neck supple. Right lower leg: No edema. Left lower leg: No edema. Skin:     General: Skin is warm and dry. Capillary Refill: Capillary refill takes 2 to 3 seconds. Neurological:      Mental Status: He is alert and oriented to person, place, and time. Psychiatric:         Behavior: Behavior normal. Behavior is cooperative. Cognition and Memory: Cognition is impaired. Chart, medications, lab work reviewed. ASSESSMENT AND PLAN:      Assessment/Plan:    1. NSVT:   --  Stable on Beta Blocker. No changes    2. Bradycardia:   --  Stable on current BB dose. No changes    3. HLD:   --  Tolerating Statin    4. HTN:   --  Stable on BB and CCB. No changes. 5.  CVA, Ischemic:   --  Stable, mild Left hemiparesis    6. CHF, Diastolic:  --  Stable, no med changes. --  Follow up 3 weeks at SNF    Adhere to ACC/AHA guidelines for management of heart failure. Adhere to the JNC VIII guidelines for HTN management and the NCEP ATP III guidelines for LDL-C management. Continue current medications and dosages and no changes in medical plan as patient is stable. Patient will need to continue to follow up with you for their general medical care/concerns.       Lindsey Wilson, APRN - CNP

## 2021-10-11 VITALS
HEART RATE: 67 BPM | OXYGEN SATURATION: 94 % | SYSTOLIC BLOOD PRESSURE: 138 MMHG | TEMPERATURE: 97 F | RESPIRATION RATE: 18 BRPM | DIASTOLIC BLOOD PRESSURE: 86 MMHG

## 2021-10-11 NOTE — PROGRESS NOTES
PATIENT:  ARIE WICK    :  1945    DOS: 10/4/2021    [facility]    Vital signs, 138/86, 67 bpm, 97.0 F, 18 RR, 94% on room air. HPI:  Patient seen today for hypoglycemia. On BMP, this morning blood glucose was 53 mg/dL. Patient denies any history of diabetes. He is not on any diabetic meds. He denies any adrenal insufficiency issues at this time. He is currently at the facility as a newer patient due to recent stroke status post 3 weeks hospital he states. He denies any new symptoms today. He is only on aspirin currently for antiplatelet therapy. No new headache, dizziness, CP, orthopnea, PND, or LEE. He is upright in bed today watching television. He is alert and oriented x3. He is yet to see a supervising physician for full H and P; however, at this time we will start Accu-Cheks q.a.m., q.h.s. x3 days to assess. We will do further lab work if evident hypoglycemia on a routine basis. Patient's appetite is within normal limits and he is eating and drinking well per nursing staff. MEDICATIONS:  Reviewed. ALLERGIES:  Reviewed. REVIEW OF SYSTEMS:  Constitutional:  No new nausea, vomiting, weakness, fatigue. No headache, confusion, lightheadedness. A and O x3. No new syncope or focal weakness reportedly per patient and nurse. Cardiopulmonary:  Denies any CP, orthopnea, PND, or LEE. No new cough, SOB, POI, or wheezing. GI:  Overall good appetite. Normal intake. No indigestion, dysphagia reportedly. :  Negative. Psychiatric:  Patient denies any stress, anxiety reportedly at this time. He is alert and oriented x3. PHYSICAL EXAMINATION:  General:  Fair hygiene, bright affect, pleasant, and cooperative today. Seated at bedside in room, watching television. HEENT:  PERRLA. EOMs WNL. MMM, no erythema, exudate. Trachea midline. Cardiopulmonary:  RRR. No significant lower leg edema at this time. Posterior tibial pulse +1 bilateral.  LSCTA. Abdominal:  Normal bowel sounds. Nontender abdomen throughout all 4 quadrants. Mental Status:  Patient is awake, alert, oriented, 3/3. ASSESSMENT AND PLAN:  1. Hypoglycemia-no diabetes meds or diagnosis of diabetes mellitus. We will monitor Accu-Cheks q.a.m. and q.h.s. for the time being. 2.   History of stroke. No new sequelae noted per patient. He is doing PT/OT. We will continue to do full course. Monitor for any new changes.          Electronically Signed By: eGovanny Vargas PA-C on 10/06/2021 09:22:38  ______________________________  Geovanny Vargas PA-C  [id number]  D: 10/04/2021 22:02:40  T: 10/05/2021 10:29:09    cc: - [id number] [place] [address]

## 2021-10-18 ENCOUNTER — OFFICE VISIT (OUTPATIENT)
Dept: GERIATRIC MEDICINE | Age: 76
End: 2021-10-18
Payer: MEDICARE

## 2021-10-18 DIAGNOSIS — Z86.73 HISTORY OF CEREBRAL EMBOLIC INFARCTION: ICD-10-CM

## 2021-10-18 DIAGNOSIS — R41.89 EPISODE OF UNRESPONSIVENESS: Primary | ICD-10-CM

## 2021-10-18 DIAGNOSIS — I95.9 ACUTE HYPOTENSION: ICD-10-CM

## 2021-10-18 PROCEDURE — 1123F ACP DISCUSS/DSCN MKR DOCD: CPT | Performed by: PHYSICIAN ASSISTANT

## 2021-10-18 PROCEDURE — G8484 FLU IMMUNIZE NO ADMIN: HCPCS | Performed by: PHYSICIAN ASSISTANT

## 2021-10-18 PROCEDURE — 99310 SBSQ NF CARE HIGH MDM 45: CPT | Performed by: PHYSICIAN ASSISTANT

## 2021-10-19 LAB
ALBUMIN SERPL-MCNC: 4 G/DL
ALP BLD-CCNC: 84 U/L
ALT SERPL-CCNC: 109 U/L
ANION GAP SERPL CALCULATED.3IONS-SCNC: 17 MMOL/L
AST SERPL-CCNC: NORMAL U/L
BASOPHILS ABSOLUTE: NORMAL
BASOPHILS RELATIVE PERCENT: NORMAL
BILIRUB SERPL-MCNC: 0.9 MG/DL (ref 0.1–1.4)
BUN BLDV-MCNC: 27 MG/DL
CALCIUM SERPL-MCNC: 9.6 MG/DL
CHLORIDE BLD-SCNC: 108 MMOL/L
CO2: 14 MMOL/L
CREAT SERPL-MCNC: 1.14 MG/DL
EOSINOPHILS ABSOLUTE: NORMAL
EOSINOPHILS RELATIVE PERCENT: NORMAL
GFR CALCULATED: NORMAL
GLUCOSE BLD-MCNC: 105 MG/DL
HCT VFR BLD CALC: 46.2 % (ref 41–53)
HEMOGLOBIN: 15.3 G/DL (ref 13.5–17.5)
LYMPHOCYTES ABSOLUTE: NORMAL
LYMPHOCYTES RELATIVE PERCENT: NORMAL
MCH RBC QN AUTO: 31.8 PG
MCHC RBC AUTO-ENTMCNC: 33.1 G/DL
MCV RBC AUTO: 96 FL
MONOCYTES ABSOLUTE: NORMAL
MONOCYTES RELATIVE PERCENT: NORMAL
NEUTROPHILS ABSOLUTE: NORMAL
NEUTROPHILS RELATIVE PERCENT: NORMAL
PLATELET # BLD: 159 K/ΜL
PMV BLD AUTO: 11.2 FL
POTASSIUM SERPL-SCNC: NORMAL MMOL/L
RBC # BLD: 4.81 10^6/ΜL
SODIUM BLD-SCNC: 139 MMOL/L
TOTAL PROTEIN: 7.9
WBC # BLD: 4.97 10^3/ML

## 2021-10-21 LAB — TSH SERPL DL<=0.05 MIU/L-ACNC: 1.7 UIU/ML

## 2021-10-25 NOTE — PROGRESS NOTES
PATIENT:  Joan Swartz    :  1945    DOS:  10/18/2021    [facility]    The patient's vital signs today within normal limits. See [facility]. HPI:  The patient is seen today for acute visit regarding patient being unresponsive this morning while under nursing care. The patient is seen today, had a blood pressure of 86/50 and was very difficult to arouse per nurse. They were about to call 911 to have patient taken out to the emergency room when he came to suddenly and was alert and oriented x3, having normal conversation, normal dialog. The patient does have a history of CVA with right-sided weakness. The patient does have difficulty walking due to right side leg weakness as well. He is here for history of CVA approximately two months ago and is currently on antiplatelet medications for prophylaxis. The patient does have of dysphasia of expressive variety. We are finding it difficult to get the proper words out and finding the proper words to say with describing history. However, he is alert and oriented x3. We will review medications, do a CMP, CBC, TSH, ECG for tomorrow morning as well as vital signs q. shift times three days to assess for any rapid hemodynamic changes. The patient offers no additional concerns. He is not having any pain or discomfort. He is eating and drinking well today, he states. No new additional concerns per nurse, other than a brief period of poor responsiveness. MEDICATIONS:  Reviewed. ALLERGIES:  Reviewed. REVIEW OF SYSTEMS:  Constitutional:  Denies any weakness, fatigue, nausea, vomiting, diarrhea. The patient does have ongoing chronic weakness to the right side secondary to stroke. HEENT:  Denies any new headache, trauma, lightheadedness, or confusion. The patient is A&O x3. No new syncopal events. Did have a period of unresponsiveness this morning between 8 and 9 a.m.   Denies any decrease in sensation or tingling/numbness to global periphery. Cardiopulmonary:  No chest pain, denies any palpitations, lower leg claudication. No new SOB, POI, wheezing, hemoptysis noted. Denies cough. No difficulty breathing per the patient. Gastrointestinal:  No new indigestion, diarrhea, constipation, abdominal pain. :  Denies any urgency, hematuria, discharge or incontinence at present. Psychiatric:  Mood is overall stable. Remaining 14-point ROS unremarkable. PHYSICAL EXAMINATION:  Poor/fair hygiene overall. Alert and oriented, 3/3. No acute distress. Appears well nourished. HEENT:  PERRLA. EOMs WNL, no icterus, no injection. Moist mucous membranes. No erythema or exudate on oropharyngeal examination. Trachea is midline. Normal hearing. Cardiopulmonary:  RRR, negative for any remarkable lower leg edema. No evidence of carotid bruits on auscultation. Lung sounds are clear bilaterally throughout. Abdominal:  Normoactive bowel sounds. No evidence of aortic or renal bruits on auscultation. Mental Status: The patient is awake, alert, oriented, 3/3. Did have difficulty expressive aphasia/dysphasia. ASSESSMENT AND PLAN:  Acute period of unresponsiveness/left side CVA - we will do CMP, CBC, TSH, ECG for tomorrow morning. We will do vital signs q. shift times three days to evaluate. No new additional concerns. We will continue close monitoring.         Electronically Signed By: Bennie De Jesus PA-C on 10/19/2021 18:02:32  ______________________________  Bennie De Jesus PA-C  [id number]  D: 10/18/2021 17:26:29  T: 10/19/2021 03:18:11    [facility] [id number] [facility]

## 2021-10-28 VITALS — HEART RATE: 67 BPM | SYSTOLIC BLOOD PRESSURE: 122 MMHG | DIASTOLIC BLOOD PRESSURE: 86 MMHG | TEMPERATURE: 96 F

## 2021-10-28 NOTE — PROGRESS NOTES
[facility]    Seen for follow up visit for his degenerative joint disease, myelopathy, weakness, spinal stenosis, unsteadiness with fall. HISTORY OF PRESENT ILLNESS:  This was a frail, 67-year-old gentleman, who is seen in his room. The patient has had ongoing falls, unsteadiness, ongoing intermittent pain. The patient had lightheadedness, brought to ER for evaluation, did undergo imaging. The patient has had prior evidence of myelopathy. The patient did have MRI, which confirmed bibasilar cerebral infarcts, age unknown. The patient has had prior ultrasound performed, which showed less than 60% to 70% stenosis of right ICA. The patient did have cervical and thoracic MRI, level of stenosis at C2-3, 4, 7, 6, 7 as well as stenosis of lumbar region without evidence of acute mass. The patient was seen by surgical service. The patient was evaluated for possibility of intracerebral stroke. The patient had intracerebral dilatation. The patient was seen by inpatient rehabilitation. The patient did undergo a course of balance training. The patient did undergo course of stroke rehabilitation. The patient did make gains while there on the inpatient rehabilitation services, subsequently transferred here for ongoing course of care. PAST MEDICAL HISTORY:  Included cellulitis, lumbar stenosis, prior CVA, degenerative joint disease, adult failure to thrive, hypertension, ataxia, dysphagia, aphasia, chronic kidney disease. MEDICATIONS:  On arrival included the following: The patient is on lisinopril 20 mg daily, metoprolol 25 mg twice daily, quetiapine 25 mg twice daily as needed, Ativan, atorvastatin 80 mg daily, aspirin 81 mg daily. FAMILY HISTORY:  Negative for early onset neoplasm and cardiac events. SOCIAL HISTORY:  The patient is currently nonsmoker, nondrinker. REVIEW OF SYSTEMS:  The patient denied chest pain, palpitations, nausea, vomiting, emesis, fevers, chills.   No change in his bowel or bladder habits. OBJECTIVE:  His vital signs are stable. He was afebrile 96.0, pulse is 67, blood pressure 122/86. He is normocephalic, atraumatic. Pupils are equal and reactive. Oral mucosa is moist.  Chest showed no crackles, no wheezing. Cardiovascular exam showed a regular rate. Abdomen is soft, nontender. Extremities showed +1 dorsal pedal pulse. ASSESSMENT AND PLAN:  1. Encephalopathy:  The patient's cognition improved towards baseline. 2.   Myelopathy:  The patient did have acute assessment with prior weakness, undergoing course of rehabilitation. 3.   Status post CVA:  The patient has had recent left centrum semiovale stroke, undergoing course of stroke rehabilitation. No evidence of acute prior stroke symptoms. Clinically stable at this time. 4.   Hypertension:  Blood pressure is stable. No orthostasis. May need titration as medication titration given for intermittently elevated systolic pressures. The patient may benefit from low-dose hydralazine. 5.   Encephalopathy with psychosis:  The patient is on quetiapine, would like to wean as able. We will follow clinically. Please note, available hospital records, imaging reports, consultant notes, laboratory results reviewed.          Electronically Signed By: Parveen Shen M.D. on 10/10/2021 20:32:08  ______________________________  DENIS Rios.Marlene  D: 10/01/2021 09:52:02  T: 10/01/2021 12:44:30    cc: - [phone] [place] [address]

## 2021-10-29 ENCOUNTER — OFFICE VISIT (OUTPATIENT)
Dept: GERIATRIC MEDICINE | Age: 76
End: 2021-10-29
Payer: MEDICARE

## 2021-10-29 ENCOUNTER — OFFICE VISIT (OUTPATIENT)
Dept: NEUROLOGY | Age: 76
End: 2021-10-29
Payer: MEDICARE

## 2021-10-29 VITALS — HEART RATE: 72 BPM | SYSTOLIC BLOOD PRESSURE: 124 MMHG | DIASTOLIC BLOOD PRESSURE: 67 MMHG

## 2021-10-29 DIAGNOSIS — M48.061 SPINAL STENOSIS OF LUMBAR REGION, UNSPECIFIED WHETHER NEUROGENIC CLAUDICATION PRESENT: ICD-10-CM

## 2021-10-29 DIAGNOSIS — Z86.73 HISTORY OF CEREBRAL EMBOLIC INFARCTION: ICD-10-CM

## 2021-10-29 DIAGNOSIS — I47.29 NSVT (NONSUSTAINED VENTRICULAR TACHYCARDIA): ICD-10-CM

## 2021-10-29 DIAGNOSIS — M48.02 CERVICAL STENOSIS OF SPINAL CANAL: ICD-10-CM

## 2021-10-29 DIAGNOSIS — I63.9 CEREBROVASCULAR ACCIDENT (CVA), UNSPECIFIED MECHANISM (HCC): Primary | ICD-10-CM

## 2021-10-29 DIAGNOSIS — I15.9 SECONDARY HYPERTENSION: ICD-10-CM

## 2021-10-29 DIAGNOSIS — I63.89 CEREBROVASCULAR ACCIDENT (CVA) DUE TO OTHER MECHANISM (HCC): ICD-10-CM

## 2021-10-29 DIAGNOSIS — I50.30 DIASTOLIC HEART FAILURE, UNSPECIFIED HF CHRONICITY (HCC): Primary | ICD-10-CM

## 2021-10-29 DIAGNOSIS — E78.5 HYPERLIPIDEMIA, UNSPECIFIED HYPERLIPIDEMIA TYPE: ICD-10-CM

## 2021-10-29 DIAGNOSIS — R00.1 BRADYCARDIA: ICD-10-CM

## 2021-10-29 PROCEDURE — G8484 FLU IMMUNIZE NO ADMIN: HCPCS | Performed by: NURSE PRACTITIONER

## 2021-10-29 PROCEDURE — G8420 CALC BMI NORM PARAMETERS: HCPCS | Performed by: STUDENT IN AN ORGANIZED HEALTH CARE EDUCATION/TRAINING PROGRAM

## 2021-10-29 PROCEDURE — 1123F ACP DISCUSS/DSCN MKR DOCD: CPT | Performed by: STUDENT IN AN ORGANIZED HEALTH CARE EDUCATION/TRAINING PROGRAM

## 2021-10-29 PROCEDURE — G8428 CUR MEDS NOT DOCUMENT: HCPCS | Performed by: STUDENT IN AN ORGANIZED HEALTH CARE EDUCATION/TRAINING PROGRAM

## 2021-10-29 PROCEDURE — 1036F TOBACCO NON-USER: CPT | Performed by: STUDENT IN AN ORGANIZED HEALTH CARE EDUCATION/TRAINING PROGRAM

## 2021-10-29 PROCEDURE — G8484 FLU IMMUNIZE NO ADMIN: HCPCS | Performed by: STUDENT IN AN ORGANIZED HEALTH CARE EDUCATION/TRAINING PROGRAM

## 2021-10-29 PROCEDURE — 99309 SBSQ NF CARE MODERATE MDM 30: CPT | Performed by: NURSE PRACTITIONER

## 2021-10-29 PROCEDURE — 1123F ACP DISCUSS/DSCN MKR DOCD: CPT | Performed by: NURSE PRACTITIONER

## 2021-10-29 PROCEDURE — 99214 OFFICE O/P EST MOD 30 MIN: CPT | Performed by: STUDENT IN AN ORGANIZED HEALTH CARE EDUCATION/TRAINING PROGRAM

## 2021-10-29 PROCEDURE — 4040F PNEUMOC VAC/ADMIN/RCVD: CPT | Performed by: STUDENT IN AN ORGANIZED HEALTH CARE EDUCATION/TRAINING PROGRAM

## 2021-10-29 PROCEDURE — 3017F COLORECTAL CA SCREEN DOC REV: CPT | Performed by: STUDENT IN AN ORGANIZED HEALTH CARE EDUCATION/TRAINING PROGRAM

## 2021-10-29 ASSESSMENT — ENCOUNTER SYMPTOMS
SHORTNESS OF BREATH: 0
COLOR CHANGE: 0
COUGH: 0
VOMITING: 0
CHEST TIGHTNESS: 0
NAUSEA: 0
EYES NEGATIVE: 1
PHOTOPHOBIA: 0
TROUBLE SWALLOWING: 0
GASTROINTESTINAL NEGATIVE: 1
DIARRHEA: 0
RESPIRATORY NEGATIVE: 1
SHORTNESS OF BREATH: 0
WHEEZING: 0

## 2021-10-29 NOTE — PROGRESS NOTES
[facility] CARDIOLOGY  REMOTE HEART FAILURE CLINIC:      NSVT  HTN  Bradycardia secondary to Beta Blocker  CVA-Ischemic  LVEF 60% Echo 0/5494  CHF Diastolic Dysfunction  Moderate LVH  Follows with  Dr. Harsha Medrano. Resides at [facility]. Chief Complaint   Patient presents with    Congestive Heart Failure    Hypertension      PROGRESS NOTE:     SUBJECTIVE:    10/29/2021: Itz Reza being seen for cardiology 3 week follow up for heart failure and HTN. He is sitting up in chair eating lunch. No acute distress. He denies any complaints and states is feeling well. Mild Left hemiparesis. Mildly cognitively impaired. Pt denies chest pain, dyspnea, dyspnea on exertion, change in exercise capacity, fatigue,  nausea, vomiting, diarrhea, constipation, motor weakness, weight loss, syncope, lightheadedness, palpitations, PND, orthopnea. BP and hr are good. CAD is stable. No LE discoloration or ulcers. No LE edema. No cough. No acute CHF type symptoms. Nurse reports stable. HPI 10/8/2021:    Itz Reza, a male of 77 y.o.  being seen for cardiology follow up following [facility] discharge on 9/28/2021. Follows with  Dr. Harsha Medrano. Resides at [facility].     NSVT  HTN  Bradycardia secondary to Beta Blocker  CVA-Ischemic 9/2021  LVEF 60% Echo 4/8201  CHF Diastolic Dysfunction  Moderate LVH    HPI: Itz Reza being seen in consultation for new admit from recent hospitalization for Acute ischemic CVA with short run of NSVT. H/O HLD. Echocardiogram while in hospital shows diastolic CHF with 75%XV. Doing well. Sitting in bed without complaints. Mild Left hemiparesis. Mildly cognitively impaired. Pt denies chest pain, dyspnea, dyspnea on exertion, change in exercise capacity, fatigue,  nausea, vomiting, diarrhea, constipation, motor weakness, weight loss, syncope, lightheadedness, palpitations, PND, orthopnea. BP and hr are good. CAD is stable. No LE discoloration or ulcers.   No LE edema. No cough. No acute CHF type symptoms. Nurse reports stable. Chief Complaint   Patient presents with    Congestive Heart Failure    Hypertension       SUBJECTIVE:     No family history on file. Past Surgical History:   Procedure Laterality Date    BACK SURGERY          Past Medical History:   Diagnosis Date    Cellulitis of left hand 3/26/2019    Hypertension        Social History     Socioeconomic History    Marital status: Single     Spouse name: Not on file    Number of children: Not on file    Years of education: Not on file    Highest education level: Not on file   Occupational History    Not on file   Tobacco Use    Smoking status: Never Smoker    Smokeless tobacco: Never Used   Vaping Use    Vaping Use: Never used   Substance and Sexual Activity    Alcohol use: Never     Alcohol/week: 0.0 standard drinks    Drug use: Never    Sexual activity: Not on file   Other Topics Concern    Not on file   Social History Narrative    Lives With: Alone, sister lives in the area    Type of Home: [place] [address] [place] [address] Blvd: One level    Home Access: Stairs to enter with rails- Number of Steps: 3- Rails: Both    Bathroom Shower/Tub: Tub/Shower unit    Home Equipment: Cane, Rolling walker (rollator)    ADL Assistance: [facility] [address]: [facility] [address] Responsibilities: Yes    Ambulation Assistance: Independent (2ww)    Transfer Assistance: Independent    Active : No    He is a retired  for [employer]. He learned how to be a  in [employer] was stationed in [place] during the [facility]. Never went overseas. Never  no kids graduated [employer] high [employer].          Social Determinants of Health     Financial Resource Strain:     Difficulty of Paying Living Expenses:    Food Insecurity:     Worried About Running Out of Food in the Last Year:     [address] in the Last Year: Transportation Needs:     Lack of Transportation (Medical):      Lack of Transportation (Non-Medical):    Physical Activity:     Days of Exercise per Week:     Minutes of Exercise per Session:    Stress:     Feeling of Stress :    Social Connections:     Frequency of Communication with Friends and Family:     Frequency of Social Gatherings with Friends and Family:     Attends Jewish Services:     Active Member of Clubs or Organizations:     Attends Club or Organization Meetings:     Marital Status:    Intimate Partner Violence:     Fear of Current or Ex-Partner:     Emotionally Abused:     Physically Abused:     Sexually Abused:        No Known Allergies    Current Outpatient Medications on File Prior to Visit   Medication Sig Dispense Refill    aspirin 81 MG chewable tablet Take 1 tablet by mouth daily (Patient taking differently: Take 81 mg by mouth daily Indications: Cerebrovascular Accident or Stroke ) 30 tablet 3    atorvastatin (LIPITOR) 80 MG tablet Take 1 tablet by mouth nightly (Patient taking differently: Take 80 mg by mouth nightly Indications: High Amount of Fats in the Blood ) 30 tablet 3    lisinopril (PRINIVIL;ZESTRIL) 20 MG tablet Take 1 tablet by mouth 2 times daily (Patient taking differently: Take 20 mg by mouth 2 times daily Indications: High Blood Pressure Disorder ) 30 tablet 3    metoprolol tartrate (LOPRESSOR) 25 MG tablet Take 1 tablet by mouth 2 times daily (Patient taking differently: Take 25 mg by mouth 2 times daily Indications: High Blood Pressure Disorder ) 60 tablet 3    QUEtiapine (SEROQUEL) 25 MG tablet Take 1 tablet by mouth 2 times daily as needed for Agitation (Patient taking differently: Take 25 mg by mouth every 12 hours as needed for Agitation Indications: Psychosis ) 60 tablet 0    Vitamin D (CHOLECALCIFEROL) 50 MCG (2000 UT) TABS tablet Take 1 tablet by mouth Daily with supper (Patient taking differently: Take 2,000 Units by mouth daily (with breakfast) ) 30 tablet 0    amLODIPine (NORVASC) 10 MG tablet Take 1 tablet by mouth daily (Patient taking differently: Take 10 mg by mouth daily Indications: High Blood Pressure Disorder ) 30 tablet 5     No current facility-administered medications on file prior to visit. Review of Systems   Constitutional: Negative. Negative for activity change, appetite change, chills, fatigue and fever. HENT: Negative. Negative for congestion, sore throat and trouble swallowing. Eyes: Negative. Negative for pain, discharge, redness and itching. Respiratory: Negative. Negative for cough, shortness of breath and wheezing. Cardiovascular: Negative. Negative for chest pain, palpitations and leg swelling. Gastrointestinal: Negative. Negative for abdominal distention, constipation, diarrhea, nausea and vomiting. Endocrine: Negative. Genitourinary: Negative. Negative for difficulty urinating and hematuria. Musculoskeletal: Positive for gait problem (Chronic). Skin: Negative. Negative for wound. Neurological: Negative for dizziness, light-headedness and headaches. Hematological: Negative. Does not bruise/bleed easily. Psychiatric/Behavioral: Negative. OBJECTIVE:  BP (!) 117/56   Pulse 59   Temp 97.1 °F (36.2 °C)   Resp 18   Wt 155 lb 3.2 oz (70.4 kg)   SpO2 94%   BMI 23.60 kg/m²     Physical Exam  Constitutional:       Appearance: Normal appearance. He is not ill-appearing. HENT:      Head: Normocephalic and atraumatic. Nose: No congestion. Cardiovascular:      Rate and Rhythm: Normal rate and regular rhythm. Heart sounds: Normal heart sounds. Pulmonary:      Breath sounds: Normal breath sounds. Abdominal:      General: Bowel sounds are normal.      Palpations: Abdomen is soft. Musculoskeletal:         General: Normal range of motion. Cervical back: Normal range of motion and neck supple. Right lower leg: No edema. Left lower leg: No edema. Skin:     General: Skin is warm and dry. Capillary Refill: Capillary refill takes 2 to 3 seconds. Neurological:      Mental Status: He is alert and oriented to person, place, and time. Psychiatric:         Behavior: Behavior normal. Behavior is cooperative. Cognition and Memory: Cognition is impaired (MILDLY). Chart, medications, lab work reviewed. ASSESSMENT AND PLAN:      Assessment/Plan:    1. NSVT:   --  Stable on Beta Blocker. No changes    2. Bradycardia:   --  Stable on current BB dose. No changes    3. HLD:   --  Tolerating Statin. Continue No changes. 4.  HTN:   --  Stable on BB and CCB. No changes. 5.  CVA, Ischemic:   --  Stable, mild Left hemiparesis. No changes    6. CHF, Diastolic:  --  Stable, no med changes. --  Follow up one month at SNF    Adhere to ACC/AHA guidelines for management of heart failure. Adhere to the JNC VIII guidelines for HTN management and the NCEP ATP III guidelines for LDL-C management. Continue current medications and dosages and no changes in medical plan as patient is stable. Patient will need to continue to follow up with you for their general medical care/concerns.       HOWARD Oscar - CNP

## 2021-10-29 NOTE — PROGRESS NOTES
[address] [facility] [address]  [address]  [address]  [phone]     Date of Visit:  10/29/2021  Patient Name: Itz Reza   Patient :  1945     CHIEF COMPLAINT:     Itz Reza is a 77 y.o. male who presents today for an general visit to be evaluated for the following condition(s):  No chief complaint on file. HISTORY OF PRESENT ILLNESS     HPI    Was hospitalized on 2021 for acute onset numbness and weakness of his right arm and right leg. Patient was found to have acute left centrum ovale infarction with sequela of right-sided hemiparesis and dysphagia. MRI showed small foci of restricted diffusion in the left centrum semiovale. Patient was also found to have severe spinal canal stenosis in the cervical spine and lumbar spine. Ultrasound showed 50 to 69% stenosis of the right ICA. less than 50% stenosis. A1c was 5.5 LDL was 94. Echocardiogram with ejection fraction of 60% with negative bubble study    Since discharge, patient has been residing at a SNF. He reports that his dysphagia has significantly improved and he is now on a normal diet. He has been able to ambulate as well. Patient reports that right-sided paresthesias do persist but have improved since onset. Patient continues on aspirin and high intensity statin without any myalgias. Patient has not followed up with Dr. Exie Mcburney. REVIEW OF SYSTEM      Review of Systems   Constitutional: Negative for diaphoresis and fever. HENT: Negative for congestion and trouble swallowing. Eyes: Negative for photophobia and visual disturbance. Respiratory: Negative for chest tightness and shortness of breath. Cardiovascular: Negative for chest pain. Gastrointestinal: Negative for diarrhea, nausea and vomiting. Musculoskeletal: Positive for gait problem. Skin: Negative for color change. Neurological: Positive for weakness.  Negative for dizziness, tremors, seizures, syncope, facial asymmetry, speech difficulty, light-headedness, numbness and headaches. Psychiatric/Behavioral: Negative for hallucinations and self-injury.        REVIEWED INFORMATION      No Known Allergies    Patient Active Problem List   Diagnosis    Shoulder dislocation    Cellulitis    Cellulitis of left hand    Stroke determined by clinical assessment ([facility]    Gait abnormality    Renal disease    Hematuria    Spinal stenosis of lumbar region with neurogenic claudication    Myelopathy concurrent with and due to spinal stenosis of cervical region 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 ([facility])    Ataxic gait    Numbness    Abnormality of gait and mobility    NSVT (nonsustained ventricular tachycardia) ([facility])    Bradycardia    Hyperlipidemia    Secondary hypertension    Diastolic heart failure ([facility])       Past Medical History:   Diagnosis Date    Cellulitis of left hand 3/26/2019    Hypertension        Past Surgical History:   Procedure Laterality Date    BACK SURGERY          Social History     Socioeconomic History    Marital status: Single     Spouse name: Not on file    Number of children: Not on file    Years of education: Not on file    Highest education level: Not on file   Occupational History    Not on file   Tobacco Use    Smoking status: Never Smoker    Smokeless tobacco: Never Used   Vaping Use    Vaping Use: Never used   Substance and Sexual Activity    Alcohol use: Never     Alcohol/week: 0.0 standard drinks    Drug use: Never    Sexual activity: Not on file   Other Topics Concern    Not on file   Social History Narrative    Lives With: Alone, sister lives in the area    Type of Home: [address] [place] [address]: One level    Home Access: Stairs to enter with rails- Number of Steps: 3- Rails: Both    Bathroom Shower/Tub: Tub/Shower unit    Home Equipment: Cane, Rolling walker (rollator)    ADL Assistance: Independent    Homemaking Assistance: Independent    Homemaking Responsibilities: Yes    Ambulation Assistance: Independent (2ww)    Transfer Assistance: Independent    Active : No    He is a retired  for [address] [employer]. He learned how to be a  in [place] was stationed in [place] during the [address]. Never went overseas. Never  no kids graduated [employer] high school. Social Determinants of Health     Financial Resource Strain:     Difficulty of Paying Living Expenses:    Food Insecurity:     Worried About Running Out of Food in the Last Year:     [address] in the Last Year:    Transportation Needs:     Lack of Transportation (Medical):  Lack of Transportation (Non-Medical):    Physical Activity:     Days of Exercise per Week:     Minutes of Exercise per Session:    Stress:     Feeling of Stress :    Social Connections:     Frequency of Communication with Friends and Family:     Frequency of Social Gatherings with Friends and Family:     Attends Pentecostalism Services:     Active Member of Clubs or Organizations:     Attends Club or Organization Meetings:     Marital Status:    Intimate Partner Violence:     Fear of Current or Ex-Partner:     Emotionally Abused:     Physically Abused:     Sexually Abused:         PHYSICAL EXAM     Vitals:    10/29/21 0930   BP: 124/67   Site: Left Upper Arm   Pulse: 72     Physical Exam  Vitals and nursing note reviewed. Constitutional:       Appearance: Normal appearance. HENT:      Head: Normocephalic and atraumatic. Eyes:      Conjunctiva/sclera: Conjunctivae normal.   Cardiovascular:      Rate and Rhythm: Normal rate and regular rhythm. Pulses: Normal pulses. Heart sounds: Normal heart sounds. Pulmonary:      Effort: Pulmonary effort is normal.      Breath sounds: Normal breath sounds. Musculoskeletal:         General: Normal range of motion.    Skin:     General: Skin is warm and dry.   Neurological:      Mental Status: He is alert and oriented to person, place, and time. Mental status is at baseline. Cranial Nerves: No cranial nerve deficit. Sensory: No sensory deficit. Motor: Weakness present. Coordination: Coordination normal.      Deep Tendon Reflexes: Reflexes normal.   Psychiatric:         Mood and Affect: Mood normal.         Behavior: Behavior normal.     Did not walk patient due to fall risk (patient currently in wheelchair)  RUE extremity distal weakness of 4/5    ASSESSMENT/PLAN   Pleasant 78-year-old male here for hospital follow-up after being hospitalized on 9/11/2021 for acute left centrum semiovale infarction with sequela of right-sided hemiparesis and dysphagia. Patient was also found to have severe spinal canal stenosis in the cervical spine and lumbar spine. Ultrasound showed 50 to 69% stenosis of the right ICA. Left ICA had less than 50% stenosis. A1c was 5.5 LDL was 94. Echocardiogram with ejection fraction of 60% with negative bubble study. Patient continues on 81 mg of aspirin and 80 mg of Lipitor. Risk factors for stroke include hypertension and hyperlipidemia. Patient has never smoked cigarettes or drink alcohol. Patient has clinically improved with resolution of dysphagia. Right-sided weakness has significantly improved although remains most prominent in distal right extremity with grasping strength. Right-sided paresthesias have improved since onset but have not completely resolved. We will continue patient on current medication regimen with plans for repeat A1c and lipid panel in 2 to 3 months. Patient has not seen Dr. Khushi Thompson as an outpatient and I do not see any upcoming appointments. Per Dr. Berhane Theodore note, he would like to follow-up with patient as an outpatient for cervical and lumbar stenosis after he had recovered from CVA. I will therefore we refer to Dr. Khushi Thompson.     Follow-up in 2 to 3 months or sooner if new or worsening symptoms.     COMMUNICATION:       Electronically signed by Viki Carrington PA-C, AARON on 10/29/2021 at 9:21 AM

## 2021-11-01 VITALS
DIASTOLIC BLOOD PRESSURE: 56 MMHG | OXYGEN SATURATION: 94 % | HEART RATE: 59 BPM | WEIGHT: 155.2 LBS | RESPIRATION RATE: 18 BRPM | BODY MASS INDEX: 23.6 KG/M2 | TEMPERATURE: 97.1 F | SYSTOLIC BLOOD PRESSURE: 117 MMHG

## 2021-11-01 ASSESSMENT — ENCOUNTER SYMPTOMS
VOMITING: 0
SORE THROAT: 0
TROUBLE SWALLOWING: 0
ABDOMINAL DISTENTION: 0
EYE DISCHARGE: 0
DIARRHEA: 0
EYE REDNESS: 0
CONSTIPATION: 0
EYE ITCHING: 0
EYE PAIN: 0
NAUSEA: 0

## 2021-11-09 ENCOUNTER — OFFICE VISIT (OUTPATIENT)
Dept: GERIATRIC MEDICINE | Age: 76
End: 2021-11-09
Payer: MEDICARE

## 2021-11-09 DIAGNOSIS — R53.1 UNILATERAL WEAKNESS: ICD-10-CM

## 2021-11-09 DIAGNOSIS — I63.441 CEREBRAL INFARCTION DUE TO EMBOLISM OF RIGHT CEREBELLAR ARTERY (HCC): Primary | ICD-10-CM

## 2021-11-09 PROCEDURE — G8484 FLU IMMUNIZE NO ADMIN: HCPCS | Performed by: PHYSICIAN ASSISTANT

## 2021-11-09 PROCEDURE — 99308 SBSQ NF CARE LOW MDM 20: CPT | Performed by: PHYSICIAN ASSISTANT

## 2021-11-09 PROCEDURE — 1123F ACP DISCUSS/DSCN MKR DOCD: CPT | Performed by: PHYSICIAN ASSISTANT

## 2021-11-17 ENCOUNTER — OFFICE VISIT (OUTPATIENT)
Dept: GERIATRIC MEDICINE | Age: 76
End: 2021-11-17
Payer: MEDICARE

## 2021-11-17 DIAGNOSIS — I63.441 CEREBRAL INFARCTION DUE TO EMBOLISM OF RIGHT CEREBELLAR ARTERY (HCC): Primary | ICD-10-CM

## 2021-11-17 DIAGNOSIS — R26.9 ABNORMALITY OF GAIT AND MOBILITY: ICD-10-CM

## 2021-11-17 DIAGNOSIS — I15.9 SECONDARY HYPERTENSION: ICD-10-CM

## 2021-11-17 DIAGNOSIS — R29.6 REPEATED FALLS: ICD-10-CM

## 2021-11-17 PROCEDURE — 99316 NF DSCHRG MGMT 30 MIN+: CPT | Performed by: PHYSICIAN ASSISTANT

## 2021-11-17 PROCEDURE — G8484 FLU IMMUNIZE NO ADMIN: HCPCS | Performed by: PHYSICIAN ASSISTANT

## 2022-01-03 NOTE — PROGRESS NOTES
PATIENT:  Diaz Pastor    :  49-    DOS:  2021    [facility]    Vital signs reviewed. See [facility]. HPI:  Patient seen today for history of CVA, had a left small semi-oval and mild inferior cerebellar stroke. Does have ongoing progressive bilateral weakness, more on the right side than left. Some minor slurring of speech and difficulty concentrating and cognitive decline, increased fatigue and malaise. He is demanding today to leave. He wants to leave against medical advice. He describes he does live alone, states he has a sister who helps him out and has 2 residences though which he wants to maintain. He shows a specific lack of trust for medical establishment stating that they want to \"take away his belongings and all his money\". I stated to him that it is very important that he become medically stabilized and have adequate PT, OT to strengthen himself prior to going home to live alone and recommend that he does not do so in the first place. He declines advice, states angrily he would rather leave and take his chances. Did state to him, advised pertaining to elopement and that it puts him at risk for complications with facility down the line should the need arise. He did not seem to state understanding nor did he seem to acknowledge the statement at all. Did request that he stay for few more days, so we can discharge him appropriately with at home PT, OT, and home health care service. MEDICATIONS:  Reviewed. ALLERGIES:  Reviewed. REVIEW OF SYSTEMS:  Constitutional:  No fever, chills, nausea, vomiting. Does have ongoing right-sided weakness and gait difficulty. HEENT:  Questionable mastication and swallowing difficulty secondary to stroke. Cardiopulmonary:  Denies any chest pain, orthopnea, PND, or LEE. Denies any cough, shortness of breath, or wheezing. Gastrointestinal:  Adequate intake of food and fluids, on a modified diet.   Denies abdominal pain, bleeding from bowels or any bowel changes otherwise. :  Negative. MSK:  Progressive right-sided weakness secondary to stroke. Psychiatric:  Patient shows increased agitation and aggression (angry about staying in facility and losing income). Remaining 14-point ROS unremarkable. PHYSICAL EXAMINATION:  General:  Hygiene is fair. Mild-to-moderate distress/anger. HEENT:  Pupils equal, round, and reactive to light equally. EOMs WNL. Cardiopulmonary:  RRR. Lung sounds are clear to auscultation bilaterally, diminished bases. Remaining exam patient would not allow due to agitation. ASSESSMENT AND PLAN:  History of recent cerebrovascular accident/unilateral weakness-The patient is in need of ongoing PT and OT to increase strength and dexterity prior to discharge home. He is refusing wanting to stay. We will attempt to get his discharge move forward with home health care and PT and OT as soon as we can via social work.         Electronically Signed By: Chalino St PA-C on 12/27/2021 10:27:45  ______________________________  Chalino St PA-C  /[id number]  D: 12/26/2021 21:49:14  T: 12/26/2021 23:39:40    cc: - [phone]

## 2022-02-06 NOTE — PROGRESS NOTES
PATIENT: Maximus Matos : 1945 DOS:2021      [facility]       REASON FOR VISIT: Patient is being seen today for discharge summary. SUBJECTIVE: Patient initially admitted to facility for RCA CVA, hypertension, acute falls with abnormal gait and mobility. . This 80-year-old male was seen in his room. Patient earlier past week did threaten to leave AMA. Was very disgruntled and effectively wanted to leave within 1 to 2 days of admission to SNF. He is high risk for falls high risk for self-neglect at home. States he owns 2 homes and wants to go home in order to maintain. He is alert and oriented 2-3 out of 3. Has a history of EtOH misuse. Very verbally aggressive throughout today's visit and past visits with him. Noncompliant with medical examination or questioning. States that he is refusing home health care. Will discharge from facility either today or tomorrow. Will have orders for home health care in place. He is likely to refuse secondary to cost, stating he \"does not want anyone taking his money\" and that he is just fine. Did warn patient about likely negative outcome if he refuses [facility] or PT and OT while at home. Stating that he has a long way to go until clinically stable. Patient unlikely to do well at home alone. Advising against discharge despite patient refusing to stay. Facility documenting refusal of care and proper planning to DC. State that he does live with his sister. Has not had any new negative events in facility other than poor mental status and slow cognition, as well as moderate left-sided weakness that only minimally improved during his short stay here. No family history on file.       Social History     Socioeconomic History    Marital status: Single     Spouse name: Not on file    Number of children: Not on file    Years of education: Not on file    Highest education level: Not on file   Occupational History    Not on file   Tobacco Use    Smoking status: Never Smoker    Smokeless tobacco: Never Used   Vaping Use    Vaping Use: Never used   Substance and Sexual Activity    Alcohol use: Never     Alcohol/week: 0.0 standard drinks    Drug use: Never    Sexual activity: Not on file   Other Topics Concern    Not on file   Social History Narrative    Lives With: Alone, sister lives in the area    Type of Home: [address] [facility]: One level    Home Access: Stairs to enter with rails- Number of Steps: 3- Rails: Both    Bathroom Shower/Tub: Tub/Shower unit    Home Equipment: Cane, Rolling walker (rollator)    ADL Assistance: [address]: Independent    Homemaking Responsibilities: Yes    Ambulation Assistance: Independent (2ww)    Transfer Assistance: Independent    Active : No    He is a retired  for [employer]. He learned how to be a  in [employer] was stationed in [place] during the [facility]. Never went overseas. Never  no kids graduated [employer]. Social Determinants of Health     Financial Resource Strain:     Difficulty of Paying Living Expenses: Not on file   Food Insecurity:     Worried About Running Out of Food in the Last Year: Not on file    Jennifer of Food in the Last Year: Not on file   Transportation Needs:     Lack of Transportation (Medical): Not on file    Lack of Transportation (Non-Medical):  Not on file   Physical Activity:     Days of Exercise per Week: Not on file    Minutes of Exercise per Session: Not on file   Stress:     Feeling of Stress : Not on file   Social Connections:     Frequency of Communication with Friends and Family: Not on file    Frequency of Social Gatherings with Friends and Family: Not on file    Attends Orthodoxy Services: Not on file    Active Member of Clubs or Organizations: Not on file    Attends Club or Organization Meetings: Not on file    Marital Status: Not on file Intimate Partner Violence:     Fear of Current or Ex-Partner: Not on file    Emotionally Abused: Not on file    Physically Abused: Not on file    Sexually Abused: Not on file   Housing Stability:     Unable to Pay for Housing in the Last Year: Not on file    Number of [place] in the Last Year: Not on file    Unstable Housing in the Last Year: Not on file         MEDICATIONS:  Lisinopril 20 mg p.o. every morning nightly, atorvastatin 80 mg p.o. nightly, aspirin 81 mg p.o. every morning, Toprol tartrate 20 mg p.o. every morning nightly, vitamin D3 2000 units p.o. every morning, amlodipine 10 mg p.o. every morning, APAP 600 mg p.o. every 4 hours as needed, Glucagon Emergency Kit 1 mL IM for systemic hypoglycemia as needed, quetiapine 25 mg p.o. every morning,. No Known Allergies      Review of Systems   Unable to perform ROS: Other   Patient refusing majority of ROS. Denies any chest pain, shortness of breath, POI or wheezing. Does have history of hypertension with multiple medication use. Denies any vertigo/syncope denies any GI or  concerns. History of psychosis. Physical Exam  Constitutional:       General: He is in acute distress (Agitation due to visit and questions.:  Demanding to go home. ). HENT:      Head: Normocephalic and atraumatic. Mouth/Throat:      Mouth: Mucous membranes are moist.      Pharynx: Oropharynx is clear. Eyes:      Pupils: Pupils are equal, round, and reactive to light. Cardiovascular:      Rate and Rhythm: Normal rate and regular rhythm. Pulmonary:      Effort: Pulmonary effort is normal.   Skin:     General: Skin is warm and dry. Neurological:      Mental Status: He is alert. Mental status is at baseline. He is disoriented. Motor: Weakness (Left-sided weakness status post RCA CVA) present.       Gait: Gait abnormal.      Comments: Alert and oriented x2-3/3 with moderate prompting   Psychiatric:      Comments: Moderately agitated and disagreeable. Patient unwilling to listen throughout visit despite multiple times. Frequently speaking over provider. Patient shows very poor judgment regarding his medical condition and living arrangements. ASSESSMENT AND PLAN:  1. RCA CVA with left-sided weakness-advised PT/OT at home at minimum for several weeks. Patient likely to refuse. With document refusal.  2.     Hypertension-BP currently within no limits. Continue current meds. 3.   Repeat falls-no new falls letter to patient about most the time during stay at facility. Patient is high risk secondary to left-sided weakness and poor gait. 4.   Abnormal gait/mobility. -PT OT at home recommended. See #1 patient is refusing due to perceived cost.       I have reviewed this resident's medication and treatment plan as well as most recent lab work. Resident will be discharged home on 11/19/2021. Pt  will continue to follow-up with PCP and neurology recommended as an outpatient. Pt may have PT/OT/HHC as needed/desired per pt tolerance and need. Stating that he is going to refuse due to cost despite multiple times to educate patient on his distinct need for services. Patient is encouraged to follow-up with her PCP within a week of discharge. Greater than 30 minutes was spent in the discharge planning of this patient. It was a pleasure following with this patient while they resided at [facility]  .

## 2022-05-23 NOTE — PROGRESS NOTES
ASSESSMENT/PROGRESS TOWARDS GOALS:  Assessment: Pt demonstrates decreased safety with trsfs secondary to poor carryover of vc's for improved safety and quality. Increased time and effort required for trsfs with approach to chair d/t step by step cues. Distratcs easily in busy environment. Pt exhibits ff posture and downward gaze throughout gait training despite vc/tc's. Goals:  Long term goals  Long term goal 1: Bed mobility with indep  Long term goal 2: sit to stand and bed transfers indep  Long term goal 3: Amb 150ft with 2ww or rollator indep  Long term goal 4: TUG <15.0 seconds with AD to demonstrate low fall risk  Long term goal 5: [place] balance testing at 25/56 or greater  Long term goal 6: SBA 4 stairs with rails    PLAN OF CARE/Safety:   Plan Comment: Cont.  POC      Therapy Time:   Individual   Time In 1000   Time Out 1030   Minutes 30     Minutes:      Transfer/Bed mobility trainin      Gait trainin      Neuro re education: 4     Therapeutic ex:Virgie Lara PTA, 09/15/21 at 12:06 PM “Patient's name, , procedure and correct site were confirmed during the [place] Timeout.”

## 2022-06-26 ENCOUNTER — APPOINTMENT (OUTPATIENT)
Dept: GENERAL RADIOLOGY | Age: 77
DRG: 947 | End: 2022-06-26
Payer: MEDICARE

## 2022-06-26 ENCOUNTER — APPOINTMENT (OUTPATIENT)
Dept: CT IMAGING | Age: 77
DRG: 947 | End: 2022-06-26
Payer: MEDICARE

## 2022-06-26 ENCOUNTER — HOSPITAL ENCOUNTER (INPATIENT)
Age: 77
LOS: 3 days | Discharge: NURSING, REHAB OR LONG-TERM CARE | DRG: 947 | End: 2022-06-29
Attending: STUDENT IN AN ORGANIZED HEALTH CARE EDUCATION/TRAINING PROGRAM | Admitting: INTERNAL MEDICINE
Payer: MEDICARE

## 2022-06-26 DIAGNOSIS — R62.7 FTT (FAILURE TO THRIVE) IN ADULT: ICD-10-CM

## 2022-06-26 DIAGNOSIS — M62.82 NON-TRAUMATIC RHABDOMYOLYSIS: ICD-10-CM

## 2022-06-26 DIAGNOSIS — R77.8 ELEVATED TROPONIN: ICD-10-CM

## 2022-06-26 DIAGNOSIS — R53.1 GENERALIZED WEAKNESS: Primary | ICD-10-CM

## 2022-06-26 DIAGNOSIS — N17.9 AKI (ACUTE KIDNEY INJURY) (HCC): ICD-10-CM

## 2022-06-26 DIAGNOSIS — U07.1 COVID-19: ICD-10-CM

## 2022-06-26 PROBLEM — R31.9 HEMATURIA: Status: RESOLVED | Noted: 2021-09-13 | Resolved: 2022-06-26

## 2022-06-26 PROBLEM — L03.90 CELLULITIS: Status: RESOLVED | Noted: 2019-03-26 | Resolved: 2022-06-26

## 2022-06-26 PROBLEM — L03.114 CELLULITIS OF LEFT HAND: Status: RESOLVED | Noted: 2019-03-26 | Resolved: 2022-06-26

## 2022-06-26 PROBLEM — M48.062 SPINAL STENOSIS OF LUMBAR REGION WITH NEUROGENIC CLAUDICATION: Status: RESOLVED | Noted: 2021-09-13 | Resolved: 2022-06-26

## 2022-06-26 LAB
ALBUMIN SERPL-MCNC: 4.1 G/DL (ref 3.5–4.6)
ALP BLD-CCNC: 65 U/L (ref 35–104)
ALT SERPL-CCNC: 47 U/L (ref 0–41)
ANION GAP SERPL CALCULATED.3IONS-SCNC: 17 MEQ/L (ref 9–15)
AST SERPL-CCNC: 128 U/L (ref 0–40)
BASOPHILS ABSOLUTE: 0 K/UL (ref 0–0.2)
BASOPHILS RELATIVE PERCENT: 0.2 %
BILIRUB SERPL-MCNC: 0.9 MG/DL (ref 0.2–0.7)
BUN BLDV-MCNC: 36 MG/DL (ref 8–23)
CALCIUM SERPL-MCNC: 9.1 MG/DL (ref 8.5–9.9)
CHLORIDE BLD-SCNC: 103 MEQ/L (ref 95–107)
CO2: 18 MEQ/L (ref 20–31)
CREAT SERPL-MCNC: 1.46 MG/DL (ref 0.7–1.2)
EOSINOPHILS ABSOLUTE: 0 K/UL (ref 0–0.7)
EOSINOPHILS RELATIVE PERCENT: 0 %
ETHANOL PERCENT: NORMAL G/DL
ETHANOL: <10 MG/DL (ref 0–0.08)
GFR AFRICAN AMERICAN: 56.9
GFR NON-AFRICAN AMERICAN: 47
GLOBULIN: 3.4 G/DL (ref 2.3–3.5)
GLUCOSE BLD-MCNC: 92 MG/DL (ref 70–99)
HCT VFR BLD CALC: 48.9 % (ref 42–52)
HEMOGLOBIN: 16.9 G/DL (ref 14–18)
LACTIC ACID: 1.5 MMOL/L (ref 0.5–2.2)
LIPASE: 24 U/L (ref 12–95)
LYMPHOCYTES ABSOLUTE: 0.7 K/UL (ref 1–4.8)
LYMPHOCYTES RELATIVE PERCENT: 11.4 %
MAGNESIUM: 2 MG/DL (ref 1.7–2.4)
MCH RBC QN AUTO: 31.2 PG (ref 27–31.3)
MCHC RBC AUTO-ENTMCNC: 34.5 % (ref 33–37)
MCV RBC AUTO: 90.4 FL (ref 80–100)
MONOCYTES ABSOLUTE: 0.6 K/UL (ref 0.2–0.8)
MONOCYTES RELATIVE PERCENT: 10.1 %
NEUTROPHILS ABSOLUTE: 4.7 K/UL (ref 1.4–6.5)
NEUTROPHILS RELATIVE PERCENT: 78.3 %
PDW BLD-RTO: 12.8 % (ref 11.5–14.5)
PLATELET # BLD: 162 K/UL (ref 130–400)
POTASSIUM SERPL-SCNC: 4.2 MEQ/L (ref 3.4–4.9)
PRO-BNP: 3441 PG/ML
RBC # BLD: 5.41 M/UL (ref 4.7–6.1)
SARS-COV-2, NAAT: DETECTED
SODIUM BLD-SCNC: 138 MEQ/L (ref 135–144)
TOTAL CK: 2768 U/L (ref 0–190)
TOTAL PROTEIN: 7.5 G/DL (ref 6.3–8)
TROPONIN: 0.01 NG/ML (ref 0–0.01)
TSH REFLEX: 2.84 UIU/ML (ref 0.44–3.86)
WBC # BLD: 6 K/UL (ref 4.8–10.8)

## 2022-06-26 PROCEDURE — 82550 ASSAY OF CK (CPK): CPT

## 2022-06-26 PROCEDURE — 2580000003 HC RX 258: Performed by: STUDENT IN AN ORGANIZED HEALTH CARE EDUCATION/TRAINING PROGRAM

## 2022-06-26 PROCEDURE — 99285 EMERGENCY DEPT VISIT HI MDM: CPT

## 2022-06-26 PROCEDURE — 70450 CT HEAD/BRAIN W/O DYE: CPT

## 2022-06-26 PROCEDURE — 84484 ASSAY OF TROPONIN QUANT: CPT

## 2022-06-26 PROCEDURE — 86140 C-REACTIVE PROTEIN: CPT

## 2022-06-26 PROCEDURE — 83880 ASSAY OF NATRIURETIC PEPTIDE: CPT

## 2022-06-26 PROCEDURE — 85379 FIBRIN DEGRADATION QUANT: CPT

## 2022-06-26 PROCEDURE — 87635 SARS-COV-2 COVID-19 AMP PRB: CPT

## 2022-06-26 PROCEDURE — 84443 ASSAY THYROID STIM HORMONE: CPT

## 2022-06-26 PROCEDURE — 83690 ASSAY OF LIPASE: CPT

## 2022-06-26 PROCEDURE — 96360 HYDRATION IV INFUSION INIT: CPT

## 2022-06-26 PROCEDURE — 82728 ASSAY OF FERRITIN: CPT

## 2022-06-26 PROCEDURE — 2060000000 HC ICU INTERMEDIATE R&B

## 2022-06-26 PROCEDURE — 82077 ASSAY SPEC XCP UR&BREATH IA: CPT

## 2022-06-26 PROCEDURE — 83735 ASSAY OF MAGNESIUM: CPT

## 2022-06-26 PROCEDURE — 84145 PROCALCITONIN (PCT): CPT

## 2022-06-26 PROCEDURE — 83615 LACTATE (LD) (LDH) ENZYME: CPT

## 2022-06-26 PROCEDURE — 51798 US URINE CAPACITY MEASURE: CPT

## 2022-06-26 PROCEDURE — 71045 X-RAY EXAM CHEST 1 VIEW: CPT

## 2022-06-26 PROCEDURE — 93005 ELECTROCARDIOGRAM TRACING: CPT | Performed by: STUDENT IN AN ORGANIZED HEALTH CARE EDUCATION/TRAINING PROGRAM

## 2022-06-26 PROCEDURE — 83605 ASSAY OF LACTIC ACID: CPT

## 2022-06-26 PROCEDURE — 36415 COLL VENOUS BLD VENIPUNCTURE: CPT

## 2022-06-26 PROCEDURE — 80053 COMPREHEN METABOLIC PANEL: CPT

## 2022-06-26 PROCEDURE — 85025 COMPLETE CBC W/AUTO DIFF WBC: CPT

## 2022-06-26 RX ORDER — SODIUM CHLORIDE 9 MG/ML
INJECTION, SOLUTION INTRAVENOUS ONCE
Status: COMPLETED | OUTPATIENT
Start: 2022-06-26 | End: 2022-06-27

## 2022-06-26 RX ORDER — ONDANSETRON 4 MG/1
4 TABLET, ORALLY DISINTEGRATING ORAL EVERY 8 HOURS PRN
Status: DISCONTINUED | OUTPATIENT
Start: 2022-06-26 | End: 2022-06-26 | Stop reason: SDUPTHER

## 2022-06-26 RX ORDER — POLYETHYLENE GLYCOL 3350 17 G/17G
17 POWDER, FOR SOLUTION ORAL DAILY PRN
Status: DISCONTINUED | OUTPATIENT
Start: 2022-06-26 | End: 2022-06-26 | Stop reason: SDUPTHER

## 2022-06-26 RX ORDER — ASPIRIN 300 MG/1
300 SUPPOSITORY RECTAL DAILY
Status: DISCONTINUED | OUTPATIENT
Start: 2022-06-27 | End: 2022-06-27

## 2022-06-26 RX ORDER — ACETAMINOPHEN 650 MG/1
650 SUPPOSITORY RECTAL EVERY 6 HOURS PRN
Status: DISCONTINUED | OUTPATIENT
Start: 2022-06-26 | End: 2022-06-29 | Stop reason: HOSPADM

## 2022-06-26 RX ORDER — ONDANSETRON 4 MG/1
4 TABLET, ORALLY DISINTEGRATING ORAL EVERY 8 HOURS PRN
Status: DISCONTINUED | OUTPATIENT
Start: 2022-06-26 | End: 2022-06-29 | Stop reason: HOSPADM

## 2022-06-26 RX ORDER — ROSUVASTATIN CALCIUM 20 MG/1
40 TABLET, COATED ORAL NIGHTLY
Status: DISCONTINUED | OUTPATIENT
Start: 2022-06-26 | End: 2022-06-29 | Stop reason: HOSPADM

## 2022-06-26 RX ORDER — ASPIRIN 81 MG/1
81 TABLET ORAL DAILY
Status: DISCONTINUED | OUTPATIENT
Start: 2022-06-27 | End: 2022-06-27

## 2022-06-26 RX ORDER — ENOXAPARIN SODIUM 100 MG/ML
40 INJECTION SUBCUTANEOUS DAILY
Status: DISCONTINUED | OUTPATIENT
Start: 2022-06-27 | End: 2022-06-26 | Stop reason: SDUPTHER

## 2022-06-26 RX ORDER — ACETAMINOPHEN 325 MG/1
650 TABLET ORAL EVERY 6 HOURS PRN
Status: DISCONTINUED | OUTPATIENT
Start: 2022-06-26 | End: 2022-06-29 | Stop reason: HOSPADM

## 2022-06-26 RX ORDER — POLYETHYLENE GLYCOL 3350 17 G/17G
17 POWDER, FOR SOLUTION ORAL DAILY PRN
Status: DISCONTINUED | OUTPATIENT
Start: 2022-06-26 | End: 2022-06-29 | Stop reason: HOSPADM

## 2022-06-26 RX ORDER — LABETALOL HYDROCHLORIDE 5 MG/ML
10 INJECTION, SOLUTION INTRAVENOUS EVERY 10 MIN PRN
Status: DISCONTINUED | OUTPATIENT
Start: 2022-06-26 | End: 2022-06-29 | Stop reason: HOSPADM

## 2022-06-26 RX ORDER — ONDANSETRON 2 MG/ML
4 INJECTION INTRAMUSCULAR; INTRAVENOUS EVERY 6 HOURS PRN
Status: DISCONTINUED | OUTPATIENT
Start: 2022-06-26 | End: 2022-06-26 | Stop reason: SDUPTHER

## 2022-06-26 RX ORDER — SODIUM CHLORIDE 0.9 % (FLUSH) 0.9 %
5-40 SYRINGE (ML) INJECTION PRN
Status: DISCONTINUED | OUTPATIENT
Start: 2022-06-26 | End: 2022-06-29 | Stop reason: HOSPADM

## 2022-06-26 RX ORDER — ONDANSETRON 2 MG/ML
4 INJECTION INTRAMUSCULAR; INTRAVENOUS EVERY 6 HOURS PRN
Status: DISCONTINUED | OUTPATIENT
Start: 2022-06-26 | End: 2022-06-29 | Stop reason: HOSPADM

## 2022-06-26 RX ORDER — ENOXAPARIN SODIUM 100 MG/ML
40 INJECTION SUBCUTANEOUS DAILY
Status: DISCONTINUED | OUTPATIENT
Start: 2022-06-27 | End: 2022-06-29 | Stop reason: HOSPADM

## 2022-06-26 RX ORDER — SODIUM CHLORIDE 9 MG/ML
INJECTION, SOLUTION INTRAVENOUS PRN
Status: DISCONTINUED | OUTPATIENT
Start: 2022-06-26 | End: 2022-06-29 | Stop reason: HOSPADM

## 2022-06-26 RX ORDER — ACETAMINOPHEN 500 MG
1000 TABLET ORAL ONCE
Status: DISCONTINUED | OUTPATIENT
Start: 2022-06-26 | End: 2022-06-26 | Stop reason: SDUPTHER

## 2022-06-26 RX ORDER — 0.9 % SODIUM CHLORIDE 0.9 %
1000 INTRAVENOUS SOLUTION INTRAVENOUS ONCE
Status: COMPLETED | OUTPATIENT
Start: 2022-06-26 | End: 2022-06-26

## 2022-06-26 RX ORDER — SODIUM CHLORIDE 0.9 % (FLUSH) 0.9 %
5-40 SYRINGE (ML) INJECTION EVERY 12 HOURS SCHEDULED
Status: DISCONTINUED | OUTPATIENT
Start: 2022-06-26 | End: 2022-06-29 | Stop reason: HOSPADM

## 2022-06-26 RX ADMIN — SODIUM CHLORIDE 1000 ML: 9 INJECTION, SOLUTION INTRAVENOUS at 20:23

## 2022-06-26 ASSESSMENT — ENCOUNTER SYMPTOMS
COUGH: 0
SHORTNESS OF BREATH: 1
EYE PAIN: 0
BACK PAIN: 0
RHINORRHEA: 0
SORE THROAT: 0
ABDOMINAL PAIN: 0
VOMITING: 0
NAUSEA: 0
DIARRHEA: 0

## 2022-06-26 ASSESSMENT — PAIN - FUNCTIONAL ASSESSMENT: PAIN_FUNCTIONAL_ASSESSMENT: NONE - DENIES PAIN

## 2022-06-26 NOTE — ED PROVIDER NOTES
[address] [facility] ED  eMERGENCY dEPARTMENT eNCOUnter      Pt Name: Jim Benoit  MRN: [id number]  [place] 1945  Date of evaluation: 6/26/2022  Provider: Christo Apodaca MD      HISTORY OF PRESENT ILLNESS      Chief Complaint   Patient presents with    Extremity Weakness     pt c/o wekaness, fatigue, possible stroke today        The history is provided by the Patient. Jim Benoit is a 77 y.o. male with a PMH clinically significant for HTN, HLD, HFpEF, CVA, Lumbar spinal stenosis presenting to the ED via PV c/o generalized weakness and fatigue, not feeling well over the past 4 months. Thinks his symptoms have been worsening more recently as well. Characterizes weakness as diffuse, constant, moderate in severity. States he just feels weak all the time. Is having difficulty walking at home. Denies any recent falls. States he was walking with a walker before and doing better. He said he is eating and drinking okay. Denies any associated numbness, headache, chest pain, current shortness of breath, abdominal pain, N/V/D/C or urinary symptoms. States he does intermittently get short of breath but not right now. Has not been taking all medications regularly, states he sometimes forgets. States his right leg is usually a little bit weaker than his left, but this is normal from his previous stroke. Denies any current focal area of weakness or numbness. Denies any vision changes. No headache. Per Chart Review: Recent admission for CVA in 9/2021 appreciated. Noted acute CVA of the left centrum semiovale and bilateral chronic cerebellar infarcts. Discharged to SNF/rehab. Psychiatrically deemed incompetent at that time. REVIEW OF SYSTEMS       Review of Systems   Constitutional: Positive for activity change and fatigue. Negative for chills and fever. HENT: Negative for rhinorrhea and sore throat. Eyes: Negative for pain and visual disturbance.    Respiratory: Positive for shortness of breath. Negative for cough. Cardiovascular: Negative for chest pain and palpitations. Gastrointestinal: Negative for abdominal pain, diarrhea, nausea and vomiting. Genitourinary: Negative for dysuria. Musculoskeletal: Positive for gait problem. Negative for back pain and neck pain. Skin: Negative for rash. Neurological: Positive for weakness (generalized). Negative for numbness and headaches. PAST MEDICAL HISTORY     Past Medical History:   Diagnosis Date    Cellulitis of left hand 3/26/2019    Cerebral artery occlusion with cerebral infarction ([facility] [address]     Hypertension        SURGICAL HISTORY       Past Surgical History:   Procedure Laterality Date    BACK SURGERY         FAMILY HISTORY     History reviewed. No pertinent family history. SOCIAL HISTORY       Social History     Socioeconomic History    Marital status: Single     Spouse name: None    Number of children: None    Years of education: None    Highest education level: None   Occupational History    None   Tobacco Use    Smoking status: Never Smoker    Smokeless tobacco: Never Used   Vaping Use    Vaping Use: Never used   Substance and Sexual Activity    Alcohol use: Never     Alcohol/week: 0.0 standard drinks    Drug use: Never    Sexual activity: None   Other Topics Concern    None   Social History Narrative    Lives With: Alone, sister lives in the area    Type of Home: [address] [facility]: One level    Home Access: Stairs to enter with rails- Number of Steps: 3- Rails: Both    Bathroom Shower/Tub: Tub/Shower unit    Home Equipment: Cane, Rolling walker (rollator)    ADL Assistance: [address]: Independent    Homemaking Responsibilities: Yes    Ambulation Assistance: Independent (2ww)    Transfer Assistance: Independent    Active : No    He is a retired  for [address] [place] [address].   He learned how to be a  in [place] was stationed in [place] during the [place] era. Never went overseas. Never  no kids graduated [employer]. Social Determinants of Health     Financial Resource Strain:     Difficulty of Paying Living Expenses: Not on file   Food Insecurity:     Worried About Running Out of Food in the Last Year: Not on file    Jennifer of Food in the Last Year: Not on file   Transportation Needs:     Lack of Transportation (Medical): Not on file    Lack of Transportation (Non-Medical):  Not on file   Physical Activity:     Days of Exercise per Week: Not on file    Minutes of Exercise per Session: Not on file   Stress:     Feeling of Stress : Not on file   Social Connections:     Frequency of Communication with Friends and Family: Not on file    Frequency of Social Gatherings with Friends and Family: Not on file    Attends Protestant Services: Not on file    Active Member of [address] [place] [address] or Organizations: Not on file    Attends Club or Organization Meetings: Not on file    Marital Status: Not on file   Intimate Partner Violence:     Fear of Current or Ex-Partner: Not on file    Emotionally Abused: Not on file    Physically Abused: Not on file    Sexually Abused: Not on file   Housing Stability:     Unable to Pay for Housing in the Last Year: Not on file    Number of [place] in the Last Year: Not on file    Unstable Housing in the Last Year: Not on file       CURRENT MEDICATIONS       Previous Medications    AMLODIPINE (NORVASC) 10 MG TABLET    Take 1 tablet by mouth daily    ASPIRIN 81 MG CHEWABLE TABLET    Take 1 tablet by mouth daily    ATORVASTATIN (LIPITOR) 80 MG TABLET    Take 1 tablet by mouth nightly    LISINOPRIL (PRINIVIL;ZESTRIL) 20 MG TABLET    Take 1 tablet by mouth 2 times daily    METOPROLOL TARTRATE (LOPRESSOR) 25 MG TABLET    Take 1 tablet by mouth 2 times daily    QUETIAPINE (SEROQUEL) 25 MG TABLET    Take 1 tablet by mouth 2 times daily as needed for Agitation    VITAMIN D (CHOLECALCIFEROL) 50 MCG (2000 UT) TABS TABLET    Take 1 tablet by mouth Daily with supper       ALLERGIES     Patient has no known allergies. PHYSICAL EXAM       ED Triage Vitals   BP Temp Temp src Pulse Resp SpO2 Height Weight   -- -- -- -- -- -- -- --       Physical Exam  Vitals and nursing note reviewed. Constitutional:       General: He is not in acute distress. Appearance: He is not toxic-appearing or diaphoretic. Comments: Very poorly kempt. HENT:      Head: Normocephalic and atraumatic. Mouth/Throat:      Mouth: Mucous membranes are dry. Pharynx: Oropharynx is clear. Eyes:      General: No visual field deficit. Extraocular Movements: Extraocular movements intact. Pupils: Pupils are equal, round, and reactive to light. Cardiovascular:      Rate and Rhythm: Regular rhythm. Tachycardia present. Pulses: Normal pulses. Heart sounds: Murmur heard. Pulmonary:      Effort: Pulmonary effort is normal. No respiratory distress. Breath sounds: No wheezing. Abdominal:      General: There is no distension. Palpations: Abdomen is soft. Tenderness: There is no abdominal tenderness. There is no guarding or rebound. Musculoskeletal:         General: No tenderness. Cervical back: Normal range of motion and neck supple. No rigidity or tenderness. Right lower leg: No edema. Left lower leg: No edema. Skin:     General: Skin is warm and dry. Capillary Refill: Capillary refill takes less than 2 seconds. Findings: Abrasion and bruising present. Comments: Scattered abrasions and ecchymoses to the bilateral upper and lower extremities. No active bleeding. Neurological:      Mental Status: He is alert and oriented to person, place, and time. GCS: GCS eye subscore is 4. GCS verbal subscore is 5. GCS motor subscore is 6. Cranial Nerves: No dysarthria. Sensory: Sensation is intact. Motor: Weakness present. No tremor. Coordination: Finger-Nose-Finger Test abnormal and Heel to [place] keaton Test abnormal.      Comments: Generalized weakness in BUE/BLE. Mildly worsened in the RLE   Psychiatric:      Comments: Poor insight/judgment. MDM:   Chart Reviewed: PMH and additional information as noted in HPI obtained from chart review    Vitals:    Vitals:    06/26/22 1950 06/26/22 2002 06/26/22 2139   BP: (!) 121/102 (!) 138/91 (!) 180/99   Pulse: (!) 104 90 90   Resp: 21 22 27   Temp:  97.9 °F (36.6 °C)    TempSrc: Oral Oral    SpO2: 97% 98% 98%       PROCEDURES:  Unless otherwise noted below, none  Procedures    LABS:  Labs Reviewed   COVID-19, RAPID - Abnormal; Notable for the following components:       Result Value    SARS-CoV-2, NAAT DETECTED (*)     All other components within normal limits    Narrative:     Elina Carr [place] [phone], phoned ED  2029 6/26/22 Ivonne Michael RN phoned ED  2029 6/26/22 SHERRIE, 06/26/2022 20:30, by FLAVIO   COMPREHENSIVE METABOLIC PANEL - Abnormal; Notable for the following components:    CO2 18 (*)     Anion Gap 17 (*)     BUN 36 (*)     CREATININE 1.46 (*)     GFR Non- 47.0 (*)     GFR  56.9 (*)     Total Bilirubin 0.9 (*)     ALT 47 (*)      (*)     All other components within normal limits   CBC WITH AUTO DIFFERENTIAL - Abnormal; Notable for the following components:    Lymphocytes Absolute 0.7 (*)     All other components within normal limits   TROPONIN - Abnormal; Notable for the following components:    Troponin 0.013 (*)     All other components within normal limits    Narrative:     Angi Distel tel. [phone],  TROP results called to and read back by [address], 06/26/2022  21:30, by Jose C Rosales   CK - Abnormal; Notable for the following components:     Total CK 2,768 (*)     All other components within normal limits    Narrative:     Angi Distel tel. [phone],  TROP results called to and read back by [address], 06/26/2022  21:30, by Mikel Bishop PEPTIDE    Narrative:     Florencia Mendoza tel. [phone],  TROP results called to and read back by [address], 06/26/2022  21:30, by Cara Bee   TSH WITH REFLEX    Narrative:     Florencia Mendoza tel. [phone],  TROP results called to and read back by [facility], 06/26/2022  21:30, by [address]    (Results Pending)   CT HEAD WO CONTRAST    (Results Pending)       ED Course as of 06/26/22 2148   Sun Jun 26, 2022 2024 EKG 12 Lead  EKG showing normal sinus rhythm, rate of 87 bpm.  Normal axis, normal intervals. No gross acute ST-T wave abnormalities. [NA]   2038 SARS-CoV-2, NAAT(!): DETECTED [NA]   2138 Creatinine(!): 1.46  LORI with elevated BUN to creatinine ratio. [NA]   2138 AST(!): 128 [NA]   2138 ALT(!): 47  Mildly elevated LFTs [NA]   2138 CBC with Auto Differential(!):    WBC 6.0   RBC 5.41   Hemoglobin Quant 16.9   Hematocrit 48.9   MCV 90.4   MCH 31.2   MCHC 34.5   RDW 12.8   Platelet Count 194   Neutrophils % 78.3   Lymphocyte % 11.4   Monocytes % 10.1   Eosinophils % 0.0   Basophils % 0.2   Neutrophils Absolute 4.7   Lymphocytes Absolute 0.7(!)   Monocytes Absolute 0.6   Eosinophils Absolute 0.0   Basophils Absolute 0.0  Unremarkable [NA]   2138 Lipase: 24 [NA]   2139 Total CK(!): 2,768  Significantly elevated CK consistent with rhabdomyolysis. [NA]   2139 Lactic Acid: 1.5 [NA]   2139 Magnesium: 2.0 [NA]   2139 Pro-BNP: 3,441  Significantly elevated BNP in the setting of LORI. [NA]   2139 Troponin(!!): 0.013  Mildly elevated in the setting of LORI. [NA]   2140 XR CHEST PORTABLE  No gross acute cardiopulmonary abnormalities.  [NA]      ED Course User Index  [NA] Get Atkinson MD       77 y.o. male with a PMH clinically significant for HTN, HLD, HFpEF, CVA, Lumbar spinal stenosis presenting to the ED via PV c/o generalized weakness and fatigue, not feeling well over the past 4 months. Upon initial evaluation, Pt mildly tachycardic and poorly kempt, but otherwise Afebrile, HDS and in NAD. PE as noted above. Labs, , EKG, and Imaging as noted above. Given findings, clinical presentation most likely consistent w/ generalized weakness and failure to thrive at home with inability to ambulate worsening over the past 3 months 3 weeks in addition to dehydration, LORI, rhabdomyolysis, COVID. Also found to have elevated troponin and BNP, though in the setting of LORI. Lower suspicion for ACS at this time. Patient was administered fluids as noted below. No current evidence of acute infectious processes beyond COVID. No antibiotics started. Will admit to medicine for further evaluation and management. Pt was administered   Medications   acetaminophen (TYLENOL) tablet 1,000 mg (1,000 mg Oral Not Given 6/26/22 2143)   0.9 % sodium chloride bolus (0 mLs IntraVENous Stopped 6/26/22 2138)       Plan: Admit to IM for further evaluation and management. Report given to Dr. Pau Ferrera. and Patient understanding and amenable to the POC. CRITICAL CARE TIME   Total CriticalCare time was 0 minutes, excluding separately reportable procedures. There was a high probability of clinically significant/life threatening deterioration in the patient's condition which required my urgent intervention. FINAL IMPRESSION      1. Generalized weakness    2. FTT (failure to thrive) in adult    3. Non-traumatic rhabdomyolysis    4. LORI (acute kidney injury) ([facility] [address]    5. Elevated troponin    6.  COVID-19          DISPOSITION/PLAN   DISPOSITION Decision To Admit 06/26/2022 09:46:22 PM      Current Discharge Medication List           MD Gin Reed MD  06/27/22 2474

## 2022-06-26 NOTE — ED TRIAGE NOTES
Pt c/o weakness, fatigue, possible stroke today, pt states he is weak for 3 past 3 months, getting worse, pt lives home alone. Pt a&ox4 on arrival, skin w/d/ slightly pale, denies pain, 0 sob, 0 distress. Speech slow, clear. Pt denies vision changes, denies injuries. Exteremly  weak lower ext. Note when transferred from w/c to bed. Pt states this Is not new. Pt states he usually takes home meds.

## 2022-06-27 LAB
ACETAMINOPHEN LEVEL: <5 UG/ML (ref 10–30)
ALBUMIN SERPL-MCNC: 3.6 G/DL (ref 3.5–4.6)
ALP BLD-CCNC: 54 U/L (ref 35–104)
ALT SERPL-CCNC: 40 U/L (ref 0–41)
ANION GAP SERPL CALCULATED.3IONS-SCNC: 15 MEQ/L (ref 9–15)
APTT: 31 SEC (ref 24.4–36.8)
AST SERPL-CCNC: 98 U/L (ref 0–40)
BACTERIA: NEGATIVE /HPF
BASOPHILS ABSOLUTE: 0 K/UL (ref 0–0.2)
BASOPHILS RELATIVE PERCENT: 0.5 %
BILIRUB SERPL-MCNC: 0.6 MG/DL (ref 0.2–0.7)
BILIRUBIN URINE: NEGATIVE
BLOOD, URINE: ABNORMAL
BUN BLDV-MCNC: 31 MG/DL (ref 8–23)
C-REACTIVE PROTEIN: 32.6 MG/L (ref 0–5)
C-REACTIVE PROTEIN: 44.6 MG/L (ref 0–5)
CALCIUM SERPL-MCNC: 8.5 MG/DL (ref 8.5–9.9)
CHLORIDE BLD-SCNC: 106 MEQ/L (ref 95–107)
CHOLESTEROL, TOTAL: 108 MG/DL (ref 0–199)
CLARITY: CLEAR
CO2: 18 MEQ/L (ref 20–31)
COLOR: YELLOW
CREAT SERPL-MCNC: 1.01 MG/DL (ref 0.7–1.2)
CREATININE URINE: 209.1 MG/DL
D DIMER: 0.43 MG/L FEU (ref 0–0.5)
D DIMER: 0.62 MG/L FEU (ref 0–0.5)
EKG ATRIAL RATE: 87 BPM
EKG P AXIS: 66 DEGREES
EKG P-R INTERVAL: 148 MS
EKG Q-T INTERVAL: 386 MS
EKG QRS DURATION: 80 MS
EKG QTC CALCULATION (BAZETT): 464 MS
EKG R AXIS: 30 DEGREES
EKG T AXIS: 50 DEGREES
EKG VENTRICULAR RATE: 87 BPM
EOSINOPHILS ABSOLUTE: 0 K/UL (ref 0–0.7)
EOSINOPHILS RELATIVE PERCENT: 0.1 %
EPITHELIAL CELLS, UA: ABNORMAL /HPF (ref 0–5)
ETHANOL PERCENT: NORMAL G/DL
ETHANOL: <10 MG/DL (ref 0–0.08)
FERRITIN: 965 NG/ML (ref 30–400)
FIBRINOGEN: 334 MG/DL (ref 235–507)
GFR AFRICAN AMERICAN: >60
GFR NON-AFRICAN AMERICAN: >60
GLOBULIN: 2.9 G/DL (ref 2.3–3.5)
GLUCOSE BLD-MCNC: 88 MG/DL (ref 70–99)
GLUCOSE URINE: NEGATIVE MG/DL
HAV IGM SER IA-ACNC: NONREACTIVE
HBA1C MFR BLD: 5.1 % (ref 4.8–5.9)
HCT VFR BLD CALC: 42.6 % (ref 42–52)
HDLC SERPL-MCNC: 35 MG/DL (ref 40–59)
HEMOGLOBIN: 15.1 G/DL (ref 14–18)
HEPATITIS B CORE IGM ANTIBODY: NONREACTIVE
HEPATITIS B SURFACE ANTIGEN: NONREACTIVE
HEPATITIS C ANTIBODY: NONREACTIVE
HYALINE CASTS: ABNORMAL /HPF (ref 0–5)
INR BLD: 1.1
KETONES, URINE: 40 MG/DL
LACTATE DEHYDROGENASE: 365 U/L (ref 135–225)
LACTIC ACID: 0.9 MMOL/L (ref 0.5–2.2)
LDL CHOLESTEROL CALCULATED: 58 MG/DL (ref 0–129)
LEUKOCYTE ESTERASE, URINE: NEGATIVE
LYMPHOCYTES ABSOLUTE: 1 K/UL (ref 1–4.8)
LYMPHOCYTES RELATIVE PERCENT: 22.6 %
MCH RBC QN AUTO: 31.8 PG (ref 27–31.3)
MCHC RBC AUTO-ENTMCNC: 35.4 % (ref 33–37)
MCV RBC AUTO: 90.1 FL (ref 80–100)
MONOCYTES ABSOLUTE: 0.4 K/UL (ref 0.2–0.8)
MONOCYTES RELATIVE PERCENT: 10.2 %
NEUTROPHILS ABSOLUTE: 2.8 K/UL (ref 1.4–6.5)
NEUTROPHILS RELATIVE PERCENT: 66.6 %
NITRITE, URINE: NEGATIVE
PDW BLD-RTO: 13.1 % (ref 11.5–14.5)
PH UA: 5 (ref 5–9)
PLATELET # BLD: 149 K/UL (ref 130–400)
POTASSIUM REFLEX MAGNESIUM: 3.6 MEQ/L (ref 3.4–4.9)
PROCALCITONIN: 0.25 NG/ML (ref 0–0.15)
PROTEIN UA: 100 MG/DL
PROTHROMBIN TIME: 14.2 SEC (ref 12.3–14.9)
RBC # BLD: 4.73 M/UL (ref 4.7–6.1)
RBC UA: ABNORMAL /HPF (ref 0–5)
SALICYLATE, SERUM: <0.3 MG/DL (ref 15–30)
SODIUM BLD-SCNC: 139 MEQ/L (ref 135–144)
SPECIFIC GRAVITY UA: 1.02 (ref 1–1.03)
TOTAL CK: 2396 U/L (ref 0–190)
TOTAL CK: 704 U/L (ref 0–190)
TOTAL CK: 909 U/L (ref 0–190)
TOTAL PROTEIN: 6.5 G/DL (ref 6.3–8)
TRIGL SERPL-MCNC: 75 MG/DL (ref 0–150)
TROPONIN: 0.01 NG/ML (ref 0–0.01)
TROPONIN: <0.01 NG/ML (ref 0–0.01)
TSH SERPL DL<=0.05 MIU/L-ACNC: 3.47 UIU/ML (ref 0.44–3.86)
UREA NITROGEN, UR: 1040 MG/DL
URINE REFLEX TO CULTURE: ABNORMAL
UROBILINOGEN, URINE: 1 E.U./DL
VITAMIN D 25-HYDROXY: 14.4 NG/ML
WBC # BLD: 4.2 K/UL (ref 4.8–10.8)
WBC UA: ABNORMAL /HPF (ref 0–5)

## 2022-06-27 PROCEDURE — 92523 SPEECH SOUND LANG COMPREHEN: CPT

## 2022-06-27 PROCEDURE — 51702 INSERT TEMP BLADDER CATH: CPT

## 2022-06-27 PROCEDURE — 82570 ASSAY OF URINE CREATININE: CPT

## 2022-06-27 PROCEDURE — 80179 DRUG ASSAY SALICYLATE: CPT

## 2022-06-27 PROCEDURE — 86140 C-REACTIVE PROTEIN: CPT

## 2022-06-27 PROCEDURE — 2060000000 HC ICU INTERMEDIATE R&B

## 2022-06-27 PROCEDURE — 85379 FIBRIN DEGRADATION QUANT: CPT

## 2022-06-27 PROCEDURE — 6360000002 HC RX W HCPCS: Performed by: INTERNAL MEDICINE

## 2022-06-27 PROCEDURE — 84484 ASSAY OF TROPONIN QUANT: CPT

## 2022-06-27 PROCEDURE — 82306 VITAMIN D 25 HYDROXY: CPT

## 2022-06-27 PROCEDURE — 82607 VITAMIN B-12: CPT

## 2022-06-27 PROCEDURE — 82550 ASSAY OF CK (CPK): CPT

## 2022-06-27 PROCEDURE — 85384 FIBRINOGEN ACTIVITY: CPT

## 2022-06-27 PROCEDURE — 81001 URINALYSIS AUTO W/SCOPE: CPT

## 2022-06-27 PROCEDURE — 36415 COLL VENOUS BLD VENIPUNCTURE: CPT

## 2022-06-27 PROCEDURE — 84540 ASSAY OF URINE/UREA-N: CPT

## 2022-06-27 PROCEDURE — 80061 LIPID PANEL: CPT

## 2022-06-27 PROCEDURE — 83605 ASSAY OF LACTIC ACID: CPT

## 2022-06-27 PROCEDURE — 80053 COMPREHEN METABOLIC PANEL: CPT

## 2022-06-27 PROCEDURE — 84443 ASSAY THYROID STIM HORMONE: CPT

## 2022-06-27 PROCEDURE — 2580000003 HC RX 258: Performed by: INTERNAL MEDICINE

## 2022-06-27 PROCEDURE — 85610 PROTHROMBIN TIME: CPT

## 2022-06-27 PROCEDURE — 92610 EVALUATE SWALLOWING FUNCTION: CPT

## 2022-06-27 PROCEDURE — 82746 ASSAY OF FOLIC ACID SERUM: CPT

## 2022-06-27 PROCEDURE — 93010 ELECTROCARDIOGRAM REPORT: CPT | Performed by: INTERNAL MEDICINE

## 2022-06-27 PROCEDURE — 85730 THROMBOPLASTIN TIME PARTIAL: CPT

## 2022-06-27 PROCEDURE — 6370000000 HC RX 637 (ALT 250 FOR IP): Performed by: INTERNAL MEDICINE

## 2022-06-27 PROCEDURE — 80143 DRUG ASSAY ACETAMINOPHEN: CPT

## 2022-06-27 PROCEDURE — 85025 COMPLETE CBC W/AUTO DIFF WBC: CPT

## 2022-06-27 PROCEDURE — 83036 HEMOGLOBIN GLYCOSYLATED A1C: CPT

## 2022-06-27 PROCEDURE — 80074 ACUTE HEPATITIS PANEL: CPT

## 2022-06-27 RX ORDER — ASPIRIN 81 MG/1
81 TABLET, CHEWABLE ORAL DAILY
Status: DISCONTINUED | OUTPATIENT
Start: 2022-06-27 | End: 2022-06-29 | Stop reason: HOSPADM

## 2022-06-27 RX ORDER — LISINOPRIL 20 MG/1
20 TABLET ORAL 2 TIMES DAILY
Status: DISCONTINUED | OUTPATIENT
Start: 2022-06-27 | End: 2022-06-29 | Stop reason: HOSPADM

## 2022-06-27 RX ORDER — AMLODIPINE BESYLATE 10 MG/1
10 TABLET ORAL DAILY
Status: DISCONTINUED | OUTPATIENT
Start: 2022-06-27 | End: 2022-06-29 | Stop reason: HOSPADM

## 2022-06-27 RX ORDER — HYDRALAZINE HYDROCHLORIDE 20 MG/ML
10 INJECTION INTRAMUSCULAR; INTRAVENOUS EVERY 4 HOURS PRN
Status: DISCONTINUED | OUTPATIENT
Start: 2022-06-27 | End: 2022-06-29 | Stop reason: HOSPADM

## 2022-06-27 RX ORDER — LISINOPRIL 20 MG/1
20 TABLET ORAL 2 TIMES DAILY
Status: CANCELLED | OUTPATIENT
Start: 2022-06-27

## 2022-06-27 RX ADMIN — METOPROLOL TARTRATE 25 MG: 25 TABLET, FILM COATED ORAL at 10:21

## 2022-06-27 RX ADMIN — ASPIRIN 81 MG: 81 TABLET, CHEWABLE ORAL at 10:21

## 2022-06-27 RX ADMIN — SODIUM CHLORIDE, PRESERVATIVE FREE 10 ML: 5 INJECTION INTRAVENOUS at 10:21

## 2022-06-27 RX ADMIN — AMLODIPINE BESYLATE 10 MG: 10 TABLET ORAL at 10:21

## 2022-06-27 RX ADMIN — METOPROLOL TARTRATE 25 MG: 25 TABLET, FILM COATED ORAL at 01:53

## 2022-06-27 RX ADMIN — SODIUM CHLORIDE: 9 INJECTION, SOLUTION INTRAVENOUS at 00:07

## 2022-06-27 RX ADMIN — ROSUVASTATIN CALCIUM 40 MG: 20 TABLET, FILM COATED ORAL at 20:28

## 2022-06-27 RX ADMIN — ROSUVASTATIN CALCIUM 40 MG: 20 TABLET, FILM COATED ORAL at 00:04

## 2022-06-27 RX ADMIN — SODIUM CHLORIDE, PRESERVATIVE FREE 10 ML: 5 INJECTION INTRAVENOUS at 20:28

## 2022-06-27 RX ADMIN — METOPROLOL TARTRATE 25 MG: 25 TABLET, FILM COATED ORAL at 20:28

## 2022-06-27 RX ADMIN — LISINOPRIL 20 MG: 20 TABLET ORAL at 20:28

## 2022-06-27 RX ADMIN — SODIUM CHLORIDE, PRESERVATIVE FREE 10 ML: 5 INJECTION INTRAVENOUS at 00:04

## 2022-06-27 RX ADMIN — ENOXAPARIN SODIUM 40 MG: 100 INJECTION SUBCUTANEOUS at 10:21

## 2022-06-27 ASSESSMENT — PAIN SCALES - GENERAL: PAINLEVEL_OUTOF10: 0

## 2022-06-27 NOTE — CONSULTS
Spiritual Care Services     Summary of Visit:  AD consultation. Patient was too sleepy to complete documents at this time. He did express that he wanted his daughter Kelvin Boxer as his primary medical decision maker. Spiritual care will follow up 6/28/22 to see if we are able to complete the documents. Family requested a phone call from the . They believed that we were the ones to talk to regarding placement, insurance, etc. Family was deferred to CM for those questions. Questions regarding Medical power of  and living bhatia were addressed by this . Spiritual Assessment/Intervention/Outcomes:    Encounter Summary  Encounter Overview/Reason : Initial Encounter,Advance Care Planning  Service Provided For[de-identified] Patient,Family  Referral/Consult From[de-identified] Nurse,Family  Complexity of Encounter: Moderate  Begin Time: 1450  End Time : 1505  Total Time Calculated: 15 min  Encounter   Type: Initial Screen/Assessment                          Values / Beliefs  Do You Have Any Ethnic, Cultural, Sacramental, or Spiritual Temple Needs You Would Like Us To Be Aware of While You Are in the Hospital : No    Care Plan:    Follow up regarding ACP. Spiritual Care Services   Electronically signed by Valentino Oakland on 6/27/22 at 3:14 PM EDT     To reach a  for emotional and spiritual support, place an [facility] consult request.   If a  is needed immediately, dial 0 and ask to page the on-call .

## 2022-06-27 NOTE — H&P
Hospital Medicine  History and Physical    Patient:  Tristian Awad  MRN: [id number]    CHIEF COMPLAINT:    Chief Complaint   Patient presents with    Extremity Weakness     pt c/o wekaness, fatigue, possible stroke today        History Obtained From:  patient, electronic medical record  Primary Care Physician: Cheli Chaparro MD    HISTORY OF PRESENT ILLNESS:   The patient is a 77 y.o. male who presents with progressively worsening worsening weakness fatigue general malaise myalgias of several weeks duration progressively worsening. Patient is a 71-year-old male he has a previous history of CVA symptomatic with right-sided numbness and weakness. He had associated dysphagia, expressive aphasia at that point time associated ataxia secondary to the right lower extremity weakness this was diagnosed in September 2020 when he was discharged to a skilled facility. He did well there while undergoing routine physical therapy however on discharge back home has had recently decompensating functional status. Ventilation was brought to the ED for evaluation where he tested positive for COVID-19. He is afebrile but tachycardic, tachypneic, hypertensive. Patient states he is not able to follow-up with any doctors to refill his prescription since discharge from skilled facility. He also has an elevated CK level at 2768 on arrival with a troponin 0.013 elevated ALT AST 47 128 respectively. CT head Showed an old right cerebellar infarct with small vessel ischemic disease D-dimer 0.62 and negative when age-adjusted. Patient denies any alcohol use drug use or smoking. Patient lives alone at home no recent travel no pets or animals he does not know if he was COVID vaccinated    Apparently during last admission the patient was deemed medically incompetent to make his own decisions as he was trying to be discharged home last September notes unable to comprehend that he was unable to care for himself.   Apparently at that time he was only alert and oriented to self. Past Medical History:      Diagnosis Date    Cellulitis of left hand 3/26/2019    Cerebral artery occlusion with cerebral infarction ([facility] [address]     Hypertension     Shoulder dislocation 3/8/2016    Spinal stenosis of lumbar region with neurogenic claudication 9/13/2021       Past Surgical History:      Procedure Laterality Date    BACK SURGERY         Medications Prior to Admission:    Prior to Admission medications    Medication Sig Start Date End Date Taking? Authorizing Provider   aspirin 81 MG chewable tablet Take 1 tablet by mouth daily  Patient not taking: Reported on 6/26/2022 9/27/21   Becca Moore MD   atorvastatin (LIPITOR) 80 MG tablet Take 1 tablet by mouth nightly  Patient not taking: Reported on 6/26/2022 9/27/21   Becca Moore MD   lisinopril (PRINIVIL;ZESTRIL) 20 MG tablet Take 1 tablet by mouth 2 times daily  Patient not taking: Reported on 6/26/2022 9/27/21   Becca Moore MD   metoprolol tartrate (LOPRESSOR) 25 MG tablet Take 1 tablet by mouth 2 times daily  Patient not taking: Reported on 6/26/2022 9/27/21   Becca Moore MD   QUEtiapine (SEROQUEL) 25 MG tablet Take 1 tablet by mouth 2 times daily as needed for Agitation  Patient not taking: Reported on 6/26/2022 9/27/21   HOWARD Infante NP   Vitamin D (CHOLECALCIFEROL) 50 MCG (2000 UT) TABS tablet Take 1 tablet by mouth Daily with supper  Patient not taking: Reported on 6/26/2022 9/27/21   HOWARD Infante NP   amLODIPine (NORVASC) 10 MG tablet Take 1 tablet by mouth daily  Patient not taking: Reported on 6/26/2022 3/26/19   Maya Dale MD       Allergies:  Patient has no known allergies. Social History:   TOBACCO:   reports that he has never smoked. He has never used smokeless tobacco.  ETOH:   reports no history of alcohol use. OCCUPATION: Retired city     Family History:   History reviewed. No pertinent family history.     REVIEW OF SYSTEMS:  Ten systems reviewed and Clara Paulino  -----------------------------------------------------------------   CT HEAD WO CONTRAST    Result Date: 6/26/2022  Patient: Savage Pinedo  Time Out: 22:33 Exam(s): CT HEAD Without Contrast  EXAM:   CT Head Without Intravenous Contrast  CLINICAL HISTORY:    Reason for exam: generalized weakness. Chief complaint generalized weakness  TECHNIQUE:   Axial computed tomography images of the head/brain without intravenous contrast.  CTDI is 50.86 mGy and DLP is 1118.74 mGy-cm. All CT scan at this facility use dose modulation, iterative reconstruction, and/or weight based dosing when appropriate to reduce radiation dose to as low as reasonably achievable. COMPARISON:   CT 9/10/2021, MRI brain 9/11/2021. FINDINGS:   Brain:  No acute hemorrhage or abnormal extra-axial fluid collection. No acute stroke. Old right cerebellar infarct. Confluent supratentorial periventricular and subcortical white matter changes. Age-appropriate generalized atrophy. Ventricles:  No hydrocephalus. No midline shift. Bones/joints:  Unremarkable. No acute fracture. Soft tissues:  Unremarkable. Sinuses:  No acute sinusitis. Partial opacification of right mastoid aerosols. Electronically signed by Tereso Gonzalez M.D. on 06-26-22 at 2233      No acute abnormality. OId right cerebellar infarct. Nonspecific supratentorial tentorial white  change most commonly seen with small vessel disease. EKG: NSR     Assessment and Plan   1. COVID-19 infection: No acute hypoxia to suggest the need for steroid administration or antivirals of immunomodulators etc.  Continue to trend inflammatory coagulopathy markers. 2. General weakness and failure to thrive: Consult to PT and OT as well as case management. May need further PT and OT to continue to care for himself likely debilitated secondary to [place] which is exacerbated by his history of CVA.   No clear acute deficits to suggest an acute stroke, with no new deficits    3. History of CVA: Continue aspirin start chewable aspirin given difficulty with swallowing pills. Continue attempting to Crestor. Check speech therapy evaluation for swallowing ability and recommendations for ways to administer medications. 4. Spinal stenosis with myelopathy: PT and OT evaluation May need repeat neurologic evaluation, was not a candidate for surgical intervention on last evaluation because he was in the acute post CVA window. Consult to neurosurgery but may not be an optimal candidate given noncompliance to medical regimen. 5. Hyperlipidemia check lipids continue statin    6. Hypertension resume metoprolol . As needed IV hydralazine if patient is not able to take tablets or pills    7. Acute kidney injury: Urinalysis urine urea urine creatinine hold nephrotoxic agents hold lisinopril. Repeat BMP tomorrow with gentle hydration given elevated CK levels. 8. Elevated CK/rhabdomyolysis following weakness and debility patient had been crawling on the floors at home as he was instructed to stand and walk continue IV hydration again as above PT and OT evaluation    9. Elevated troponin: Likely secondary to the above kidney injury. Trend troponins repeat EKG patient has no chest pain or cardiac symptoms. 10. Elevated ALT AST: Repeat CMP tomorrow if persistently elevated may need right upper quadrant ultrasound patient denies any alcohol abuse. Avoid hepatotoxic agents. Acute hepatitis panel, INR, acetaminophen salicylate level     11.  DVT prophylaxis with Lovenox    Patient Active Problem List   Diagnosis Code    Stroke determined by clinical assessment ([facility] [address] I63.9    Gait abnormality R26.9    Renal disease N28.9    Myelopathy concurrent with and due to spinal stenosis of cervical region ([address] M48.02, G99.2    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 ([address] I63.441    Ataxic gait R26.0    Numbness R20.0    Abnormality of gait and mobility R26.9  NSVT (nonsustained ventricular tachycardia) ([facility]) I47.2    Bradycardia R00.1    Hyperlipidemia E78.5    Secondary hypertension V88.6    Diastolic heart failure ([facility]) I50.30    Failure to thrive in adult R62.7       Iban Smith DO  Admitting Hospitalist    Emergency Contact:

## 2022-06-27 NOTE — PROGRESS NOTES
increasing fatigue and overall weakness. A modified diet of soft and bite size is recommended at this time with follow-up from speech therapy. Prognosis for improvement is fair due to previous level of function and motivation. Recommended Diet  Recommendations: Dysphagia treatment  Diet Solids Recommendation: Soft & Bite Sized  Liquid Consistency Recommendation: Thin  Recommended Form of Meds: Crushed in puree as able  Compensatory Swallowing Strategies : Alternate solids and liquids;Upright as possible for all oral intake;Small bites/sips; Lingual sweep      Reason for Referral  Colby More was referred for a bedside swallow evaluation to assess the efficiency of his swallow function, identify signs and symptoms of aspiration, identify risk factors, and make recommendations regarding safe dietary consistencies, effective compensatory strategies, and safe eating environment. General  Chart Reviewed: Yes  Subjective  Subjective: Patient participted in bedside swallow evaluation. SLP assisted in repositioning upright. Patient reports decreased appetite throughout the last several days. Patient with hx of right cerebellar CVA with some residual deficits. Patient evaluated at this facility on 9/11/21 where he was placed on a reg/NTL diet. Patient then received MBSS on 9/13/21 where he was downgraded to soft/NTL diet. Patient received repeat MBS on 9/22/21 where he was upgraded to soft/thin liquid diet and discharged on that diet. Patient has partial dentition and reports that it is often difficult for him to chew tougher foods and meats. Patient reports to ED with increased weakness over the last few days. Behavior/Cognition: Alert; Cooperative;Pleasant mood  Respiratory Status: Room air  O2 Device: None (Room air)  Communication Observation: Functional  Follows Directions: Simple  Dentition: Some missing teeth  Patient Positioning: Upright in bed  Baseline Vocal Quality: Normal  Volitional Cough: Strong  Prior Dysphagia History: Patient evaluated at this facility on 9/11/21 where he was placed on a reg/NTL diet. Patient then received MBSS on 9/13/21 where he was downgraded to soft/NTL diet. Patient received repeat MBS on 9/22/21 where he was upgraded to soft/thin liquid diet and discharged on that diet. Patient has partial dentition and reports that it is often difficult for him to chew tougher foods and meats. Consistencies Administered: Regular;Pureed; Thin    Vision and Hearing  Vision  Vision: Impaired  Vision Exceptions: Wears glasses for reading  Hearing  Hearing: Within functional limits    Current Diet level  Current Diet : Minced and Moist  Current Liquid Diet : Thin    Oral Motor  Labial: No impairment  Dentition: Intact  Oral Hygiene: Clean  Lingual: Decreased strength  Velum: No Impairment  Mandible: No impairment    Oral/Pharyngeal Phase  Oral Phase - Comment: Patient presents with mild oral dysphagia characterized by lingual weakness and moderate lingual residue on base of tongue with all trials of regular solid. Patient able to clear residue with SLP cue to utilize liquid wash and independent use of finger sweep and reswallow strategy. Pharyngeal Phase: Patient presents with functional pharyngeal phase of the swallow. Patient demonstrated a timely swallow onset and no s/s of aspiration in all observed trials.     PO Trials  Neuromuscular Estim Used: No  Assessment Method(s): Observation  Patient Position: Patient seated upright in bed  Vocal Quality: No Impairment  Consistency Presented: Pureed  How Presented: Self-fed/presented  How much presented: 7 (teaspoons)  Bolus Acceptance: No impairment  Bolus Formation/Control: No impairment  Propulsion: No impairment  Oral Residue: None  Initiation of Swallow: No impairment  Laryngeal Elevation: Functional  Aspiration Signs/Symptoms: None  Pharyngeal Phase Characteristics: No impairment, issues, or problems  Additional PO Trials: 2      PO Trials 2  Consistency goals met    Prognosis  Speech Therapy Prognosis  Prognosis: Good  Prognosis Considerations: Motivation;Previous Level of Function    Education  Individuals consulted  Consulted and agree with results and recommendations: RN;Patient  RN Name: [facility]    Treatment/Goals  Short-term Goals  Timeframe for Short-term Goals: 1-2 weeks  Goal 1: Pt will complete oral motor ROM and strengthening exercises with 80% accuracy, given cues as needed, in order to strengthen lingual/labial/buccal musculature to promote safety and efficiency of oral phase of swallow and decrease risk for pocketing. Goal 2: Pt will complete tongue press, tongue pullback, and/or effortful swallow exercise with 80% accuracy, given cues as needed, to decrease residue on the base of tongue per recommended consistencies. Goal 3: Pt will tolerate current diet with adequate mastication, oral clearance, and no s/s of aspiration in 10/10 trials. Long-term Goals  Timeframe for Long-term Goals: 1-2 weeks for LOS or until goals met  Goal 1: Patient will tolerate least restrictive diet while demonstrating adequate mastication, oral clearance, and no s/s of aspiration.     Safety Devices  Safety Devices  Safety Devices in place: Yes  Type of devices: Bed alarm in place;Call light within reach  Restraints Initially in Place: No    Pain Assessment  Pain Assessment: Patient does not c/o pain    Pain Re-assessment  Pain Reassessment: Patient does not c/o pain      Therapy Time  SLP Individual Minutes  Time In: 1038  Time Out: [address]  Minutes: 15          Signature: Electronically signed by SIVA Evans on 6/27/2022 at 11:42 AM

## 2022-06-27 NOTE — ED NOTES
PT SISTER AT BEDSIDE. STATES THAT PT HOSPITALIZED FOR A STROKE 10 MONTHS AGO, WENT TO NH FOR REHAB, CAME HOME AND WAS RECEIVING HOME PT/OT. STATES THAT PT WAS NOT COMPLIANT OR PARTICIPATING SO THEY STOPPED COMING. PT HAS SINCE LOST HIS STRENGTH AND HAD INCREASED WEAKNESS. PT STATES HE HAS FREQUENT FALLS AND CRAWLS AROUND ON THE GROUND. HAS REDNESS, SWELLING, AND ABRASIONS TO L ELBOW, L ANKLE, R TOE, AND A SUPERFICIAL SCRATCH WITH SURROUNDING REDNESS TO RIGHT FOREARM.     LLE WEAKER THAN RLE ON NEURO EXAM. PT IS A/O X4     Titi Werner RN  06/26/22 [id number]

## 2022-06-27 NOTE — FLOWSHEET NOTE
2330 Pt arrived to the floor. Home meds have been reconciled. Dr. Pau Ferrera at bedside. Darshan Cameron served Dr. Pau Ferrera regarding pt being NPO yet passing the swallow eval. New orders received to place pt on a dysphagia minced and moist diet until speech therapy can see him. Pt given jello and pudding, tolerated it fine without any signs of aspiration. Monika Rain served Dr. Pau Ferrera regarding pt's elevated troponin of 0.014 and d-dimer of 0.62   Also made physician aware of pt's inability to urinate. Bladder scanner states that there was greater than 400 ml of urine. New orders received to placed a quintero catheter. 16 gauge quintero catheter placed. 500 ml of urine drained. Pt resting comfortably will continue to monitor.

## 2022-06-27 NOTE — CARE COORDINATION
[facility] EMERGENCY [facility] CENTER AT [place] Case Management Initial Discharge Assessment    Met with Patient to discuss discharge plan. PCP: Octaviano Catalan MD                                Date of Last Visit: UNSURE OF DATE OF LAST VISIT     [facility] Patient: No        [facility] Notified: no    If no PCP, list provided? N/A    Discharge Planning    Living Arrangements: independently at home    Who do you live with? ALONE HAS PETS     Who helps you with your care:  self    If lives at home:     Do you have any barriers navigating in your home? no    Patient can perform ADL? Yes    Current Services (outpatient and in home) :  None    Dialysis: No    Is transportation available to get to your appointments? Yes    DME Equipment:  yes - 2 WALKERS, ROLATOR, CANE    Respiratory equipment: None    Respiratory provider:  no     Pharmacy:  yes -  [facility]     Consult with Medication Assistance Program?  No      Patient agreeable to [facility]? Declined    Patient agreeable to SNF/Rehab? Yes, [address] [facility]     Other discharge needs identified? N/A    Does Patient Have a High-Risk for Readmission Diagnosis (CHF, PN, MI, COPD)? No  Initial Discharge Plan? MET W/PT TO DISCUSS DISCHARGE NEEDS AND PLAN. PT LIVES ALONE AND HAS DME. PT HAS LIMITED HELP FROM SISTER. PT HAS AGREED TO GO TO SNF HOWEVER, DOES NOT WANT [address]. PT AGREES TO GO TO [facility] SNF. CM/LSW TO FOLLOW HOSPITAL COURSE.  (Note: please see concurrent daily documentation for any updates after initial note).       Readmission Risk              Risk of Unplanned Readmission:  16         Electronically signed by Stan Moy RN on 6/27/2022 at 4:47 PM

## 2022-06-27 NOTE — ED NOTES
REPORT CALLED TO 1W, PT ON TELE, PT STABLE AND FAMILY AWARE OF ADMISSION     Vinay Chapin, RN  06/26/22 [id number]

## 2022-06-27 NOTE — PROGRESS NOTES
Physical Therapy Missed Treatment   Facility/Department: [facility] MED SURG [id number]    NAME: Hubert Tabor    : 1945 (60 y.o.)  MRN: [id number]    Account: [de-identified]  Gender: male      Referral received. Chart reviewed. Pt recently admitted and still being assessed. Discussed with nursing and determined it was appropriate to hold PT eval this date due to Covid status and workup in progress      Will follow and attempt PT evaluation again at earliest availability.        Melissa Bautista, PT, 22 at 9:14 AM

## 2022-06-27 NOTE — PROGRESS NOTES
Progress Note  Date:2022       Room:W170/W170-01  Patient Lauren Lindo     YOB: 1945     Age:76 y.o. Subjective      No acute events overnight. Continues to exhibit mild confusion. Continues to saturate well on room air with mild nonproductive rare cough. Scattered small skin changes from crawling on floors of his home for past several days, healing. LORI significantly improving. CK trending slight improvement. No new/acute complaints at present. Informed by nursing that Neurosurgery (Dr. Tolu Carrington) out of the office and without inpatient neurosurgical coverage until ~2022. Objective         Vitals Last 24 Hours:  TEMPERATURE:  Temp  Av.8 °F (36.6 °C)  Min: 97.7 °F (36.5 °C)  Max: 97.9 °F (36.6 °C)  RESPIRATIONS RANGE: Resp  Av.3  Min: 20  Max: 27  PULSE OXIMETRY RANGE: SpO2  Av.3 %  Min: 97 %  Max: 99 %  PULSE RANGE: Pulse  Av.9  Min: 75  Max: 104  BLOOD PRESSURE RANGE: Systolic (47CEH), ETC:656 , Min:121 , GY   ; Diastolic (29DEZ), QZB:01, Min:79, Max:120    I/O (24Hr): Intake/Output Summary (Last 24 hours) at 2022 1750  Last data filed at 2022 1330  Gross per 24 hour   Intake 1480 ml   Output --   Net 1480 ml     Objective   Constitutional: Elderly male reclining in bed no acute distress  Head: NCAT  Eyes: PERRLA, EOMI  Neck: Trachea midline, phonation normal  Cardiovascular: RRR.  +2/9 systolic murmur appreciated best at RUSB. No significant pretibial edema  Pulmonary: Normal rate and effort of respiration on room air. No coughing during exam.  Grossly CTA B. Abdomen: Soft, nontense, nondistended. Active bowel sounds. Neurologic: Slightly confused. Follows all commands. Grossly non-focal, but doesn't put much effort into neuro exam.    Psychiatric: Pleasant and cooperative. Slightly confused.       Labs/Imaging/Diagnostics    Labs:  CBC:  Recent Labs     22  0523   WBC 6.0 4.2*   RBC 5.41 4.73   HGB 16.9 15.1   HCT 48.9 42.6   MCV 90.4 90.1   RDW 12.8 13.1    149     CHEMISTRIES:  Recent Labs     06/26/22 2000 06/27/22 0523    139   K 4.2 3.6    106   CO2 18* 18*   BUN 36* 31*   CREATININE 1.46* 1.01   GLUCOSE 92 88   MG 2.0  --      PT/INR:  Recent Labs     06/27/22 0522   PROTIME 14.2   INR 1.1     APTT:  Recent Labs     06/27/22 0522   APTT 31.0     LIVER PROFILE:  Recent Labs     06/26/22 2000 06/27/22 0523   * 98*   ALT 47* 40   BILITOT 0.9* 0.6   ALKPHOS 65 54       Imaging Last 24 Hours:  CT HEAD WO CONTRAST    Result Date: 6/26/2022  Patient: Royce Lazcano  Time Out: 22:33 Exam(s): CT HEAD Without Contrast  EXAM:   CT Head Without Intravenous Contrast  CLINICAL HISTORY:    Reason for exam: generalized weakness. Chief complaint generalized weakness  TECHNIQUE:   Axial computed tomography images of the head/brain without intravenous contrast.  CTDI is 50.86 mGy and DLP is 1118.74 mGy-cm. All CT scan at this facility use dose modulation, iterative reconstruction, and/or weight based dosing when appropriate to reduce radiation dose to as low as reasonably achievable. COMPARISON:   CT 9/10/2021, MRI brain 9/11/2021. FINDINGS:   Brain:  No acute hemorrhage or abnormal extra-axial fluid collection. No acute stroke. Old right cerebellar infarct. Confluent supratentorial periventricular and subcortical white matter changes. Age-appropriate generalized atrophy. Ventricles:  No hydrocephalus. No midline shift. Bones/joints:  Unremarkable. No acute fracture. Soft tissues:  Unremarkable. Sinuses:  No acute sinusitis. Partial opacification of right mastoid aerosols. Electronically signed by Jeanine Lala M.D. on 06-26-22 at 2233      No acute abnormality. OId right cerebellar infarct. Nonspecific supratentorial tentorial white  change most commonly seen with small vessel disease. XR CHEST PORTABLE    Result Date: 6/27/2022  EXAMINATION:  CHEST. CLINICAL HISTORY:  ALTERED MENTAL STATUS. COMPARISONS:  9/10/2021. TECHNIQUE:  AP portable view. FINDINGS:  Heart size and contour are within normal limits. Pulmonary vascularity is normal. No infiltrate or effusion is seen. No definite evidence of a mass or adenopathy. There appears to be some pleural calcification. No significant bony abnormality. NO ACUTE CHANGE. SUGGESTION OF SOME PLEURAL CALCIFICATION. Assessment//Plan           Hospital Problems           Last Modified POA    * (Principal) Failure to thrive in adult 6/26/2022 Yes        Assessment & Plan    1. COVID-19 infection: No acute hypoxia to suggest the need for steroid administration or antivirals of immunomodulators etc.  Continue to trend inflammatory coagulopathy markers.     2. General weakness and failure to thrive: Consult to PT and OT as well as case management. May need further PT and OT to continue to care for himself likely debilitated secondary to [place] which is exacerbated by his history of CVA. No clear acute deficits to suggest an acute stroke, with no new deficits  6/27: Neurology and neurosurgery consults placed on admission. Neurosurgery unavailable until ~7/6. Previously evaluated by neurosurgery, but found to be poor surgical candidate at that time and post-CVA timeframe. Appreciate neurology recommendations when available.     3. History of CVA: Continue aspirin start chewable aspirin given difficulty with swallowing pills. Continue attempting to Crestor. Check speech therapy evaluation for swallowing ability and recommendations for ways to administer medications.     4. Spinal stenosis with myelopathy: PT and OT evaluation May need repeat neurologic evaluation, was not a candidate for surgical intervention on last evaluation because he was in the acute post CVA window. Consult to neurosurgery but may not be an optimal candidate given noncompliance to medical regimen.   6/27: Neurosurgery coverage reportedly unavailable until ~7/6. Will await PT/OT recommendations, may need outpatient follow-up with inpatient unavailable. Consider neurology consult or weakness evaluation in the interim.     5. Hyperlipidemia check lipids continue statin     6. Hypertension resume metoprolol . As needed IV hydralazine if patient is not able to take tablets or pills  6/27: LORI significantly improved. BP not yet at goal.  Resume home lisinopril 20 mg p.o. twice daily.     7. Acute kidney injury: Urinalysis urine urea urine creatinine hold nephrotoxic agents hold lisinopril. Repeat BMP tomorrow with gentle hydration given elevated CK levels. 6/27: Significantly improved.       8. Elevated CK/rhabdomyolysis following weakness and debility patient had been crawling on the floors at home as he was instructed to stand and walk continue IV hydration again as above PT and OT evaluation  6/27: Slowly improving     9. Elevated troponin: Likely secondary to the above kidney injury. Trend troponins repeat EKG patient has no chest pain or cardiac symptoms. 6/27: 2nd and 3rd troponins undetectable, likely impaired clearance in setting of LORI on presentation.     10. Elevated ALT AST: Repeat CMP tomorrow if persistently elevated may need right upper quadrant ultrasound patient denies any alcohol abuse. Avoid hepatotoxic agents. Acute hepatitis panel, INR, acetaminophen salicylate level   8/96: Slightly improved today. Continue to monitor with daily labs.     11.  DVT prophylaxis with Lovenox        Electronically signed by Paola Durán DO on 6/27/22 at 5:50 PM EDT

## 2022-06-27 NOTE — PROGRESS NOTES
[facility]   Facility/Department: [facility]  Speech Language Pathology  Initial Speech/Language/Cognitive Assessment    NAME:Shaggy Corey  : 1945 (77 y.o.)   [x]   confirmed    MRN: [id number]  ROOM: [place] [address]  ADMISSION DATE: 2022  PATIENT DIAGNOSIS(ES): Generalized weakness [R53.1]  Elevated troponin [R77.8]  Failure to thrive in adult [R62.7]  FTT (failure to thrive) in adult [R62.7]  LORI (acute kidney injury) ([address] [N17.9]  Non-traumatic rhabdomyolysis [M62.82]  COVID-19 [U07.1]  Chief Complaint   Patient presents with    Extremity Weakness     pt c/o wekaness, fatigue, possible stroke today      Patient Active Problem List    Diagnosis Date Noted    Failure to thrive in adult 2022    NSVT (nonsustained ventricular tachycardia) ([address] 10/08/2021    Bradycardia 10/08/2021    Hyperlipidemia 10/08/2021    Secondary hypertension     Diastolic heart failure ([address] 10/08/2021    Abnormality of gait and mobility 2021    Gait abnormality 2021    Renal disease 2021    Myelopathy concurrent with and due to spinal stenosis of cervical region [facility]) 2021    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 ([address] 2021    Ataxic gait 2021    Numbness     Stroke determined by clinical assessment ([address] 09/10/2021     Past Medical History:   Diagnosis Date    Cellulitis of left hand 3/26/2019    Cerebral artery occlusion with cerebral infarction ([address]     Hypertension     Shoulder dislocation 3/8/2016    Spinal stenosis of lumbar region with neurogenic claudication 2021     Past Surgical History:   Procedure Laterality Date    BACK SURGERY         DATE ONSET: 2022    Date of Evaluation: 2022   Evaluating Therapist: Shanita Fairbanks SLP        Diagnosis: Patient presents with mild expressive aphasia characterized by impaired divergent naming abilities and mild word-finding difficulties in conversation. aphasia    Prognosis  Speech Therapy Prognosis  Prognosis: Good  Prognosis Considerations: Motivation;Previous Level of Function    Education  Individuals consulted  Consulted and agree with results and recommendations: RN;Patient  RN Name: Elias Torres    Treatment/Goals  Short-term Goals  Timeframe for Short-term Goals: 1-2 weeks  Goal 1: Pt will name 10/10 items in a concrete category with min verbal cues to promote semantic organization, neuroplasticity, and the efficiency of word finding for expression of the patient's wants, needs, feelings, and ideas. Goal 2: Pt will complete convergent and divergent naming tasks with 80% accuracy with mild cues to promote semantic organization and the overall effectiveness and efficiency for expression of their wants, needs, feelings, and ideas. Goal 3: Pt will be educated on word-finding strategies, and use them in structured and unstructured tasks in 80% of given opportunities with mild verbal cues to help the pt express their personal, safety, and medical needs in the presence of language deficits. Long-term Goals  Timeframe for Long-term Goals: 1-2 weeks for LOS or until goals met  Goal 1: Pt will improve his Expressive Language abilities to a min-assist level for expression of complex wants, needs, feelings/ideas, and medical/safety information.     Safety Devices  Safety Devices  Safety Devices in place: Yes  Type of devices: Bed alarm in place;Call light within reach  Restraints Initially in Place: No    Pain Assessment  Pain Assessment: Patient does not c/o pain    Pain Re-assessment  Pain Reassessment: Patient does not c/o pain         Therapy Time  SLP Individual Minutes  Time In: 1053  Time Out: 1109  Minutes: 16            Signature: Electronically signed by SIVA Kaur on 6/27/2022 at 11:50 AM

## 2022-06-28 LAB
ANION GAP SERPL CALCULATED.3IONS-SCNC: 12 MEQ/L (ref 9–15)
BASOPHILS ABSOLUTE: 0 K/UL (ref 0–0.2)
BASOPHILS RELATIVE PERCENT: 0.3 %
BUN BLDV-MCNC: 24 MG/DL (ref 8–23)
C-REACTIVE PROTEIN: 9.5 MG/L (ref 0–5)
CALCIUM SERPL-MCNC: 8.3 MG/DL (ref 8.5–9.9)
CHLORIDE BLD-SCNC: 102 MEQ/L (ref 95–107)
CO2: 20 MEQ/L (ref 20–31)
CREAT SERPL-MCNC: 0.91 MG/DL (ref 0.7–1.2)
D DIMER: 0.4 MG/L FEU (ref 0–0.5)
EOSINOPHILS ABSOLUTE: 0 K/UL (ref 0–0.7)
EOSINOPHILS RELATIVE PERCENT: 0.4 %
FOLATE: 10.7 NG/ML
GFR AFRICAN AMERICAN: >60
GFR NON-AFRICAN AMERICAN: >60
GLUCOSE BLD-MCNC: 95 MG/DL (ref 70–99)
HCT VFR BLD CALC: 45.8 % (ref 42–52)
HEMOGLOBIN: 16.1 G/DL (ref 14–18)
LYMPHOCYTES ABSOLUTE: 1.1 K/UL (ref 1–4.8)
LYMPHOCYTES RELATIVE PERCENT: 26.8 %
MCH RBC QN AUTO: 31.8 PG (ref 27–31.3)
MCHC RBC AUTO-ENTMCNC: 35.2 % (ref 33–37)
MCV RBC AUTO: 90.5 FL (ref 80–100)
MONOCYTES ABSOLUTE: 0.3 K/UL (ref 0.2–0.8)
MONOCYTES RELATIVE PERCENT: 7.8 %
NEUTROPHILS ABSOLUTE: 2.6 K/UL (ref 1.4–6.5)
NEUTROPHILS RELATIVE PERCENT: 64.7 %
PDW BLD-RTO: 13.4 % (ref 11.5–14.5)
PLATELET # BLD: 157 K/UL (ref 130–400)
POTASSIUM SERPL-SCNC: 3.7 MEQ/L (ref 3.4–4.9)
RBC # BLD: 5.06 M/UL (ref 4.7–6.1)
SODIUM BLD-SCNC: 134 MEQ/L (ref 135–144)
VITAMIN B-12: 549 PG/ML (ref 232–1245)
VITAMIN D 25-HYDROXY: 14.2 NG/ML
WBC # BLD: 4 K/UL (ref 4.8–10.8)

## 2022-06-28 PROCEDURE — 99213 OFFICE O/P EST LOW 20 MIN: CPT

## 2022-06-28 PROCEDURE — 6360000002 HC RX W HCPCS: Performed by: INTERNAL MEDICINE

## 2022-06-28 PROCEDURE — 97166 OT EVAL MOD COMPLEX 45 MIN: CPT

## 2022-06-28 PROCEDURE — 6370000000 HC RX 637 (ALT 250 FOR IP): Performed by: INTERNAL MEDICINE

## 2022-06-28 PROCEDURE — 2060000000 HC ICU INTERMEDIATE R&B

## 2022-06-28 PROCEDURE — 86140 C-REACTIVE PROTEIN: CPT

## 2022-06-28 PROCEDURE — 85025 COMPLETE CBC W/AUTO DIFF WBC: CPT

## 2022-06-28 PROCEDURE — 80048 BASIC METABOLIC PNL TOTAL CA: CPT

## 2022-06-28 PROCEDURE — 36415 COLL VENOUS BLD VENIPUNCTURE: CPT

## 2022-06-28 PROCEDURE — 6370000000 HC RX 637 (ALT 250 FOR IP): Performed by: SPECIALIST

## 2022-06-28 PROCEDURE — 99212 OFFICE O/P EST SF 10 MIN: CPT

## 2022-06-28 PROCEDURE — 2580000003 HC RX 258: Performed by: INTERNAL MEDICINE

## 2022-06-28 PROCEDURE — 6370000000 HC RX 637 (ALT 250 FOR IP)

## 2022-06-28 PROCEDURE — 84145 PROCALCITONIN (PCT): CPT

## 2022-06-28 PROCEDURE — 51702 INSERT TEMP BLADDER CATH: CPT

## 2022-06-28 PROCEDURE — 85379 FIBRIN DEGRADATION QUANT: CPT

## 2022-06-28 PROCEDURE — 97162 PT EVAL MOD COMPLEX 30 MIN: CPT

## 2022-06-28 RX ADMIN — LISINOPRIL 20 MG: 20 TABLET ORAL at 20:36

## 2022-06-28 RX ADMIN — MUPIROCIN: 20 OINTMENT TOPICAL at 20:36

## 2022-06-28 RX ADMIN — ACETAMINOPHEN 650 MG: 325 TABLET ORAL at 01:30

## 2022-06-28 RX ADMIN — SODIUM CHLORIDE, PRESERVATIVE FREE 10 ML: 5 INJECTION INTRAVENOUS at 08:34

## 2022-06-28 RX ADMIN — AMLODIPINE BESYLATE 10 MG: 10 TABLET ORAL at 08:33

## 2022-06-28 RX ADMIN — Medication 5000 UNITS: at 20:40

## 2022-06-28 RX ADMIN — METOPROLOL TARTRATE 25 MG: 25 TABLET, FILM COATED ORAL at 08:33

## 2022-06-28 RX ADMIN — LISINOPRIL 20 MG: 20 TABLET ORAL at 08:33

## 2022-06-28 RX ADMIN — ROSUVASTATIN CALCIUM 40 MG: 20 TABLET, FILM COATED ORAL at 20:36

## 2022-06-28 RX ADMIN — SODIUM CHLORIDE, PRESERVATIVE FREE 10 ML: 5 INJECTION INTRAVENOUS at 20:36

## 2022-06-28 RX ADMIN — METOPROLOL TARTRATE 25 MG: 25 TABLET, FILM COATED ORAL at 20:36

## 2022-06-28 RX ADMIN — ASPIRIN 81 MG: 81 TABLET, CHEWABLE ORAL at 08:33

## 2022-06-28 RX ADMIN — ENOXAPARIN SODIUM 40 MG: 100 INJECTION SUBCUTANEOUS at 08:34

## 2022-06-28 ASSESSMENT — PAIN SCALES - GENERAL
PAINLEVEL_OUTOF10: 3
PAINLEVEL_OUTOF10: 0

## 2022-06-28 ASSESSMENT — PAIN - FUNCTIONAL ASSESSMENT: PAIN_FUNCTIONAL_ASSESSMENT: ACTIVITIES ARE NOT PREVENTED

## 2022-06-28 ASSESSMENT — PAIN DESCRIPTION - LOCATION: LOCATION: BACK

## 2022-06-28 ASSESSMENT — PAIN DESCRIPTION - DESCRIPTORS: DESCRIPTORS: ACHING

## 2022-06-28 NOTE — PROGRESS NOTES
[facility] OCCUPATIONAL THERAPY EVALUATION - ACUTE     NAME: Colby More  :  (77 y.o.)  MRN: [id number]  CODE STATUS: Full Code  [id number]    Date of Service: 2022    Patient Diagnosis(es): Generalized weakness [R53.1]  Elevated troponin [R77.8]  Failure to thrive in adult [R62.7]  FTT (failure to thrive) in adult [R62.7]  LORI (acute kidney injury) ([facility] [N17.9]  Non-traumatic rhabdomyolysis [M62.82]  COVID-19 [U07.1]   Patient Active Problem List    Diagnosis Date Noted    Failure to thrive in adult 2022    NSVT (nonsustained ventricular tachycardia) ([facility] 10/08/2021    Bradycardia 10/08/2021    Hyperlipidemia 10/08/2021    Secondary hypertension     Diastolic heart failure ([facility] 10/08/2021    Abnormality of gait and mobility 2021    Gait abnormality 2021    Renal disease 2021    Myelopathy concurrent with and due to spinal stenosis of cervical region 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 ([facility] 2021    Ataxic gait 2021    Numbness     Stroke determined by clinical assessment ([facility] 09/10/2021        Past Medical History:   Diagnosis Date    Cellulitis of left hand 3/26/2019    Cerebral artery occlusion with cerebral infarction ([facility]     Hypertension     Shoulder dislocation 3/8/2016    Spinal stenosis of lumbar region with neurogenic claudication 2021     Past Surgical History:   Procedure Laterality Date    BACK SURGERY          Restrictions  Restrictions/Precautions: Fall Risk (high fall risk per [place]; COVID 19+, droplet + isolation)     Safety Devices: Safety Devices  Type of Devices: Left in bed;Bed alarm in place;Call light within reach     Patient's date of birth confirmed: Yes    General:  Chart Reviewed: Yes  Patient assessed for rehabilitation services?: Yes    Subjective  Subjective: \"I guess I feel okay\"       Pain at start of treatment: No    Pain at end of treatment: No    Location:   Nursing notified: Not Applicable  RN:   Intervention: None    Prior Level of Function:  Social/Functional History  Lives With: Alone  Type of Home: House  Home Layout: One level  Home Access: Stairs to enter with rails  Entrance Stairs - Number of Steps: 3-4  Entrance Stairs - Rails: Right  Bathroom Shower/Tub: Tub/Shower unit  Home Equipment: Cane,Rollator,Walker, rolling  ADL Assistance: Independent  Homemaking Assistance:  (Sister helps with groceries)  Ambulation Assistance: Independent (Cane or [employer], mostly cane lately)  Transfer Assistance: Independent  Active : No  Patient's  Info: Sister    OBJECTIVE:     Orientation Status:  Orientation  Overall Orientation Status: Within Functional Limits    Observation:  Observation/Palpation  Posture: Fair (forward head, rounded shoulders, increased T kyphosis, flexed forward posture)  Observation: pleasant, cooperative, no acute distress noted, flat affect, dry scabbed abrasions noted on B LEs and UEs    Cognition Status:  Cognition  Overall Cognitive Status: Exceptions  Arousal/Alertness: Appropriate responses to stimuli  Following Commands: Follows one step commands consistently; Follows multistep commands with increased time  Attention Span: Difficulty attending to directions  Memory: Decreased recall of precautions  Safety Judgement: Decreased awareness of need for assistance;Decreased awareness of need for safety  Problem Solving: Assistance required to generate solutions;Assistance required to identify errors made;Assistance required to correct errors made;Assistance required to implement solutions  Insights: Decreased awareness of deficits  Initiation: Requires cues for some  Sequencing: Requires cues for some  Cognition Comment: Verbal cues and increased processing time    Perception Status:  Perception  Overall Perceptual Status: WFL    Vision and Hearing Status:  Vision  Vision Exceptions: Wears glasses for reading  Hearing  Hearing: Within functional limits   Vision - Basic Assessment  Prior Vision: Wears glasses only for reading  Visual History: No significant visual history  Patient Visual Report: No visual complaint reported. Visual Field Cut: No  Oculo Motor Control: WNL    GROSS ASSESSMENT AROM/PROM:  AROM: Within functional limits  PROM: Within functional limits    ROM:   LUE AROM (degrees)  LUE AROM : WFL  Left Hand AROM (degrees)  Left Hand AROM: WFL  RUE AROM (degrees)  RUE AROM : WFL  Right Hand AROM (degrees)  Right Hand AROM: WFL    UE STRENGTH:  Strength: Within functional limits    UE COORDINATION:  Coordination: Within functional limits    UE TONE:  Tone: Normal    UE SENSATION:  Sensation: Intact    Hand Dominance:  Hand Dominance  Hand Dominance: Right    ADL Status:  ADL  Feeding: Independent  Grooming: Setup  UE Bathing: Setup  LE Bathing: Moderate assistance  UE Dressing: Minimal assistance  LE Dressing: Dependent/Total  Toileting: Dependent/Total  Additional Comments: Simulated ADLs as above. Unable to perform figure 4, safely reach feet, or maintain standing for pants management. Decreased sequencing and safety for mobility. Toilet Transfers  Toilet Transfer: Unable to assess  Toilet Transfers Comments: Anticipate min A    Functional Mobility:  Patient ambulated NT due to Pt was able to weight shift in standing but unable to take any steps despite cues and encouragement.      Bed Mobility  Bed mobility  Supine to Sit: Minimal assistance  Sit to Supine: Minimal assistance  Scooting: Stand by assistance  Bed Mobility Comments: VC and TC for hand placement, elevated HOB and use of handrails, increased time and effort for performance    Seated and Standing Balance:  Balance  Sitting: Intact  Standing: Impaired  Standing - Static: Fair  Standing - Dynamic: Fair    Functional Endurance:  Activity Tolerance  Activity Tolerance: Patient Tolerated treatment well    D/C Recommendations:  OT D/C RECOMMENDATIONS  REQUIRES OT FOLLOW-UP: Yes    Equipment Recommendations:  OT Equipment Recommendations  Other: Continue to assess    OT Education:   Patient Education  Education Given To: Patient  Education Provided: Role of Therapy;Plan of Care  Education Method: Verbal  Barriers to Learning: Cognition  Education Outcome: Verbalized understanding;Continued education needed    OT Follow Up:   OT D/C RECOMMENDATIONS  REQUIRES OT FOLLOW-UP: Yes    Assessment/Discharge Disposition:  Assessment: Pt is a 77year old man from home who presents to [facility] with the above deficits which impact his ability to perform ADLs and IADLs. Pt. limited d/t fatigue, weakness and pain. Pt. would benefit from continued OT to maximize independence and safety with ADL tasks.   Performance deficits / Impairments: Decreased functional mobility ,Decreased cognition,Decreased ADL status,Decreased strength,Decreased endurance,Decreased balance,Decreased high-level IADLs,Decreased safe awareness  Prognosis: Good  Discharge Recommendations: Continue to assess pending progress  Decision Making: Medium Complexity  History: Pt's medical history is moderately complex  Exam: Pt. has 8 performance deficits  Assistance / Modification: Pt. requires max A    AMPAC (Six Click) Self care Score   How much help for putting on and taking off regular lower body clothing?: Total  How much help for Bathing?: A Lot  How much help for Toileting?: Total  How much help for putting on and taking off regular upper body clothing?: A Little  How much help for taking care of personal grooming?: A Little  How much help for eating meals?: None  AM-[facility] Inpatient Daily Activity Raw Score: 14  AM-PAC Inpatient ADL T-Scale Score : 33.39  ADL Inpatient CMS 0-100% Score: 59.67    Therapy key for assistance levels -   Independent/Mod I = Pt. is able to perform task with no assistance but may require a device   Stand by assistance = Pt. does not perform task at an independent level

## 2022-06-28 NOTE — PROGRESS NOTES
Physical Therapy Med Surg Initial Assessment  Facility/Department: [facility] TELEMETRY  Room: W170/W170-01       NAME: Jaclyn March  : 1945 (87 y.o.)  MRN: [id number]  CODE STATUS: Full Code    Date of Service: 2022    Patient Diagnosis(es): Generalized weakness [R53.1]  Elevated troponin [R77.8]  Failure to thrive in adult [R62.7]  FTT (failure to thrive) in adult [R62.7]  LORI (acute kidney injury) ([facility] [address] [N17.9]  Non-traumatic rhabdomyolysis [M62.82]  COVID-19 [U07.1]   Chief Complaint   Patient presents with    Extremity Weakness     pt c/o wekaness, fatigue, possible stroke today      Chief Reason: General Medical  Chart Reviewed: Yes  Patient assessed for rehabilitation services?: Yes  Family / Caregiver Present: No  Restrictions:  Restrictions/Precautions: Fall Risk (high fall risk per Izell [place]; COVID 19+, droplet + isolation)     SUBJECTIVE:   Subjective: Pt denies pain when asked.  Pt reports decline in functional beginning ~2 month ago    Pain   Pre treatment screenin/10   Post treatment screening 0/10    Prior Level of Function:  Social/Functional History  Lives With: Alone  Type of Home: House  Home Layout: One level  Home Access: Stairs to enter with rails  Entrance Stairs - Number of Steps: 3-4  Entrance Stairs - Rails: Right  Bathroom Shower/Tub: Tub/Shower unit  Home Equipment: Cane,Rollator,Walker, rolling  ADL Assistance: Independent  Homemaking Assistance:  (Sister helps with groceries)  Ambulation Assistance: Independent (Cane or [employer], mostly cane lately)  Transfer Assistance: Independent  Active : No  Patient's  Info: Sister    OBJECTIVE:   Vision  Vision Exceptions: Wears glasses for reading  Hearing: Within functional limits    Cognition:  Overall Orientation Status: Within Functional Limits  Follows Commands: Within Functional Limits  Overall Cognitive Status: WFL    Observation/Palpation  Posture: Fair (forward head, rounded shoulders, increased T kyphosis, flexed forward posture)  Observation: pleasant, cooperative, no acute distress noted, flat affect, dry scabbed abrasions noted on B LEs and UEs    ROM:  AROM: Generally decreased, functional  PROM: Generally decreased, functional (impaired hip flexor and HS flexibility)    Strength:  Strength: Generally decreased, functional (functionally >/= 3+/5)    Neuro:  Balance  Sitting - Static: Fair;+  Sitting - Dynamic: Fair;+  Standing - Static: Fair (requires B UE support)  Standing - Dynamic: Fair;-                    Coordination: Generally decreased, functional    Sensation: Intact (pt denies numbness/tingling)    Bed mobility  Supine to Sit: Minimal assistance  Sit to Supine: Minimal assistance  Scooting: Stand by assistance  Bed Mobility Comments: VC and TC for hand placement, elevated HOB and use of handrails, increased time and effort for performance    Transfers  Sit to Stand: Minimal Assistance  Stand to sit: Contact guard assistance  Comment: with 2ww    Ambulation  Surface: level tile  Device: Rolling Walker  Assistance: Minimal assistance  Quality of Gait: flexed posture, definate use of 2ww  Distance: <1ft  Comments: initiated pre gait- wt shifting with 2ww, attempted 2 sidesteps at EOB, pt fatigued quickly     Activity Tolerance  Activity Tolerance: Patient tolerated evaluation without incident      Patient Education  Education Given To: Patient  Education Provided: Plan of Care;Role of Therapy; Fall Prevention Strategies  Education Method: Verbal  Barriers to Learning: None  Education Outcome: Verbalized understanding     ASSESSMENT:   Body Structures, Functions, Activity Limitations Requiring Skilled Therapeutic Intervention: Decreased functional mobility ; Decreased strength;Decreased balance;Decreased posture;Decreased ROM  Decision Making: Medium Complexity  History: high  Exam: high  Clinical Presentation: med    Therapy Prognosis: Good  Barriers to Learning: cognitive deficits       DISCHARGE RECOMMENDATIONS:  Discharge Recommendations: Patient would benefit from continued therapy after discharge  Assessment: Pt demonstrates the above deficits and decline in functional mobility status placing them at increased risk for falls. Pt unable to ambulate d/t B LE weakness and overall debility. Pt would benefit from physical therapy to address above deficits and allow for safe return home at highest level of function, decrease risk for falls, and improve QOL. Requires PT Follow-Up: Yes       PLAN OF CARE:  Plan  Plan: 1 time a day 3-6 times a week  Current Treatment Recommendations: Strengthening,ROM,Balance training,Functional mobility training,Transfer training,Endurance training,Neuromuscular re-education,Stair training,Manual Therapy - Soft Tissue Mobilization,Gait training,Home exercise program,Patient/Caregiver education & training,Safety education & training,Equipment evaluation, education, & procurement,Modalities,Therapeutic activities    Safety Devices  Type of Devices: Left in bed,Bed alarm in place,Call light within reach  Restraints  Restraints Initially in Place: No    Goals:  Patient goals : to get better  Long Term Goals  Long term goal 1: Bed mobility with indep  Long term goal 2: Functional transfers with supervision  Long term goal 3: Amb 50ft with 2ww and SBA  Long term goal 4: SBA for HEP to improve LE strength, ROM, and activity tolerance  Long term goal 5: 5xSTS <18 seconds to demonstrate decreased fall risk    AMPA (6 CLICK) BASIC MOBILITY  AM-PAC Inpatient Mobility Raw Score : 14     Therapy Time:   Individual   Time In 1110   Time Out 1124   Minutes [address] [place], [place], 06/28/22 at 11:40 AM       Definitions for assistance levels  Independent = pt does not require any physical supervision or assistance from another person for activity completion. Device may be needed.   Stand by assistance = pt requires verbal cues or instructions from another person, close to but not

## 2022-06-28 NOTE — PROGRESS NOTES
Progress Note  Date:2022       Room:W170/W170-01  Patient Bridger Parekh     YOB: 1945     Age:76 y.o. Subjective      No acute events overnight. Continues to exhibit mild confusion. Continues to saturate well on room air with mild nonproductive rare cough. Scattered small skin changes from crawling on floors of his home for past several days, healing. LORI improving. CK trending improvement. No new/acute complaints at present. Objective         Vitals Last 24 Hours:  TEMPERATURE:  Temp  Av.3 °F (36.3 °C)  Min: 97.2 °F (36.2 °C)  Max: 97.5 °F (36.4 °C)  RESPIRATIONS RANGE: Resp  Av  Min: 20  Max: 20  PULSE OXIMETRY RANGE: SpO2  Av %  Min: 96 %  Max: 100 %  PULSE RANGE: Pulse  Av.8  Min: 63  Max: 66  BLOOD PRESSURE RANGE: Systolic (59QGZ), GCM:470 , Min:145 , RHQ:729   ; Diastolic (29MBP), ZXE:58, Min:57, Max:86    I/O (24Hr): Intake/Output Summary (Last 24 hours) at 2022 1335  Last data filed at 2022 1300  Gross per 24 hour   Intake 1505.32 ml   Output 500 ml   Net 1005.32 ml     Objective:  Vital signs: (most recent): Blood pressure (!) 148/86, pulse 63, temperature 97.3 °F (36.3 °C), temperature source Oral, resp. rate 20, height 5' 8\" (1.727 m), weight 166 lb 14.4 oz (75.7 kg), SpO2 98 %. Constitutional: Elderly male reclining in bed no acute distress  Head: NCAT  Eyes: PERRLA, EOMI  Neck: Trachea midline, phonation normal  Cardiovascular: RRR.  +1/6 systolic murmur appreciated best at RUSB. No significant pretibial edema  Pulmonary: Normal rate and effort of respiration on room air. No coughing during exam.  Grossly CTAB. Abdomen: Soft, nontense, nondistended. Active bowel sounds. Neurologic: Slightly confused. Follows all commands. Grossly non-focal, but doesn't put much effort into neuro exam.    Psychiatric: Pleasant and cooperative. Slightly confused.   MSK/integumentary: Multiple cutaneous skin wounds, primarily on bilateral knees and bilateral forearm extensor surfaces, consistent with crawling on the floor. Various stages of healing. No active bleeding. Labs/Imaging/Diagnostics    Labs:  CBC:  Recent Labs     06/26/22 2000 06/27/22 0523 06/28/22 0817   WBC 6.0 4.2* 4.0*   RBC 5.41 4.73 5.06   HGB 16.9 15.1 16.1   HCT 48.9 42.6 45.8   MCV 90.4 90.1 90.5   RDW 12.8 13.1 13.4    149 157     CHEMISTRIES:  Recent Labs     06/26/22 2000 06/27/22 0523 06/28/22 0817    139 134*   K 4.2 3.6 3.7    106 102   CO2 18* 18* 20   BUN 36* 31* 24*   CREATININE 1.46* 1.01 0.91   GLUCOSE 92 88 95   MG 2.0  --   --      PT/INR:  Recent Labs     06/27/22 0522   PROTIME 14.2   INR 1.1     APTT:  Recent Labs     06/27/22 0522   APTT 31.0     LIVER PROFILE:  Recent Labs     06/26/22 2000 06/27/22 0523   * 98*   ALT 47* 40   BILITOT 0.9* 0.6   ALKPHOS 65 54       Imaging Last 24 Hours:  CT HEAD WO CONTRAST    Result Date: 6/26/2022  Patient: Eamon Powers  Time Out: 22:33 Exam(s): CT HEAD Without Contrast  EXAM:   CT Head Without Intravenous Contrast  CLINICAL HISTORY:    Reason for exam: generalized weakness. Chief complaint generalized weakness  TECHNIQUE:   Axial computed tomography images of the head/brain without intravenous contrast.  CTDI is 50.86 mGy and DLP is 1118.74 mGy-cm. All CT scan at this facility use dose modulation, iterative reconstruction, and/or weight based dosing when appropriate to reduce radiation dose to as low as reasonably achievable. COMPARISON:   CT 9/10/2021, MRI brain 9/11/2021. FINDINGS:   Brain:  No acute hemorrhage or abnormal extra-axial fluid collection. No acute stroke. Old right cerebellar infarct. Confluent supratentorial periventricular and subcortical white matter changes. Age-appropriate generalized atrophy. Ventricles:  No hydrocephalus. No midline shift. Bones/joints:  Unremarkable. No acute fracture. Soft tissues:  Unremarkable.    Sinuses:  No acute sinusitis. Partial opacification of right mastoid aerosols. Electronically signed by Yesenia Conklin M.D. on 06-26-22 at 2233      No acute abnormality. OId right cerebellar infarct. Nonspecific supratentorial tentorial white  change most commonly seen with small vessel disease. XR CHEST PORTABLE    Result Date: 6/27/2022  EXAMINATION:  CHEST. CLINICAL HISTORY:  ALTERED MENTAL STATUS. COMPARISONS:  9/10/2021. TECHNIQUE:  AP portable view. FINDINGS:  Heart size and contour are within normal limits. Pulmonary vascularity is normal. No infiltrate or effusion is seen. No definite evidence of a mass or adenopathy. There appears to be some pleural calcification. No significant bony abnormality. NO ACUTE CHANGE. SUGGESTION OF SOME PLEURAL CALCIFICATION. Assessment//Plan           Hospital Problems           Last Modified POA    * (Principal) Failure to thrive in adult 6/26/2022 Yes        Assessment & Plan    1. COVID-19 infection: No acute hypoxia to suggest the need for steroid administration or antivirals of immunomodulators etc.  Continue to trend inflammatory coagulopathy markers.     2. General weakness and failure to thrive: Consult to PT and OT as well as case management. May need further PT and OT to continue to care for himself likely debilitated secondary to [place] which is exacerbated by his history of CVA. No clear acute deficits to suggest an acute stroke, with no new deficits  6/27: Neurology and neurosurgery consults placed on admission. Neurosurgery unavailable until ~7/6. Previously evaluated by neurosurgery, but found to be poor surgical candidate at that time and post-CVA timeframe. Appreciate neurology recommendations when available. 6/28: Neurology recommending placement on DC. CM working on SNF for further therapy on DC from acute inpatient setting. Will need outpatient Neurosurgery follow up.       3.  History of CVA: Continue aspirin start chewable aspirin given difficulty with swallowing pills. Continue attempting to Crestor. Check speech therapy evaluation for swallowing ability and recommendations for ways to administer medications.     4. Spinal stenosis with myelopathy: PT and OT evaluation May need repeat neurologic evaluation, was not a candidate for surgical intervention on last evaluation because he was in the acute post CVA window. Consult to neurosurgery but may not be an optimal candidate given noncompliance to medical regimen. 6/27: Neurosurgery coverage reportedly unavailable until ~7/6. Will await PT/OT recommendations, may need outpatient follow-up with inpatient unavailable. Consider neurology consult or weakness evaluation in the interim.     5. Hyperlipidemia check lipids continue statin     6. Hypertension resume metoprolol . As needed IV hydralazine if patient is not able to take tablets or pills  6/27: LORI significantly improved. BP not yet at goal.  Resume home lisinopril 20 mg p.o. twice daily. 6/28: Improved slighlty, but not yet at goal.  Amlodipine added 6/27, Lisinopril resumed 6/27. Monitor again overnight for continued improvement before further medication regimen adjustments.       7. Acute kidney injury: Urinalysis urine urea urine creatinine hold nephrotoxic agents hold lisinopril. Repeat BMP tomorrow with gentle hydration given elevated CK levels. 6/27: Significantly improved. 6/28: Improved     8. Elevated CK/rhabdomyolysis following weakness and debility patient had been crawling on the floors at home as he was instructed to stand and walk continue IV hydration again as above PT and OT evaluation  6/27: Slowly improving  6/28: Still improving     9. Elevated troponin: Likely secondary to the above kidney injury. Trend troponins repeat EKG patient has no chest pain or cardiac symptoms. 6/27: 2nd and 3rd troponins undetectable, likely impaired clearance in setting of LORI on presentation.     10.  Elevated ALT AST: Repeat CMP tomorrow if persistently elevated may need right upper quadrant ultrasound patient denies any alcohol abuse. Avoid hepatotoxic agents. Acute hepatitis panel, INR, acetaminophen salicylate level   1/53: Slightly improved today. Continue to monitor with daily labs.     11.  DVT prophylaxis with Lovenox      Electronically signed by Andrae Park DO on 6/28/2022 at 1:40 PM

## 2022-06-28 NOTE — PROGRESS NOTES
NEUROLOGY INPATIENT PROGRESS NOTE    Patient- Itz Reza    MRN -  [id number]   Acct # - [de-identified]      - 1945    77 y.o. Subjective: The patient is seen in follow-up. Patient is alert at this time. The patient more awake and alert  Oral intake improving  Moving limbs better  Cough is somewhat better  Liver enzymes have improved  Vitamin D was low, we shall supplement renal function improving    Result Data:  CBC:   Recent Labs     2223 22  0817   WBC 6.0 4.2* 4.0*   HGB 16.9 15.1 16.1    149 157     BMP:    Recent Labs     22  0817    139 134*   K 4.2 3.6 3.7    106 102   CO2 18* 18* 20   BUN 36* 31* 24*   CREATININE 1.46* 1.01 0.91   GLUCOSE 92 88 95     TSH:   Recent Labs     22  2258   TSH 0.689     Folic Acid:   Recent Labs     22  2258   FOLATE 10.7     B12:    Lab Results   Component Value Date    [id number] 549 2022     Vit. D: No components found for: VITAMIND  Lipids:   Recent Labs     22  0523   CHOL 108   TRIG 75   HDL 35*   LDLCALC 58     Ammonia: No results for input(s): AMMONIA in the last 72 hours. LFT:   Recent Labs     22  0523   * 98*   ALT 47* 40   BILITOT 0.9* 0.6   ALKPHOS 65 54        Urine:   Recent Labs     22  0143   COLORU Yellow   PHUR 5.0   WBCUA 3-5   RBCUA 10-20*   BACTERIA Negative   CLARITYU Clear   SPECGRAV 1.024   LEUKOCYTESUR Negative   UROBILINOGEN 1.0   BILIRUBINUR Negative   BLOODU MODERATE*        Imaging    CT HEAD WO CONTRAST    Result Date: 2022    No acute abnormality. OId right cerebellar infarct. Nonspecific supratentorial tentorial white  change most commonly seen with small vessel disease. XR CHEST PORTABLE    Result Date: 2022  NO ACUTE CHANGE. SUGGESTION OF SOME PLEURAL CALCIFICATION.           Objective:   /74   Pulse 53   Temp 97.4 °F (36.3 °C) (Oral)   Resp 19   Ht 5' 8\" (1.727 m)   Wt 166 lb 14.4 oz (75.7 kg)   SpO2 100%   BMI 25.38 kg/m²       Intake/Output Summary (Last 24 hours) at 6/28/2022 1937  Last data filed at 6/28/2022 1836  Gross per 24 hour   Intake 720 ml   Output 500 ml   Net 220 ml            Physical Exam:    MENTAL STATE: Oriented to time, place and person off and on. Recent and remote memory was impaired. Attention span and concentration were decreased. General fund of knowledge was decreased. The patient  Month he needed difficulty remembering the day of the week and the year  Language: speech comprehension, repetition, expression, and naming is intact for patient's age and level of education. He is hard of hearing     CRANIAL NERVES:      CN 3, 4, 6 Pupils round, equally reactive to light. No ptosis. EOM normal alignment, full range with normal saccades, pursuit and convergence. No nystagmus. CN 5 Facial sensation intact bilaterally. CN 7 Normal and symmetric facial strength. Nasolabial folds symmetric. CN 8 Hearing intact to finger rub bilaterally. CN 9 Palate elevates symmetrically. CN 11 Bilaterally normal strength of shoulder shrug and neck turning. CN 12 Tongue midline, with normal bulk and strength; no fasciculations. Patient is handling pharyngeal secretions well. Speech: articulation is intact.      MOTOR: The patient has normal muscle tone and has normal muscle mass. Muscle strength was 5/5 in distal and proximal muscles in both upper and lower extremities on the left side, strength is graded 4/5 on the right side. No fasciculations, tremor or other abnormal movements were present.    On [place] testing the patient has no pronation or drift.      REFLEXES:   RIGHT UE       LEFT UE   BR: 1               BR:1  Biceps:1          Biceps:1  Triceps:1         Triceps:1  RIGHT LLE     LEFT LLE   Patella:1          Patella:1  Achilles:0         Achilles:0  Babinski: plantar responses are flexor.      No frontal release signs or other pathologic reflexes present.      COORDINATION: In both upper extremities, finger-nose-finger was intact without dysmetria. Heel-to-knee to shin was intact.      GAIT: The patient has a difficulty with walking  He was using the hands and legs to crawl on the floor  SENSORY: Patient difficulty with the detailed sensory testing grossly since hearing evaluation was unremarkable      Medications:     mupirocin   Topical BID    vitamin D  5,000 Units Oral Daily    aspirin  81 mg Oral Daily    metoprolol tartrate  25 mg Oral BID    amLODIPine  10 mg Oral Daily    lisinopril  20 mg Oral BID    sodium chloride flush  5-40 mL IntraVENous 2 times per day    enoxaparin  40 mg SubCUTAneous Daily    rosuvastatin  40 mg Oral Nightly                 Principal Problem:    Failure to thrive in adult  Resolved Problems:    * No resolved hospital problems. 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   Subjectively feels that his right [place] Oscar is the weakest  Small vessel cerebrovascular disease  Poor compliance with the medications and medical follow-ups  He had poor insight into his medical status  He was found to be COVID-positive  Tachycardia  Tachypnea  Hypertension  Hyperlipidemia  Vitamin D deficiency, he will need to stay on 5000 units daily  Chronic kidney disease function is improving  Rhabdomyolysis probably due to the falls  History of lumbar stenosis, neurosurgery evaluation requested when available     Recommendations:  Patient continue patient on antiplatelet medications  Additional lab work showed low vitamin D level, T60, folic acid level, TSH unremarkable  The patient will need long-term placement  Increase activity as tolerated        Anaya Yu MD, MD, 6/28/2022 7:37 PM

## 2022-06-28 NOTE — CONSULTS
Nutrition Education    Consult recieved 'For diet Ed' type of education not specified. Pt currently under Isolation precautions for COVID 19.   Will follow for education needs prior to discharge    Maxine Marlow RD, LD

## 2022-06-28 NOTE — PROGRESS NOTES
3    atorvastatin (LIPITOR) 80 MG tablet Take 1 tablet by mouth nightly (Patient not taking: Reported on 6/26/2022) 30 tablet 3    lisinopril (PRINIVIL;ZESTRIL) 20 MG tablet Take 1 tablet by mouth 2 times daily (Patient not taking: Reported on 6/26/2022) 30 tablet 3    metoprolol tartrate (LOPRESSOR) 25 MG tablet Take 1 tablet by mouth 2 times daily (Patient not taking: Reported on 6/26/2022) 60 tablet 3    QUEtiapine (SEROQUEL) 25 MG tablet Take 1 tablet by mouth 2 times daily as needed for Agitation (Patient not taking: Reported on 6/26/2022) 60 tablet 0    Vitamin D (CHOLECALCIFEROL) 50 MCG (2000 UT) TABS tablet Take 1 tablet by mouth Daily with supper (Patient not taking: Reported on 6/26/2022) 30 tablet 0    amLODIPine (NORVASC) 10 MG tablet Take 1 tablet by mouth daily (Patient not taking: Reported on 6/26/2022) 30 tablet 5       Objective    BP (!) 148/86   Pulse 63   Temp 97.3 °F (36.3 °C) (Oral)   Resp 20   Ht 5' 8\" (1.727 m)   Wt 166 lb 14.4 oz (75.7 kg)   SpO2 98%   BMI 25.38 kg/m²     LABS:  WBC:    Lab Results   Component Value Date    WBC 4.0 06/28/2022     H/H:    Lab Results   Component Value Date    HGB 16.1 06/28/2022    HCT 45.8 06/28/2022     PTT:    Lab Results   Component Value Date    APTT 31.0 06/27/2022   [APTT}  PT/INR:    Lab Results   Component Value Date    PROTIME 14.2 06/27/2022    INR 1.1 06/27/2022     HgBA1c:    Lab Results   Component Value Date    LABA1C 5.1 06/27/2022       Assessment   Rush Risk Score: Rush Scale Score: 20    Patient Active Problem List   Diagnosis    Stroke determined by clinical assessment ([facility] [address]    Gait abnormality    Renal disease    Myelopathy concurrent with and due to spinal stenosis of cervical region [facility])    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 Multiple superficial, dry, scabbed abrasion to bilateral lower and upper extremities. Lower extremities with more abrasions and bruises than the upper extremities. No signs or symptoms of infection, no drainage noted. Patient also has few, superficial, dry excoriation to bilateral buttocks - patient is able to turn himself in bed without assistance. Plan   Plan of Care:  see above     Specialty Bed Required : N/A   [] Low Air Loss   [] Pressure Redistribution  [] Fluid Immersion  [] Bariatric  [] Other:     Current Diet: ADULT DIET;  Dysphagia - Minced and Moist; Low Fat/Low Chol/High Fiber/DORINDA  Dietician consult:  Yes    Discharge Plan:  Placement for patient upon discharge: TBD   Patient appropriate for Outpatient [address]: N/A    Referrals:  []   [] [facility]  [] Supplies  [] Other    Patient/Caregiver Teaching:  Level of patient/caregiver understanding able to:   [] Indicates understanding       [] Needs reinforcement  [] Unsuccessful      [] Verbal Understanding  [] Demonstrated understanding       [] No evidence of learning  [] Refused teaching         [x] N/A       Electronically signed by YUDITH Garvin, RN, Tanya Davis on 6/28/2022 at 10:05 AM

## 2022-06-28 NOTE — CONSULTS
Marylee Liter is a 77 y.o.  single right-handed white gentleman with a PMH clinically significant for HTN, HLD, HFpEF, CVA, Lumbar spinal stenosis presenting to the ED at [facility]  On June 26, 2022 via PV c/o generalized weakness and fatigue, not feeling well over the past 4 months. Thinks his symptoms have been worsening more recently as well. Characterizes weakness as diffuse, constant, moderate in severity. States he just feels weak all the time. Is having difficulty walking at home. Denies any recent falls. States he was walking with a walker before and doing better. He said he is eating and drinking okay. Denies any associated numbness, headache, chest pain, current shortness of breath, abdominal pain, N/V/D/C or urinary symptoms. States he does intermittently get short of breath but not right now. Has not been taking all medications regularly, states he sometimes forgets. States his right leg is usually a little bit weaker than his left, but this is normal from his previous stroke. Denies any current focal area of weakness or numbness. Denies any vision changes. No headache.        Per Chart Review: Recent admission for CVA in 9/2021 appreciated. Noted acute CVA of the left centrum semiovale and bilateral chronic cerebellar infarcts. Discharged to SNF/rehab. Psychiatrically deemed incompetent at that time. The patient was at her skilled nursing facility. After he was discharged he did not take his blood pressure medications  Patient had multiple abrasions and bruises over the lower extremities. He was crawling on the floor he was COVID-positive incidentally     REVIEW OF SYSTEMS        Review of Systems   Constitutional: Positive for activity change and fatigue. Negative for chills and fever. HENT: Negative for rhinorrhea and sore throat. Eyes: Negative for pain and visual disturbance. Respiratory: Positive for shortness of breath. Negative for cough.     Cardiovascular: Negative for chest pain and palpitations. Gastrointestinal: Negative for abdominal pain, diarrhea, nausea and vomiting. Genitourinary: Negative for dysuria. Musculoskeletal: Positive for gait problem. Negative for back pain and neck pain. Skin: Negative for rash. Neurological: Positive for weakness (generalized). Negative for numbness and headaches.         PAST MEDICAL HISTORY      Past Medical History[]Expand by Default        Past Medical History:   Diagnosis Date    Cellulitis of left hand 3/26/2019    Cerebral artery occlusion with cerebral infarction ([facility] [address]      Hypertension              SURGICAL HISTORY        Past Surgical History[]Expand by Default         Past Surgical History:   Procedure Laterality Date    BACK SURGERY                FAMILY HISTORY     Family History[]Expand by Default   History reviewed. No pertinent family history.        SOCIAL HISTORY        Social History[]Expand by Default   Social History            Socioeconomic History    Marital status: Single       Spouse name: None    Number of children: None    Years of education: None    Highest education level: None   Occupational History    None   Tobacco Use    Smoking status: Never Smoker    Smokeless tobacco: Never Used   Vaping Use    Vaping Use: Never used   Substance and Sexual Activity    Alcohol use: Never       Alcohol/week: 0.0 standard drinks    Drug use: Never    Sexual activity: None   Other Topics Concern    None   Social History Narrative     Lives With: Alone, sister lives in the area     Type of Home: [address]: One level     Home Access: Stairs to enter with rails- Number of Steps: 3- Rails: Both     Bathroom Shower/Tub: Tub/Shower unit     Home Equipment: Cane, Rolling walker (rollator)     ADL Assistance: [address]: [address] [place] [address] Responsibilities:  Yes     Ambulation Assistance: Independent (2ww)     Transfer Assistance: Independent     Active : No     He is a retired  for [address] [employer]. He learned how to be a  in [employer] was stationed in [employer] during the [address]. Never went overseas. Never  no kids graduated [employer]. Extremity Weakness (pt c/o wekaness, fatigue, possible stroke today )      Results    Imaging  CT HEAD WO CONTRAST    Result Date: 6/26/2022  Patient: Jaz Wong  Time Out: 22:33 Exam(s): CT HEAD Without Contrast  EXAM:   CT Head Without Intravenous Contrast  CLINICAL HISTORY:    Reason for exam: generalized weakness. Chief complaint generalized weakness  TECHNIQUE:   Axial computed tomography images of the head/brain without intravenous contrast.  CTDI is 50.86 mGy and DLP is 1118.74 mGy-cm. All CT scan at this facility use dose modulation, iterative reconstruction, and/or weight based dosing when appropriate to reduce radiation dose to as low as reasonably achievable. COMPARISON:   CT 9/10/2021, MRI brain 9/11/2021. FINDINGS:   Brain:  No acute hemorrhage or abnormal extra-axial fluid collection. No acute stroke. Old right cerebellar infarct. Confluent supratentorial periventricular and subcortical white matter changes. Age-appropriate generalized atrophy. Ventricles:  No hydrocephalus. No midline shift. Bones/joints:  Unremarkable. No acute fracture. Soft tissues:  Unremarkable. Sinuses:  No acute sinusitis. Partial opacification of right mastoid aerosols. Electronically signed by Lina Centeno M.D. on 06-26-22 at 2233      No acute abnormality. OId right cerebellar infarct. Nonspecific supratentorial tentorial white  change most commonly seen with small vessel disease. XR CHEST PORTABLE    Result Date: 6/27/2022  EXAMINATION:  CHEST. CLINICAL HISTORY:  ALTERED MENTAL STATUS. COMPARISONS:  9/10/2021. TECHNIQUE:  AP portable view. FINDINGS:  Heart size and contour are within normal limits.  Pulmonary vascularity is normal. No infiltrate or effusion is seen. No definite evidence of a mass or adenopathy. There appears to be some pleural calcification. No significant bony abnormality. NO ACUTE CHANGE. SUGGESTION OF SOME PLEURAL CALCIFICATION. Labs    CBC:   Recent Labs     06/26/22 2000 06/27/22 0523   WBC 6.0 4.2*   HGB 16.9 15.1    149     BMP:    Recent Labs     06/26/22 2000 06/27/22 0523    139   K 4.2 3.6    106   CO2 18* 18*   BUN 36* 31*   CREATININE 1.46* 1.01   GLUCOSE 92 88     TSH: No results for input(s): TSH in the last 72 hours. Folic Acid: No results for input(s): FOLATE in the last 72 hours. B12:    Lab Results   Component Value Date    [id number] 476 09/11/2021     Vit. D: No components found for: VITAMIND  Lipids:   Recent Labs     06/27/22 0523   CHOL 108   TRIG 75   HDL 35*   LDLCALC 58     Ammonia: No results for input(s): AMMONIA in the last 72 hours. LFT:   Recent Labs     06/26/22 2000 06/27/22 0523   * 98*   ALT 47* 40   BILITOT 0.9* 0.6   ALKPHOS 65 54        Urine:   Recent Labs     06/27/22  0143   COLORU Yellow   PHUR 5.0   WBCUA 3-5   RBCUA 10-20*   BACTERIA Negative   CLARITYU Clear   SPECGRAV 1.024   LEUKOCYTESUR Negative   UROBILINOGEN 1.0   BILIRUBINUR Negative   BLOODU MODERATE*          BP (!) 145/67   Pulse 63   Temp 97.5 °F (36.4 °C)   Resp 20   Ht 5' 8\" (1.727 m)   Wt 163 lb 4.8 oz (74.1 kg)   SpO2 100%   BMI 24.83 kg/m²    Systemic Exam    GENERAL APPEARANCE: Well developed, well nourished, and in no acute distress. CARDIOVASCULAR: Peripheral pulsations are well maintained. He has multiple abrasions of the lower extremities  He has a few bruises of the trunk and upper limbs    Abdomen is soft, non-tender, no hepatosplenomegaly. No sign of head or neck injury    Neurological Exam     MENTAL STATE: Oriented to time, place and person off and on. Recent and remote memory was impaired.  Attention span and concentration were decreased. General fund of knowledge was decreased. The patient  Month he needed difficulty remembering the day of the week and the year  Language: speech comprehension, repetition, expression, and naming is intact for patient's age and level of education. He is hard of hearing     CRANIAL NERVES:      CN 3, 4, 6 Pupils round, equally reactive to light. No ptosis. EOM normal alignment, full range with normal saccades, pursuit and convergence. No nystagmus. CN 5 Facial sensation intact bilaterally. CN 7 Normal and symmetric facial strength. Nasolabial folds symmetric. CN 8 Hearing intact to finger rub bilaterally. CN 9 Palate elevates symmetrically. CN 11 Bilaterally normal strength of shoulder shrug and neck turning. CN 12 Tongue midline, with normal bulk and strength; no fasciculations. Patient is handling pharyngeal secretions well. Speech: articulation is intact. MOTOR: The patient has normal muscle tone and has normal muscle mass. Muscle strength was 5/5 in distal and proximal muscles in both upper and lower extremities on the left side, strength is graded 4/5 on the right side. No fasciculations, tremor or other abnormal movements were present. On Vina testing the patient has no pronation or drift. REFLEXES:   RIGHT UE LEFT UE   BR: 1  BR:1  Biceps:1 Biceps:1  Triceps:1 Triceps:1  RIGHT LLE  LEFT LLE   Patella:1 Patella:1  Achilles:0 Achilles:0  Babinski: plantar responses are flexor. No frontal release signs or other pathologic reflexes present. COORDINATION: In both upper extremities, finger-nose-finger was intact without dysmetria. Heel-to-knee to shin was intact.      GAIT: The patient has a difficulty with walking  He was using the hands and legs to crawl on the floor  SENSORY: Patient difficulty with the detailed sensory testing grossly since hearing evaluation was unremarkable        Principal Problem:    Failure to thrive in adult  Resolved Problems:    * No resolved hospital problems. *           IMPRESSION:  The patient has a history of gait ataxia and falls  Patient history of bilateral cerebellar and left cerebral infarctions right upper and lower extremities were weaker than the left side.   Subjectively feels that his right Dorma Evelia is the weakest  Small vessel cerebrovascular disease  Poor compliance with the medications and medical follow-ups  He had poor insight into his medical status  He was found to be COVID-positive  Tachycardia  Tachypnea  Hypertension  Hyperlipidemia  Vitamin D deficiency  Chronic kidney disease her  Rhabdomyolysis probably due to the falls  History of lumbar stenosis    Recommendations:  Patient continue patient on antiplatelet medications  Additional lab work  The patient will need long-term placement  Records symptoms seem to monitor only    Luke Duane, MD

## 2022-06-28 NOTE — PLAN OF CARE
See OT evaluation for all goals and OT POC.  Electronically signed by Elia Arriaga OTJOVANY/L on 6/28/2022 at 12:08 PM

## 2022-06-28 NOTE — PROGRESS NOTES
NEUROLOGY INPATIENT PROGRESS NOTE    Patient- Hubert Tabor    MRN -  [id number]   Acct # - [de-identified]      - 1945    77 y.o. Subjective: The patient is seen in follow-up. Patient is alert at this time. The patient more awake and alert  Oral intake improving  Moving limbs better  Cough is somewhat better  Liver enzymes have improved  Vitamin D was low, we shall supplement renal function improving    Result Data:  CBC: Recent Labs     22  0817   WBC 6.0 4.2* 4.0*   HGB 16.9 15.1 16.1    149 157     BMP:    Recent Labs     22  0817    139 134*   K 4.2 3.6 3.7    106 102   CO2 18* 18* 20   BUN 36* 31* 24*   CREATININE 1.46* 1.01 0.91   GLUCOSE 92 88 95     TSH:   Recent Labs     228   TSH 8.367     Folic Acid:   Recent Labs     22   FOLATE 10.7     B12:    Lab Results   Component Value Date    [id number] 549 2022     Vit. D: No components found for: VITAMIND  Lipids:   Recent Labs     22  05   CHOL 108   TRIG 75   HDL 35*   LDLCALC 58     Ammonia: No results for input(s): AMMONIA in the last 72 hours. LFT:   Recent Labs     22  0523   * 98*   ALT 47* 40   BILITOT 0.9* 0.6   ALKPHOS 65 54        Urine:   Recent Labs     22  0143   COLORU Yellow   PHUR 5.0   WBCUA 3-5   RBCUA 10-20*   BACTERIA Negative   CLARITYU Clear   SPECGRAV 1.024   LEUKOCYTESUR Negative   UROBILINOGEN 1.0   BILIRUBINUR Negative   BLOODU MODERATE*        Imaging    CT HEAD WO CONTRAST    Result Date: 2022    No acute abnormality. OId right cerebellar infarct. Nonspecific supratentorial tentorial white  change most commonly seen with small vessel disease. XR CHEST PORTABLE    Result Date: 2022  NO ACUTE CHANGE. SUGGESTION OF SOME PLEURAL CALCIFICATION.           Objective:   /74   Pulse 53   Temp 97.4 °F (36.3 °C) (Oral)   Resp 19   Ht 5' 8\" (1.727 m)   Wt 166 lb 14.4 oz (75.7 kg)   SpO2 100%   BMI 25.38 kg/m²       Intake/Output Summary (Last 24 hours) at 6/28/2022 1932  Last data filed at 6/28/2022 1836  Gross per 24 hour   Intake 720 ml   Output 500 ml   Net 220 ml            Physical Exam:        Medications:     mupirocin   Topical BID    aspirin  81 mg Oral Daily    metoprolol tartrate  25 mg Oral BID    amLODIPine  10 mg Oral Daily    lisinopril  20 mg Oral BID    sodium chloride flush  5-40 mL IntraVENous 2 times per day    enoxaparin  40 mg SubCUTAneous Daily    rosuvastatin  40 mg Oral Nightly                 Principal Problem:    Failure to thrive in adult  Resolved Problems:    * No resolved hospital problems. *      Patient Active Problem List   Diagnosis    [facility]    Gait abnormality    Renal disease    Myelopathy concurrent with and due to spinal stenosis of cervical region [facility])    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 ([facility] [address]    Ataxic gait    Numbness    Abnormality of gait and mobility    NSVT (nonsustained ventricular tachycardia) (HCC)    Bradycardia    Hyperlipidemia    Secondary hypertension    Diastolic heart failure (HCC)    Failure to thrive in adult       Assessment/Plan    The patient has a history of gait ataxia and falls  Patient history of bilateral cerebellar and left cerebral infarctions right upper and lower extremities were weaker than the left side.   Subjectively feels that his right Deniz Babe is the weakest  Small vessel cerebrovascular disease  Poor compliance with the medications and medical follow-ups  He had poor insight into his medical status  He was found to be COVID-positive  Tachycardia  Tachypnea  Hypertension  Hyperlipidemia  Vitamin D deficiency, he will need to stay on 5000 units daily  Chronic kidney disease function is improving  Rhabdomyolysis probably due to the falls  History of lumbar stenosis, neurosurgery evaluation requested when available     Recommendations:  Patient continue patient on antiplatelet medications  Additional lab work showed low vitamin D level, M48, folic acid level, TSH unremarkable  The patient will need long-term placement  Increase activity as tolerated        Joan Bay MD, MD, 6/28/2022 7:32 PM

## 2022-06-29 VITALS
DIASTOLIC BLOOD PRESSURE: 77 MMHG | BODY MASS INDEX: 25.29 KG/M2 | WEIGHT: 166.9 LBS | RESPIRATION RATE: 18 BRPM | TEMPERATURE: 97.5 F | OXYGEN SATURATION: 99 % | HEART RATE: 55 BPM | SYSTOLIC BLOOD PRESSURE: 159 MMHG | HEIGHT: 68 IN

## 2022-06-29 LAB
ALBUMIN SERPL-MCNC: 3.6 G/DL (ref 3.5–4.6)
ALP BLD-CCNC: 54 U/L (ref 35–104)
ALT SERPL-CCNC: 42 U/L (ref 0–41)
ANION GAP SERPL CALCULATED.3IONS-SCNC: 13 MEQ/L (ref 9–15)
AST SERPL-CCNC: 86 U/L (ref 0–40)
BASOPHILS ABSOLUTE: 0 K/UL (ref 0–0.2)
BASOPHILS RELATIVE PERCENT: 0.4 %
BILIRUB SERPL-MCNC: 0.6 MG/DL (ref 0.2–0.7)
BUN BLDV-MCNC: 22 MG/DL (ref 8–23)
CALCIUM SERPL-MCNC: 8.2 MG/DL (ref 8.5–9.9)
CHLORIDE BLD-SCNC: 103 MEQ/L (ref 95–107)
CO2: 20 MEQ/L (ref 20–31)
CREAT SERPL-MCNC: 0.86 MG/DL (ref 0.7–1.2)
EOSINOPHILS ABSOLUTE: 0 K/UL (ref 0–0.7)
EOSINOPHILS RELATIVE PERCENT: 0.7 %
GFR AFRICAN AMERICAN: >60
GFR NON-AFRICAN AMERICAN: >60
GLOBULIN: 2.9 G/DL (ref 2.3–3.5)
GLUCOSE BLD-MCNC: 93 MG/DL (ref 70–99)
HCT VFR BLD CALC: 46.3 % (ref 42–52)
HEMOGLOBIN: 15.8 G/DL (ref 14–18)
LYMPHOCYTES ABSOLUTE: 0.7 K/UL (ref 1–4.8)
LYMPHOCYTES RELATIVE PERCENT: 18 %
MCH RBC QN AUTO: 31.6 PG (ref 27–31.3)
MCHC RBC AUTO-ENTMCNC: 34.1 % (ref 33–37)
MCV RBC AUTO: 92.6 FL (ref 80–100)
MONOCYTES ABSOLUTE: 0.2 K/UL (ref 0.2–0.8)
MONOCYTES RELATIVE PERCENT: 5.8 %
MYELOCYTE PERCENT: 2 %
NEUTROPHILS ABSOLUTE: 2.8 K/UL (ref 1.4–6.5)
NEUTROPHILS RELATIVE PERCENT: 74 %
PDW BLD-RTO: 12.9 % (ref 11.5–14.5)
PLATELET # BLD: 132 K/UL (ref 130–400)
PLATELET SLIDE REVIEW: ABNORMAL
POTASSIUM SERPL-SCNC: 3.9 MEQ/L (ref 3.4–4.9)
PROCALCITONIN: 0.13 NG/ML (ref 0–0.15)
RBC # BLD: 4.99 M/UL (ref 4.7–6.1)
RBC # BLD: NORMAL 10*6/UL
SMUDGE CELLS: 6.7
SODIUM BLD-SCNC: 136 MEQ/L (ref 135–144)
TOTAL CK: 210 U/L (ref 0–190)
TOTAL PROTEIN: 6.5 G/DL (ref 6.3–8)
WBC # BLD: 3.7 K/UL (ref 4.8–10.8)

## 2022-06-29 PROCEDURE — 82550 ASSAY OF CK (CPK): CPT

## 2022-06-29 PROCEDURE — 85025 COMPLETE CBC W/AUTO DIFF WBC: CPT

## 2022-06-29 PROCEDURE — 6360000002 HC RX W HCPCS: Performed by: INTERNAL MEDICINE

## 2022-06-29 PROCEDURE — 6370000000 HC RX 637 (ALT 250 FOR IP): Performed by: INTERNAL MEDICINE

## 2022-06-29 PROCEDURE — 80053 COMPREHEN METABOLIC PANEL: CPT

## 2022-06-29 PROCEDURE — 36415 COLL VENOUS BLD VENIPUNCTURE: CPT

## 2022-06-29 PROCEDURE — 6370000000 HC RX 637 (ALT 250 FOR IP): Performed by: SPECIALIST

## 2022-06-29 PROCEDURE — 2580000003 HC RX 258: Performed by: INTERNAL MEDICINE

## 2022-06-29 RX ADMIN — ASPIRIN 81 MG: 81 TABLET, CHEWABLE ORAL at 08:26

## 2022-06-29 RX ADMIN — ENOXAPARIN SODIUM 40 MG: 100 INJECTION SUBCUTANEOUS at 08:27

## 2022-06-29 RX ADMIN — AMLODIPINE BESYLATE 10 MG: 10 TABLET ORAL at 08:26

## 2022-06-29 RX ADMIN — LISINOPRIL 20 MG: 20 TABLET ORAL at 08:26

## 2022-06-29 RX ADMIN — MUPIROCIN: 20 OINTMENT TOPICAL at 08:27

## 2022-06-29 RX ADMIN — SODIUM CHLORIDE, PRESERVATIVE FREE 10 ML: 5 INJECTION INTRAVENOUS at 08:26

## 2022-06-29 RX ADMIN — Medication 5000 UNITS: at 08:26

## 2022-06-29 RX ADMIN — METOPROLOL TARTRATE 25 MG: 25 TABLET, FILM COATED ORAL at 08:26

## 2022-06-29 ASSESSMENT — PAIN - FUNCTIONAL ASSESSMENT
PAIN_FUNCTIONAL_ASSESSMENT: ACTIVITIES ARE NOT PREVENTED

## 2022-06-29 ASSESSMENT — PAIN DESCRIPTION - LOCATION
LOCATION: GENERALIZED

## 2022-06-29 ASSESSMENT — PAIN SCALES - GENERAL
PAINLEVEL_OUTOF10: 0

## 2022-06-29 ASSESSMENT — PAIN DESCRIPTION - DESCRIPTORS
DESCRIPTORS: DULL;DISCOMFORT

## 2022-06-29 NOTE — DISCHARGE INSTR - DIET

## 2022-06-29 NOTE — CARE COORDINATION
LSW called [address] admissions and spoke with [facility]. Referral information faxed to [phone] for her to review. Call back from [facility] who said they are able to accept patient. No pre cert is needed. [facility] transportation scheduled for today at 4:00. Nurse and facility informed. [id number] completed.

## 2022-06-29 NOTE — DISCHARGE INSTR - COC
Continuity of Care Form    Patient Name: Jack Ballard   :    MRN:  [id number]    [facility] date:  2022  Discharge date:  2022    Code Status Order: Full Code   Advance Directives:      Admitting Physician:  Marta Rivera DO  PCP: Maira Ramirez MD    Discharging Nurse: [address] Unit/Room#: -05  Discharging Unit Phone Number: [phone]    Emergency Contact:   Extended Emergency Contact Information  Primary Emergency Contact: [address]  Mobile Phone: [phone]  Relation: Brother/Sister    Past Surgical History:  Past Surgical History:   Procedure Laterality Date    BACK SURGERY         Immunization History:   Immunization History   Administered Date(s) Administered    Tdap (Boostrix, Adacel) 2019       Active Problems:  Patient Active Problem List   Diagnosis Code    Stroke determined by clinical assessment ([facility] [address] I63.9    Gait abnormality R26.9    Renal disease N28.9    Myelopathy concurrent with and due to spinal stenosis of cervical region ([facility] [address] M48.02, G99.2    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 (HCC) I63.441    Ataxic gait R26.0    Numbness R20.0    Abnormality of gait and mobility R26.9    NSVT (nonsustained ventricular tachycardia) ([facility] [address] I47.2    Bradycardia R00.1    Hyperlipidemia E78.5    Secondary hypertension N63.4    Diastolic heart failure ([facility] [address] I50.30    Failure to thrive in adult R62.7       Isolation/Infection:   Isolation            Droplet Plus          Patient Infection Status       Infection Onset Added Last Indicated Last Indicated By Review Planned Expiration Resolved Resolved By    COVID-19 22 COVID-19, Rapid 07/03/22 07/10/22      Resolved    COVID-19 (Rule Out) 22 COVID-19, Rapid (Ordered)   22 Rule-Out Test Resulted    COVID-19 (Rule Out) 21 COVID-19 (Ordered)   21 Rule-Out Test Resulted    COVID-19 (Rule Out) 09/10/21 09/10/21 09/10/21 COVID-19, Rapid (Ordered)   09/10/21 Rule-Out Test Resulted            Nurse Assessment:  Last Vital Signs: /74   Pulse 54   Temp 97.5 °F (36.4 °C) (Oral)   Resp 18   Ht 5' 8\" (1.727 m)   Wt 166 lb 14.4 oz (75.7 kg)   SpO2 100%   BMI 25.38 kg/m²     Last documented pain score (0-10 scale): Pain Level: 0  Last Weight:   Wt Readings from Last 1 Encounters:   06/28/22 166 lb 14.4 oz (75.7 kg)     Mental Status:  oriented and alert    IV Access:  - None    Nursing Mobility/ADLs:  Walking   Dependent  Transfer  Dependent  Bathing  Dependent  Dressing  Assisted  Toileting  Dependent  Feeding  Assisted  Med Admin  Assisted  Med Delivery   crushed and prefers mixed with applesauce    Wound Care Documentation and Therapy:        Elimination:  Continence: Bowel: No  Bladder: No  Urinary Catheter: Insertion Date: 6/27/2022    Colostomy/Ileostomy/Ileal Conduit: No       Date of Last BM: Incontinent today 6/29/2022    Intake/Output Summary (Last 24 hours) at 6/29/2022 1052  Last data filed at 6/29/2022 0900  Gross per 24 hour   Intake 720 ml   Output 650 ml   Net 70 ml     I/O last 3 completed shifts: In: 5 [P.O.:720]  Out: 1150 [Urine:1150]    Safety Concerns:      At Risk for Falls and isolation COVID    Impairments/Disabilities:      None    Nutrition Therapy:  Current Nutrition Therapy:   - Oral Diet:  Dysphagia 2 mechanically altered minced/moist    Routes of Feeding: Oral  Liquids: No Restrictions  Daily Fluid Restriction: no  Last Modified Barium Swallow with Video (Video Swallowing Test): not done    Treatments at the Time of Hospital Discharge:   Respiratory Treatments: ***  Oxygen Therapy:   Ventilator:    - No ventilator support    Rehab Therapies: Physical Therapy and Occupational Therapy  Weight Bearing Status/Restrictions: No weight bearing restrictions  Other Medical Equipment (for information only, NOT a DME order):  walker and bedside commode  Other Treatments: ***    Patient's personal belongings (please select all that are sent with patient):  None    RN SIGNATURE:  {Esiature:[id number]}    CASE MANAGEMENT/SOCIAL WORK SECTION    Inpatient Status Date: 6/26/22    Readmission Risk Assessment Score:  Readmission Risk              Risk of Unplanned Readmission:  17           Discharging to Facility/ Agency   Name: [facility]  Address:  Phone: [phone]  Fax:    Dialysis Facility (if applicable)   Name:  Address:  Dialysis Schedule:  Phone:  Fax:    / signature: Electronically signed by SANJAY Cantu on 6/29/22 at 10:53 AM EDT    PHYSICIAN SECTION    Prognosis: Fair    Condition at Discharge: Stable    Rehab Potential (if transferring to Rehab): Good    Recommended Labs or Other Treatments After Discharge: N/A    Physician Certification: I certify the above information and transfer of Jim Benoit  is necessary for the continuing treatment of the diagnosis listed and that he requires [place] for either < or > 30 days, pending clinical course.      Update Admission H&P: No change in H&P    PHYSICIAN SIGNATURE:  Electronically signed by Dhruv Doshi DO on 6/29/22 at 12:02 PM EDT

## 2022-06-29 NOTE — DISCHARGE SUMMARY
Discharge Summary    Date: 6/29/2022  Patient Name: Leela Valdez YOB: 1945 Age: 77 y.o. Admit Date: 6/26/2022  Discharge Date: 6/29/2022  Discharge Condition: [address]    Admission Diagnosis  Generalized weakness (R53.1); Elevated troponin (R77.8); Failure to thrive in adult (R62.7);FTT (failure to thrive) in adult (R62.7); LORI (acute kidney injury) ([facility] [address] (N17.9); Non-traumatic rhabdomyolysis (M62.82);COVID-19 (U07.1)     Discharge Diagnosis  Principal Problem: Failure to thrive in adultResolved Problems: * No resolved hospital problems. [facility] Stay  Narrative of Hospital Course:  Patient is a 67-year-old male admitted 6/26/2022 in setting of weakness with falls at home. He has a prior history of spinal stenosis. He was additionally found to be incidentally asymptomatic COVID-positive. He had multiple wounds on the extensor surfaces of his arms and legs from crawling around on the floor at home when he was unable to rise to a fully standing position prior to admission. Wound care followed during the admission, as did neurology. Neurosurgery was unavailable at the time of his admission at this facility. Patient demonstrated slow improvement from a weakness standpoint, but was still not able to ambulate independently and required ongoing therapy and placement at time of discharge. He was additionally recommended to follow-up in short interval outpatient with neurosurgery for reevaluation of his known spinal stenosis, and consideration of possible surgical intervention. He remained entirely asymptomatic from [address], and as such fell under the 5 days quarantine followed by 5 days masking rule per CDC and IDSA guidelines. He was deemed sufficiently improved and appropriate for discharge to COVID-positive unit of local SNF on 6/29/2022 for additional therapy.     Consultants:  IP CONSULT TO CASE [facility] CONSULT TO [facility] [address] CONSULT TO SOCIAL WORKIP CONSULT TO [facility] CONSULT TO NEUROLOGYIP CONSULT TO NEUROSURGERY    Surgeries/procedures Performed:       Treatments:            Discharge Plan/Disposition:  To [facility]    Hospital/Incidental Findings Requiring Follow Up:    Patient Instructions:    Diet:    Activity:  For number of days (if applicable): Other Instructions:    Provider Follow-Up:   No follow-ups on file. Significant Diagnostic Studies:    Recent Labs:  Admission on 06/26/2022No results displayed because visit has over 200 results. ------------    Radiology last 7 days:  CT HEAD WO CONTRASTResult Date: 6/26/2022  No acute abnormality. OId right cerebellar infarct. Nonspecific supratentorial tentorial white  change most commonly seen with small vessel disease. XR CHEST PORTABLEResult Date: 6/27/2022NO ACUTE CHANGE. SUGGESTION OF SOME PLEURAL CALCIFICATION. Pending Labs   Order Current Status  Legionella antigen, urine Collected (06/27/22 0137)  Strep Pneumoniae Antigen Collected (06/27/22 2904)  Urinalysis Collected (06/27/22 0137)      Discharge Medications    Current Discharge Medication List    Current Discharge Medication List    Current Discharge Medication ListCONTINUE these medications which have NOT CHANGEDaspirin 81 MG chewable tabletTake 1 tablet by mouth dailyQty: 30 tablet Refills: 3atorvastatin (LIPITOR) 80 MG tabletTake 1 tablet by mouth nightlyQty: 30 tablet Refills: 3lisinopril (PRINIVIL;ZESTRIL) 20 MG tabletTake 1 tablet by mouth 2 times dailyQty: 30 tablet Refills: 3metoprolol tartrate (LOPRESSOR) 25 MG tabletTake 1 tablet by mouth 2 times dailyQty: 60 tablet Refills: 3QUEtiapine (SEROQUEL) 25 MG tabletTake 1 tablet by mouth 2 times daily as needed for AgitationQty: 60 tablet Refills: 0Vitamin D (CHOLECALCIFEROL) 50 MCG (2000 UT) TABS tabletTake 1 tablet by mouth Daily with supperQty: 30 tablet Refills: 0Comments: Labeling may look different. 25 mcg=1000 Units. Please double check dosages. amLODIPine (NORVASC) 10 MG tabletTake 1 tablet by mouth dailyQty: 30 tablet Refills: 5    Current Discharge Medication List    Time Spent on Discharge:3E] minutes were spent in patient examination, evaluation, counseling as well as medication reconciliation, prescriptions for required medications, discharge plan, and follow up.     Electronically signed by Rickie Muhammad DO on 6/29/22 at 12:00 PM EDT

## 2024-10-16 NOTE — PROGRESS NOTES
planes and vcs for initiation, tech [place] for proper body mechs                                                                                   Plan   Plan  Times per week: 5-7x  Plan weeks: 2 weeks  Current Treatment Recommendations: Strengthening, Functional Mobility Training, Balance Training, Neuromuscular Re-education, Cognitive/Perceptual Training, Safety Education & Training, Patient/Caregiver Education & Training, Equipment Evaluation, Education, & procurement, Self-Care / ADL, Home Management Training  Plan Comment: continue with current POC    Goals  Long term goals  Time Frame for Long term goals :  Within 2 weeks, pt to demonstrate progress in the following areas listed below to achieve specific LTG's as stated in initial evaluation  Long term goal 1: Improve independence during ADLs/IADLs  Long term goal 2: Improve strength and endurance to perform functional transfers  Long term goal 3: Improve fine motor coordination during self care  Long term goal 4: Improve cognition to demonstrate understanding of HEP  Long term goal 5: improve balance to safely perform functional mobility  Patient Goals   Patient goals : \"to get better\"       Therapy Time   Individual Concurrent Group Co-treatment   Time In 1000         Time Out 1100         Minutes 60              Therapeutic activities: 60 minutes      JASKARAN Cantu Electronically signed by JASKARAN Cantu on 9/24/2021 at 11:46 AM Detail Level: Detailed Depth Of Biopsy: dermis Was A Bandage Applied: Yes Size Of Lesion In Cm: 0.2 X Size Of Lesion In Cm: 0 Biopsy Type: H and E Biopsy Method: Dermablade Anesthesia Type: 1% lidocaine with epinephrine Anesthesia Volume In Cc: 0.5 Hemostasis: Drysol Wound Care: Petrolatum Dressing: bandage Destruction After The Procedure: No Type Of Destruction Used: Curettage Curettage Text: The wound bed was treated with curettage after the biopsy was performed. Cryotherapy Text: The wound bed was treated with cryotherapy after the biopsy was performed. Electrodesiccation Text: The wound bed was treated with electrodesiccation after the biopsy was performed. Electrodesiccation And Curettage Text: The wound bed was treated with electrodesiccation and curettage after the biopsy was performed. Silver Nitrate Text: The wound bed was treated with silver nitrate after the biopsy was performed. Lab: [id number] Consent: Written consent was obtained and risks were reviewed including but not limited to scarring, infection, bleeding, scabbing, incomplete removal, nerve damage and allergy to anesthesia. Post-Care Instructions: I reviewed with the patient in detail post-care instructions. Patient is to keep the biopsy site dry overnight, and then apply bacitracin twice daily until healed. Patient may apply hydrogen peroxide soaks to remove any crusting. Notification Instructions: Patient will be notified of biopsy results. However, patient instructed to call the office if not contacted within 2 weeks. Billing Type: Third-Party Bill Information: Selecting Yes will display possible errors in your note based on the variables you have selected. This validation is only offered as a suggestion for you. PLEASE NOTE THAT THE VALIDATION TEXT WILL BE REMOVED WHEN YOU FINALIZE YOUR NOTE. IF YOU WANT TO FAX A PRELIMINARY NOTE YOU WILL NEED TO TOGGLE THIS TO 'NO' IF YOU DO NOT WANT IT IN YOUR FAXED NOTE.